# Patient Record
Sex: MALE | Race: WHITE | NOT HISPANIC OR LATINO | Employment: OTHER | URBAN - NONMETROPOLITAN AREA
[De-identification: names, ages, dates, MRNs, and addresses within clinical notes are randomized per-mention and may not be internally consistent; named-entity substitution may affect disease eponyms.]

---

## 2023-04-04 ENCOUNTER — TELEPHONE (OUTPATIENT)
Dept: PRIMARY CARE | Facility: CLINIC | Age: 88
End: 2023-04-04
Payer: MEDICARE

## 2023-04-04 RX ORDER — ROSUVASTATIN CALCIUM 40 MG/1
40 TABLET, COATED ORAL DAILY
COMMUNITY
End: 2023-05-08 | Stop reason: SDUPTHER

## 2023-04-04 RX ORDER — PANTOPRAZOLE SODIUM 40 MG/1
TABLET, DELAYED RELEASE ORAL 2 TIMES DAILY
COMMUNITY
Start: 2022-02-21 | End: 2023-09-20 | Stop reason: ALTCHOICE

## 2023-04-04 RX ORDER — TORSEMIDE 20 MG/1
1 TABLET ORAL DAILY
COMMUNITY
Start: 2023-01-01 | End: 2023-05-08

## 2023-04-04 RX ORDER — APIXABAN 2.5 MG/1
2.5 TABLET, FILM COATED ORAL 2 TIMES DAILY
COMMUNITY
End: 2023-09-20 | Stop reason: SDUPTHER

## 2023-04-04 RX ORDER — TAMSULOSIN HYDROCHLORIDE 0.4 MG/1
CAPSULE ORAL
COMMUNITY
Start: 2023-01-01 | End: 2023-04-06 | Stop reason: ALTCHOICE

## 2023-04-04 RX ORDER — POLYETHYLENE GLYCOL 3350 17 G/17G
POWDER, FOR SOLUTION ORAL
COMMUNITY
Start: 2020-07-09 | End: 2023-09-20 | Stop reason: ALTCHOICE

## 2023-04-04 RX ORDER — FERROUS SULFATE 325(65) MG
1 TABLET ORAL
COMMUNITY
Start: 2022-02-21 | End: 2023-09-20 | Stop reason: ALTCHOICE

## 2023-04-04 RX ORDER — METOPROLOL SUCCINATE 25 MG/1
1 TABLET, EXTENDED RELEASE ORAL DAILY
COMMUNITY
End: 2023-05-08 | Stop reason: SDUPTHER

## 2023-04-04 RX ORDER — COLCHICINE 0.6 MG/1
CAPSULE ORAL
COMMUNITY
Start: 2023-02-08 | End: 2023-04-04

## 2023-04-04 RX ORDER — LISINOPRIL 10 MG/1
1 TABLET ORAL
COMMUNITY
End: 2023-05-08 | Stop reason: SDUPTHER

## 2023-04-04 RX ORDER — FUROSEMIDE 40 MG/1
1 TABLET ORAL DAILY
COMMUNITY
End: 2023-04-06 | Stop reason: ALTCHOICE

## 2023-04-04 NOTE — TELEPHONE ENCOUNTER
Transition of Care    Inpatient facility: [facility]  Discharge diagnosis: Syncope and Collapse  Discharged to: Home  Discharge date: 4/3/23  Initial Call date: 4/4/23  Spoke with patient/caregiver: Caregiver                                                                     Do you need assistance  visits prior to your PCP visit: No  Home health care ordered: No  Have you been contacted by home care and have a start of care date: No  Are you taking medications as prescribed at discharge: No    Referral to [facility] Pharmacist: No  Patient advised to bring all medications to PCP follow-up appointment.  Patient advised to follow discharge instructions until provider follow-up.  TCM visit date: 4/6/23  TCM provider visit with: Beronica Adames MD

## 2023-04-05 ENCOUNTER — DOCUMENTATION (OUTPATIENT)
Dept: CARE COORDINATION | Facility: CLINIC | Age: 88
End: 2023-04-05
Payer: MEDICARE

## 2023-04-06 ENCOUNTER — OFFICE VISIT (OUTPATIENT)
Dept: PRIMARY CARE | Facility: CLINIC | Age: 88
End: 2023-04-06
Payer: MEDICARE

## 2023-04-06 VITALS
HEART RATE: 52 BPM | WEIGHT: 173.2 LBS | SYSTOLIC BLOOD PRESSURE: 118 MMHG | TEMPERATURE: 97.3 F | DIASTOLIC BLOOD PRESSURE: 68 MMHG | OXYGEN SATURATION: 98 % | BODY MASS INDEX: 27.13 KG/M2

## 2023-04-06 DIAGNOSIS — I25.83 CORONARY ARTERY DISEASE DUE TO LIPID RICH PLAQUE: ICD-10-CM

## 2023-04-06 DIAGNOSIS — I50.9 OTHER CONGESTIVE HEART FAILURE (MULTI): ICD-10-CM

## 2023-04-06 DIAGNOSIS — Z78.9 ALCOHOL USE: ICD-10-CM

## 2023-04-06 DIAGNOSIS — R55 SYNCOPE, UNSPECIFIED SYNCOPE TYPE: Primary | ICD-10-CM

## 2023-04-06 DIAGNOSIS — I48.11 LONGSTANDING PERSISTENT ATRIAL FIBRILLATION (MULTI): ICD-10-CM

## 2023-04-06 DIAGNOSIS — D46.4 REFRACTORY ANEMIA, UNSPECIFIED (MULTI): ICD-10-CM

## 2023-04-06 DIAGNOSIS — N18.31 STAGE 3A CHRONIC KIDNEY DISEASE (MULTI): ICD-10-CM

## 2023-04-06 DIAGNOSIS — I25.10 CORONARY ARTERY DISEASE DUE TO LIPID RICH PLAQUE: ICD-10-CM

## 2023-04-06 PROBLEM — I48.91 ATRIAL FIBRILLATION (MULTI): Status: ACTIVE | Noted: 2023-04-06

## 2023-04-06 PROBLEM — N40.0 BPH (BENIGN PROSTATIC HYPERPLASIA): Status: ACTIVE | Noted: 2023-04-06

## 2023-04-06 PROBLEM — I10 HYPERTENSION: Status: ACTIVE | Noted: 2023-04-06

## 2023-04-06 PROBLEM — Z95.810 AICD (AUTOMATIC CARDIOVERTER/DEFIBRILLATOR) PRESENT: Status: ACTIVE | Noted: 2023-04-06

## 2023-04-06 PROBLEM — E87.6 HYPOKALEMIA: Status: ACTIVE | Noted: 2023-04-06

## 2023-04-06 PROBLEM — N18.30 CKD (CHRONIC KIDNEY DISEASE), STAGE III (MULTI): Status: ACTIVE | Noted: 2023-04-06

## 2023-04-06 PROBLEM — I47.20 VENTRICULAR TACHYCARDIA (MULTI): Status: ACTIVE | Noted: 2023-04-06

## 2023-04-06 PROBLEM — I63.511 ACUTE RIGHT MCA STROKE (MULTI): Status: ACTIVE | Noted: 2023-04-06

## 2023-04-06 PROCEDURE — 99496 TRANSJ CARE MGMT HIGH F2F 7D: CPT | Performed by: INTERNAL MEDICINE

## 2023-04-06 PROCEDURE — 1160F RVW MEDS BY RX/DR IN RCRD: CPT | Performed by: INTERNAL MEDICINE

## 2023-04-06 PROCEDURE — 1036F TOBACCO NON-USER: CPT | Performed by: INTERNAL MEDICINE

## 2023-04-06 PROCEDURE — 1159F MED LIST DOCD IN RCRD: CPT | Performed by: INTERNAL MEDICINE

## 2023-04-06 PROCEDURE — 3074F SYST BP LT 130 MM HG: CPT | Performed by: INTERNAL MEDICINE

## 2023-04-06 PROCEDURE — 3078F DIAST BP <80 MM HG: CPT | Performed by: INTERNAL MEDICINE

## 2023-04-06 ASSESSMENT — ENCOUNTER SYMPTOMS
BLOOD IN STOOL: 0
SORE THROAT: 0
DIZZINESS: 0
ARTHRALGIAS: 0
COUGH: 0
DIARRHEA: 0
DIFFICULTY URINATING: 0
PALPITATIONS: 0
BRUISES/BLEEDS EASILY: 0
FATIGUE: 0
ABDOMINAL PAIN: 0
WHEEZING: 0
FEVER: 0
HEADACHES: 0
UNEXPECTED WEIGHT CHANGE: 0
SINUS PAIN: 0
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
## 2023-04-17 ENCOUNTER — PATIENT OUTREACH (OUTPATIENT)
Dept: CARE COORDINATION | Facility: CLINIC | Age: 88
End: 2023-04-17
Payer: MEDICARE

## 2023-05-08 ENCOUNTER — LAB (OUTPATIENT)
Dept: LAB | Facility: LAB | Age: 88
End: 2023-05-08
Payer: MEDICARE

## 2023-05-08 ENCOUNTER — OFFICE VISIT (OUTPATIENT)
Dept: PRIMARY CARE | Facility: CLINIC | Age: 88
End: 2023-05-08
Payer: MEDICARE

## 2023-05-08 VITALS
TEMPERATURE: 97.4 F | HEART RATE: 74 BPM | WEIGHT: 169.6 LBS | DIASTOLIC BLOOD PRESSURE: 62 MMHG | BODY MASS INDEX: 26.56 KG/M2 | SYSTOLIC BLOOD PRESSURE: 110 MMHG | OXYGEN SATURATION: 95 %

## 2023-05-08 DIAGNOSIS — I10 HYPERTENSION, UNSPECIFIED TYPE: ICD-10-CM

## 2023-05-08 DIAGNOSIS — Z79.899 HIGH RISK MEDICATION USE: ICD-10-CM

## 2023-05-08 DIAGNOSIS — I50.9 OTHER CONGESTIVE HEART FAILURE (MULTI): ICD-10-CM

## 2023-05-08 DIAGNOSIS — Z95.810 AICD (AUTOMATIC CARDIOVERTER/DEFIBRILLATOR) PRESENT: ICD-10-CM

## 2023-05-08 DIAGNOSIS — I48.11 LONGSTANDING PERSISTENT ATRIAL FIBRILLATION (MULTI): ICD-10-CM

## 2023-05-08 DIAGNOSIS — E87.6 HYPOKALEMIA: ICD-10-CM

## 2023-05-08 DIAGNOSIS — R55 SYNCOPE, UNSPECIFIED SYNCOPE TYPE: ICD-10-CM

## 2023-05-08 DIAGNOSIS — I25.83 CORONARY ARTERY DISEASE DUE TO LIPID RICH PLAQUE: ICD-10-CM

## 2023-05-08 DIAGNOSIS — I10 HYPERTENSION, UNSPECIFIED TYPE: Primary | ICD-10-CM

## 2023-05-08 DIAGNOSIS — I25.10 CORONARY ARTERY DISEASE DUE TO LIPID RICH PLAQUE: ICD-10-CM

## 2023-05-08 LAB
ANION GAP IN SER/PLAS: 15 MMOL/L (ref 10–20)
BASOPHILS (10*3/UL) IN BLOOD BY AUTOMATED COUNT: 0.01 X10E9/L (ref 0–0.1)
BASOPHILS/100 LEUKOCYTES IN BLOOD BY AUTOMATED COUNT: 0.3 % (ref 0–2)
CALCIUM (MG/DL) IN SER/PLAS: 8.8 MG/DL (ref 8.6–10.3)
CARBON DIOXIDE, TOTAL (MMOL/L) IN SER/PLAS: 27 MMOL/L (ref 21–32)
CHLORIDE (MMOL/L) IN SER/PLAS: 102 MMOL/L (ref 98–107)
CREATININE (MG/DL) IN SER/PLAS: 1.26 MG/DL (ref 0.5–1.3)
EOSINOPHILS (10*3/UL) IN BLOOD BY AUTOMATED COUNT: 0.19 X10E9/L (ref 0–0.4)
EOSINOPHILS/100 LEUKOCYTES IN BLOOD BY AUTOMATED COUNT: 5.3 % (ref 0–6)
ERYTHROCYTE DISTRIBUTION WIDTH (RATIO) BY AUTOMATED COUNT: 17.6 % (ref 11.5–14.5)
ERYTHROCYTE MEAN CORPUSCULAR HEMOGLOBIN CONCENTRATION (G/DL) BY AUTOMATED: 30.1 G/DL (ref 32–36)
ERYTHROCYTE MEAN CORPUSCULAR VOLUME (FL) BY AUTOMATED COUNT: 86 FL (ref 80–100)
ERYTHROCYTES (10*6/UL) IN BLOOD BY AUTOMATED COUNT: 4.44 X10E12/L (ref 4.5–5.9)
GFR MALE: 56 ML/MIN/1.73M2
GLUCOSE (MG/DL) IN SER/PLAS: 88 MG/DL (ref 74–99)
HEMATOCRIT (%) IN BLOOD BY AUTOMATED COUNT: 38.2 % (ref 41–52)
HEMOGLOBIN (G/DL) IN BLOOD: 11.5 G/DL (ref 13.5–17.5)
IMMATURE GRANULOCYTES/100 LEUKOCYTES IN BLOOD BY AUTOMATED COUNT: 0.3 % (ref 0–0.9)
LEUKOCYTES (10*3/UL) IN BLOOD BY AUTOMATED COUNT: 3.6 X10E9/L (ref 4.4–11.3)
LYMPHOCYTES (10*3/UL) IN BLOOD BY AUTOMATED COUNT: 0.6 X10E9/L (ref 0.8–3)
LYMPHOCYTES/100 LEUKOCYTES IN BLOOD BY AUTOMATED COUNT: 16.7 % (ref 13–44)
MAGNESIUM (MG/DL) IN SER/PLAS: 1.99 MG/DL (ref 1.6–2.4)
MONOCYTES (10*3/UL) IN BLOOD BY AUTOMATED COUNT: 0.42 X10E9/L (ref 0.05–0.8)
MONOCYTES/100 LEUKOCYTES IN BLOOD BY AUTOMATED COUNT: 11.7 % (ref 2–10)
NEUTROPHILS (10*3/UL) IN BLOOD BY AUTOMATED COUNT: 2.37 X10E9/L (ref 1.6–5.5)
NEUTROPHILS/100 LEUKOCYTES IN BLOOD BY AUTOMATED COUNT: 65.7 % (ref 40–80)
PLATELETS (10*3/UL) IN BLOOD AUTOMATED COUNT: 132 X10E9/L (ref 150–450)
POTASSIUM (MMOL/L) IN SER/PLAS: 3.8 MMOL/L (ref 3.5–5.3)
SODIUM (MMOL/L) IN SER/PLAS: 140 MMOL/L (ref 136–145)
UREA NITROGEN (MG/DL) IN SER/PLAS: 14 MG/DL (ref 6–23)

## 2023-05-08 PROCEDURE — 1036F TOBACCO NON-USER: CPT | Performed by: INTERNAL MEDICINE

## 2023-05-08 PROCEDURE — 3074F SYST BP LT 130 MM HG: CPT | Performed by: INTERNAL MEDICINE

## 2023-05-08 PROCEDURE — 83735 ASSAY OF MAGNESIUM: CPT

## 2023-05-08 PROCEDURE — 85025 COMPLETE CBC W/AUTO DIFF WBC: CPT

## 2023-05-08 PROCEDURE — 36415 COLL VENOUS BLD VENIPUNCTURE: CPT

## 2023-05-08 PROCEDURE — 1160F RVW MEDS BY RX/DR IN RCRD: CPT | Performed by: INTERNAL MEDICINE

## 2023-05-08 PROCEDURE — 80048 BASIC METABOLIC PNL TOTAL CA: CPT

## 2023-05-08 PROCEDURE — 1159F MED LIST DOCD IN RCRD: CPT | Performed by: INTERNAL MEDICINE

## 2023-05-08 PROCEDURE — 99214 OFFICE O/P EST MOD 30 MIN: CPT | Performed by: INTERNAL MEDICINE

## 2023-05-08 PROCEDURE — 3078F DIAST BP <80 MM HG: CPT | Performed by: INTERNAL MEDICINE

## 2023-05-08 RX ORDER — TORSEMIDE 20 MG/1
TABLET ORAL
Qty: 90 TABLET | Refills: 0 | Status: SHIPPED | OUTPATIENT
Start: 2023-05-08 | End: 2023-09-20 | Stop reason: SDUPTHER

## 2023-05-08 RX ORDER — METOPROLOL SUCCINATE 25 MG/1
25 TABLET, EXTENDED RELEASE ORAL DAILY
Qty: 90 TABLET | Refills: 0 | Status: SHIPPED | OUTPATIENT
Start: 2023-05-08 | End: 2023-09-20 | Stop reason: SDUPTHER

## 2023-05-08 RX ORDER — LISINOPRIL 2.5 MG/1
TABLET ORAL
Qty: 19 TABLET | Refills: 0 | Status: SHIPPED | OUTPATIENT
Start: 2023-05-08 | End: 2023-09-20 | Stop reason: SDUPTHER

## 2023-05-08 RX ORDER — ROSUVASTATIN CALCIUM 40 MG/1
40 TABLET, COATED ORAL DAILY
Qty: 90 TABLET | Refills: 0 | Status: SHIPPED | OUTPATIENT
Start: 2023-05-08 | End: 2023-09-20 | Stop reason: SDUPTHER

## 2023-05-08 RX ORDER — LISINOPRIL 10 MG/1
10 TABLET ORAL DAILY
Qty: 90 TABLET | Refills: 0 | Status: SHIPPED | OUTPATIENT
Start: 2023-05-08 | End: 2023-09-20 | Stop reason: ALTCHOICE

## 2023-05-08 ASSESSMENT — ENCOUNTER SYMPTOMS
COUGH: 0
ARTHRALGIAS: 0
BRUISES/BLEEDS EASILY: 0
WHEEZING: 0
BLOOD IN STOOL: 0
DIZZINESS: 0
UNEXPECTED WEIGHT CHANGE: 0
HEADACHES: 0
FEVER: 0
DIFFICULTY URINATING: 0
DIARRHEA: 1
ABDOMINAL PAIN: 0
FATIGUE: 0
SYNCOPE: 1
SINUS PAIN: 0
PALPITATIONS: 0
SORE THROAT: 0

## 2023-05-08 NOTE — PROGRESS NOTES
Subjective   Patient ID: Gilmer Quiñonez is a 87 y.o. male who presents for Syncope and Diarrhea.    -Syncope  No recurrence continue with increased fluid intake  Follow-up CBC and BMP magnesium  -Defibrillator placement awaiting device check  -Patient counseled about alcohol abstinence possible passing out after drinking  - Coronary artery disease multiple stent placement dilated cardiomyopathy status post AICD compensated  - History of stroke no residual effect  -Benign prostatic hypertrophy symptoms are controlled  - Chronic kidney disease improved due to underlying diuretic use  -Chronic atrial fibrillation rate controlled continue with metoprolol and apixaban  -Hypercholesteremia continue with Crestor daily  Follow-up CBC BMP in 4 weeks reevaluate patient in 4 weeks      Syncope  Pertinent negatives include no abdominal pain, chest pain, dizziness, fever, headaches or palpitations.   Diarrhea   Pertinent negatives include no abdominal pain, arthralgias, coughing, fever or headaches.          Review of Systems   Constitutional:  Negative for fatigue, fever and unexpected weight change.   HENT:  Negative for congestion, ear discharge, ear pain, mouth sores, sinus pain and sore throat.    Eyes:  Negative for visual disturbance.   Respiratory:  Negative for cough and wheezing.    Cardiovascular:  Positive for syncope. Negative for chest pain, palpitations and leg swelling.   Gastrointestinal:  Positive for diarrhea. Negative for abdominal pain and blood in stool.   Genitourinary:  Negative for difficulty urinating.   Musculoskeletal:  Negative for arthralgias.   Skin:  Negative for rash.   Neurological:  Negative for dizziness and headaches.   Hematological:  Does not bruise/bleed easily.   Psychiatric/Behavioral:  Negative for behavioral problems.    All other systems reviewed and are negative.      Objective   Lab Results   Component Value Date    HGBA1C 5.8 10/31/2020      /62   Pulse 74   Temp 36.3 °C  (97.4 °F)   Wt 76.9 kg (169 lb 9.6 oz)   SpO2 95%   BMI 26.56 kg/m²     Lab Results   Component Value Date    WBC 3.6 (L) 05/08/2023    HGB 11.5 (L) 05/08/2023    HCT 38.2 (L) 05/08/2023     (L) 05/08/2023    CHOL 127 10/31/2020    TRIG 60 10/31/2020    HDL 42.1 10/31/2020    ALT 9 (L) 04/01/2023    AST 13 04/01/2023     05/08/2023    K 3.8 05/08/2023     05/08/2023    CREATININE 1.26 05/08/2023    BUN 14 05/08/2023    CO2 27 05/08/2023    TSH 1.09 02/10/2018    INR 1.2 (H) 02/28/2022    HGBA1C 5.8 10/31/2020     par   Physical Exam  Vitals and nursing note reviewed.   Constitutional:       Appearance: Normal appearance.   HENT:      Head: Normocephalic.      Nose: Nose normal.   Eyes:      Conjunctiva/sclera: Conjunctivae normal.      Pupils: Pupils are equal, round, and reactive to light.   Cardiovascular:      Rate and Rhythm: Regular rhythm.   Pulmonary:      Effort: Pulmonary effort is normal.      Breath sounds: Normal breath sounds.   Abdominal:      General: Abdomen is flat.      Palpations: Abdomen is soft.   Musculoskeletal:      Cervical back: Neck supple.   Skin:     General: Skin is warm.   Neurological:      General: No focal deficit present.      Mental Status: He is oriented to person, place, and time.   Psychiatric:         Mood and Affect: Mood normal.         Assessment/Plan   Gilmer was seen today for syncope and diarrhea.  Diagnoses and all orders for this visit:  Hypertension, unspecified type (Primary)  -     Magnesium; Future  Hypokalemia  -     Magnesium; Future  Syncope, unspecified syncope type  Longstanding persistent atrial fibrillation (CMS/HCC)  AICD (automatic cardioverter/defibrillator) present  -     Basic metabolic panel; Future  High risk medication use  -     Basic metabolic panel; Future  -     Magnesium; Future  -     CBC and Auto Differential; Future  Other orders  -     Follow Up In Primary Care  -     Follow Up In Primary Care; Future   -Syncope  No  recurrence continue with increased fluid intake  Follow-up CBC and BMP magnesium  -Defibrillator placement awaiting device check  -Patient counseled about alcohol abstinence possible passing out after drinking  - Coronary artery disease multiple stent placement dilated cardiomyopathy status post AICD compensated  - History of stroke no residual effect  -Benign prostatic hypertrophy symptoms are controlled  - Chronic kidney disease improved due to underlying diuretic use  -Chronic atrial fibrillation rate controlled continue with metoprolol and apixaban  -Hypercholesteremia continue with Crestor daily  Follow-up CBC BMP in 4 weeks reevaluate patient in 4 weeks

## 2023-05-31 ENCOUNTER — OFFICE VISIT (OUTPATIENT)
Dept: PRIMARY CARE | Facility: CLINIC | Age: 88
End: 2023-05-31
Payer: MEDICARE

## 2023-05-31 VITALS
OXYGEN SATURATION: 96 % | WEIGHT: 168.4 LBS | SYSTOLIC BLOOD PRESSURE: 138 MMHG | DIASTOLIC BLOOD PRESSURE: 68 MMHG | HEART RATE: 71 BPM | BODY MASS INDEX: 26.38 KG/M2 | TEMPERATURE: 97.7 F

## 2023-05-31 DIAGNOSIS — I10 PRIMARY HYPERTENSION: ICD-10-CM

## 2023-05-31 DIAGNOSIS — I48.11 LONGSTANDING PERSISTENT ATRIAL FIBRILLATION (MULTI): ICD-10-CM

## 2023-05-31 DIAGNOSIS — R04.0 EPISTAXIS: Primary | ICD-10-CM

## 2023-05-31 PROCEDURE — 1036F TOBACCO NON-USER: CPT | Performed by: NURSE PRACTITIONER

## 2023-05-31 PROCEDURE — 1160F RVW MEDS BY RX/DR IN RCRD: CPT | Performed by: NURSE PRACTITIONER

## 2023-05-31 PROCEDURE — 99213 OFFICE O/P EST LOW 20 MIN: CPT | Performed by: NURSE PRACTITIONER

## 2023-05-31 PROCEDURE — 3075F SYST BP GE 130 - 139MM HG: CPT | Performed by: NURSE PRACTITIONER

## 2023-05-31 PROCEDURE — 3078F DIAST BP <80 MM HG: CPT | Performed by: NURSE PRACTITIONER

## 2023-05-31 PROCEDURE — 1159F MED LIST DOCD IN RCRD: CPT | Performed by: NURSE PRACTITIONER

## 2023-06-04 ASSESSMENT — ENCOUNTER SYMPTOMS
SINUS PAIN: 0
FEVER: 0
DIFFICULTY URINATING: 0
COUGH: 0
SORE THROAT: 0
FATIGUE: 0
UNEXPECTED WEIGHT CHANGE: 0
BRUISES/BLEEDS EASILY: 0
ABDOMINAL PAIN: 0
WHEEZING: 0
BLOOD IN STOOL: 0
HEADACHES: 0
ARTHRALGIAS: 0
PALPITATIONS: 0
DIZZINESS: 0

## 2023-06-04 NOTE — PROGRESS NOTES
Subjective   Patient ID: Gilmer Quiñonez is a 87 y.o. male who presents for Epistaxis (Nose Bleed) and Coughing Up Blood (Has been taking eliquis every 3 days instead of every day).    History of atrial fib, prescribed Eliquis 2.5 mg twice a day. During the last month he has had a couple episodes of bloody nose after blowing his nose hard. States the bleeding stopped right away. He had a cough and had some blood streaks. Symptoms have now resolved. He stopped taking his Eliquis for a couple days, then resume taking every third day. Advised to resume Eliquis as prescribed twice a day every day. Advised to use saline nasal spray to keep nasal passages moisturized. Instructed to call the office if symptoms worsen or do not improve.         Review of Systems   Constitutional:  Negative for fatigue, fever and unexpected weight change.   HENT:  Positive for nosebleeds. Negative for congestion, ear discharge, ear pain, mouth sores, sinus pain and sore throat.    Eyes:  Negative for visual disturbance.   Respiratory:  Negative for cough and wheezing.    Cardiovascular:  Negative for chest pain, palpitations and leg swelling.   Gastrointestinal:  Negative for abdominal pain and blood in stool.   Genitourinary:  Negative for difficulty urinating.   Musculoskeletal:  Negative for arthralgias.   Skin:  Negative for rash.   Neurological:  Negative for dizziness and headaches.   Hematological:  Does not bruise/bleed easily.   Psychiatric/Behavioral:  Negative for behavioral problems.    All other systems reviewed and are negative.      Objective   /68   Pulse 71   Temp 36.5 °C (97.7 °F)   Wt 76.4 kg (168 lb 6.4 oz)   SpO2 96%   BMI 26.38 kg/m²     Physical Exam  Vitals and nursing note reviewed.   Constitutional:       Appearance: Normal appearance.   HENT:      Head: Normocephalic.      Nose: Nose normal.   Eyes:      Conjunctiva/sclera: Conjunctivae normal.      Pupils: Pupils are equal, round, and reactive to  light.   Cardiovascular:      Rate and Rhythm: Regular rhythm.   Pulmonary:      Effort: Pulmonary effort is normal.      Breath sounds: Normal breath sounds.   Abdominal:      General: Abdomen is flat.      Palpations: Abdomen is soft.   Musculoskeletal:      Cervical back: Neck supple.   Skin:     General: Skin is warm.   Neurological:      General: No focal deficit present.      Mental Status: He is oriented to person, place, and time.   Psychiatric:         Mood and Affect: Mood normal.         Assessment/Plan   Problem List Items Addressed This Visit          Circulatory    Atrial fibrillation (CMS/HCC)     Controlled. Continue current medications.  Resume Eliquis 2.5 mg twice a day every day         Hypertension     Controlled. Continue current medications.  -Low fat/cholesterol, low sodium, low carbohydrate diet  -Exercise as tolerated 150 minutes/week, increase activity slowly  -Maintain healthy weight          Other Visit Diagnoses       Epistaxis    -  Primary  Start saline nasal spray  -Call the office if symptoms worsen or do not improve

## 2023-06-08 ENCOUNTER — OFFICE VISIT (OUTPATIENT)
Dept: PRIMARY CARE | Facility: CLINIC | Age: 88
End: 2023-06-08
Payer: MEDICARE

## 2023-06-08 VITALS
WEIGHT: 168.4 LBS | OXYGEN SATURATION: 97 % | BODY MASS INDEX: 26.38 KG/M2 | DIASTOLIC BLOOD PRESSURE: 70 MMHG | HEART RATE: 45 BPM | SYSTOLIC BLOOD PRESSURE: 110 MMHG | TEMPERATURE: 97.1 F

## 2023-06-08 DIAGNOSIS — D46.4 REFRACTORY ANEMIA, UNSPECIFIED (MULTI): Primary | ICD-10-CM

## 2023-06-08 DIAGNOSIS — I48.11 LONGSTANDING PERSISTENT ATRIAL FIBRILLATION (MULTI): ICD-10-CM

## 2023-06-08 DIAGNOSIS — I10 HYPERTENSION, UNSPECIFIED TYPE: ICD-10-CM

## 2023-06-08 DIAGNOSIS — R04.0 RECURRENT EPISTAXIS: ICD-10-CM

## 2023-06-08 DIAGNOSIS — E87.6 HYPOKALEMIA: ICD-10-CM

## 2023-06-08 PROCEDURE — 99214 OFFICE O/P EST MOD 30 MIN: CPT | Performed by: INTERNAL MEDICINE

## 2023-06-08 PROCEDURE — 3078F DIAST BP <80 MM HG: CPT | Performed by: INTERNAL MEDICINE

## 2023-06-08 PROCEDURE — 1159F MED LIST DOCD IN RCRD: CPT | Performed by: INTERNAL MEDICINE

## 2023-06-08 PROCEDURE — 3074F SYST BP LT 130 MM HG: CPT | Performed by: INTERNAL MEDICINE

## 2023-06-08 PROCEDURE — 1036F TOBACCO NON-USER: CPT | Performed by: INTERNAL MEDICINE

## 2023-06-08 PROCEDURE — 1160F RVW MEDS BY RX/DR IN RCRD: CPT | Performed by: INTERNAL MEDICINE

## 2023-06-08 RX ORDER — BACITRACIN 500 [USP'U]/G
OINTMENT TOPICAL
COMMUNITY
Start: 2023-06-07 | End: 2023-09-20 | Stop reason: ALTCHOICE

## 2023-06-08 ASSESSMENT — ENCOUNTER SYMPTOMS
BRUISES/BLEEDS EASILY: 0
WHEEZING: 0
DIARRHEA: 0
COUGH: 0
BLOOD IN STOOL: 0
ABDOMINAL PAIN: 0
FATIGUE: 0
SORE THROAT: 0
DIFFICULTY URINATING: 0
ARTHRALGIAS: 0
HEADACHES: 0
UNEXPECTED WEIGHT CHANGE: 0
DIZZINESS: 0
SINUS PAIN: 0
FEVER: 0
PALPITATIONS: 0
HYPERTENSION: 1

## 2023-06-08 NOTE — PROGRESS NOTES
Subjective   Patient ID: Gilmer Quiñonez is a 87 y.o. male who presents for Hypertension and Epistaxis (Nose Bleed) (Continues, still only taking eliquis every 3 days because of the nosebleeds).    -Recurrent epistaxis patient using nasal spray normal saline using local ointment without improvement  Patient continue with conservative measures  Referred patient to otolaryngology  -Atrial fibrillation rate controlled continue with current medication  - Hypertension controlled continue with lisinopril 2.5 mg daily  -Hypercholesterolemia controlled continue with Crestor continue low-fat diet  - Patient counseled about alcohol abstinence  -History of defibrillator placement compensated  - History of stroke no recurrence continue with current medication  Reevaluate patient in 3 months    Hypertension  Pertinent negatives include no chest pain, headaches or palpitations.   Epistaxis (Nose Bleed)            Review of Systems   Constitutional:  Negative for fatigue, fever and unexpected weight change.   HENT:  Positive for nosebleeds. Negative for congestion, ear discharge, ear pain, mouth sores, sinus pain and sore throat.    Eyes:  Negative for visual disturbance.   Respiratory:  Negative for cough and wheezing.    Cardiovascular:  Negative for chest pain, palpitations and leg swelling.   Gastrointestinal:  Negative for abdominal pain, blood in stool and diarrhea.   Genitourinary:  Negative for difficulty urinating.   Musculoskeletal:  Negative for arthralgias.   Skin:  Negative for rash.   Neurological:  Negative for dizziness and headaches.   Hematological:  Does not bruise/bleed easily.   Psychiatric/Behavioral:  Negative for behavioral problems.    All other systems reviewed and are negative.      Objective   Lab Results   Component Value Date    HGBA1C 5.8 10/31/2020      /70   Pulse (!) 45   Temp 36.2 °C (97.1 °F)   Wt 76.4 kg (168 lb 6.4 oz)   SpO2 97%   BMI 26.38 kg/m²     Physical Exam  Vitals and  nursing note reviewed.   Constitutional:       Appearance: Normal appearance.   HENT:      Head: Normocephalic.      Nose: Nose normal.   Eyes:      Conjunctiva/sclera: Conjunctivae normal.      Pupils: Pupils are equal, round, and reactive to light.   Cardiovascular:      Rate and Rhythm: Regular rhythm.   Pulmonary:      Effort: Pulmonary effort is normal.      Breath sounds: Normal breath sounds.   Abdominal:      General: Abdomen is flat.      Palpations: Abdomen is soft.   Musculoskeletal:      Cervical back: Neck supple.   Skin:     General: Skin is warm.   Neurological:      General: No focal deficit present.      Mental Status: He is oriented to person, place, and time.   Psychiatric:         Mood and Affect: Mood normal.         Assessment/Plan   Gilmer was seen today for hypertension and epistaxis (nose bleed).  Diagnoses and all orders for this visit:  Refractory anemia, unspecified (CMS/HCC) (Primary)  Hypokalemia  Hypertension, unspecified type  Longstanding persistent atrial fibrillation (CMS/HCC)  Recurrent epistaxis  -     Referral to ENT; Future  Other orders  -     Follow Up In Primary Care   -Recurrent epistaxis patient using nasal spray normal saline using local ointment without improvement  Patient continue with conservative measures  Referred patient to otolaryngology  -Atrial fibrillation rate controlled continue with current medication  - Hypertension controlled continue with lisinopril 2.5 mg daily  -Hypercholesterolemia controlled continue with Crestor continue low-fat diet  - Patient counseled about alcohol abstinence  -History of defibrillator placement compensated  - History of stroke no recurrence continue with current medication  Reevaluate patient in 3 months

## 2023-09-11 ENCOUNTER — APPOINTMENT (OUTPATIENT)
Dept: PRIMARY CARE | Facility: CLINIC | Age: 88
End: 2023-09-11
Payer: MEDICARE

## 2023-09-13 ENCOUNTER — APPOINTMENT (OUTPATIENT)
Dept: PRIMARY CARE | Facility: CLINIC | Age: 88
End: 2023-09-13
Payer: MEDICARE

## 2023-09-20 ENCOUNTER — LAB (OUTPATIENT)
Dept: LAB | Facility: LAB | Age: 88
End: 2023-09-20
Payer: MEDICARE

## 2023-09-20 ENCOUNTER — OFFICE VISIT (OUTPATIENT)
Dept: PRIMARY CARE | Facility: CLINIC | Age: 88
End: 2023-09-20
Payer: MEDICARE

## 2023-09-20 VITALS
HEART RATE: 76 BPM | WEIGHT: 160.2 LBS | OXYGEN SATURATION: 98 % | SYSTOLIC BLOOD PRESSURE: 126 MMHG | BODY MASS INDEX: 25.9 KG/M2 | TEMPERATURE: 97.2 F | DIASTOLIC BLOOD PRESSURE: 66 MMHG

## 2023-09-20 DIAGNOSIS — Z79.899 HIGH RISK MEDICATION USE: ICD-10-CM

## 2023-09-20 DIAGNOSIS — I50.9 OTHER CONGESTIVE HEART FAILURE (MULTI): ICD-10-CM

## 2023-09-20 DIAGNOSIS — Z79.899 HIGH RISK MEDICATION USE: Primary | ICD-10-CM

## 2023-09-20 DIAGNOSIS — I10 HYPERTENSION, UNSPECIFIED TYPE: ICD-10-CM

## 2023-09-20 DIAGNOSIS — I25.10 CORONARY ARTERY DISEASE DUE TO LIPID RICH PLAQUE: ICD-10-CM

## 2023-09-20 DIAGNOSIS — I25.83 CORONARY ARTERY DISEASE DUE TO LIPID RICH PLAQUE: ICD-10-CM

## 2023-09-20 LAB
ANION GAP IN SER/PLAS: 13 MMOL/L (ref 10–20)
BASOPHILS (10*3/UL) IN BLOOD BY AUTOMATED COUNT: 0.02 X10E9/L (ref 0–0.1)
BASOPHILS/100 LEUKOCYTES IN BLOOD BY AUTOMATED COUNT: 0.4 % (ref 0–2)
CALCIUM (MG/DL) IN SER/PLAS: 8.9 MG/DL (ref 8.6–10.3)
CARBON DIOXIDE, TOTAL (MMOL/L) IN SER/PLAS: 26 MMOL/L (ref 21–32)
CHLORIDE (MMOL/L) IN SER/PLAS: 103 MMOL/L (ref 98–107)
CREATININE (MG/DL) IN SER/PLAS: 1.21 MG/DL (ref 0.5–1.3)
EOSINOPHILS (10*3/UL) IN BLOOD BY AUTOMATED COUNT: 0.16 X10E9/L (ref 0–0.4)
EOSINOPHILS/100 LEUKOCYTES IN BLOOD BY AUTOMATED COUNT: 3 % (ref 0–6)
ERYTHROCYTE DISTRIBUTION WIDTH (RATIO) BY AUTOMATED COUNT: 19.1 % (ref 11.5–14.5)
ERYTHROCYTE MEAN CORPUSCULAR HEMOGLOBIN CONCENTRATION (G/DL) BY AUTOMATED: 30 G/DL (ref 32–36)
ERYTHROCYTE MEAN CORPUSCULAR VOLUME (FL) BY AUTOMATED COUNT: 84 FL (ref 80–100)
ERYTHROCYTES (10*6/UL) IN BLOOD BY AUTOMATED COUNT: 4.7 X10E12/L (ref 4.5–5.9)
GFR MALE: 58 ML/MIN/1.73M2
GLUCOSE (MG/DL) IN SER/PLAS: 121 MG/DL (ref 74–99)
HEMATOCRIT (%) IN BLOOD BY AUTOMATED COUNT: 39.7 % (ref 41–52)
HEMOGLOBIN (G/DL) IN BLOOD: 11.9 G/DL (ref 13.5–17.5)
IMMATURE GRANULOCYTES/100 LEUKOCYTES IN BLOOD BY AUTOMATED COUNT: 0.2 % (ref 0–0.9)
LEUKOCYTES (10*3/UL) IN BLOOD BY AUTOMATED COUNT: 5.3 X10E9/L (ref 4.4–11.3)
LYMPHOCYTES (10*3/UL) IN BLOOD BY AUTOMATED COUNT: 0.68 X10E9/L (ref 0.8–3)
LYMPHOCYTES/100 LEUKOCYTES IN BLOOD BY AUTOMATED COUNT: 12.8 % (ref 13–44)
MONOCYTES (10*3/UL) IN BLOOD BY AUTOMATED COUNT: 0.31 X10E9/L (ref 0.05–0.8)
MONOCYTES/100 LEUKOCYTES IN BLOOD BY AUTOMATED COUNT: 5.8 % (ref 2–10)
NEUTROPHILS (10*3/UL) IN BLOOD BY AUTOMATED COUNT: 4.15 X10E9/L (ref 1.6–5.5)
NEUTROPHILS/100 LEUKOCYTES IN BLOOD BY AUTOMATED COUNT: 77.8 % (ref 40–80)
PLATELETS (10*3/UL) IN BLOOD AUTOMATED COUNT: 180 X10E9/L (ref 150–450)
POTASSIUM (MMOL/L) IN SER/PLAS: 4.2 MMOL/L (ref 3.5–5.3)
SODIUM (MMOL/L) IN SER/PLAS: 138 MMOL/L (ref 136–145)
UREA NITROGEN (MG/DL) IN SER/PLAS: 14 MG/DL (ref 6–23)

## 2023-09-20 PROCEDURE — 3078F DIAST BP <80 MM HG: CPT | Performed by: INTERNAL MEDICINE

## 2023-09-20 PROCEDURE — 1036F TOBACCO NON-USER: CPT | Performed by: INTERNAL MEDICINE

## 2023-09-20 PROCEDURE — 36415 COLL VENOUS BLD VENIPUNCTURE: CPT

## 2023-09-20 PROCEDURE — 1160F RVW MEDS BY RX/DR IN RCRD: CPT | Performed by: INTERNAL MEDICINE

## 2023-09-20 PROCEDURE — 99214 OFFICE O/P EST MOD 30 MIN: CPT | Performed by: INTERNAL MEDICINE

## 2023-09-20 PROCEDURE — 85025 COMPLETE CBC W/AUTO DIFF WBC: CPT

## 2023-09-20 PROCEDURE — 1159F MED LIST DOCD IN RCRD: CPT | Performed by: INTERNAL MEDICINE

## 2023-09-20 PROCEDURE — 80048 BASIC METABOLIC PNL TOTAL CA: CPT

## 2023-09-20 PROCEDURE — 3074F SYST BP LT 130 MM HG: CPT | Performed by: INTERNAL MEDICINE

## 2023-09-20 RX ORDER — TORSEMIDE 20 MG/1
20 TABLET ORAL DAILY
Qty: 90 TABLET | Refills: 1 | Status: SHIPPED | OUTPATIENT
Start: 2023-09-20 | End: 2024-04-02 | Stop reason: SDUPTHER

## 2023-09-20 RX ORDER — ROSUVASTATIN CALCIUM 40 MG/1
40 TABLET, COATED ORAL DAILY
Qty: 90 TABLET | Refills: 1 | Status: SHIPPED | OUTPATIENT
Start: 2023-09-20

## 2023-09-20 RX ORDER — LISINOPRIL 2.5 MG/1
TABLET ORAL
Qty: 90 TABLET | Refills: 1 | Status: SHIPPED | OUTPATIENT
Start: 2023-09-20 | End: 2023-11-07 | Stop reason: ALTCHOICE

## 2023-09-20 RX ORDER — METOPROLOL SUCCINATE 25 MG/1
25 TABLET, EXTENDED RELEASE ORAL DAILY
Qty: 90 TABLET | Refills: 1 | Status: SHIPPED | OUTPATIENT
Start: 2023-09-20

## 2023-09-20 ASSESSMENT — ENCOUNTER SYMPTOMS
DIFFICULTY URINATING: 0
BLOOD IN STOOL: 0
ABDOMINAL PAIN: 0
UNEXPECTED WEIGHT CHANGE: 0
DIZZINESS: 0
DIARRHEA: 0
PALPITATIONS: 0
BRUISES/BLEEDS EASILY: 0
WHEEZING: 0
SORE THROAT: 0
SINUS PAIN: 0
COUGH: 0
HEADACHES: 0
FATIGUE: 1
FEVER: 0
HYPERTENSION: 1
ARTHRALGIAS: 0

## 2023-09-20 NOTE — PROGRESS NOTES
Subjective   Patient ID: Gilmer Quiñonez is a 87 y.o. male who presents for Hypertension, Hyperlipidemia, Flu Vaccine (decline), and Fatigue (Would like BW done).    -Patient came in today with his daughter fatigue and tiredness not taking his iron supplement as recommended  Underlying history of anemia  Need to follow-up CBC recommended again to start on iron tablet daily patient agreed daughter also is going to bring him his medication  -Atrial fibrillation rate controlled continue with current medication  - Hypertension controlled continue with lisinopril 2.5 mg daily continue with current meds obtain BMP  -Hypercholesterolemia controlled continue with Crestor continue low-fat diet  - Patient counseled about alcohol abstinence  -History of defibrillator placement compensated.  - History of stroke no recurrence continue with current medication  Reevaluate patient in 3 months  Patient declined flu vaccine      Hypertension  Pertinent negatives include no chest pain, headaches or palpitations.   Hyperlipidemia  Pertinent negatives include no chest pain.   Fatigue  Associated symptoms include fatigue. Pertinent negatives include no abdominal pain, arthralgias, chest pain, congestion, coughing, fever, headaches, rash or sore throat.          Review of Systems   Constitutional:  Positive for fatigue. Negative for fever and unexpected weight change.   HENT:  Negative for congestion, ear discharge, ear pain, mouth sores, sinus pain and sore throat.    Eyes:  Negative for visual disturbance.   Respiratory:  Negative for cough and wheezing.    Cardiovascular:  Negative for chest pain, palpitations and leg swelling.   Gastrointestinal:  Negative for abdominal pain, blood in stool and diarrhea.   Genitourinary:  Negative for difficulty urinating.   Musculoskeletal:  Negative for arthralgias.   Skin:  Negative for rash.   Neurological:  Negative for dizziness and headaches.   Hematological:  Does not bruise/bleed easily.    Psychiatric/Behavioral:  Negative for behavioral problems.    All other systems reviewed and are negative.      Objective   Lab Results   Component Value Date    HGBA1C 6.3 (H) 06/11/2021      /66   Pulse 76   Temp 36.2 °C (97.2 °F)   Wt 72.7 kg (160 lb 3.2 oz)   SpO2 98%   BMI 25.90 kg/m²   Lab Results   Component Value Date    WBC 3.7 (L) 06/04/2023    HGB 11.6 (L) 06/04/2023    HCT 37.9 (L) 06/04/2023     (L) 06/04/2023    CHOL 127 10/31/2020    TRIG 60 10/31/2020    HDL 42.1 10/31/2020    ALT 9 (L) 04/01/2023    AST 13 04/01/2023     05/08/2023    K 3.8 05/08/2023     05/08/2023    CREATININE 1.26 05/08/2023    BUN 14 05/08/2023    CO2 27 05/08/2023    TSH 1.09 02/10/2018    INR 1.2 (H) 06/04/2023    HGBA1C 6.3 (H) 06/11/2021     par   Physical Exam  Vitals and nursing note reviewed.   Constitutional:       Appearance: Normal appearance.   HENT:      Head: Normocephalic.      Nose: Nose normal.   Eyes:      Conjunctiva/sclera: Conjunctivae normal.      Pupils: Pupils are equal, round, and reactive to light.   Cardiovascular:      Rate and Rhythm: Regular rhythm.   Pulmonary:      Effort: Pulmonary effort is normal.      Breath sounds: Normal breath sounds.   Abdominal:      General: Abdomen is flat.      Palpations: Abdomen is soft.   Musculoskeletal:      Cervical back: Neck supple.   Skin:     General: Skin is warm.   Neurological:      General: No focal deficit present.      Mental Status: He is oriented to person, place, and time.   Psychiatric:         Mood and Affect: Mood normal.         Assessment/Plan   Gilmer was seen today for hypertension, hyperlipidemia, flu vaccine and fatigue.  Diagnoses and all orders for this visit:  High risk medication use (Primary)  -     CBC and Auto Differential; Future  Hypertension, unspecified type  -     lisinopril 2.5 mg tablet; TAKE 1 TABLET BY MOUTH ONCE DAILY.  -     metoprolol succinate XL (Toprol-XL) 25 mg 24 hr tablet; Take 1  tablet (25 mg) by mouth once daily.  -     Basic metabolic panel; Future  Coronary artery disease due to lipid rich plaque  -     apixaban (Eliquis) 2.5 mg tablet; Take 1 tablet (2.5 mg) by mouth 2 times a day.  -     rosuvastatin (Crestor) 40 mg tablet; Take 1 tablet (40 mg) by mouth once daily.  Other congestive heart failure (CMS/HCC)  -     torsemide (Demadex) 20 mg tablet; Take 1 tablet (20 mg) by mouth once daily.  Other orders  -     Follow Up In Primary Care - Established; Future   -Patient came in today with his daughter fatigue and tiredness not taking his iron supplement as recommended  Underlying history of anemia  Need to follow-up CBC recommended again to start on iron tablet daily patient agreed daughter also is going to bring him his medication  -Atrial fibrillation rate controlled continue with current medication  - Hypertension controlled continue with lisinopril 2.5 mg daily continue with current meds obtain BMP  -Hypercholesterolemia controlled continue with Crestor continue low-fat diet  - Patient counseled about alcohol abstinence  -History of defibrillator placement compensated.  - History of stroke no recurrence continue with current medication  Reevaluate patient in 3 months  Patient declined flu vaccine

## 2023-10-30 ENCOUNTER — LAB (OUTPATIENT)
Dept: LAB | Facility: LAB | Age: 88
End: 2023-10-30
Payer: MEDICARE

## 2023-10-30 ENCOUNTER — OFFICE VISIT (OUTPATIENT)
Dept: PRIMARY CARE | Facility: CLINIC | Age: 88
End: 2023-10-30
Payer: MEDICARE

## 2023-10-30 VITALS
TEMPERATURE: 96.9 F | WEIGHT: 166 LBS | SYSTOLIC BLOOD PRESSURE: 146 MMHG | HEART RATE: 81 BPM | OXYGEN SATURATION: 100 % | BODY MASS INDEX: 26.84 KG/M2 | DIASTOLIC BLOOD PRESSURE: 72 MMHG

## 2023-10-30 DIAGNOSIS — R55 SYNCOPE, UNSPECIFIED SYNCOPE TYPE: ICD-10-CM

## 2023-10-30 DIAGNOSIS — I48.11 LONGSTANDING PERSISTENT ATRIAL FIBRILLATION (MULTI): ICD-10-CM

## 2023-10-30 DIAGNOSIS — Z95.810 AICD (AUTOMATIC CARDIOVERTER/DEFIBRILLATOR) PRESENT: ICD-10-CM

## 2023-10-30 DIAGNOSIS — Z79.899 HIGH RISK MEDICATION USE: ICD-10-CM

## 2023-10-30 DIAGNOSIS — I10 HYPERTENSION, UNSPECIFIED TYPE: ICD-10-CM

## 2023-10-30 DIAGNOSIS — R55 SYNCOPE, UNSPECIFIED SYNCOPE TYPE: Primary | ICD-10-CM

## 2023-10-30 DIAGNOSIS — N18.31 STAGE 3A CHRONIC KIDNEY DISEASE (MULTI): ICD-10-CM

## 2023-10-30 LAB
ANION GAP SERPL CALC-SCNC: 12 MMOL/L (ref 10–20)
BASOPHILS # BLD AUTO: 0.03 X10*3/UL (ref 0–0.1)
BASOPHILS NFR BLD AUTO: 0.6 %
BUN SERPL-MCNC: 10 MG/DL (ref 6–23)
CALCIUM SERPL-MCNC: 9.3 MG/DL (ref 8.6–10.3)
CHLORIDE SERPL-SCNC: 102 MMOL/L (ref 98–107)
CO2 SERPL-SCNC: 27 MMOL/L (ref 21–32)
CREAT SERPL-MCNC: 1.08 MG/DL (ref 0.5–1.3)
EOSINOPHIL # BLD AUTO: 0.25 X10*3/UL (ref 0–0.4)
EOSINOPHIL NFR BLD AUTO: 4.7 %
ERYTHROCYTE [DISTWIDTH] IN BLOOD BY AUTOMATED COUNT: 19.1 % (ref 11.5–14.5)
GFR SERPL CREATININE-BSD FRML MDRD: 67 ML/MIN/1.73M*2
GLUCOSE SERPL-MCNC: 78 MG/DL (ref 74–99)
HCT VFR BLD AUTO: 41.6 % (ref 41–52)
HGB BLD-MCNC: 12.4 G/DL (ref 13.5–17.5)
IMM GRANULOCYTES # BLD AUTO: 0.01 X10*3/UL (ref 0–0.5)
IMM GRANULOCYTES NFR BLD AUTO: 0.2 % (ref 0–0.9)
LYMPHOCYTES # BLD AUTO: 0.86 X10*3/UL (ref 0.8–3)
LYMPHOCYTES NFR BLD AUTO: 16 %
MCH RBC QN AUTO: 25.8 PG (ref 26–34)
MCHC RBC AUTO-ENTMCNC: 29.8 G/DL (ref 32–36)
MCV RBC AUTO: 87 FL (ref 80–100)
MONOCYTES # BLD AUTO: 0.63 X10*3/UL (ref 0.05–0.8)
MONOCYTES NFR BLD AUTO: 11.7 %
NEUTROPHILS # BLD AUTO: 3.59 X10*3/UL (ref 1.6–5.5)
NEUTROPHILS NFR BLD AUTO: 66.8 %
NRBC BLD-RTO: 0 /100 WBCS (ref 0–0)
PLATELET # BLD AUTO: 165 X10*3/UL (ref 150–450)
PMV BLD AUTO: 10.4 FL (ref 7.5–11.5)
POTASSIUM SERPL-SCNC: 4.6 MMOL/L (ref 3.5–5.3)
RBC # BLD AUTO: 4.81 X10*6/UL (ref 4.5–5.9)
SODIUM SERPL-SCNC: 136 MMOL/L (ref 136–145)
WBC # BLD AUTO: 5.4 X10*3/UL (ref 4.4–11.3)

## 2023-10-30 PROCEDURE — 99214 OFFICE O/P EST MOD 30 MIN: CPT | Performed by: INTERNAL MEDICINE

## 2023-10-30 PROCEDURE — 1036F TOBACCO NON-USER: CPT | Performed by: INTERNAL MEDICINE

## 2023-10-30 PROCEDURE — 3077F SYST BP >= 140 MM HG: CPT | Performed by: INTERNAL MEDICINE

## 2023-10-30 PROCEDURE — 3078F DIAST BP <80 MM HG: CPT | Performed by: INTERNAL MEDICINE

## 2023-10-30 PROCEDURE — 1159F MED LIST DOCD IN RCRD: CPT | Performed by: INTERNAL MEDICINE

## 2023-10-30 PROCEDURE — 1160F RVW MEDS BY RX/DR IN RCRD: CPT | Performed by: INTERNAL MEDICINE

## 2023-10-30 PROCEDURE — 36415 COLL VENOUS BLD VENIPUNCTURE: CPT

## 2023-10-30 ASSESSMENT — ENCOUNTER SYMPTOMS
FATIGUE: 0
UNEXPECTED WEIGHT CHANGE: 0
PALPITATIONS: 0
FEVER: 0
WHEEZING: 0
SORE THROAT: 0
ARTHRALGIAS: 0
DIARRHEA: 0
DIZZINESS: 1
HEADACHES: 0
BLOOD IN STOOL: 0
SINUS PAIN: 0
COUGH: 0
DIFFICULTY URINATING: 0
BRUISES/BLEEDS EASILY: 0
ABDOMINAL PAIN: 0

## 2023-10-30 NOTE — PROGRESS NOTES
Subjective   Patient ID: Gilmer Quiñonez is a 88 y.o. male who presents for Dizziness (Has been holding metoprolol for a couple of weeks on daughters orders), Hypotension, and Shoulder Pain (Right shoulder joint pain).    -Patient comes today for evaluation passed out in the parking lot for almost 10 minutes patient comes today for evaluation patient was directed by his daughter to stop the Toprol  Patient blood pressure controlled heart rate controlled need to follow-up CBC and BMP  Reasons to resume Toprol 25 mg daily  Referred patient to cardiology and [place] again for reevaluation and further recommendation, regarding recurrent falls  Patient is at increased fluid intake  -Atrial fibrillation rate controlled continue with current medication  - Hypertension controlled continue with lisinopril 2.5 mg daily continue with current meds obtain BMP  -Hypercholesterolemia controlled continue with Crestor continue low-fat diet  - Patient counseled about alcohol abstinence, as recommended  -History of defibrillator placement compensated.  - History of stroke no recurrence continue with current medication  Follow-up 4 weeks follow-up lab results closely    Dizziness  Pertinent negatives include no abdominal pain, arthralgias, chest pain, congestion, coughing, fatigue, fever, headaches, rash or sore throat.   Shoulder Pain   Pertinent negatives include no fever.          Review of Systems   Constitutional:  Negative for fatigue, fever and unexpected weight change.   HENT:  Negative for congestion, ear discharge, ear pain, mouth sores, sinus pain and sore throat.    Eyes:  Negative for visual disturbance.   Respiratory:  Negative for cough and wheezing.    Cardiovascular:  Negative for chest pain, palpitations and leg swelling.   Gastrointestinal:  Negative for abdominal pain, blood in stool and diarrhea.   Genitourinary:  Negative for difficulty urinating.   Musculoskeletal:  Negative for arthralgias.   Skin:  Negative  for rash.   Neurological:  Positive for dizziness. Negative for headaches.   Hematological:  Does not bruise/bleed easily.   Psychiatric/Behavioral:  Negative for behavioral problems.    All other systems reviewed and are negative.      Objective   Lab Results   Component Value Date    HGBA1C 6.3 (H) 06/11/2021      /72   Pulse 81   Temp 36.1 °C (96.9 °F)   Wt 75.3 kg (166 lb)   SpO2 100%   BMI 26.84 kg/m²   Lab Results   Component Value Date    WBC 5.3 09/20/2023    HGB 11.9 (L) 09/20/2023    HCT 39.7 (L) 09/20/2023     09/20/2023    CHOL 127 10/31/2020    TRIG 60 10/31/2020    HDL 42.1 10/31/2020    ALT 9 (L) 04/01/2023    AST 13 04/01/2023     09/20/2023    K 4.2 09/20/2023     09/20/2023    CREATININE 1.21 09/20/2023    BUN 14 09/20/2023    CO2 26 09/20/2023    TSH 1.09 02/10/2018    INR 1.2 (H) 06/04/2023    HGBA1C 6.3 (H) 06/11/2021     par   Physical Exam  Vitals and nursing note reviewed.   Constitutional:       Appearance: Normal appearance.   HENT:      Head: Normocephalic.      Nose: Nose normal.   Eyes:      Conjunctiva/sclera: Conjunctivae normal.      Pupils: Pupils are equal, round, and reactive to light.   Cardiovascular:      Rate and Rhythm: Regular rhythm.   Pulmonary:      Effort: Pulmonary effort is normal.      Breath sounds: Normal breath sounds.   Abdominal:      General: Abdomen is flat.      Palpations: Abdomen is soft.   Musculoskeletal:      Cervical back: Neck supple.   Skin:     General: Skin is warm.   Neurological:      General: No focal deficit present.      Mental Status: He is oriented to person, place, and time.   Psychiatric:         Mood and Affect: Mood normal.         Assessment/Plan   Gilmer was seen today for dizziness, hypotension and shoulder pain.  Diagnoses and all orders for this visit:  Syncope, unspecified syncope type (Primary)  -     Referral to Cardiology; Future  -     Basic metabolic panel; Future  High risk medication use  -     CBC  and Auto Differential; Future  Hypertension, unspecified type  AICD (automatic cardioverter/defibrillator) present  Longstanding persistent atrial fibrillation (CMS/HCC)  Stage 3a chronic kidney disease (CMS/HCC)  Other orders  -     Follow Up In Primary Care - Established; Future   -Patient comes today for evaluation passed out in the parking lot for almost 10 minutes patient comes today for evaluation patient was directed by his daughter to stop the Toprol  Patient blood pressure controlled heart rate controlled need to follow-up CBC and BMP  Reasons to resume Toprol 25 mg daily  Referred patient to cardiology and [place] again for reevaluation and further recommendation, regarding recurrent falls  Patient is at increased fluid intake  -Atrial fibrillation rate controlled continue with current medication  - Hypertension controlled continue with lisinopril 2.5 mg daily continue with current meds obtain BMP  -Hypercholesterolemia controlled continue with Crestor continue low-fat diet  - Patient counseled about alcohol abstinence, as recommended  -History of defibrillator placement compensated.  - History of stroke no recurrence continue with current medication  Follow-up 4 weeks follow-up lab results closely

## 2023-11-07 ENCOUNTER — OFFICE VISIT (OUTPATIENT)
Dept: CARDIOLOGY | Facility: CLINIC | Age: 88
End: 2023-11-07
Payer: MEDICARE

## 2023-11-07 VITALS
DIASTOLIC BLOOD PRESSURE: 68 MMHG | BODY MASS INDEX: 26.06 KG/M2 | WEIGHT: 161.2 LBS | HEART RATE: 63 BPM | SYSTOLIC BLOOD PRESSURE: 114 MMHG | OXYGEN SATURATION: 95 %

## 2023-11-07 DIAGNOSIS — I25.10 CORONARY ARTERY DISEASE INVOLVING NATIVE HEART WITHOUT ANGINA PECTORIS, UNSPECIFIED VESSEL OR LESION TYPE: ICD-10-CM

## 2023-11-07 DIAGNOSIS — E78.5 HYPERLIPIDEMIA, UNSPECIFIED HYPERLIPIDEMIA TYPE: ICD-10-CM

## 2023-11-07 DIAGNOSIS — I50.9 CONGESTIVE HEART FAILURE, UNSPECIFIED HF CHRONICITY, UNSPECIFIED HEART FAILURE TYPE (MULTI): Primary | ICD-10-CM

## 2023-11-07 PROCEDURE — 3074F SYST BP LT 130 MM HG: CPT | Performed by: NURSE PRACTITIONER

## 2023-11-07 PROCEDURE — 3078F DIAST BP <80 MM HG: CPT | Performed by: NURSE PRACTITIONER

## 2023-11-07 PROCEDURE — 1160F RVW MEDS BY RX/DR IN RCRD: CPT | Performed by: NURSE PRACTITIONER

## 2023-11-07 PROCEDURE — 99214 OFFICE O/P EST MOD 30 MIN: CPT | Performed by: NURSE PRACTITIONER

## 2023-11-07 PROCEDURE — 1159F MED LIST DOCD IN RCRD: CPT | Performed by: NURSE PRACTITIONER

## 2023-11-07 PROCEDURE — 1036F TOBACCO NON-USER: CPT | Performed by: NURSE PRACTITIONER

## 2023-11-07 ASSESSMENT — ENCOUNTER SYMPTOMS: DEPRESSION: 0

## 2023-11-07 NOTE — PROGRESS NOTES
Referred by Dr. Josefa finley. provider found for Dizziness (Patient has been holding metoprolol succ and has noticed improvement with sx.  Sx started two week ago and he did have an isolated episode of a fall d/t the dizziness.)     History Of Present Illness:    Gilmer Quiñonez is a very pleasant 88 year old gentleman with a history HTN, HLD, atrial fibrillation (Eliquis), ischemic cardiomyopathy LVEF 35-40% s/p ICD, and CAD, he is here for a follow up visit. The patient is seen in collaboration with Dr. Porter. Mr. Quiñonez has had several admission for GI bleed since I have seen him last. He has been losing weight. Complains of increased dizziness, his daughter states he has fallen. He stopped the Metoprolol and Lisinopril. States he has been feeling better. Heart rate has been low. Denies chest pain, has shortness of breath on exertion. He states he has to stop frequently with activity. He complains of general fatigue.     Review of Systems   Constitutional: Positive for malaise/fatigue.   HENT: Negative.     Eyes: Negative.    Cardiovascular:  Positive for dyspnea on exertion.   Respiratory:  Positive for shortness of breath.    Endocrine: Negative.    Hematologic/Lymphatic: Negative.    Skin: Negative.    Musculoskeletal:  Positive for muscle weakness and myalgias.   Gastrointestinal: Negative.    Neurological: Negative.    Psychiatric/Behavioral: Negative.          Past Medical History:  He has a past medical history of Personal history of pneumonia (recurrent) (07/15/2021).    Past Surgical History:  He has a past surgical history that includes Knee surgery (02/06/2015); Appendectomy (02/06/2015); Other surgical history (01/06/2020); Other surgical history (01/06/2020); Coronary artery bypass graft (07/17/2017); CT angio abdomen pelvis w and or wo IV IV contrast (2/28/2022); CT angio head w and wo IV contrast (4/1/2023); and CT angio neck w and wo IV contrast (4/1/2023).      Social History:  He reports that he  quit smoking about 25 years ago. His smoking use included cigarettes. He has a 49.00 pack-year smoking history. He has never used smokeless tobacco. He reports current alcohol use of about 1.0 standard drink of alcohol per week. He reports that he does not use drugs.    Family History:  No family history on file.     Allergies:  Penicillins, Rivaroxaban, and Morphine    Outpatient Medications:  Current Outpatient Medications   Medication Instructions    apixaban (ELIQUIS) 2.5 mg, oral, 2 times daily    lisinopril 2.5 mg tablet TAKE 1 TABLET BY MOUTH ONCE DAILY.    metoprolol succinate XL (TOPROL-XL) 25 mg, oral, Daily    rosuvastatin (CRESTOR) 40 mg, oral, Daily    torsemide (DEMADEX) 20 mg, oral, Daily        Last Recorded Vitals:  Vitals:    11/07/23 1412   BP: 114/68   Pulse: 63   SpO2: 95%   Weight: 73.1 kg (161 lb 3.2 oz)       Physical Exam:  Physical Exam  Vitals reviewed.   HENT:      Head: Normocephalic.      Nose: Nose normal.   Eyes:      Pupils: Pupils are equal, round, and reactive to light.   Cardiovascular:      Rate and Rhythm: Normal rate and regular rhythm.   Pulmonary:      Effort: Pulmonary effort is normal.      Breath sounds: Normal breath sounds.   Abdominal:      General: Abdomen is flat.      Palpations: Abdomen is soft.   Musculoskeletal:         General: Normal range of motion.      Cervical back: Normal range of motion.   Skin:     General: Skin is warm and dry.   Neurological:      General: No focal deficit present.      Mental Status: He is alert and oriented to person, place, and time.   Psychiatric:         Mood and Affect: Mood normal.            Last Labs:  CBC -  Lab Results   Component Value Date    WBC 5.4 10/30/2023    HGB 12.4 (L) 10/30/2023    HCT 41.6 10/30/2023    MCV 87 10/30/2023     10/30/2023       CMP -  Lab Results   Component Value Date    CALCIUM 9.3 10/30/2023    PHOS 2.4 (L) 07/07/2021    PROT 6.8 04/01/2023    ALBUMIN 4.1 04/01/2023    AST 13 04/01/2023     ALT 9 (L) 04/01/2023    ALKPHOS 63 04/01/2023    BILITOT 1.2 04/01/2023       LIPID PANEL -   Lab Results   Component Value Date    CHOL 127 10/31/2020    TRIG 60 10/31/2020    HDL 42.1 10/31/2020    CHHDL 3.0 10/31/2020    LDLF 73 10/31/2020    VLDL 12 10/31/2020       RENAL FUNCTION PANEL -   Lab Results   Component Value Date    GLUCOSE 78 10/30/2023     10/30/2023    K 4.6 10/30/2023     10/30/2023    CO2 27 10/30/2023    ANIONGAP 12 10/30/2023    BUN 10 10/30/2023    CREATININE 1.08 10/30/2023    GFRMALE 58 (A) 09/20/2023    CALCIUM 9.3 10/30/2023    PHOS 2.4 (L) 07/07/2021    ALBUMIN 4.1 04/01/2023        Lab Results   Component Value Date     (H) 04/02/2023    HGBA1C 6.3 (H) 06/11/2021       Last Cardiology Tests:  ECG:    Echo:  Echocardiogram 2/21/2022  1. The left ventricular systolic function is moderately decreased with a 35-40% estimated ejection fraction.  2. Spectral Doppler shows an impaired relaxation pattern of left ventricular diastolic filling.  3. The left atrium is severely dilated.  4. The right atrium is moderately dilated.  5. RVSP within normal limits.  6. Compared with study from 7/9/2021, no significant change.  7. There is global hypokinesis of the left ventricle with minor regional variations.    Echocardiogram 12/17/2019   1. The left ventricular systolic function is moderately decreased with a 35-40%  estimated ejection fraction.  2. Basal inferoseptal segment and basal inferior segment are abnormal.  3. Spectral Doppler shows an impaired relaxation pattern of left ventricular  diastolic filling.  4. The left atrium is severely dilated.  5. The right atrium is moderately dilated.  6. There is mild mitral and tricuspid regurgitation    Echo 2/10/18:   1. The left ventricular systolic function is moderately to severely decreased  with a 30-35% estimated ejection fraction.   2. Abnormal septal motion consistent with left bundle branch block.   3. Spectral Doppler shows  an abnormal pattern of left ventricular diastolic  filling.   4. The left atrium is severely dilated.   5. The left ventricular cavity size is moderately dilated.   6. The patient is in atrial fibrillation which may influence the estimate of  left ventricular function and transvalvular flows.        Ejection Fractions:  LVEF 35-40%    Cath:  Cath 2/12/18:   1. Triple vessel coronary artery disease with proximal left anterior descending  involvement.   2. Patent left internal mammary artery graft to the ramus intermedius coronary  artery.   3. Patent right internal mammary artery graft to the left anterior descending  coronary artery.   4. Severe in-stent restenosis in the saphenous vein graft to right posterior  descending coronary artery, at the distal anastomotic site, treated with balloon  angioplasty and stenting with a 2.5 mm x 12 mm zotarolimus-eluting stent,  postdilated to 2.75 mm at high atmospheric pressure.       Stress Test:    Cardiac Imaging:          Assessment/Plan   Mr. Quiñonez is a very pleasant 88 year old gentleman with HTN, CAD/CABG/ICM, a.fib/flutter s/p ablation in April 2015, s/p ICD, CVA (Eliquis 2020) and PCI to SVG-RCA in February 2018 in setting of VT, he was recently admitted with a syncopal event. He has had several admissions for a GI bleed we did discuss a Watchman device, the patient declined. He complains of dizziness he stopped his medications, blood pressure and heart rate are well controlled today. He has been losing weight. The Metoprolol will be restarted at 12.5 mg daily. The Torsemide will be decreased to a half a tablet daily. Recommended following up with pulmonary, patient declined.      Plan:  -call with any questions   -start Metoprolol ER 12.5 mg daily   -continue Crestor   -stop the Lisinopril   -decrease the Torsemide to a half a tablet daily   -follow up in one year         Sherine Marie, APRN-CNP

## 2023-11-07 NOTE — PATIENT INSTRUCTIONS
STOP THE LISINOPRIL   DECREASE THE METOPROLOL TO A HALF A TABLET A DAY   CALL WITH ANY QUESTIONS   DECREASE THE TORSEMIDE TO A HALF A TABLET A DAY

## 2023-11-08 ASSESSMENT — ENCOUNTER SYMPTOMS
SHORTNESS OF BREATH: 1
DYSPNEA ON EXERTION: 1
EYES NEGATIVE: 1
MYALGIAS: 1
ENDOCRINE NEGATIVE: 1
GASTROINTESTINAL NEGATIVE: 1
PSYCHIATRIC NEGATIVE: 1
NEUROLOGICAL NEGATIVE: 1
HEMATOLOGIC/LYMPHATIC NEGATIVE: 1

## 2023-11-30 DIAGNOSIS — I47.20 VENTRICULAR TACHYCARDIA (MULTI): Primary | ICD-10-CM

## 2023-12-05 ENCOUNTER — HOSPITAL ENCOUNTER (OUTPATIENT)
Dept: CARDIOLOGY | Facility: HOSPITAL | Age: 88
Discharge: HOME | End: 2023-12-05
Payer: MEDICARE

## 2023-12-05 DIAGNOSIS — I47.20 VENTRICULAR TACHYCARDIA (MULTI): ICD-10-CM

## 2023-12-05 PROCEDURE — 93290 INTERROG DEV EVAL ICPMS IP: CPT | Performed by: NURSE PRACTITIONER

## 2023-12-05 PROCEDURE — 93283 PRGRMG EVAL IMPLANTABLE DFB: CPT | Performed by: NURSE PRACTITIONER

## 2023-12-05 PROCEDURE — 93283 PRGRMG EVAL IMPLANTABLE DFB: CPT

## 2024-04-01 ENCOUNTER — LAB (OUTPATIENT)
Dept: LAB | Facility: LAB | Age: 89
End: 2024-04-01
Payer: MEDICARE

## 2024-04-01 ENCOUNTER — OFFICE VISIT (OUTPATIENT)
Dept: PRIMARY CARE | Facility: CLINIC | Age: 89
End: 2024-04-01
Payer: MEDICARE

## 2024-04-01 ENCOUNTER — HOSPITAL ENCOUNTER (OUTPATIENT)
Dept: RADIOLOGY | Facility: HOSPITAL | Age: 89
Discharge: HOME | End: 2024-04-01
Payer: MEDICARE

## 2024-04-01 VITALS
TEMPERATURE: 98.6 F | DIASTOLIC BLOOD PRESSURE: 74 MMHG | HEIGHT: 65 IN | SYSTOLIC BLOOD PRESSURE: 130 MMHG | OXYGEN SATURATION: 95 % | HEART RATE: 76 BPM | BODY MASS INDEX: 28.79 KG/M2 | WEIGHT: 172.8 LBS

## 2024-04-01 DIAGNOSIS — I48.11 LONGSTANDING PERSISTENT ATRIAL FIBRILLATION (MULTI): ICD-10-CM

## 2024-04-01 DIAGNOSIS — R53.83 OTHER FATIGUE: ICD-10-CM

## 2024-04-01 DIAGNOSIS — I50.9 CONGESTIVE HEART FAILURE, UNSPECIFIED HF CHRONICITY, UNSPECIFIED HEART FAILURE TYPE (MULTI): ICD-10-CM

## 2024-04-01 DIAGNOSIS — J61 PNEUMOCONIOSIS DUE TO ASBESTOS AND OTHER MINERAL FIBERS (MULTI): ICD-10-CM

## 2024-04-01 DIAGNOSIS — R53.83 OTHER FATIGUE: Primary | ICD-10-CM

## 2024-04-01 DIAGNOSIS — D46.4 REFRACTORY ANEMIA, UNSPECIFIED (MULTI): ICD-10-CM

## 2024-04-01 DIAGNOSIS — Z79.899 HIGH RISK MEDICATION USE: ICD-10-CM

## 2024-04-01 DIAGNOSIS — I50.23 ACUTE ON CHRONIC SYSTOLIC (CONGESTIVE) HEART FAILURE (MULTI): ICD-10-CM

## 2024-04-01 DIAGNOSIS — Z00.00 ROUTINE GENERAL MEDICAL EXAMINATION AT HEALTH CARE FACILITY: ICD-10-CM

## 2024-04-01 DIAGNOSIS — M19.90 ARTHRITIS: ICD-10-CM

## 2024-04-01 DIAGNOSIS — N18.31 STAGE 3A CHRONIC KIDNEY DISEASE (MULTI): ICD-10-CM

## 2024-04-01 DIAGNOSIS — Z13.89 SCREENING FOR MULTIPLE CONDITIONS: ICD-10-CM

## 2024-04-01 LAB
ANION GAP SERPL CALC-SCNC: 12 MMOL/L (ref 10–20)
BASOPHILS # BLD AUTO: 0.03 X10*3/UL (ref 0–0.1)
BASOPHILS NFR BLD AUTO: 0.6 %
BNP SERPL-MCNC: 670 PG/ML (ref 0–99)
BUN SERPL-MCNC: 10 MG/DL (ref 6–23)
CALCIUM SERPL-MCNC: 9.4 MG/DL (ref 8.6–10.3)
CHLORIDE SERPL-SCNC: 102 MMOL/L (ref 98–107)
CO2 SERPL-SCNC: 26 MMOL/L (ref 21–32)
CREAT SERPL-MCNC: 1.13 MG/DL (ref 0.5–1.3)
EGFRCR SERPLBLD CKD-EPI 2021: 63 ML/MIN/1.73M*2
EOSINOPHIL # BLD AUTO: 0.13 X10*3/UL (ref 0–0.4)
EOSINOPHIL NFR BLD AUTO: 2.8 %
ERYTHROCYTE [DISTWIDTH] IN BLOOD BY AUTOMATED COUNT: 15.9 % (ref 11.5–14.5)
GLUCOSE SERPL-MCNC: 83 MG/DL (ref 74–99)
HCT VFR BLD AUTO: 40.1 % (ref 41–52)
HGB BLD-MCNC: 12.3 G/DL (ref 13.5–17.5)
IMM GRANULOCYTES # BLD AUTO: 0.01 X10*3/UL (ref 0–0.5)
IMM GRANULOCYTES NFR BLD AUTO: 0.2 % (ref 0–0.9)
LYMPHOCYTES # BLD AUTO: 0.69 X10*3/UL (ref 0.8–3)
LYMPHOCYTES NFR BLD AUTO: 14.8 %
MAGNESIUM SERPL-MCNC: 2.18 MG/DL (ref 1.6–2.4)
MCH RBC QN AUTO: 26.1 PG (ref 26–34)
MCHC RBC AUTO-ENTMCNC: 30.7 G/DL (ref 32–36)
MCV RBC AUTO: 85 FL (ref 80–100)
MONOCYTES # BLD AUTO: 0.49 X10*3/UL (ref 0.05–0.8)
MONOCYTES NFR BLD AUTO: 10.5 %
NEUTROPHILS # BLD AUTO: 3.3 X10*3/UL (ref 1.6–5.5)
NEUTROPHILS NFR BLD AUTO: 71.1 %
NRBC BLD-RTO: 0 /100 WBCS (ref 0–0)
PLATELET # BLD AUTO: 153 X10*3/UL (ref 150–450)
POTASSIUM SERPL-SCNC: 4.3 MMOL/L (ref 3.5–5.3)
RBC # BLD AUTO: 4.71 X10*6/UL (ref 4.5–5.9)
SODIUM SERPL-SCNC: 136 MMOL/L (ref 136–145)
TSH SERPL-ACNC: 1.07 MIU/L (ref 0.44–3.98)
URATE SERPL-MCNC: 5.7 MG/DL (ref 4–7.5)
WBC # BLD AUTO: 4.7 X10*3/UL (ref 4.4–11.3)

## 2024-04-01 PROCEDURE — 36415 COLL VENOUS BLD VENIPUNCTURE: CPT

## 2024-04-01 PROCEDURE — 84443 ASSAY THYROID STIM HORMONE: CPT

## 2024-04-01 PROCEDURE — 71046 X-RAY EXAM CHEST 2 VIEWS: CPT | Performed by: RADIOLOGY

## 2024-04-01 PROCEDURE — G0439 PPPS, SUBSEQ VISIT: HCPCS | Performed by: INTERNAL MEDICINE

## 2024-04-01 PROCEDURE — 84550 ASSAY OF BLOOD/URIC ACID: CPT

## 2024-04-01 PROCEDURE — 83735 ASSAY OF MAGNESIUM: CPT

## 2024-04-01 PROCEDURE — 83880 ASSAY OF NATRIURETIC PEPTIDE: CPT

## 2024-04-01 PROCEDURE — 71046 X-RAY EXAM CHEST 2 VIEWS: CPT

## 2024-04-01 PROCEDURE — 1160F RVW MEDS BY RX/DR IN RCRD: CPT | Performed by: INTERNAL MEDICINE

## 2024-04-01 PROCEDURE — 99214 OFFICE O/P EST MOD 30 MIN: CPT | Performed by: INTERNAL MEDICINE

## 2024-04-01 PROCEDURE — 1159F MED LIST DOCD IN RCRD: CPT | Performed by: INTERNAL MEDICINE

## 2024-04-01 PROCEDURE — 99397 PER PM REEVAL EST PAT 65+ YR: CPT | Performed by: INTERNAL MEDICINE

## 2024-04-01 PROCEDURE — 3075F SYST BP GE 130 - 139MM HG: CPT | Performed by: INTERNAL MEDICINE

## 2024-04-01 PROCEDURE — 1036F TOBACCO NON-USER: CPT | Performed by: INTERNAL MEDICINE

## 2024-04-01 PROCEDURE — 85025 COMPLETE CBC W/AUTO DIFF WBC: CPT

## 2024-04-01 PROCEDURE — 1170F FXNL STATUS ASSESSED: CPT | Performed by: INTERNAL MEDICINE

## 2024-04-01 PROCEDURE — 3078F DIAST BP <80 MM HG: CPT | Performed by: INTERNAL MEDICINE

## 2024-04-01 PROCEDURE — 80048 BASIC METABOLIC PNL TOTAL CA: CPT

## 2024-04-01 ASSESSMENT — ENCOUNTER SYMPTOMS
ABDOMINAL PAIN: 0
ARTHRALGIAS: 0
SORE THROAT: 0
UNEXPECTED WEIGHT CHANGE: 0
BLOOD IN STOOL: 0
COUGH: 0
DIARRHEA: 0
FATIGUE: 1
BRUISES/BLEEDS EASILY: 0
DIFFICULTY URINATING: 0
WHEEZING: 0
HEADACHES: 0
OCCASIONAL FEELINGS OF UNSTEADINESS: 1
DEPRESSION: 0
DIZZINESS: 0
SINUS PAIN: 0
PALPITATIONS: 0
FEVER: 0
LOSS OF SENSATION IN FEET: 1

## 2024-04-01 ASSESSMENT — ACTIVITIES OF DAILY LIVING (ADL)
TAKING_MEDICATION: INDEPENDENT
MANAGING_FINANCES: INDEPENDENT
GROCERY_SHOPPING: INDEPENDENT
BATHING: INDEPENDENT
DRESSING: INDEPENDENT
DOING_HOUSEWORK: INDEPENDENT

## 2024-04-01 ASSESSMENT — PATIENT HEALTH QUESTIONNAIRE - PHQ9
2. FEELING DOWN, DEPRESSED OR HOPELESS: NOT AT ALL
SUM OF ALL RESPONSES TO PHQ9 QUESTIONS 1 AND 2: 0
1. LITTLE INTEREST OR PLEASURE IN DOING THINGS: NOT AT ALL

## 2024-04-01 NOTE — PATIENT INSTRUCTIONS

## 2024-04-01 NOTE — PROGRESS NOTES
"Subjective   Patient ID: Gilmer Quiñonez is a 88 y.o. male who presents for Medicare Annual Wellness Visit Subsequent (Would like bw. ), Generalized Body Aches (Fatigued. Knee pain.), Gout (Would like uric acid level checked.), and Fall (Knees are locking up. Daughter thinks its time to see ortho again. Did not do his exeresis like he was supposed to when he has his knee replacement. ).    HPI       Review of Systems    Objective   Lab Results   Component Value Date    HGBA1C 6.3 (H) 06/11/2021      /74   Pulse 76   Temp 37 °C (98.6 °F)   Ht 1.651 m (5' 5\")   Wt 78.4 kg (172 lb 12.8 oz)   SpO2 95%   BMI 28.76 kg/m²   Lab Results   Component Value Date    WBC 5.4 10/30/2023    HGB 12.4 (L) 10/30/2023    HCT 41.6 10/30/2023     10/30/2023    CHOL 127 10/31/2020    TRIG 60 10/31/2020    HDL 42.1 10/31/2020    ALT 9 (L) 04/01/2023    AST 13 04/01/2023     10/30/2023    K 4.6 10/30/2023     10/30/2023    CREATININE 1.08 10/30/2023    BUN 10 10/30/2023    CO2 27 10/30/2023    TSH 1.09 02/10/2018    INR 1.2 (H) 06/04/2023    HGBA1C 6.3 (H) 06/11/2021     par   Physical Exam    Assessment/Plan   There are no diagnoses linked to this encounter.   "

## 2024-04-01 NOTE — PROGRESS NOTES
Subjective   Reason for Visit: Gilmer Quiñonez is an 88 y.o. male here for a Medicare Wellness visit.     Past Medical, Surgical, and Family History reviewed and updated in chart.    Reviewed all medications by prescribing practitioner or clinical pharmacist (such as prescriptions, OTCs, herbal therapies and supplements) and documented in the medical record.    Annual preventive visit and Medicare screening  Depression screening reviewed negative  - Vaccines reviewed and up-to-date  - Screening for colon cancer obtained and no need to repeat because of patient age  -Previous blood work reviewed and up-to-date  Advanced directives reviewed and up-to-date    Follow-up  - Fatigue and tiredness need to proceed with thyroid function test and CBC BMP  - Recurrent joint pains in knees and hips also bilateral feet history of gout need to follow-up with uric acid today contact patient with results  -Paroxysmal atrial fibrillation continue Toprol-XL 25 mg continue with Eliquis and lisinopril  Follow-up renal function today  - Hypertension controlled continue with lisinopril 2.5 mg daily continue with current meds obtain BMP  -Hypercholesterolemia controlled continue with Crestor continue low-fat diet  - Patient counseled about alcohol abstinence, as recommended  -History of defibrillator placement compensated.  - History of stroke no recurrence continue with current medication  Follow-up 4 weeks follow-up lab results closely              Fall  Pertinent negatives include no abdominal pain, fever or headaches.       Patient Care Team:  Beronica Adames MD as PCP - General  Beronica Adames MD as PCP - [employer] PCP     Review of Systems   Constitutional:  Positive for fatigue. Negative for fever and unexpected weight change.   HENT:  Negative for congestion, ear discharge, ear pain, mouth sores, sinus pain and sore throat.    Eyes:  Negative for visual disturbance.   Respiratory:  Negative for cough and  "wheezing.    Cardiovascular:  Negative for chest pain, palpitations and leg swelling.   Gastrointestinal:  Negative for abdominal pain, blood in stool and diarrhea.   Genitourinary:  Negative for difficulty urinating.   Musculoskeletal:  Negative for arthralgias.   Skin:  Negative for rash.   Neurological:  Negative for dizziness and headaches.   Hematological:  Does not bruise/bleed easily.   Psychiatric/Behavioral:  Negative for behavioral problems.    All other systems reviewed and are negative.      Objective   Vitals:  /74   Pulse 76   Temp 37 °C (98.6 °F)   Ht 1.651 m (5' 5\")   Wt 78.4 kg (172 lb 12.8 oz)   SpO2 95%   BMI 28.76 kg/m²       Physical Exam  Vitals and nursing note reviewed.   Constitutional:       Appearance: Normal appearance. He is diaphoretic. He is not ill-appearing or toxic-appearing.   HENT:      Head: Normocephalic.      Nose: Nose normal.   Eyes:      Conjunctiva/sclera: Conjunctivae normal.      Pupils: Pupils are equal, round, and reactive to light.   Cardiovascular:      Rate and Rhythm: Regular rhythm.   Pulmonary:      Effort: Pulmonary effort is normal.      Breath sounds: Normal breath sounds.   Abdominal:      General: Abdomen is flat.      Palpations: Abdomen is soft.   Musculoskeletal:      Cervical back: Neck supple.   Skin:     General: Skin is warm.   Neurological:      General: No focal deficit present.      Mental Status: He is oriented to person, place, and time.   Psychiatric:         Mood and Affect: Mood normal.         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)     Other Visit Diagnoses       Routine general medical examination at health care facility    -  Primary    Screening for multiple conditions        High risk medication use        Relevant Orders    CBC and Auto Differential (Completed)    Other fatigue        Relevant Orders    Basic metabolic panel    TSH with reflex to Free T4 if abnormal    XR chest 2 views    B-type natriuretic peptide (Completed)    Arthritis        Relevant Orders    Uric Acid        Annual preventive visit and Medicare screening  Depression screening reviewed negative  - Vaccines reviewed and up-to-date  - Screening for colon cancer obtained and no need to repeat because of patient age  -Previous blood work reviewed and up-to-date  Advanced directives reviewed and up-to-date    Follow-up  - Fatigue and tiredness need to proceed with thyroid function test and CBC BMP  - Recurrent joint pains in knees and hips also bilateral feet history of gout need to follow-up with uric acid today contact patient with results  -Paroxysmal atrial fibrillation continue Toprol-XL 25 mg continue with Eliquis and lisinopril  Follow-up renal function today  - Hypertension controlled continue with lisinopril 2.5 mg daily continue with current meds obtain BMP  -Hypercholesterolemia controlled continue with Crestor continue low-fat diet  - Patient counseled about alcohol abstinence, as recommended  -History of defibrillator placement compensated.  - History of stroke no recurrence continue with current medication  Follow-up 4 weeks follow-up lab results closely               Patient was identified as a fall risk. Risk prevention instructions provided.

## 2024-04-02 DIAGNOSIS — I50.9 OTHER CONGESTIVE HEART FAILURE (MULTI): ICD-10-CM

## 2024-04-02 DIAGNOSIS — J18.9 PNEUMONIA OF BOTH LUNGS DUE TO INFECTIOUS ORGANISM, UNSPECIFIED PART OF LUNG: Primary | ICD-10-CM

## 2024-04-02 RX ORDER — TORSEMIDE 20 MG/1
40 TABLET ORAL DAILY
Qty: 90 TABLET | Refills: 1 | Status: SHIPPED | OUTPATIENT
Start: 2024-04-02

## 2024-04-02 RX ORDER — DOXYCYCLINE 100 MG/1
100 CAPSULE ORAL 2 TIMES DAILY
Qty: 20 CAPSULE | Refills: 0 | Status: SHIPPED | OUTPATIENT
Start: 2024-04-02 | End: 2024-04-12

## 2024-06-11 ENCOUNTER — HOSPITAL ENCOUNTER (OUTPATIENT)
Dept: CARDIOLOGY | Facility: CLINIC | Age: 89
Discharge: HOME | End: 2024-06-11
Payer: MEDICARE

## 2024-06-11 DIAGNOSIS — I47.29 OTHER VENTRICULAR TACHYCARDIA (MULTI): ICD-10-CM

## 2024-06-11 DIAGNOSIS — Z95.810 PRESENCE OF AUTOMATIC (IMPLANTABLE) CARDIAC DEFIBRILLATOR: ICD-10-CM

## 2024-06-11 PROCEDURE — 93296 REM INTERROG EVL PM/IDS: CPT

## 2024-06-11 PROCEDURE — 93295 DEV INTERROG REMOTE 1/2/MLT: CPT | Performed by: INTERNAL MEDICINE

## 2024-08-26 ENCOUNTER — APPOINTMENT (OUTPATIENT)
Dept: CARDIOLOGY | Facility: HOSPITAL | Age: 89
DRG: 291 | End: 2024-08-26
Payer: MEDICARE

## 2024-08-26 ENCOUNTER — APPOINTMENT (OUTPATIENT)
Dept: RADIOLOGY | Facility: HOSPITAL | Age: 89
DRG: 291 | End: 2024-08-26
Payer: MEDICARE

## 2024-08-26 ENCOUNTER — HOSPITAL ENCOUNTER (OUTPATIENT)
Facility: HOSPITAL | Age: 89
Setting detail: OBSERVATION
Discharge: LEFT BEFORE DISCHARGE | DRG: 291 | End: 2024-08-27
Attending: STUDENT IN AN ORGANIZED HEALTH CARE EDUCATION/TRAINING PROGRAM | Admitting: INTERNAL MEDICINE
Payer: MEDICARE

## 2024-08-26 DIAGNOSIS — I50.9 ACUTE ON CHRONIC CONGESTIVE HEART FAILURE, UNSPECIFIED HEART FAILURE TYPE (MULTI): Primary | ICD-10-CM

## 2024-08-26 DIAGNOSIS — I48.0 PAROXYSMAL ATRIAL FIBRILLATION (MULTI): ICD-10-CM

## 2024-08-26 PROBLEM — E78.5 HLD (HYPERLIPIDEMIA): Status: ACTIVE | Noted: 2024-08-26

## 2024-08-26 PROBLEM — I50.43 ACUTE ON CHRONIC COMBINED SYSTOLIC (CONGESTIVE) AND DIASTOLIC (CONGESTIVE) HEART FAILURE (MULTI): Status: ACTIVE | Noted: 2024-08-26

## 2024-08-26 PROBLEM — D50.9 IRON DEFICIENCY ANEMIA: Status: ACTIVE | Noted: 2024-08-26

## 2024-08-26 LAB
ALBUMIN SERPL BCP-MCNC: 4.4 G/DL (ref 3.4–5)
ALP SERPL-CCNC: 70 U/L (ref 33–136)
ALT SERPL W P-5'-P-CCNC: 7 U/L (ref 10–52)
ANION GAP SERPL CALC-SCNC: 12 MMOL/L (ref 10–20)
AORTIC VALVE PEAK VELOCITY: 1.32 M/S
AST SERPL W P-5'-P-CCNC: 21 U/L (ref 9–39)
AV PEAK GRADIENT: 7 MMHG
AVA (PEAK VEL): 2.38 CM2
BASOPHILS # BLD AUTO: 0.01 X10*3/UL (ref 0–0.1)
BASOPHILS NFR BLD AUTO: 0.2 %
BILIRUB SERPL-MCNC: 0.9 MG/DL (ref 0–1.2)
BNP SERPL-MCNC: 375 PG/ML (ref 0–99)
BUN SERPL-MCNC: 23 MG/DL (ref 6–23)
CALCIUM SERPL-MCNC: 9.4 MG/DL (ref 8.6–10.3)
CARDIAC TROPONIN I PNL SERPL HS: 17 NG/L (ref 0–20)
CARDIAC TROPONIN I PNL SERPL HS: 19 NG/L (ref 0–20)
CHLORIDE SERPL-SCNC: 98 MMOL/L (ref 98–107)
CO2 SERPL-SCNC: 29 MMOL/L (ref 21–32)
CREAT SERPL-MCNC: 1.44 MG/DL (ref 0.5–1.3)
EGFRCR SERPLBLD CKD-EPI 2021: 47 ML/MIN/1.73M*2
EJECTION FRACTION APICAL 4 CHAMBER: 23.4
EJECTION FRACTION: 28 %
EOSINOPHIL # BLD AUTO: 0.18 X10*3/UL (ref 0–0.4)
EOSINOPHIL NFR BLD AUTO: 3.7 %
ERYTHROCYTE [DISTWIDTH] IN BLOOD BY AUTOMATED COUNT: 16.3 % (ref 11.5–14.5)
FLUAV RNA RESP QL NAA+PROBE: NOT DETECTED
FLUBV RNA RESP QL NAA+PROBE: NOT DETECTED
GLUCOSE SERPL-MCNC: 105 MG/DL (ref 74–99)
HCT VFR BLD AUTO: 44.3 % (ref 41–52)
HGB BLD-MCNC: 13.5 G/DL (ref 13.5–17.5)
IMM GRANULOCYTES # BLD AUTO: 0.01 X10*3/UL (ref 0–0.5)
IMM GRANULOCYTES NFR BLD AUTO: 0.2 % (ref 0–0.9)
LEFT ATRIUM VOLUME AREA LENGTH INDEX BSA: 55.6 ML/M2
LEFT VENTRICLE INTERNAL DIMENSION DIASTOLE: 5.4 CM (ref 3.5–6)
LEFT VENTRICULAR OUTFLOW TRACT DIAMETER: 1.9 CM
LIPASE SERPL-CCNC: 14 U/L (ref 9–82)
LYMPHOCYTES # BLD AUTO: 0.84 X10*3/UL (ref 0.8–3)
LYMPHOCYTES NFR BLD AUTO: 17.1 %
MAGNESIUM SERPL-MCNC: 2.25 MG/DL (ref 1.6–2.4)
MCH RBC QN AUTO: 25.2 PG (ref 26–34)
MCHC RBC AUTO-ENTMCNC: 30.5 G/DL (ref 32–36)
MCV RBC AUTO: 83 FL (ref 80–100)
MONOCYTES # BLD AUTO: 0.44 X10*3/UL (ref 0.05–0.8)
MONOCYTES NFR BLD AUTO: 9 %
NEUTROPHILS # BLD AUTO: 3.43 X10*3/UL (ref 1.6–5.5)
NEUTROPHILS NFR BLD AUTO: 69.8 %
NRBC BLD-RTO: 0 /100 WBCS (ref 0–0)
PLATELET # BLD AUTO: 174 X10*3/UL (ref 150–450)
POTASSIUM SERPL-SCNC: 4.4 MMOL/L (ref 3.5–5.3)
PROT SERPL-MCNC: 7.7 G/DL (ref 6.4–8.2)
RBC # BLD AUTO: 5.35 X10*6/UL (ref 4.5–5.9)
RIGHT VENTRICLE FREE WALL PEAK S': 7.13 CM/S
SARS-COV-2 RNA RESP QL NAA+PROBE: NOT DETECTED
SODIUM SERPL-SCNC: 135 MMOL/L (ref 136–145)
TRICUSPID ANNULAR PLANE SYSTOLIC EXCURSION: 0.8 CM
WBC # BLD AUTO: 4.9 X10*3/UL (ref 4.4–11.3)

## 2024-08-26 PROCEDURE — 93005 ELECTROCARDIOGRAM TRACING: CPT

## 2024-08-26 PROCEDURE — 93306 TTE W/DOPPLER COMPLETE: CPT

## 2024-08-26 PROCEDURE — 83690 ASSAY OF LIPASE: CPT | Performed by: STUDENT IN AN ORGANIZED HEALTH CARE EDUCATION/TRAINING PROGRAM

## 2024-08-26 PROCEDURE — 83735 ASSAY OF MAGNESIUM: CPT | Performed by: STUDENT IN AN ORGANIZED HEALTH CARE EDUCATION/TRAINING PROGRAM

## 2024-08-26 PROCEDURE — 71250 CT THORAX DX C-: CPT | Performed by: RADIOLOGY

## 2024-08-26 PROCEDURE — 94760 N-INVAS EAR/PLS OXIMETRY 1: CPT

## 2024-08-26 PROCEDURE — 99285 EMERGENCY DEPT VISIT HI MDM: CPT | Mod: 25

## 2024-08-26 PROCEDURE — 96376 TX/PRO/DX INJ SAME DRUG ADON: CPT

## 2024-08-26 PROCEDURE — 71250 CT THORAX DX C-: CPT

## 2024-08-26 PROCEDURE — 71045 X-RAY EXAM CHEST 1 VIEW: CPT | Performed by: RADIOLOGY

## 2024-08-26 PROCEDURE — 84484 ASSAY OF TROPONIN QUANT: CPT | Performed by: STUDENT IN AN ORGANIZED HEALTH CARE EDUCATION/TRAINING PROGRAM

## 2024-08-26 PROCEDURE — 2500000004 HC RX 250 GENERAL PHARMACY W/ HCPCS (ALT 636 FOR OP/ED)

## 2024-08-26 PROCEDURE — 71045 X-RAY EXAM CHEST 1 VIEW: CPT

## 2024-08-26 PROCEDURE — 99223 1ST HOSP IP/OBS HIGH 75: CPT

## 2024-08-26 PROCEDURE — 85025 COMPLETE CBC W/AUTO DIFF WBC: CPT | Performed by: STUDENT IN AN ORGANIZED HEALTH CARE EDUCATION/TRAINING PROGRAM

## 2024-08-26 PROCEDURE — 99222 1ST HOSP IP/OBS MODERATE 55: CPT | Performed by: NURSE PRACTITIONER

## 2024-08-26 PROCEDURE — 2500000002 HC RX 250 W HCPCS SELF ADMINISTERED DRUGS (ALT 637 FOR MEDICARE OP, ALT 636 FOR OP/ED)

## 2024-08-26 PROCEDURE — 93306 TTE W/DOPPLER COMPLETE: CPT | Performed by: INTERNAL MEDICINE

## 2024-08-26 PROCEDURE — G0378 HOSPITAL OBSERVATION PER HR: HCPCS

## 2024-08-26 PROCEDURE — 87636 SARSCOV2 & INF A&B AMP PRB: CPT | Performed by: STUDENT IN AN ORGANIZED HEALTH CARE EDUCATION/TRAINING PROGRAM

## 2024-08-26 PROCEDURE — 2500000004 HC RX 250 GENERAL PHARMACY W/ HCPCS (ALT 636 FOR OP/ED): Performed by: STUDENT IN AN ORGANIZED HEALTH CARE EDUCATION/TRAINING PROGRAM

## 2024-08-26 PROCEDURE — 96374 THER/PROPH/DIAG INJ IV PUSH: CPT

## 2024-08-26 PROCEDURE — 2500000001 HC RX 250 WO HCPCS SELF ADMINISTERED DRUGS (ALT 637 FOR MEDICARE OP)

## 2024-08-26 PROCEDURE — 80053 COMPREHEN METABOLIC PANEL: CPT | Performed by: STUDENT IN AN ORGANIZED HEALTH CARE EDUCATION/TRAINING PROGRAM

## 2024-08-26 PROCEDURE — 36415 COLL VENOUS BLD VENIPUNCTURE: CPT | Performed by: STUDENT IN AN ORGANIZED HEALTH CARE EDUCATION/TRAINING PROGRAM

## 2024-08-26 PROCEDURE — 83880 ASSAY OF NATRIURETIC PEPTIDE: CPT | Performed by: STUDENT IN AN ORGANIZED HEALTH CARE EDUCATION/TRAINING PROGRAM

## 2024-08-26 RX ORDER — ROSUVASTATIN CALCIUM 10 MG/1
40 TABLET, COATED ORAL DAILY
Status: DISCONTINUED | OUTPATIENT
Start: 2024-08-26 | End: 2024-08-28 | Stop reason: HOSPADM

## 2024-08-26 RX ORDER — PANTOPRAZOLE SODIUM 40 MG/10ML
40 INJECTION, POWDER, LYOPHILIZED, FOR SOLUTION INTRAVENOUS
Status: DISCONTINUED | OUTPATIENT
Start: 2024-08-27 | End: 2024-08-28 | Stop reason: HOSPADM

## 2024-08-26 RX ORDER — FUROSEMIDE 10 MG/ML
20 INJECTION INTRAMUSCULAR; INTRAVENOUS 2 TIMES DAILY
Status: DISCONTINUED | OUTPATIENT
Start: 2024-08-26 | End: 2024-08-28 | Stop reason: HOSPADM

## 2024-08-26 RX ORDER — ONDANSETRON 4 MG/1
4 TABLET, FILM COATED ORAL EVERY 8 HOURS PRN
Status: DISCONTINUED | OUTPATIENT
Start: 2024-08-26 | End: 2024-08-28 | Stop reason: HOSPADM

## 2024-08-26 RX ORDER — TORSEMIDE 20 MG/1
40 TABLET ORAL DAILY
Status: DISCONTINUED | OUTPATIENT
Start: 2024-08-26 | End: 2024-08-28 | Stop reason: HOSPADM

## 2024-08-26 RX ORDER — POLYETHYLENE GLYCOL 3350 17 G/17G
17 POWDER, FOR SOLUTION ORAL DAILY PRN
Status: DISCONTINUED | OUTPATIENT
Start: 2024-08-26 | End: 2024-08-28 | Stop reason: HOSPADM

## 2024-08-26 RX ORDER — ACETAMINOPHEN 325 MG/1
650 TABLET ORAL EVERY 4 HOURS PRN
Status: DISCONTINUED | OUTPATIENT
Start: 2024-08-26 | End: 2024-08-28 | Stop reason: HOSPADM

## 2024-08-26 RX ORDER — TALC
9 POWDER (GRAM) TOPICAL NIGHTLY PRN
Status: DISCONTINUED | OUTPATIENT
Start: 2024-08-26 | End: 2024-08-28 | Stop reason: HOSPADM

## 2024-08-26 RX ORDER — ONDANSETRON HYDROCHLORIDE 2 MG/ML
4 INJECTION, SOLUTION INTRAVENOUS EVERY 8 HOURS PRN
Status: DISCONTINUED | OUTPATIENT
Start: 2024-08-26 | End: 2024-08-28 | Stop reason: HOSPADM

## 2024-08-26 RX ORDER — PANTOPRAZOLE SODIUM 40 MG/1
40 TABLET, DELAYED RELEASE ORAL
Status: DISCONTINUED | OUTPATIENT
Start: 2024-08-27 | End: 2024-08-28 | Stop reason: HOSPADM

## 2024-08-26 RX ORDER — METOPROLOL SUCCINATE 25 MG/1
25 TABLET, EXTENDED RELEASE ORAL DAILY
Status: DISCONTINUED | OUTPATIENT
Start: 2024-08-26 | End: 2024-08-28 | Stop reason: HOSPADM

## 2024-08-26 RX ORDER — GUAIFENESIN/DEXTROMETHORPHAN 100-10MG/5
5 SYRUP ORAL EVERY 4 HOURS PRN
Status: DISCONTINUED | OUTPATIENT
Start: 2024-08-26 | End: 2024-08-28 | Stop reason: HOSPADM

## 2024-08-26 RX ORDER — GUAIFENESIN 600 MG/1
600 TABLET, EXTENDED RELEASE ORAL EVERY 12 HOURS PRN
Status: DISCONTINUED | OUTPATIENT
Start: 2024-08-26 | End: 2024-08-28 | Stop reason: HOSPADM

## 2024-08-26 RX ORDER — FUROSEMIDE 10 MG/ML
40 INJECTION INTRAMUSCULAR; INTRAVENOUS ONCE
Status: COMPLETED | OUTPATIENT
Start: 2024-08-26 | End: 2024-08-26

## 2024-08-26 SDOH — SOCIAL STABILITY: SOCIAL INSECURITY: ARE THERE ANY APPARENT SIGNS OF INJURIES/BEHAVIORS THAT COULD BE RELATED TO ABUSE/NEGLECT?: NO

## 2024-08-26 SDOH — SOCIAL STABILITY: SOCIAL INSECURITY: HAS ANYONE EVER THREATENED TO HURT YOUR FAMILY OR YOUR PETS?: NO

## 2024-08-26 SDOH — ECONOMIC STABILITY: INCOME INSECURITY: IN THE LAST 12 MONTHS, WAS THERE A TIME WHEN YOU WERE NOT ABLE TO PAY THE MORTGAGE OR RENT ON TIME?: NO

## 2024-08-26 SDOH — SOCIAL STABILITY: SOCIAL INSECURITY: DOES ANYONE TRY TO KEEP YOU FROM HAVING/CONTACTING OTHER FRIENDS OR DOING THINGS OUTSIDE YOUR HOME?: NO

## 2024-08-26 SDOH — ECONOMIC STABILITY: HOUSING INSECURITY: AT ANY TIME IN THE PAST 12 MONTHS, WERE YOU HOMELESS OR LIVING IN A SHELTER (INCLUDING NOW)?: NO

## 2024-08-26 SDOH — SOCIAL STABILITY: SOCIAL INSECURITY: DO YOU FEEL ANYONE HAS EXPLOITED OR TAKEN ADVANTAGE OF YOU FINANCIALLY OR OF YOUR PERSONAL PROPERTY?: NO

## 2024-08-26 SDOH — ECONOMIC STABILITY: HOUSING INSECURITY: IN THE PAST 12 MONTHS, HOW MANY TIMES HAVE YOU MOVED WHERE YOU WERE LIVING?: 0

## 2024-08-26 SDOH — ECONOMIC STABILITY: INCOME INSECURITY: HOW HARD IS IT FOR YOU TO PAY FOR THE VERY BASICS LIKE FOOD, HOUSING, MEDICAL CARE, AND HEATING?: NOT HARD AT ALL

## 2024-08-26 SDOH — SOCIAL STABILITY: SOCIAL INSECURITY: HAVE YOU HAD THOUGHTS OF HARMING ANYONE ELSE?: NO

## 2024-08-26 SDOH — SOCIAL STABILITY: SOCIAL INSECURITY: HAVE YOU HAD ANY THOUGHTS OF HARMING ANYONE ELSE?: NO

## 2024-08-26 SDOH — SOCIAL STABILITY: SOCIAL INSECURITY: WERE YOU ABLE TO COMPLETE ALL THE BEHAVIORAL HEALTH SCREENINGS?: YES

## 2024-08-26 SDOH — SOCIAL STABILITY: SOCIAL INSECURITY: ARE YOU OR HAVE YOU BEEN THREATENED OR ABUSED PHYSICALLY, EMOTIONALLY, OR SEXUALLY BY ANYONE?: NO

## 2024-08-26 SDOH — ECONOMIC STABILITY: TRANSPORTATION INSECURITY
IN THE PAST 12 MONTHS, HAS THE LACK OF TRANSPORTATION KEPT YOU FROM MEDICAL APPOINTMENTS OR FROM GETTING MEDICATIONS?: NO

## 2024-08-26 SDOH — ECONOMIC STABILITY: TRANSPORTATION INSECURITY
IN THE PAST 12 MONTHS, HAS LACK OF TRANSPORTATION KEPT YOU FROM MEETINGS, WORK, OR FROM GETTING THINGS NEEDED FOR DAILY LIVING?: NO

## 2024-08-26 SDOH — SOCIAL STABILITY: SOCIAL INSECURITY: ABUSE: ADULT

## 2024-08-26 SDOH — SOCIAL STABILITY: SOCIAL INSECURITY: DO YOU FEEL UNSAFE GOING BACK TO THE PLACE WHERE YOU ARE LIVING?: NO

## 2024-08-26 ASSESSMENT — ACTIVITIES OF DAILY LIVING (ADL)
PATIENT'S MEMORY ADEQUATE TO SAFELY COMPLETE DAILY ACTIVITIES?: YES
TOILETING: INDEPENDENT
HEARING - RIGHT EAR: HEARING AID
FEEDING YOURSELF: INDEPENDENT
JUDGMENT_ADEQUATE_SAFELY_COMPLETE_DAILY_ACTIVITIES: YES
HEARING - LEFT EAR: HEARING AID
WALKS IN HOME: INDEPENDENT
ADEQUATE_TO_COMPLETE_ADL: YES
GROOMING: INDEPENDENT
BATHING: INDEPENDENT
DRESSING YOURSELF: INDEPENDENT

## 2024-08-26 ASSESSMENT — PAIN - FUNCTIONAL ASSESSMENT: PAIN_FUNCTIONAL_ASSESSMENT: 0-10

## 2024-08-26 ASSESSMENT — LIFESTYLE VARIABLES
AUDIT-C TOTAL SCORE: 4
HOW OFTEN DO YOU HAVE A DRINK CONTAINING ALCOHOL: 4 OR MORE TIMES A WEEK
SKIP TO QUESTIONS 9-10: 1
HOW MANY STANDARD DRINKS CONTAINING ALCOHOL DO YOU HAVE ON A TYPICAL DAY: 1 OR 2
HOW OFTEN DO YOU HAVE 6 OR MORE DRINKS ON ONE OCCASION: NEVER
AUDIT-C TOTAL SCORE: 4

## 2024-08-26 ASSESSMENT — ENCOUNTER SYMPTOMS
CARDIOVASCULAR NEGATIVE: 1
COUGH: 1
HEMATOLOGIC/LYMPHATIC NEGATIVE: 1
NEUROLOGICAL NEGATIVE: 1
MUSCULOSKELETAL NEGATIVE: 1
CONSTITUTIONAL NEGATIVE: 1
GASTROINTESTINAL NEGATIVE: 1
EYES NEGATIVE: 1
ALLERGIC/IMMUNOLOGIC NEGATIVE: 1
SHORTNESS OF BREATH: 1
PSYCHIATRIC NEGATIVE: 1
ENDOCRINE NEGATIVE: 1

## 2024-08-26 ASSESSMENT — COGNITIVE AND FUNCTIONAL STATUS - GENERAL
DAILY ACTIVITIY SCORE: 24
CLIMB 3 TO 5 STEPS WITH RAILING: A LITTLE
PATIENT BASELINE BEDBOUND: NO
MOBILITY SCORE: 23

## 2024-08-26 ASSESSMENT — PAIN SCALES - GENERAL
PAINLEVEL_OUTOF10: 0 - NO PAIN
PAINLEVEL_OUTOF10: 0 - NO PAIN

## 2024-08-26 ASSESSMENT — PATIENT HEALTH QUESTIONNAIRE - PHQ9
1. LITTLE INTEREST OR PLEASURE IN DOING THINGS: NOT AT ALL
2. FEELING DOWN, DEPRESSED OR HOPELESS: NOT AT ALL
SUM OF ALL RESPONSES TO PHQ9 QUESTIONS 1 & 2: 0

## 2024-08-26 NOTE — CONSULTS
Cardiology Consult Note  Patient: Gilmer Quiñonez  Unit/Bed: 216/216-A  YOB: 1935    Admitting Diagnosis: Paroxysmal atrial fibrillation (Multi) [I48.0]  Acute on chronic congestive heart failure, unspecified heart failure type (Multi) [I50.9]  Acute on chronic combined systolic (congestive) and diastolic (congestive) heart failure (Multi) [I50.43]  Date:  8/26/2024  Hospital Day: 0  Attending: Cardiology consulting at the request of  Mirta Plaza MD  for Heart failure      Chief Complaint   Patient presents with    Shortness of Breath    Dizziness            History of Present Illness:   Gilmer Quiñonez is a 88 y.o. Male who presented via [facility] with c/o progressive shortness of breath over the past week   He is in atrial flutter with intermittent V pacing,  which is not unusual for him   BNP is 375  Crt is 1.44 baseline 0.93         Past medical history significant for  HTN, HLD, atrial fibrillation /flutter,  hx ablation in 2015 with recurrence, on long term OAC with Eliquis,  ischemic cardiomyopathy LVEF 35-40% s/p ICD,  hx VT,  hx GI bleed declining watchman,  Hx CVA,  numerus PCI and hx CABG         Primary Cardiology outpatient: Dr. Porter / Sherine Acosta          SUBJECTIVE:   Awake in bed,  feeling somewhat better after IV Lasix x40 mg.     He does not take his Rx regularly but starts it again when he becomes symptomatic                Allergies:  Allergies   Allergen Reactions    Penicillins Swelling    Rivaroxaban GI bleeding     required 9 blood transfsions while on it, unable to find source of bleeding    Morphine Rash      Home Medication:  Medications Prior to Admission   Medication Sig Dispense Refill Last Dose    apixaban (Eliquis) 2.5 mg tablet Take 1 tablet (2.5 mg) by mouth 2 times a day. 180 tablet 1 Unknown    metoprolol succinate XL (Toprol-XL) 25 mg 24 hr tablet Take 1 tablet (25 mg) by mouth once daily. 90 tablet 1 Unknown    rosuvastatin (Crestor) 40 mg  tablet Take 1 tablet (40 mg) by mouth once daily. 90 tablet 1 Unknown    torsemide (Demadex) 20 mg tablet Take 2 tablets (40 mg) by mouth once daily. 90 tablet 1 Unknown      PMHx:  Past Medical History:   Diagnosis Date    Personal history of pneumonia (recurrent) 07/15/2021    History of pneumonia       PSHx:  Past Surgical History:   Procedure Laterality Date    APPENDECTOMY  2015    Appendectomy    CORONARY ARTERY BYPASS GRAFT  2017    CABG    CT ABDOMEN PELVIS ANGIOGRAM W AND/OR WO IV CONTRAST  2022    CT ABDOMEN PELVIS ANGIOGRAM W AND/OR WO IV CONTRAST [facility]    CT ANGIO NECK  2023    CT NECK ANGIO W AND WO IV CONTRAST GEN CT    CT HEAD ANGIO W AND WO IV CONTRAST  2023    CT HEAD ANGIO W AND WO IV CONTRAST GEN CT    KNEE SURGERY  2015    Knee Surgery    OTHER SURGICAL HISTORY  2020    Cardioverter defibrillator insertion    OTHER SURGICAL HISTORY  2020    Coronary artery stent placement       Social Hx:  Social History     Socioeconomic History    Marital status:    Tobacco Use    Smoking status: Former     Current packs/day: 0.00     Average packs/day: 1 pack/day for 49.0 years (49.0 ttl pk-yrs)     Types: Cigarettes     Start date:      Quit date:      Years since quittin.6    Smokeless tobacco: Never   Vaping Use    Vaping status: Never Used   Substance and Sexual Activity    Alcohol use: Yes     Alcohol/week: 1.0 standard drink of alcohol     Types: 1 Cans of beer per week    Drug use: Never     Social Determinants of Health     Financial Resource Strain: Low Risk  (2024)    Overall Financial Resource Strain (CARDIA)     Difficulty of Paying Living Expenses: Not hard at all   Transportation Needs: No Transportation Needs (2024)    PRAPARE - Transportation     Lack of Transportation (Medical): No     Lack of Transportation (Non-Medical): No   Housing Stability: Low Risk  (2024)    Housing Stability Vital  Sign     Unable to Pay for Housing in the Last Year: No     Number of Times Moved in the Last Year: 0     Homeless in the Last Year: No       Family Hx:  No family history on file.    Review of Systems:   12 system review of symptoms is negative except as noted above          OBJECTIVE  Vitals:   Visit Vitals  /67 (BP Location: Right arm, Patient Position: Sitting)   Pulse 51   Temp 36.1 °C (97 °F) (Temporal)   Resp 17        Wt Readings from Last 5 Encounters:   08/26/24 71.8 kg (158 lb 4.6 oz)   04/01/24 78.4 kg (172 lb 12.8 oz)   11/07/23 73.1 kg (161 lb 3.2 oz)   10/30/23 75.3 kg (166 lb)   09/20/23 72.7 kg (160 lb 3.2 oz)       Intake / Output:   No intake/output data recorded.     Tele monitoring: Atrial flutter + V paced    Physical Examination:    Constitutional:       Appearance: Healthy appearance. Not in distress.   Eyes:      Conjunctiva/sclera: Conjunctivae normal.   Neck:      Vascular: JVD normal.   Pulmonary:      Effort: Pulmonary effort is normal.      Breath sounds: Normal breath sounds.   Cardiovascular:      PMI at left midclavicular line. Normal rate. Regularly irregular rhythm. Normal S1. Normal S2.       Murmurs: There is no murmur.      No rub.      Comments: ICD left chest, healed   Pulses:     Intact distal pulses.   Edema:     Peripheral edema absent.   Abdominal:      General: Bowel sounds are normal.   Musculoskeletal:      Cervical back: Neck supple. Skin:     General: Skin is warm and dry.   Neurological:      Mental Status: Alert and oriented to person, place and time.            LABS:  CMP:   Recent Labs     08/26/24  1026 04/01/24  1210 10/30/23  1305 09/20/23  1324 05/08/23  1519 04/03/23  0443 04/02/23  0510 04/01/23  0852 03/31/23  2010 10/05/22  1410 12/24/21  2015 12/01/21  0612 11/29/21  1015 11/28/21  1600 11/16/21  1610 11/10/21  0535   * 136 136 138 140 142 141 139 137 138   < > 133* 130* 132*   < > 138   K 4.4 4.3 4.6 4.2 3.8 3.7 3.7 4.1 3.9 4.1   < > 3.9 3.4*  3.9   < > 4.0   CL 98 102 102 103 102 107 105 107 104 104   < > 97* 96* 96*   < > 100   CO2 29 26 27 26 27 27 23 24 22 27   < > 30 26 30   < > 29   ANIONGAP 12 12 12 13 15 12 17 12 15 11   < > 10 11 10   < > 13   BUN 23 10 10 14 14 19 18 14 14 14   < > 37* 22 21   < > 24*   CREATININE 1.44* 1.13 1.08 1.21 1.26 1.35* 1.42* 1.10 1.08 1.10   < > 1.84* 1.38* 1.28   < > 1.88*   EGFR 47* 63 67  --   --   --   --   --   --   --   --   --   --   --   --   --    MG 2.25 2.18  --   --  1.99 1.99 1.92  --  2.04  --   --  2.41* 2.22 2.17  --  2.11    < > = values in this interval not displayed.     LIVER ENZYMES:   Recent Labs     08/26/24  1026 04/01/23  0852 03/31/23 2010 02/28/22  1847 02/19/22  0642 02/18/22  2028 02/18/22  1454 02/17/22  1945 12/16/19  1533 12/06/19  1142 11/28/19  1641   ALBUMIN 4.4 4.1 4.5 3.8 3.3* 3.6 3.7 3.9   < > 3.9 4.3   ALT 7* 9* 11 11 10 12 13 15   < > 20 22   AST 21 13 16 17 16 15 16 21   < > 36 27   BILITOT 0.9 1.2 1.2 1.0 1.2 1.1 1.3* 0.9   < > 0.6 0.6   LIPASE 14  --  14 14  --   --   --   --   --  40 60    < > = values in this interval not displayed.     CBC:  Recent Labs     08/26/24  1026 04/01/24  1210 10/30/23  1305 09/20/23  1324 06/04/23  1006 05/08/23  1519 04/03/23  0443 04/02/23  0510   WBC 4.9 4.7 5.4 5.3 3.7* 3.6* 4.9 5.3   HGB 13.5 12.3* 12.4* 11.9* 11.6* 11.5* 10.9* 11.2*   HCT 44.3 40.1* 41.6 39.7* 37.9* 38.2* 35.7* 36.7*    153 165 180 146* 132* 151 161   MCV 83 85 87 84 84 86 86 86     COAG:   Recent Labs     06/04/23  1006 02/28/22  1847 02/18/22  1454 02/17/22  1945 11/28/21  1600 11/25/21  1520 11/16/21  1725 11/07/21  1440   INR 1.2* 1.2* 1.4* 1.5* 1.4* 1.3* 1.4* 1.6*     ABO:   Recent Labs     02/28/22  1847   ABO O     HEME/ENDO:  Recent Labs     04/01/24  1210 04/02/23  0510 04/18/22  1040 11/17/21  1430 06/11/21  0910 10/31/20  2353 11/29/19  0808 02/10/18  0250   FERRITIN  --  35 70   < >  --   --    < > 17*   IRONSAT  --  83* 12*   < >  --   --    < > NOT  "CALC.   TSH 1.07  --   --   --   --   --   --  1.09   HGBA1C  --   --   --   --  6.3* 5.8  --   --     < > = values in this interval not displayed.      CARDIAC:   Recent Labs     08/26/24  1152 08/26/24  1026 04/01/24  1210 04/02/23  0510 04/01/23  0852 03/31/23  2158 03/31/23 2010 02/28/22  2219 02/17/22  1945 01/10/22  1435 11/29/21  1015 10/31/20  2353 07/30/20  1127   LDH  --   --   --   --   --   --   --   --   --   --   --   --  165   TROPHS 19 17  --   --  21* 27* 23*  CANCELED  --   --   --   --   --   --    BNP  --  375* 670* 708*  --   --  642* 619* 366* 548* 388*   < >  --     < > = values in this interval not displayed.       Lipid Panel:  No results found for: \"HDL\", \"CHHDL\", \"VLDL\", \"TRIG\", \"NHDL\"       Current Medications:   MEDICATIONS  Infusions:     Scheduled:  apixaban, 2.5 mg, BID  furosemide, 20 mg, BID  metoprolol succinate XL, 25 mg, Daily  [START ON 8/27/2024] pantoprazole, 40 mg, Daily before breakfast   Or  [START ON 8/27/2024] pantoprazole, 40 mg, Daily before breakfast  perflutren lipid microspheres, 0.5-10 mL of dilution, Once in imaging  perflutren protein A microsphere, 0.5 mL, Once in imaging  rosuvastatin, 40 mg, Daily  sulfur hexafluoride microsphr, 2 mL, Once in imaging  [Held by provider] torsemide, 40 mg, Daily      PRN:  acetaminophen, 650 mg, q4h PRN  acetaminophen, 650 mg, q4h PRN  benzocaine-menthol, 1 lozenge, q2h PRN  dextromethorphan-guaifenesin, 5 mL, q4h PRN  guaiFENesin, 600 mg, q12h PRN  melatonin, 9 mg, Nightly PRN  ondansetron, 4 mg, q8h PRN   Or  ondansetron, 4 mg, q8h PRN  polyethylene glycol, 17 g, Daily PRN          LAST IMAGING RESULTS   XR chest 1 view  Narrative: Interpreted By:  William Unger,   STUDY:  XR CHEST 1 VIEW; 8/26/2024 10:39 am      INDICATION:  CLINICAL INFORMATION: Signs/Symptoms:SOB.      COMPARISON:  04/01/2024      ACCESSION NUMBER(S):  [id number]      ORDERING CLINICIAN:  KIRK MARTINEZ      TECHNIQUE:  Portable chest one view.    "   FINDINGS:  The cardiac silhouette is quite prominent suggesting cardiomegaly. A  bipolar cardiac pacemaker is present. Sternal fixations devices and  mediastinal clips are present. Calcific pleural plaque is present  bilaterally. The overlying plaque limits assessment for pulmonary  infiltrates. Hiatal hernia suspected within the lower middle  mediastinum. The lungs are markedly hyperinflated consistent with  COPD.      Impression: 1. 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  2. 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  3. Limited sensitivity for the detection of pulmonary infiltrates due  to the extensive calcific pleural plaque.  4. Probable hiatal hernia.  5. Postoperative findings as above.      MACRO:  none      Signed by: William Unger 8/26/2024 10:56 AM  Dictation workstation:   [id number]           Cardiovascular studies:        EKG dated 8/26/2024 independently reviewed    Atrial flutter and V Paced         Echocardiogram 2/21/2022  CONCLUSIONS:   1. The left ventricular systolic function is moderately decreased with a 35-40% estimated ejection fraction.   2. Spectral Doppler shows an impaired relaxation pattern of left ventricular diastolic filling.   3. The left atrium is severely dilated.   4. The right atrium is moderately dilated.   5. RVSP within normal limits.   6. Compared with study from 7/9/2021, no significant change.   7. There is global hypokinesis of the left ventricle with minor regional variations.      Echocardiogram 11/02/2020  CONCLUSIONS:   1. The left ventricular systolic function is mildly to moderately decreased with a 40-45% estimated ejection fraction.   2. Poorly visualized anatomical structures due to suboptimal image quality.   3. Abnormal septal motion consistent with post-operative status.   4. Thee is inferior akinesia and inferolateral and distal septal-apical hypokinesia.   5. There is severe eccentric left ventricular hypertrophy.   6. The left atrium is  severely dilated.   7. The right atrium is severely dilated.   8. The left ventricular cavity size is moderate to severely dilated.   9. There is no evidence of a patent foramen ovale.  10. The patient is in atrial fibrillation which may influence the estimate of left ventricular function and transvalvular flows.    Cardiac catheterization 10/31/2016  CONCLUSIONS:   1. Left main: patent stent into left Cx with mild ISR.   2. LAD: 100% ostial occlusion.   3. LCx: patient LM-proximal LCx stent with mild ISR, mild diffuse disease in vessel proper.   4. RCA: 95% ostial disease, 100% prox-mid  with right to right collaterals.   5. Grafts: (1) WENDY to LAD (and jump to diagonal) patent (2) LIMA to ramus patent (3) SVG to RPDA with 99% ISR at distal anastomosis.   6. LVEDP 6mmHg, no significant aortic stenosis on LV-AO gradient.      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  Chronic atrial fibrillation /flutter, rate controlled   -- hx ablation in 2015 with recurrence, on long term OAC with Eliquis, declines watchman LAAO despite hx of GI bleed  HTN, controlled  HLD,    hx VT   Hx CVA  JONAS - baseline crt 0.93      Plan:  Continue diuresis   Resume home antihypertensive regimen,  optimizing GDMT for HFrEF after clinically euvolemic   Will review repeat echo   Further recommendations to follow                 Thank you  for the consult   Will follow   Please call or message with questions, concerns or changes in clinical condition   The case was discussed with SOFIE Hobson           Electronically signed by SOFIE Chaudhry on 8/26/2024 at 3:05 PM  I spent 60 minutes in the professional and overall care of this patient.

## 2024-08-26 NOTE — H&P
History Of Present Illness  Gilmer Quiñonez is a 88 y.o. male presenting with shortness of breath. Pt has a history of heart failure and atrial fibrillation. Pt is supposed to be taking apixaban and torsemide but does not take them regularly. Pt reports having some short of breath for the past few days. He has also had a cough that is productive of clear sputum. The patients daughter states that she went to get his torsemide from the cabinet and realized that it has been out for about a month. Pt has seen Dr. Porter in the past for cardiology but his PCP normally handles his prescriptions. Pt denies any chest pain or palpitations. Sp02 stable on RA.    ED VS: T36.2, HR 74, RR 18, /86, Sp02 95%RA    Imaging: CXR- 1. 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  2. 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  3. Limited sensitivity for the detection of pulmonary infiltrates due  to the extensive calcific pleural plaque.  4. Probable hiatal hernia.  5. Postoperative findings as above.    Labs: Glu 105, Na 135, K 4.4, Bun/creat 23/1.44, , Trop 17, WBC 4.9, H/H 13.5/44.3, Plt 174     Past Medical History  Past Medical History:   Diagnosis Date    Personal history of pneumonia (recurrent) 07/15/2021    History of pneumonia       Surgical History  Past Surgical History:   Procedure Laterality Date    APPENDECTOMY  02/06/2015    Appendectomy    CORONARY ARTERY BYPASS GRAFT  07/17/2017    CABG    CT ABDOMEN PELVIS ANGIOGRAM W AND/OR WO IV CONTRAST  2/28/2022    CT ABDOMEN PELVIS ANGIOGRAM W AND/OR WO IV CONTRAST [facility]    CT ANGIO NECK  4/1/2023    CT NECK ANGIO W AND WO IV CONTRAST GEN CT    CT HEAD ANGIO W AND WO IV CONTRAST  4/1/2023    CT HEAD ANGIO W AND WO IV CONTRAST GEN CT    KNEE SURGERY  02/06/2015    Knee Surgery    OTHER SURGICAL HISTORY  01/06/2020    Cardioverter defibrillator insertion    OTHER SURGICAL HISTORY  01/06/2020    Coronary artery stent placement         Social History  He reports that he quit smoking about 26 years ago. His smoking use included cigarettes. He started smoking about 75 years ago. He has a 49 pack-year smoking history. He has never used smokeless tobacco. He reports current alcohol use of about 1.0 standard drink of alcohol per week. He reports that he does not use drugs.    Family History  No family history on file.     Allergies  Penicillins, Rivaroxaban, and Morphine    Review of Systems   Constitutional: Negative.    HENT: Negative.     Eyes: Negative.    Respiratory:  Positive for cough and shortness of breath.    Cardiovascular: Negative.    Gastrointestinal: Negative.    Endocrine: Negative.    Genitourinary: Negative.    Musculoskeletal: Negative.    Skin: Negative.    Allergic/Immunologic: Negative.    Neurological: Negative.    Hematological: Negative.    Psychiatric/Behavioral: Negative.          Physical Exam  Vitals reviewed.   HENT:      Head: Normocephalic and atraumatic.      Right Ear: External ear normal.      Left Ear: External ear normal.      Nose: Nose normal.      Mouth/Throat:      Pharynx: Oropharynx is clear.   Eyes:      Conjunctiva/sclera: Conjunctivae normal.   Cardiovascular:      Rate and Rhythm: Normal rate and regular rhythm.      Pulses: Normal pulses.      Heart sounds: Normal heart sounds.   Pulmonary:      Effort: Pulmonary effort is normal.      Breath sounds: Normal breath sounds.   Abdominal:      General: Bowel sounds are normal.      Palpations: Abdomen is soft.   Musculoskeletal:         General: Normal range of motion.      Cervical back: Normal range of motion and neck supple.   Skin:     General: Skin is dry.   Neurological:      General: No focal deficit present.      Mental Status: He is alert and oriented to person, place, and time.   Psychiatric:         Mood and Affect: Mood normal.         Behavior: Behavior normal.          Last Recorded Vitals  Blood pressure 155/68, pulse 64, temperature 36.2 °C  "(97.2 °F), temperature source Temporal, resp. rate 18, height 1.676 m (5' 6\"), weight 73.6 kg (162 lb 4.1 oz), SpO2 95%.    Relevant Results  Scheduled medications  furosemide, 20 mg, intravenous, BID  [START ON 8/27/2024] pantoprazole, 40 mg, oral, Daily before breakfast   Or  [START ON 8/27/2024] pantoprazole, 40 mg, intravenous, Daily before breakfast      Continuous medications     PRN medications  PRN medications: acetaminophen, acetaminophen, benzocaine-menthol, dextromethorphan-guaifenesin, guaiFENesin, melatonin, ondansetron **OR** ondansetron, polyethylene glycol    Results for orders placed or performed during the hospital encounter of 08/26/24 (from the past 24 hour(s))   CBC and Auto Differential   Result Value Ref Range    WBC 4.9 4.4 - 11.3 x10*3/uL    nRBC 0.0 0.0 - 0.0 /100 WBCs    RBC 5.35 4.50 - 5.90 x10*6/uL    Hemoglobin 13.5 13.5 - 17.5 g/dL    Hematocrit 44.3 41.0 - 52.0 %    MCV 83 80 - 100 fL    MCH 25.2 (L) 26.0 - 34.0 pg    MCHC 30.5 (L) 32.0 - 36.0 g/dL    RDW 16.3 (H) 11.5 - 14.5 %    Platelets 174 150 - 450 x10*3/uL    Neutrophils % 69.8 40.0 - 80.0 %    Immature Granulocytes %, Automated 0.2 0.0 - 0.9 %    Lymphocytes % 17.1 13.0 - 44.0 %    Monocytes % 9.0 2.0 - 10.0 %    Eosinophils % 3.7 0.0 - 6.0 %    Basophils % 0.2 0.0 - 2.0 %    Neutrophils Absolute 3.43 1.60 - 5.50 x10*3/uL    Immature Granulocytes Absolute, Automated 0.01 0.00 - 0.50 x10*3/uL    Lymphocytes Absolute 0.84 0.80 - 3.00 x10*3/uL    Monocytes Absolute 0.44 0.05 - 0.80 x10*3/uL    Eosinophils Absolute 0.18 0.00 - 0.40 x10*3/uL    Basophils Absolute 0.01 0.00 - 0.10 x10*3/uL   Magnesium   Result Value Ref Range    Magnesium 2.25 1.60 - 2.40 mg/dL   Comprehensive metabolic panel   Result Value Ref Range    Glucose 105 (H) 74 - 99 mg/dL    Sodium 135 (L) 136 - 145 mmol/L    Potassium 4.4 3.5 - 5.3 mmol/L    Chloride 98 98 - 107 mmol/L    Bicarbonate 29 21 - 32 mmol/L    Anion Gap 12 10 - 20 mmol/L    Urea Nitrogen 23 6 " - 23 mg/dL    Creatinine 1.44 (H) 0.50 - 1.30 mg/dL    eGFR 47 (L) >60 mL/min/1.73m*2    Calcium 9.4 8.6 - 10.3 mg/dL    Albumin 4.4 3.4 - 5.0 g/dL    Alkaline Phosphatase 70 33 - 136 U/L    Total Protein 7.7 6.4 - 8.2 g/dL    AST 21 9 - 39 U/L    Bilirubin, Total 0.9 0.0 - 1.2 mg/dL    ALT 7 (L) 10 - 52 U/L   Lipase   Result Value Ref Range    Lipase 14 9 - 82 U/L   B-Type Natriuretic Peptide   Result Value Ref Range     (H) 0 - 99 pg/mL   Sars-CoV-2 PCR   Result Value Ref Range    Coronavirus 2019, PCR Not Detected Not Detected   Influenza A, and B PCR   Result Value Ref Range    Flu A Result Not Detected Not Detected    Flu B Result Not Detected Not Detected   Troponin I, High Sensitivity, Initial   Result Value Ref Range    Troponin I, High Sensitivity 17 0 - 20 ng/L   Troponin, High Sensitivity, 1 Hour   Result Value Ref Range    Troponin I, High Sensitivity 19 0 - 20 ng/L        Assessment/Plan   Assessment & Plan  Acute on chronic combined systolic (congestive) and diastolic (congestive) heart failure (Multi)    Atrial fibrillation (Multi)    Hypertension    HLD (hyperlipidemia)    Iron deficiency anemia      #Acute on chronic combined systolic and diastolic heart failure   #Paroxysmal atrial fibrillation  #Essential HTN  #HLD  -Presented with SOB, out of lasix for a few weeks   -CXR: 1. 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  2. 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  3. Limited sensitivity for the detection of pulmonary infiltrates due  to the extensive calcific pleural plaque.  4. Probable hiatal hernia.  5. Postoperative findings as above.  -CT chest pending   -Echo (2/22): 1. The left ventricular systolic function is moderately decreased with a 35-40% estimated ejection fraction.   2. Spectral Doppler shows an impaired relaxation pattern of left ventricular diastolic filling.   3. The left atrium is severely dilated.   4. The right atrium is moderately dilated.   5. RVSP within  normal limits.   6. Compared with study from 7/9/2021, no significant change.   7. There is global hypokinesis of the left ventricle with minor regional variations.  -Repeat echo pending  -  -Trop 17 > 19  -Cardiac monitoring  -Cardiology consult, appreciate recs  -Cardiac diet with 1500ml FR  -IVP furosemide 20mg BID  -Strict IO  -Daily wt   -Continue apixaban, metoprolol, rosuvastatin    #Iron deficiency anemia   -Stable, H/H 13.5/44.3  -Daily CBC     DVT ppx  -apixaban    PUD ppx  -pantoprazole    F: PRN  E: Replete per protocol  N: Cardiac, 1500ml FR  A: PIV    Disposition: Pt requires more than 2 inpatient days at this time   Code Status: Full Code         Laura Nix, APRN-CNP

## 2024-08-26 NOTE — CARE PLAN
The patient's goals for the shift include      The clinical goals for the shift include SpO2 to remain > 90%    Orientated to all surroundings. Treatment plan initiated. Vital signs remain stable. SpO2 remains > 92% room air. Tolerated Lasix IV.

## 2024-08-26 NOTE — CARE PLAN
Problem: Heart Failure  Goal: Reduction in peripheral edema within 24 hours  8/26/2024 1734 by Gina Shume, RN  Outcome: Progressing  8/26/2024 1730 by Gina Shume, RN  Outcome: Progressing     Problem: Heart Failure  Goal: Report improvement of dyspnea/breathlessness this shift  8/26/2024 1734 by Gina Shume, RN  Outcome: Progressing  8/26/2024 1730 by Gina Shume, RN  Outcome: Progressing     Signed      The patient's goals for the shift include       The clinical goals for the shift include SpO2 to remain > 90%     Orientated to all surroundings. Treatment plan initiated. Vital signs remain stable. SpO2 remains > 92% room air. Tolerated Lasix IV

## 2024-08-26 NOTE — ED TRIAGE NOTES
Pt from home to the ED with daughter with the c/o SOB and dizziness. Pt states it hurts to take a deep breath in. Daughter states pt ran out of lasix a few weeks ago and does have a hx of CHF. Pt states increased SOB started a few days ago.

## 2024-08-27 ENCOUNTER — APPOINTMENT (OUTPATIENT)
Dept: RADIOLOGY | Facility: HOSPITAL | Age: 89
DRG: 291 | End: 2024-08-27
Payer: MEDICARE

## 2024-08-27 VITALS
SYSTOLIC BLOOD PRESSURE: 162 MMHG | OXYGEN SATURATION: 95 % | TEMPERATURE: 97 F | DIASTOLIC BLOOD PRESSURE: 90 MMHG | WEIGHT: 158.29 LBS | RESPIRATION RATE: 19 BRPM | BODY MASS INDEX: 25.44 KG/M2 | HEIGHT: 66 IN | HEART RATE: 73 BPM

## 2024-08-27 PROBLEM — I50.9 ACUTE ON CHRONIC CONGESTIVE HEART FAILURE, UNSPECIFIED HEART FAILURE TYPE (MULTI): Status: ACTIVE | Noted: 2024-08-27

## 2024-08-27 PROBLEM — J40 BRONCHITIS: Status: ACTIVE | Noted: 2024-08-27

## 2024-08-27 LAB
ANION GAP SERPL CALC-SCNC: 18 MMOL/L (ref 10–20)
APPEARANCE UR: CLEAR
BILIRUB UR STRIP.AUTO-MCNC: NEGATIVE MG/DL
BUN SERPL-MCNC: 27 MG/DL (ref 6–23)
CALCIUM SERPL-MCNC: 9.6 MG/DL (ref 8.6–10.3)
CHLORIDE SERPL-SCNC: 98 MMOL/L (ref 98–107)
CO2 SERPL-SCNC: 26 MMOL/L (ref 21–32)
COLOR UR: ABNORMAL
CREAT SERPL-MCNC: 1.38 MG/DL (ref 0.5–1.3)
D DIMER PPP FEU-MCNC: 1108 NG/ML FEU
EGFRCR SERPLBLD CKD-EPI 2021: 50 ML/MIN/1.73M*2
ERYTHROCYTE [DISTWIDTH] IN BLOOD BY AUTOMATED COUNT: 16.3 % (ref 11.5–14.5)
GLUCOSE SERPL-MCNC: 94 MG/DL (ref 74–99)
GLUCOSE UR STRIP.AUTO-MCNC: NORMAL MG/DL
HCT VFR BLD AUTO: 44.8 % (ref 41–52)
HGB BLD-MCNC: 13.9 G/DL (ref 13.5–17.5)
HOLD SPECIMEN: NORMAL
KETONES UR STRIP.AUTO-MCNC: NEGATIVE MG/DL
LEUKOCYTE ESTERASE UR QL STRIP.AUTO: NEGATIVE
MCH RBC QN AUTO: 25.2 PG (ref 26–34)
MCHC RBC AUTO-ENTMCNC: 31 G/DL (ref 32–36)
MCV RBC AUTO: 81 FL (ref 80–100)
NITRITE UR QL STRIP.AUTO: NEGATIVE
NRBC BLD-RTO: 0 /100 WBCS (ref 0–0)
PH UR STRIP.AUTO: 5.5 [PH]
PLATELET # BLD AUTO: 178 X10*3/UL (ref 150–450)
POTASSIUM SERPL-SCNC: 3.7 MMOL/L (ref 3.5–5.3)
PROT UR STRIP.AUTO-MCNC: ABNORMAL MG/DL
RBC # BLD AUTO: 5.52 X10*6/UL (ref 4.5–5.9)
RBC # UR STRIP.AUTO: ABNORMAL /UL
RBC #/AREA URNS AUTO: NORMAL /HPF
SODIUM SERPL-SCNC: 138 MMOL/L (ref 136–145)
SP GR UR STRIP.AUTO: 1.04
UROBILINOGEN UR STRIP.AUTO-MCNC: NORMAL MG/DL
WBC # BLD AUTO: 5.5 X10*3/UL (ref 4.4–11.3)
WBC #/AREA URNS AUTO: NORMAL /HPF

## 2024-08-27 PROCEDURE — 99232 SBSQ HOSP IP/OBS MODERATE 35: CPT

## 2024-08-27 PROCEDURE — 94760 N-INVAS EAR/PLS OXIMETRY 1: CPT | Mod: IPSPLIT

## 2024-08-27 PROCEDURE — 70450 CT HEAD/BRAIN W/O DYE: CPT | Mod: IPSPLIT

## 2024-08-27 PROCEDURE — 80048 BASIC METABOLIC PNL TOTAL CA: CPT

## 2024-08-27 PROCEDURE — 2500000002 HC RX 250 W HCPCS SELF ADMINISTERED DRUGS (ALT 637 FOR MEDICARE OP, ALT 636 FOR OP/ED): Mod: IPSPLIT | Performed by: NURSE PRACTITIONER

## 2024-08-27 PROCEDURE — 2500000001 HC RX 250 WO HCPCS SELF ADMINISTERED DRUGS (ALT 637 FOR MEDICARE OP): Mod: IPSPLIT

## 2024-08-27 PROCEDURE — 99232 SBSQ HOSP IP/OBS MODERATE 35: CPT | Performed by: NURSE PRACTITIONER

## 2024-08-27 PROCEDURE — 2500000002 HC RX 250 W HCPCS SELF ADMINISTERED DRUGS (ALT 637 FOR MEDICARE OP, ALT 636 FOR OP/ED)

## 2024-08-27 PROCEDURE — 71275 CT ANGIOGRAPHY CHEST: CPT | Mod: IPSPLIT

## 2024-08-27 PROCEDURE — 81001 URINALYSIS AUTO W/SCOPE: CPT | Mod: IPSPLIT | Performed by: NURSE PRACTITIONER

## 2024-08-27 PROCEDURE — 71275 CT ANGIOGRAPHY CHEST: CPT | Performed by: RADIOLOGY

## 2024-08-27 PROCEDURE — 36415 COLL VENOUS BLD VENIPUNCTURE: CPT

## 2024-08-27 PROCEDURE — 2500000004 HC RX 250 GENERAL PHARMACY W/ HCPCS (ALT 636 FOR OP/ED): Mod: IPSPLIT

## 2024-08-27 PROCEDURE — 96376 TX/PRO/DX INJ SAME DRUG ADON: CPT

## 2024-08-27 PROCEDURE — 70450 CT HEAD/BRAIN W/O DYE: CPT | Performed by: STUDENT IN AN ORGANIZED HEALTH CARE EDUCATION/TRAINING PROGRAM

## 2024-08-27 PROCEDURE — 2500000001 HC RX 250 WO HCPCS SELF ADMINISTERED DRUGS (ALT 637 FOR MEDICARE OP): Mod: IPSPLIT | Performed by: NURSE PRACTITIONER

## 2024-08-27 PROCEDURE — 2550000001 HC RX 255 CONTRASTS: Mod: IPSPLIT

## 2024-08-27 PROCEDURE — 1200000002 HC GENERAL ROOM WITH TELEMETRY DAILY: Mod: IPSPLIT

## 2024-08-27 PROCEDURE — G0378 HOSPITAL OBSERVATION PER HR: HCPCS

## 2024-08-27 PROCEDURE — 85027 COMPLETE CBC AUTOMATED: CPT

## 2024-08-27 PROCEDURE — 85379 FIBRIN DEGRADATION QUANT: CPT

## 2024-08-27 RX ORDER — QUETIAPINE FUMARATE 25 MG/1
25 TABLET, FILM COATED ORAL NIGHTLY
Status: DISCONTINUED | OUTPATIENT
Start: 2024-08-27 | End: 2024-08-28 | Stop reason: HOSPADM

## 2024-08-27 RX ORDER — BISMUTH SUBSALICYLATE 262 MG
1 TABLET,CHEWABLE ORAL DAILY
Status: DISCONTINUED | OUTPATIENT
Start: 2024-08-27 | End: 2024-08-28 | Stop reason: HOSPADM

## 2024-08-27 RX ORDER — LANOLIN ALCOHOL/MO/W.PET/CERES
100 CREAM (GRAM) TOPICAL DAILY
Status: DISCONTINUED | OUTPATIENT
Start: 2024-08-27 | End: 2024-08-28 | Stop reason: HOSPADM

## 2024-08-27 RX ORDER — LORAZEPAM 0.5 MG/1
2 TABLET ORAL EVERY 2 HOUR PRN
Status: DISCONTINUED | OUTPATIENT
Start: 2024-08-27 | End: 2024-08-28 | Stop reason: HOSPADM

## 2024-08-27 RX ORDER — LORAZEPAM 0.5 MG/1
1 TABLET ORAL EVERY 2 HOUR PRN
Status: DISCONTINUED | OUTPATIENT
Start: 2024-08-27 | End: 2024-08-28 | Stop reason: HOSPADM

## 2024-08-27 RX ORDER — FOLIC ACID 1 MG/1
1 TABLET ORAL DAILY
Status: DISCONTINUED | OUTPATIENT
Start: 2024-08-27 | End: 2024-08-28 | Stop reason: HOSPADM

## 2024-08-27 RX ORDER — SPIRONOLACTONE 25 MG/1
25 TABLET ORAL DAILY
Status: DISCONTINUED | OUTPATIENT
Start: 2024-08-27 | End: 2024-08-28 | Stop reason: HOSPADM

## 2024-08-27 RX ORDER — AZITHROMYCIN 250 MG/1
500 TABLET, FILM COATED ORAL
Status: DISCONTINUED | OUTPATIENT
Start: 2024-08-27 | End: 2024-08-28 | Stop reason: HOSPADM

## 2024-08-27 RX ORDER — LORAZEPAM 0.5 MG/1
0.5 TABLET ORAL EVERY 2 HOUR PRN
Status: DISCONTINUED | OUTPATIENT
Start: 2024-08-27 | End: 2024-08-28 | Stop reason: HOSPADM

## 2024-08-27 RX ORDER — SODIUM CHLORIDE 9 MG/ML
10 INJECTION, SOLUTION INTRAVENOUS CONTINUOUS PRN
Status: DISCONTINUED | OUTPATIENT
Start: 2024-08-27 | End: 2024-08-28 | Stop reason: HOSPADM

## 2024-08-27 ASSESSMENT — LIFESTYLE VARIABLES
AUDITORY DISTURBANCES: NOT PRESENT
HEADACHE, FULLNESS IN HEAD: NOT PRESENT
ANXIETY: MILDLY ANXIOUS
AUDITORY DISTURBANCES: NOT PRESENT
ORIENTATION AND CLOUDING OF SENSORIUM: DISORIENTED FOR DATE BY MORE THAN 2 CALENDAR DAYS
TOTAL SCORE: 9
ANXIETY: MILDLY ANXIOUS
AGITATION: NORMAL ACTIVITY
TREMOR: NO TREMOR
PULSE: 72
PAROXYSMAL SWEATS: NO SWEAT VISIBLE
HEADACHE, FULLNESS IN HEAD: NOT PRESENT
AGITATION: SOMEWHAT MORE THAN NORMAL ACTIVITY
NAUSEA AND VOMITING: MILD NAUSEA WITH NO VOMITING
VISUAL DISTURBANCES: VERY MILD SENSITIVITY
NAUSEA AND VOMITING: MILD NAUSEA WITH NO VOMITING
ORIENTATION AND CLOUDING OF SENSORIUM: ORIENTED AND CAN DO SERIAL ADDITIONS
PAROXYSMAL SWEATS: NO SWEAT VISIBLE
TREMOR: NO TREMOR
VISUAL DISTURBANCES: MODERATELY SEVERE HALLUCINATIONS
TOTAL SCORE: 4

## 2024-08-27 ASSESSMENT — ACTIVITIES OF DAILY LIVING (ADL): LACK_OF_TRANSPORTATION: NO

## 2024-08-27 ASSESSMENT — PAIN SCALES - GENERAL: PAINLEVEL_OUTOF10: 0 - NO PAIN

## 2024-08-27 NOTE — PROGRESS NOTES
08/27/24 1251   Discharge Planning   Living Arrangements Children   Support Systems Children   Assistance Needed Independent with ADLs/IADLs and ambulation; Patient says he drives.  No DME at home.   Type of Residence Private residence  (1 story house)   Do you have animals or pets at home? No   Home or Post Acute Services None   Expected Discharge Disposition Home   Does the patient need discharge transport arranged? No   Financial Resource Strain   How hard is it for you to pay for the very basics like food, housing, medical care, and heating? Not hard   Housing Stability   In the last 12 months, was there a time when you were not able to pay the mortgage or rent on time? N   In the past 12 months, how many times have you moved where you were living? 0   At any time in the past 12 months, were you homeless or living in a shelter (including now)? N   Transportation Needs   In the past 12 months, has lack of transportation kept you from medical appointments or from getting medications? no   In the past 12 months, has lack of transportation kept you from meetings, work, or from getting things needed for daily living? No     OBSERVATION:   TCC spoke with patient regarding discharge planning and home going needs. Patient says his daughter lives with him.  Patient says he is independent with ADLs/ambulation/driving.  Confirmed with patient PCP is  Dr. Beronica Adames and pharmacy is [facility] in [place].  Discharge Plan is for patient to return home and his daughter should be able to pick him up.  TCC will continue to follow for changes in discharge planning needs.

## 2024-08-27 NOTE — DISCHARGE INSTR - OTHER ORDERS
Thank you for choosing [facility] for your Health Care needs.  Also, thank you for allowing us to take you and your families preferences into account when determining your discharge plan.  Stay well!    Your Care Transitions Team Member:Elise Larson Robin and Karin [phone]    For questions about your medications listed on your discharge instructions, please call the Nurses Station at [phone].      [facility] is a free community service that provides information about social health and government resources 24 hours a day.  It provides Human services agencies, food and shelter providers,  resources, special services for senior and volunteer opportunities.  [email or web address]

## 2024-08-27 NOTE — PROGRESS NOTES
"     Cardiology Progress Note  Patient: Gilmer Quiñonez  Unit/Bed: 216/216-A  YOB: 1935    Admitting Diagnosis: Paroxysmal atrial fibrillation (Multi) [I48.0]  Acute on chronic congestive heart failure, unspecified heart failure type (Multi) [I50.9]  Acute on chronic combined systolic (congestive) and diastolic (congestive) heart failure (Multi) [I50.43]  Date:  8/26/2024  Attending: Mirta Plaza MD      Hospital Day: 0    SUBJECTIVE:   Feeling better,  shortness of breath has resolved   Cites Rx as \"a hassle\" but otherwise agreeable to take as prescribed         OBJECTIVE  Vitals:   Visit Vitals  /70 (BP Location: Right arm, Patient Position: Lying)   Pulse 61   Temp 36 °C (96.8 °F) (Temporal)   Resp 16        Wt Readings from Last 5 Encounters:   08/27/24 71.8 kg (158 lb 4.6 oz)   04/01/24 78.4 kg (172 lb 12.8 oz)   11/07/23 73.1 kg (161 lb 3.2 oz)   10/30/23 75.3 kg (166 lb)   09/20/23 72.7 kg (160 lb 3.2 oz)       Intake / Output:   I/O last 3 completed shifts:  In: 360 (5 mL/kg) [P.O.:360]  Out: 200 (2.8 mL/kg) [Urine:200 (0.1 mL/kg/hr)]  Weight: 71.8 kg      Tele monitoring: AF / Vpaced    Physical Examination:    Constitutional:       Appearance: Healthy appearance. Not in distress.   Eyes:      Conjunctiva/sclera: Conjunctivae normal.   Neck:      Vascular: JVD normal.   Pulmonary:      Effort: Pulmonary effort is normal.      Breath sounds: Normal breath sounds.   Cardiovascular:      PMI at left midclavicular line. Normal rate. Regularly irregular rhythm. Normal S1. Normal S2.       Murmurs: There is no murmur.      No rub.      Comments: ICD site healed   Pulses:     Intact distal pulses.   Edema:     Peripheral edema absent.   Abdominal:      General: Bowel sounds are normal.   Musculoskeletal:      Cervical back: Neck supple. Skin:     General: Skin is warm and dry.   Neurological:      Mental Status: Alert and oriented to person, place and time.            LABS:  CMP: "   Recent Labs     08/27/24  0733 08/26/24  1026 04/01/24  1210 10/30/23  1305 09/20/23  1324 05/08/23  1519 04/03/23  0443 04/02/23  0510 04/01/23  0852 03/31/23 2010 12/24/21 2015 12/01/21  0612 11/29/21  1015 11/28/21  1600 11/16/21  1610 11/10/21  0535    135* 136 136 138 140 142 141 139 137   < > 133* 130* 132*   < > 138   K 3.7 4.4 4.3 4.6 4.2 3.8 3.7 3.7 4.1 3.9   < > 3.9 3.4* 3.9   < > 4.0   CL 98 98 102 102 103 102 107 105 107 104   < > 97* 96* 96*   < > 100   CO2 26 29 26 27 26 27 27 23 24 22   < > 30 26 30   < > 29   ANIONGAP 18 12 12 12 13 15 12 17 12 15   < > 10 11 10   < > 13   BUN 27* 23 10 10 14 14 19 18 14 14   < > 37* 22 21   < > 24*   CREATININE 1.38* 1.44* 1.13 1.08 1.21 1.26 1.35* 1.42* 1.10 1.08   < > 1.84* 1.38* 1.28   < > 1.88*   EGFR 50* 47* 63 67  --   --   --   --   --   --   --   --   --   --   --   --    MG  --  2.25 2.18  --   --  1.99 1.99 1.92  --  2.04  --  2.41* 2.22 2.17  --  2.11    < > = values in this interval not displayed.     LIVER ENZYMES:   Recent Labs     08/26/24  1026 04/01/23  0852 03/31/23 2010 02/28/22  1847 02/19/22  0642 02/18/22  2028 02/18/22  1454 02/17/22  1945 12/16/19  1533 12/06/19  1142 11/28/19  1641   ALBUMIN 4.4 4.1 4.5 3.8 3.3* 3.6 3.7 3.9   < > 3.9 4.3   ALT 7* 9* 11 11 10 12 13 15   < > 20 22   AST 21 13 16 17 16 15 16 21   < > 36 27   BILITOT 0.9 1.2 1.2 1.0 1.2 1.1 1.3* 0.9   < > 0.6 0.6   LIPASE 14  --  14 14  --   --   --   --   --  40 60    < > = values in this interval not displayed.     CBC:  Recent Labs     08/27/24  0733 08/26/24  1026 04/01/24  1210 10/30/23  1305 09/20/23  1324 06/04/23  1006 05/08/23  1519 04/03/23  0443   WBC 5.5 4.9 4.7 5.4 5.3 3.7* 3.6* 4.9   HGB 13.9 13.5 12.3* 12.4* 11.9* 11.6* 11.5* 10.9*   HCT 44.8 44.3 40.1* 41.6 39.7* 37.9* 38.2* 35.7*    174 153 165 180 146* 132* 151   MCV 81 83 85 87 84 84 86 86     COAG:   Recent Labs     06/04/23  1006 02/28/22  1847 02/18/22  1454 02/17/22  1945 11/28/21  1600  "11/25/21  1520 11/16/21  1725 11/07/21  1440   INR 1.2* 1.2* 1.4* 1.5* 1.4* 1.3* 1.4* 1.6*     ABO:   Recent Labs     02/28/22  1847   ABO O     HEME/ENDO:  Recent Labs     04/01/24  1210 04/02/23  0510 04/18/22  1040 11/17/21  1430 06/11/21  0910 10/31/20  2353 11/29/19  0808 02/10/18  0250   FERRITIN  --  35 70   < >  --   --    < > 17*   IRONSAT  --  83* 12*   < >  --   --    < > NOT CALC.   TSH 1.07  --   --   --   --   --   --  1.09   HGBA1C  --   --   --   --  6.3* 5.8  --   --     < > = values in this interval not displayed.      CARDIAC:   Recent Labs     08/26/24  1152 08/26/24  1026 04/01/24  1210 04/02/23  0510 04/01/23  0852 03/31/23  2158 03/31/23 2010 02/28/22  2219 02/17/22  1945 01/10/22  1435 11/29/21  1015 10/31/20  2353 07/30/20  1127   LDH  --   --   --   --   --   --   --   --   --   --   --   --  165   TROPHS 19 17  --   --  21* 27* 23*  CANCELED  --   --   --   --   --   --    BNP  --  375* 670* 708*  --   --  642* 619* 366* 548* 388*   < >  --     < > = values in this interval not displayed.       Lipid Panel:  No results found for: \"HDL\", \"CHHDL\", \"VLDL\", \"TRIG\", \"NHDL\"       Current Medications:   MEDICATIONS  Infusions:  sodium chloride 0.9%      Scheduled:  apixaban, 2.5 mg, BID  azithromycin, 500 mg, q24h MELISSA  empagliflozin, 10 mg, Daily  furosemide, 20 mg, BID  metoprolol succinate XL, 25 mg, Daily  pantoprazole, 40 mg, Daily before breakfast   Or  pantoprazole, 40 mg, Daily before breakfast  perflutren protein A microsphere, 0.5 mL, Once in imaging  rosuvastatin, 40 mg, Daily  sacubitriL-valsartan, 0.5 tablet, BID  spironolactone, 25 mg, Daily  sulfur hexafluoride microsphr, 2 mL, Once in imaging  [Held by provider] torsemide, 40 mg, Daily      PRN:  acetaminophen, 650 mg, q4h PRN  acetaminophen, 650 mg, q4h PRN  benzocaine-menthol, 1 lozenge, q2h PRN  dextromethorphan-guaifenesin, 5 mL, q4h PRN  guaiFENesin, 600 mg, q12h PRN  melatonin, 9 mg, Nightly PRN  ondansetron, 4 mg, q8h " PRN   Or  ondansetron, 4 mg, q8h PRN  polyethylene glycol, 17 g, Daily PRN  sodium chloride 0.9%, 10 mL/hr, Continuous PRN              Imaging:   CT chest without IV contrast -- pending     CXR 8/26/2024  IMPRESSION:  1. 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  2. 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  3. Limited sensitivity for the detection of pulmonary infiltrates due  to the extensive calcific pleural plaque.  4. Probable hiatal hernia.  5. Postoperative findings as above    Cardiovascular studies:       Echocardiogram 8/26/2024  CONCLUSIONS:   1. Left ventricular ejection fraction is severely decreased, by visual estimate at 25-30%.   2. There is global hypokinesis of the left ventricle with minor regional variations.   3. Spectral Doppler shows an abnormal pattern of left ventricular diastolic filling.   4. Left ventricular cavity size is mildly dilated.   5. There is moderate concentric left ventricular hypertrophy.   6. There is mildly reduced right ventricular systolic function.   7. Mildly enlarged right ventricle.   8. The left atrium is severely dilated.   9. The right atrium is moderately dilated.  10. The patient is in atrial fibrillation which may influence the estimate of left ventricular function and transvalvular flows.        EKG dated 8/26/2024 independently reviewed    Atrial flutter and V Paced            Echocardiogram 2/21/2022  CONCLUSIONS:   1. The left ventricular systolic function is moderately decreased with a 35-40% estimated ejection fraction.   2. Spectral Doppler shows an impaired relaxation pattern of left ventricular diastolic filling.   3. The left atrium is severely dilated.   4. The right atrium is moderately dilated.   5. RVSP within normal limits.   6. Compared with study from 7/9/2021, no significant change.   7. There is global hypokinesis of the left ventricle with minor regional variations.        Echocardiogram 11/02/2020  CONCLUSIONS:   1. The left  ventricular systolic function is mildly to moderately decreased with a 40-45% estimated ejection fraction.   2. Poorly visualized anatomical structures due to suboptimal image quality.   3. Abnormal septal motion consistent with post-operative status.   4. Thee is inferior akinesia and inferolateral and distal septal-apical hypokinesia.   5. There is severe eccentric left ventricular hypertrophy.   6. The left atrium is severely dilated.   7. The right atrium is severely dilated.   8. The left ventricular cavity size is moderate to severely dilated.   9. There is no evidence of a patent foramen ovale.  10. The patient is in atrial fibrillation which may influence the estimate of left ventricular function and transvalvular flows.     Cardiac catheterization 10/31/2016  CONCLUSIONS:   1. Left main: patent stent into left Cx with mild ISR.   2. LAD: 100% ostial occlusion.   3. LCx: patient LM-proximal LCx stent with mild ISR, mild diffuse disease in vessel proper.   4. RCA: 95% ostial disease, 100% prox-mid  with right to right collaterals.   5. Grafts: (1) WENDY to LAD (and jump to diagonal) patent (2) LIMA to ramus patent (3) SVG to RPDA with 99% ISR at distal anastomosis.   6. LVEDP 6mmHg, no significant aortic stenosis on LV-AO gradient.        Assessment:   88 YOM with acute on chronic HFrEF with Rx non-compliance.  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  Chronic atrial fibrillation /flutter, rate controlled   -- hx ablation in 2015 with recurrence, on long term OAC with Eliquis, declines watchman LAAO despite hx of GI bleed  HTN, controlled  HLD,    hx VT   Hx CVA  JONAS - baseline crt 0.93       PLAN:     GDMT for HFrEF:   Continue IV lasix for now,  closely monitoring renal function and electrolyte balance   Transition to PO torsemide tomorrow   Add Jardiance 10 mg daily    Spironolactone 25 mg daily  Continue Toprol XL 25 mg daily   Entresto 24/26 mg, starting at 0.5 tab BID  Atrial flutter is rate controlled   Continue long term OAC with Eliquis   ASCVD is chest pain free   On OAC alone with hx GI bleeding   Consider workup for LAAO as an outpatient -- which he has declined in the past       Thank you  for the consult   Will follow   Please call or message with questions, concerns or changes in clinical condition   The case was discussed with SOFIE Hobson       Electronically signed by SOFIE Chaudhry on 8/27/2024 at 10:10 AM

## 2024-08-27 NOTE — ED PROVIDER NOTES
Chief Complaint: Shortness of breath  HPI: This is an 88-year-old male, presenting to the emergency department for shortness of breath which began a few days ago, and increased in severity today.  Patient denies any chest pain.  He does complain of some bilateral lower extremity swelling.  Further history was obtained by the patient's daughter who states that the patient has not been taking his Lasix as prescribed.  He denies any cough, congestion, fevers, chills.    Past Medical History:   Diagnosis Date    Personal history of pneumonia (recurrent) 07/15/2021    History of pneumonia      Past Surgical History:   Procedure Laterality Date    APPENDECTOMY  02/06/2015    Appendectomy    CORONARY ARTERY BYPASS GRAFT  07/17/2017    CABG    CT ABDOMEN PELVIS ANGIOGRAM W AND/OR WO IV CONTRAST  2/28/2022    CT ABDOMEN PELVIS ANGIOGRAM W AND/OR WO IV CONTRAST 2/28/2022 [facility]    CT ANGIO NECK  4/1/2023    CT NECK ANGIO W AND WO IV CONTRAST GEN CT    CT HEAD ANGIO W AND WO IV CONTRAST  4/1/2023    CT HEAD ANGIO W AND WO IV CONTRAST GEN CT    KNEE SURGERY  02/06/2015    Knee Surgery    OTHER SURGICAL HISTORY  01/06/2020    Cardioverter defibrillator insertion    OTHER SURGICAL HISTORY  01/06/2020    Coronary artery stent placement       Physical Exam  Constitutional:       Appearance: Normal appearance.   HENT:      Head: Normocephalic and atraumatic.      Mouth/Throat:      Mouth: Mucous membranes are moist.   Eyes:      Extraocular Movements: Extraocular movements intact.   Cardiovascular:      Rate and Rhythm: Normal rate and regular rhythm.   Pulmonary:      Effort: Pulmonary effort is normal.      Breath sounds: Rales present. No decreased breath sounds.   Chest:      Chest wall: No tenderness.   Abdominal:      General: Abdomen is flat.      Palpations: Abdomen is soft.   Musculoskeletal:      Right lower leg: Edema present.      Left lower leg: Edema present.   Skin:     General: Skin is warm.    Neurological:      General: No focal deficit present.      Mental Status: He is alert.   Psychiatric:         Mood and Affect: Mood normal.            ED Course/MDM  Diagnoses as of 08/26/24 2130   Acute on chronic congestive heart failure, unspecified heart failure type (Multi)     EKG interpreted by myself (ED attending physician): Ventricular paced rhythm, rate of 66 with PVCs, left axis deviation, wide QRS consistent with pacing, paced rhythm makes further interpretation difficult, however no apparent ischemic changes    This is a 88 y.o. male presenting to the ED for evaluation of shortness of breath and bilateral lower extremity swelling for the last few days, worse today. Further history was obtained from the patient's daughter who states the patient has not been taking his Lasix as prescribed.  On physical exam, the patient is resting comfortably in the bed, no acute distress.  He does have Rales bilaterally, worse at the bases as well as some mild pitting edema to his ankles bilaterally.  EKG is a paced rhythm that is nonischemic.  Lab work was obtained, and is concerning for an elevation of the BNP, which is likely the cause of the patient's shortness of breath.  He was given Lasix in the emergency department with good urine output.  He did not require oxygen during his stay.  I did discuss the case with the hospitalist team who ultimately accepted the patient in admission for further evaluation.    Final Impression  1.  CHF exacerbation   Disposition/Plan: admit to medicine  Condition at disposition: Stable.     Muriel Oscar DO  Emergency Medicine Physician     Muriel Oscar,   08/26/24 2133

## 2024-08-27 NOTE — PROGRESS NOTES
Marie Canseco is a 88 y.o. male on day 0 of admission presenting with Acute on chronic combined systolic (congestive) and diastolic (congestive) heart failure (Multi).      Subjective   Pt assessed at bedside. Feeling a little better, anxious to go home. Noted to have a moist cough.        Objective     Last Recorded Vitals  /70 (BP Location: Right arm, Patient Position: Lying)   Pulse 61   Temp 36 °C (96.8 °F) (Temporal)   Resp 16   Wt 71.8 kg (158 lb 4.6 oz)   SpO2 94%   Intake/Output last 3 Shifts:    Intake/Output Summary (Last 24 hours) at 8/27/2024 0908  Last data filed at 8/26/2024 2049  Gross per 24 hour   Intake 360 ml   Output 200 ml   Net 160 ml       Admission Weight  Weight: 73.6 kg (162 lb 4.1 oz) (08/26/24 1000)    Daily Weight  08/27/24 : 71.8 kg (158 lb 4.6 oz)    Image Results  Transthoracic Echo (TTE) Complete     [facility], [address] [place] [address]               Tel [phone] and Fax [phone]    TRANSTHORACIC ECHOCARDIOGRAM REPORT       Patient Name:      MARIE CANSECO     Reading Physician:    [id number] Helena Phillips MD  Study Date:        8/26/2024            Ordering Provider:    [id number] GAUTAM  MRN/PID:           [id number]             Fellow:  Accession#:        [id number]         Nurse:                Bernadette Cox RN  Date of Birth/Age: 1935 / 88      Sonographer:          Jenn paul                                      JASKARAN  Gender:            M                    Additional Staff:  Height:            167.64 cm            Admit Date:  Weight:            71.67 kg             Admission Status:     Inpatient -                                                                Routine  BSA / BMI:         1.81 m2 / 25.50      Encounter#:           [id number]                      kg/m2  Blood Pressure:    139/67 mmHg          Department Location:  [facility]    Study Type:    TRANSTHORACIC ECHO (TTE) COMPLETE  Diagnosis/ICD: Heart failure, unspecified-I50.9  Indication:    Congestive Heart Failure  CPT Code:      Echo Complete w Full Doppler-[id number]    Patient History:  Pertinent         A-Fib, CAD, CHF, HTN, Hyperlipidemia, CVA and Syncope.  History:          pacemaker.    Study Detail: The following Echo studies were performed: 2D, M-Mode and Doppler.                Technically challenging study due to body habitus. Definity used                as a contrast agent for endocardial border definition. Total                contrast used for this procedure was 2 mL via IV push. The patient                was awake.       PHYSICIAN INTERPRETATION:  Left Ventricle: Left ventricular ejection fraction is severely decreased, by visual estimate at 25-30%. The patient is in atrial fibrillation which may influence the estimate of left ventricular function and transvalvular flows. There is global hypokinesis of the left ventricle with minor regional variations. The left ventricular cavity size is mildly dilated. There is moderate concentric left ventricular hypertrophy. Spectral Doppler shows an abnormal pattern of left ventricular diastolic filling.  Left Atrium: The left atrium is severely dilated.  Right Ventricle: The right ventricle is mildly enlarged. There is mildly reduced right ventricular systolic function. A device is visualized in the right ventricle.  Right Atrium: The right atrium is moderately dilated.  Aortic Valve: The aortic valve is trileaflet. There is minimal aortic valve cusp calcification. There is no evidence of aortic valve regurgitation. The peak instantaneous gradient of the aortic valve is 7.0 mmHg.  Mitral Valve: The mitral valve is normal in structure. There is mild mitral valve regurgitation.  Tricuspid  Valve: The tricuspid valve is structurally normal. There is mild tricuspid regurgitation.  Pulmonic Valve: The pulmonic valve is not well visualized. There is physiologic pulmonic valve regurgitation.  Pericardium: There is no pericardial effusion noted.  Aorta: The aortic root was not well visualized.       CONCLUSIONS:   1. Left ventricular ejection fraction is severely decreased, by visual estimate at 25-30%.   2. There is global hypokinesis of the left ventricle with minor regional variations.   3. Spectral Doppler shows an abnormal pattern of left ventricular diastolic filling.   4. Left ventricular cavity size is mildly dilated.   5. There is moderate concentric left ventricular hypertrophy.   6. There is mildly reduced right ventricular systolic function.   7. Mildly enlarged right ventricle.   8. The left atrium is severely dilated.   9. The right atrium is moderately dilated.  10. The patient is in atrial fibrillation which may influence the estimate of left ventricular function and transvalvular flows.    QUANTITATIVE DATA SUMMARY:  2D MEASUREMENTS:                          Normal Ranges:  Ao Root d:     3.50 cm  (2.0-3.7cm)  LAs:           4.00 cm  (2.7-4.0cm)  IVSd:          0.97 cm  (0.6-1.1cm)  LVPWd:         1.28 cm  (0.6-1.1cm)  LVIDd:         5.40 cm  (3.9-5.9cm)  LVIDs:         4.25 cm  LV Mass Index: 134 g/m2  LVEDV Index:   78 ml/m2  LV % FS        21.3 %    LA VOLUME:                                Normal Ranges:  LA Vol A4C:        82.6 ml    (22+/-6mL/m2)  LA Vol A2C:        113.3 ml  LA Vol BP:         100.6 ml  LA Vol Index A4C:  45.6ml/m2  LA Vol Index A2C:  62.6 ml/m2  LA Vol Index BP:   55.6 ml/m2  LA Area A4C:       27.7 cm2  LA Area A2C:       31.2 cm2  LA Major Axis A4C: 7.9 cm  LA Major Axis A2C: 7.3 cm  LA Volume Index:   53.0 ml/m2    RA VOLUME BY A/L METHOD:                                Normal Ranges:  RA Vol A4C:        89.4 ml    (8.3-19.5ml)  RA Vol Index A4C:  49.4 ml/m2  RA  Area A4C:       27.9 cm2  RA Major Axis A4C: 7.4 cm    M-MODE MEASUREMENTS:                   Normal Ranges:  Ao Root: 3.00 cm (2.0-3.7cm)  LAs:     5.30 cm (2.7-4.0cm)    AORTA MEASUREMENTS:                     Normal Ranges:  Asc Ao, d: 3.30 cm (2.1-3.4cm)    LV SYSTOLIC FUNCTION BY 2D PLANIMETRY (MOD):                       Normal Ranges:  EF-A4C View:    23 % (>=55%)  EF-A2C View:    29 %  EF-Biplane:     26 %  EF-Visual:      28 %  LV EF Reported: 28 %    LV DIASTOLIC FUNCTION:                             Normal Ranges:  MV Peak E:      0.99 m/s   (0.7-1.2 m/s)  MV e'           0.054 m/s  (>8.0)  MV lateral e'   0.06 m/s  MV medial e'    0.05 m/s  E/e' Ratio:     18.29      (<8.0)  PulmV Sys Eduard:  24.60 cm/s  PulmV Villa Eduard: 46.80 cm/s  PulmV S/D Eduard:  0.50    MITRAL VALVE:                  Normal Ranges:  MV DT: 203 msec (150-240msec)    AORTIC VALVE:                          Normal Ranges:  AoV Vmax:      1.32 m/s (<=1.7m/s)  AoV Peak P.0 mmHg (<20mmHg)  LVOT Max Eduard:  1.11 m/s (<=1.1m/s)  LVOT VTI:      18.50 cm  LVOT Diameter: 1.90 cm  (1.8-2.4cm)  AoV Area,Vmax: 2.38 cm2 (2.5-4.5cm2)       RIGHT VENTRICLE:  RV Basal 3.70 cm  RV Mid   2.80 cm  RV Major 8.1 cm  TAPSE:   7.9 mm  RV s'    0.07 m/s    TRICUSPID VALVE/RVSP:                    Normal Ranges:  IVC Diam: 1.60 cm    PULMONIC VALVE:                       Normal Ranges:  PV Max Eduard: 1.1 m/s  (0.6-0.9m/s)  PV Max P.0 mmHg    Pulmonary Veins:  PulmV Villa Eduard: 46.80 cm/s  PulmV S/D Eduard:  0.50  PulmV Sys Eduard:  24.60 cm/s       [id number] Helena Phillips MD  Electronically signed on 2024 at 8:40:29 PM       ** Final **  XR chest 1 view  Narrative: Interpreted By:  William Unger,   STUDY:  XR CHEST 1 VIEW; 2024 10:39 am      INDICATION:  CLINICAL INFORMATION: Signs/Symptoms:SOB.      COMPARISON:  2024      ACCESSION NUMBER(S):  [id number]      ORDERING CLINICIAN:  KIRK MARTINEZ      TECHNIQUE:  Portable chest one view.       FINDINGS:  The cardiac silhouette is quite prominent suggesting cardiomegaly. A  bipolar cardiac pacemaker is present. Sternal fixations devices and  mediastinal clips are present. Calcific pleural plaque is present  bilaterally. The overlying plaque limits assessment for pulmonary  infiltrates. Hiatal hernia suspected within the lower middle  mediastinum. The lungs are markedly hyperinflated consistent with  COPD.      Impression: 1. 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  2. 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  3. Limited sensitivity for the detection of pulmonary infiltrates due  to the extensive calcific pleural plaque.  4. Probable hiatal hernia.  5. Postoperative findings as above.      MACRO:  none      Signed by: William Unger 8/26/2024 10:56 AM  Dictation workstation:   [id number]      Physical Exam  Vitals reviewed.   HENT:      Head: Normocephalic and atraumatic.      Right Ear: External ear normal.      Left Ear: External ear normal.      Nose: Nose normal.      Mouth/Throat:      Pharynx: Oropharynx is clear.   Eyes:      Conjunctiva/sclera: Conjunctivae normal.   Cardiovascular:      Rate and Rhythm: Normal rate and regular rhythm.      Pulses: Normal pulses.      Heart sounds: Normal heart sounds.   Pulmonary:      Effort: Pulmonary effort is normal.      Breath sounds: Normal breath sounds.   Abdominal:      General: Bowel sounds are normal.      Palpations: Abdomen is soft.   Musculoskeletal:         General: Normal range of motion.      Cervical back: Normal range of motion and neck supple.   Skin:     General: Skin is dry.   Neurological:      General: No focal deficit present.      Mental Status: He is alert and oriented to person, place, and time.   Psychiatric:         Mood and Affect: Mood normal.         Behavior: Behavior normal.         Relevant Results  Scheduled medications  apixaban, 2.5 mg, oral, BID  azithromycin, 500 mg, oral, q24h MELISSA  furosemide, 20 mg,  intravenous, BID  metoprolol succinate XL, 25 mg, oral, Daily  pantoprazole, 40 mg, oral, Daily before breakfast   Or  pantoprazole, 40 mg, intravenous, Daily before breakfast  perflutren protein A microsphere, 0.5 mL, intravenous, Once in imaging  rosuvastatin, 40 mg, oral, Daily  sulfur hexafluoride microsphr, 2 mL, intravenous, Once in imaging  [Held by provider] torsemide, 40 mg, oral, Daily      Continuous medications  sodium chloride 0.9%, 10 mL/hr      PRN medications  PRN medications: acetaminophen, acetaminophen, benzocaine-menthol, dextromethorphan-guaifenesin, guaiFENesin, melatonin, ondansetron **OR** ondansetron, polyethylene glycol, sodium chloride 0.9%    Results for orders placed or performed during the hospital encounter of 08/26/24 (from the past 24 hour(s))   CBC and Auto Differential   Result Value Ref Range    WBC 4.9 4.4 - 11.3 x10*3/uL    nRBC 0.0 0.0 - 0.0 /100 WBCs    RBC 5.35 4.50 - 5.90 x10*6/uL    Hemoglobin 13.5 13.5 - 17.5 g/dL    Hematocrit 44.3 41.0 - 52.0 %    MCV 83 80 - 100 fL    MCH 25.2 (L) 26.0 - 34.0 pg    MCHC 30.5 (L) 32.0 - 36.0 g/dL    RDW 16.3 (H) 11.5 - 14.5 %    Platelets 174 150 - 450 x10*3/uL    Neutrophils % 69.8 40.0 - 80.0 %    Immature Granulocytes %, Automated 0.2 0.0 - 0.9 %    Lymphocytes % 17.1 13.0 - 44.0 %    Monocytes % 9.0 2.0 - 10.0 %    Eosinophils % 3.7 0.0 - 6.0 %    Basophils % 0.2 0.0 - 2.0 %    Neutrophils Absolute 3.43 1.60 - 5.50 x10*3/uL    Immature Granulocytes Absolute, Automated 0.01 0.00 - 0.50 x10*3/uL    Lymphocytes Absolute 0.84 0.80 - 3.00 x10*3/uL    Monocytes Absolute 0.44 0.05 - 0.80 x10*3/uL    Eosinophils Absolute 0.18 0.00 - 0.40 x10*3/uL    Basophils Absolute 0.01 0.00 - 0.10 x10*3/uL   Magnesium   Result Value Ref Range    Magnesium 2.25 1.60 - 2.40 mg/dL   Comprehensive metabolic panel   Result Value Ref Range    Glucose 105 (H) 74 - 99 mg/dL    Sodium 135 (L) 136 - 145 mmol/L    Potassium 4.4 3.5 - 5.3 mmol/L    Chloride 98 98 -  107 mmol/L    Bicarbonate 29 21 - 32 mmol/L    Anion Gap 12 10 - 20 mmol/L    Urea Nitrogen 23 6 - 23 mg/dL    Creatinine 1.44 (H) 0.50 - 1.30 mg/dL    eGFR 47 (L) >60 mL/min/1.73m*2    Calcium 9.4 8.6 - 10.3 mg/dL    Albumin 4.4 3.4 - 5.0 g/dL    Alkaline Phosphatase 70 33 - 136 U/L    Total Protein 7.7 6.4 - 8.2 g/dL    AST 21 9 - 39 U/L    Bilirubin, Total 0.9 0.0 - 1.2 mg/dL    ALT 7 (L) 10 - 52 U/L   Lipase   Result Value Ref Range    Lipase 14 9 - 82 U/L   B-Type Natriuretic Peptide   Result Value Ref Range     (H) 0 - 99 pg/mL   Sars-CoV-2 PCR   Result Value Ref Range    Coronavirus 2019, PCR Not Detected Not Detected   Influenza A, and B PCR   Result Value Ref Range    Flu A Result Not Detected Not Detected    Flu B Result Not Detected Not Detected   Troponin I, High Sensitivity, Initial   Result Value Ref Range    Troponin I, High Sensitivity 17 0 - 20 ng/L   Troponin, High Sensitivity, 1 Hour   Result Value Ref Range    Troponin I, High Sensitivity 19 0 - 20 ng/L   Transthoracic Echo (TTE) Complete   Result Value Ref Range    AV pk vazquez 1.32 m/s    LVOT diam 1.90 cm    LA vol index A/L 55.6 ml/m2    Tricuspid annular plane systolic excursion 0.8 cm    LV EF 28 %    RV free wall pk S' 7.13 cm/s    LVIDd 5.40 cm    Aortic Valve Area by Continuity of Peak Velocity 2.38 cm2    AV pk grad 7.0 mmHg    LV A4C EF 23.4    CBC   Result Value Ref Range    WBC 5.5 4.4 - 11.3 x10*3/uL    nRBC 0.0 0.0 - 0.0 /100 WBCs    RBC 5.52 4.50 - 5.90 x10*6/uL    Hemoglobin 13.9 13.5 - 17.5 g/dL    Hematocrit 44.8 41.0 - 52.0 %    MCV 81 80 - 100 fL    MCH 25.2 (L) 26.0 - 34.0 pg    MCHC 31.0 (L) 32.0 - 36.0 g/dL    RDW 16.3 (H) 11.5 - 14.5 %    Platelets 178 150 - 450 x10*3/uL   D-dimer, VTE Exclusion   Result Value Ref Range    D-Dimer, Quantitative VTE Exclusion 1,108 (H) <=500 ng/mL FEU       Assessment/Plan      Assessment & Plan  Acute on chronic combined systolic (congestive) and diastolic (congestive) heart failure  (Multi)    Atrial fibrillation (Multi)    Hypertension    HLD (hyperlipidemia)    Iron deficiency anemia    Acute on chronic congestive heart failure, unspecified heart failure type (Multi)    Bronchitis    #Acute on chronic combined systolic and diastolic heart failure   #Paroxysmal atrial fibrillation  #Essential HTN  #HLD  -Presented with SOB, out of lasix for a few weeks   -CXR: 1. 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  2. 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  3. Limited sensitivity for the detection of pulmonary infiltrates due  to the extensive calcific pleural plaque.  4. Probable hiatal hernia.  5. Postoperative findings as above.  -CT chest pending   -Echo (2/22): 1. The left ventricular systolic function is moderately decreased with a 35-40% estimated ejection fraction.   2. Spectral Doppler shows an impaired relaxation pattern of left ventricular diastolic filling.   3. The left atrium is severely dilated.   4. The right atrium is moderately dilated.   5. RVSP within normal limits.   6. Compared with study from 7/9/2021, no significant change.   7. There is global hypokinesis of the left ventricle with minor regional variations.  -Repeat echo:  1. Left ventricular ejection fraction is severely decreased, by visual estimate at 25-30%.   2. There is global hypokinesis of the left ventricle with minor regional variations.   3. Spectral Doppler shows an abnormal pattern of left ventricular diastolic filling.   4. Left ventricular cavity size is mildly dilated.   5. There is moderate concentric left ventricular hypertrophy.   6. There is mildly reduced right ventricular systolic function.   7. Mildly enlarged right ventricle.   8. The left atrium is severely dilated.   9. The right atrium is moderately dilated.  10. The patient is in atrial fibrillation which may influence the estimate of left ventricular function and transvalvular flows.  -  -Trop 17 > 19  -Cardiac  monitoring  -Cardiology consult, appreciate recs  -Cardiac diet with 1500ml FR  -IVP furosemide 20mg BID  -Strict IO  -LOS + 160  -Daily wt   -Continue apixaban, metoprolol, rosuvastatin    #Bronchitis  -CT chest pending  -PO azithro Day 1/3  -Stable on RA     #Iron deficiency anemia   -Stable, H/H 13.5/44.3  -Daily CBC      DVT ppx  -apixaban     PUD ppx  -pantoprazole     F: PRN  E: Replete per protocol  N: Cardiac, 1500ml FR  A: PIV     Disposition: Pt requires more than 2 inpatient days at this time   Code Status: Full Code        Laura Nix, APRN-CNP

## 2024-08-27 NOTE — CARE PLAN
The patient's goals for the shift include      The clinical goals for the shift include pt will maintain sufficient oxygenation of 92% or above on room air, pt will have no incidents of shortness of breath      Tolerating IV Lasix. Patient woke up confused, paranoid and hallucinating. Cooperative with care. Re-orientated to surroundings. Daughter became upset stating that patient has never been confused in his life. CIWA protocol added and will monitor for mentation changes. Alarms enabled to ensure safety.

## 2024-08-27 NOTE — CARE PLAN
The patient's goals for the shift include      The clinical goals for the shift include pt will maintain sufficient oxygenation of 92% or above on room air, pt will have no incidents of shortness of breath    Pt rested in bed through the night. Was cooperative with all medications, requests, and was able to maintain a oxygen level at or above 92% on room air. Pt denies any pain or shortness of breath.

## 2024-08-28 LAB
ATRIAL RATE: 65 BPM
HOLD SPECIMEN: NORMAL
Q ONSET: 189 MS
QRS COUNT: 11 BEATS
QRS DURATION: 200 MS
QT INTERVAL: 484 MS
QTC CALCULATION(BAZETT): 507 MS
QTC FREDERICIA: 500 MS
R AXIS: -84 DEGREES
T AXIS: 63 DEGREES
T OFFSET: 431 MS
VENTRICULAR RATE: 66 BPM

## 2024-08-28 NOTE — NURSING NOTE
"2045- Daughter at bedside. CIWA score 9. 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 Patient presents with clear speech, motor skills and sensation intact. Pupils equal and reactive. Disoriented to time and situation. Baseline A/OX3, confusion onset during day. Supervisor called to bedside in attempt to deescalate and find solutions with patient and daughter. Daughter reports patient fell 2 weeks ago and has increased forgetfulness since and was not seen after that fall. Guadalupe Ansari CNP notified daughter is insisting on head CT. This RN also requested UA order as one was not complete on this admission. New orders placed by provider.     2300- Patient presents with increase in agitation. This RN and STNA at bedside attempting to reorient patient and help patient find his cell phone as that is where his concern is. Patient denies pain. Stating \"Im calling my daughter I'm going home with her, she would not have left me here.\" This RN sat at eye level with patient in calm, quiet environment and explained where patient was, reason for being here and treatment that is necessary for his stay. Patient presented with more agitation despite efforts to calm. Patient called daughter with hospital phone then his cell phone. This RN attempted to call daughter to ask her wishes on ways to deescalate (sitter vs. Medication)- no answer.     2330- Daughter arrived requesting to take patient AMA. RN on floor called supervisor to come up, supervisor failed to come up. Assigned RN to this patient resumed procedure for AMA paperwork and equipment removal from there.     0035- Guadalupe Ansari CNP notified of patient leaving AMA via secure chat as this provider is off site.   "

## 2024-08-28 NOTE — NURSING NOTE
Pt increased confusion agitated. Wants to leave this place. Called daughter to come get him. Daughter here and convinced dad to complete testing that has been ordered. New orders for urinalysis, ct scan of the head. Diagnostics completed. Pt remains confused, angry, agitated. daughter in and reviewed ct scan and urine test results with daughter. Daughter agreed with her father to take him home even if it is against medical advice. Discharge AMA form reviewed with daughter and her signature was obtained on the form. Pt did ambulate down [address], and out emergency room waiting room door and through parking lot and did get into vehicle without difficulty.

## 2024-08-29 ENCOUNTER — APPOINTMENT (OUTPATIENT)
Dept: RADIOLOGY | Facility: HOSPITAL | Age: 89
End: 2024-08-29
Payer: MEDICARE

## 2024-08-29 ENCOUNTER — HOSPITAL ENCOUNTER (INPATIENT)
Facility: HOSPITAL | Age: 89
End: 2024-08-29
Attending: EMERGENCY MEDICINE | Admitting: INTERNAL MEDICINE
Payer: MEDICARE

## 2024-08-29 ENCOUNTER — APPOINTMENT (OUTPATIENT)
Dept: CARDIOLOGY | Facility: HOSPITAL | Age: 89
End: 2024-08-29
Payer: MEDICARE

## 2024-08-29 DIAGNOSIS — I47.29 OTHER VENTRICULAR TACHYCARDIA (MULTI): ICD-10-CM

## 2024-08-29 DIAGNOSIS — F10.10 ETOH ABUSE: ICD-10-CM

## 2024-08-29 DIAGNOSIS — R79.89 ELEVATED TROPONIN: ICD-10-CM

## 2024-08-29 DIAGNOSIS — R29.898 WEAKNESS OF BOTH LEGS: ICD-10-CM

## 2024-08-29 DIAGNOSIS — N28.9 RENAL INSUFFICIENCY: ICD-10-CM

## 2024-08-29 DIAGNOSIS — Z95.810 PRESENCE OF AUTOMATIC (IMPLANTABLE) CARDIAC DEFIBRILLATOR: ICD-10-CM

## 2024-08-29 DIAGNOSIS — N17.9 AKI (ACUTE KIDNEY INJURY) (CMS-HCC): ICD-10-CM

## 2024-08-29 DIAGNOSIS — I50.43 CHF (CONGESTIVE HEART FAILURE), NYHA CLASS II, ACUTE ON CHRONIC, COMBINED (MULTI): ICD-10-CM

## 2024-08-29 DIAGNOSIS — I50.9 CONGESTIVE HEART FAILURE, UNSPECIFIED HF CHRONICITY, UNSPECIFIED HEART FAILURE TYPE (MULTI): ICD-10-CM

## 2024-08-29 DIAGNOSIS — R42 DIZZINESS: Primary | ICD-10-CM

## 2024-08-29 LAB
ALBUMIN SERPL BCP-MCNC: 4.3 G/DL (ref 3.4–5)
ALP SERPL-CCNC: 78 U/L (ref 33–136)
ALT SERPL W P-5'-P-CCNC: 6 U/L (ref 10–52)
ANION GAP SERPL CALC-SCNC: 14 MMOL/L (ref 10–20)
AST SERPL W P-5'-P-CCNC: 13 U/L (ref 9–39)
BASOPHILS # BLD AUTO: 0.03 X10*3/UL (ref 0–0.1)
BASOPHILS NFR BLD AUTO: 0.5 %
BILIRUB SERPL-MCNC: 0.7 MG/DL (ref 0–1.2)
BNP SERPL-MCNC: 264 PG/ML (ref 0–99)
BUN SERPL-MCNC: 48 MG/DL (ref 6–23)
CALCIUM SERPL-MCNC: 9.5 MG/DL (ref 8.6–10.3)
CARDIAC TROPONIN I PNL SERPL HS: 24 NG/L (ref 0–20)
CARDIAC TROPONIN I PNL SERPL HS: 25 NG/L (ref 0–20)
CHLORIDE SERPL-SCNC: 95 MMOL/L (ref 98–107)
CO2 SERPL-SCNC: 31 MMOL/L (ref 21–32)
CREAT SERPL-MCNC: 2.88 MG/DL (ref 0.5–1.3)
EGFRCR SERPLBLD CKD-EPI 2021: 21 ML/MIN/1.73M*2
EOSINOPHIL # BLD AUTO: 0.18 X10*3/UL (ref 0–0.4)
EOSINOPHIL NFR BLD AUTO: 2.8 %
ERYTHROCYTE [DISTWIDTH] IN BLOOD BY AUTOMATED COUNT: 17 % (ref 11.5–14.5)
GLUCOSE BLD MANUAL STRIP-MCNC: 95 MG/DL (ref 74–99)
GLUCOSE SERPL-MCNC: 87 MG/DL (ref 74–99)
HCT VFR BLD AUTO: 45.1 % (ref 41–52)
HGB BLD-MCNC: 14.2 G/DL (ref 13.5–17.5)
IMM GRANULOCYTES # BLD AUTO: 0.01 X10*3/UL (ref 0–0.5)
IMM GRANULOCYTES NFR BLD AUTO: 0.2 % (ref 0–0.9)
INR PPP: 1.3 (ref 0.9–1.1)
LYMPHOCYTES # BLD AUTO: 1.01 X10*3/UL (ref 0.8–3)
LYMPHOCYTES NFR BLD AUTO: 15.5 %
MCH RBC QN AUTO: 25 PG (ref 26–34)
MCHC RBC AUTO-ENTMCNC: 31.5 G/DL (ref 32–36)
MCV RBC AUTO: 79 FL (ref 80–100)
MONOCYTES # BLD AUTO: 0.65 X10*3/UL (ref 0.05–0.8)
MONOCYTES NFR BLD AUTO: 10 %
NEUTROPHILS # BLD AUTO: 4.63 X10*3/UL (ref 1.6–5.5)
NEUTROPHILS NFR BLD AUTO: 71 %
NRBC BLD-RTO: 0 /100 WBCS (ref 0–0)
PLATELET # BLD AUTO: 205 X10*3/UL (ref 150–450)
POTASSIUM SERPL-SCNC: 3.5 MMOL/L (ref 3.5–5.3)
PROT SERPL-MCNC: 7.8 G/DL (ref 6.4–8.2)
PROTHROMBIN TIME: 14.8 SECONDS (ref 9.8–12.8)
RBC # BLD AUTO: 5.68 X10*6/UL (ref 4.5–5.9)
SODIUM SERPL-SCNC: 136 MMOL/L (ref 136–145)
WBC # BLD AUTO: 6.5 X10*3/UL (ref 4.4–11.3)

## 2024-08-29 PROCEDURE — G0378 HOSPITAL OBSERVATION PER HR: HCPCS

## 2024-08-29 PROCEDURE — 85610 PROTHROMBIN TIME: CPT | Performed by: EMERGENCY MEDICINE

## 2024-08-29 PROCEDURE — 82947 ASSAY GLUCOSE BLOOD QUANT: CPT

## 2024-08-29 PROCEDURE — 2500000001 HC RX 250 WO HCPCS SELF ADMINISTERED DRUGS (ALT 637 FOR MEDICARE OP)

## 2024-08-29 PROCEDURE — 83880 ASSAY OF NATRIURETIC PEPTIDE: CPT

## 2024-08-29 PROCEDURE — 2500000004 HC RX 250 GENERAL PHARMACY W/ HCPCS (ALT 636 FOR OP/ED): Performed by: EMERGENCY MEDICINE

## 2024-08-29 PROCEDURE — 96361 HYDRATE IV INFUSION ADD-ON: CPT

## 2024-08-29 PROCEDURE — 84484 ASSAY OF TROPONIN QUANT: CPT | Performed by: EMERGENCY MEDICINE

## 2024-08-29 PROCEDURE — 70450 CT HEAD/BRAIN W/O DYE: CPT | Performed by: RADIOLOGY

## 2024-08-29 PROCEDURE — 99285 EMERGENCY DEPT VISIT HI MDM: CPT | Mod: 59

## 2024-08-29 PROCEDURE — 85025 COMPLETE CBC W/AUTO DIFF WBC: CPT | Performed by: EMERGENCY MEDICINE

## 2024-08-29 PROCEDURE — 36415 COLL VENOUS BLD VENIPUNCTURE: CPT | Performed by: EMERGENCY MEDICINE

## 2024-08-29 PROCEDURE — 80053 COMPREHEN METABOLIC PANEL: CPT | Performed by: EMERGENCY MEDICINE

## 2024-08-29 PROCEDURE — 93005 ELECTROCARDIOGRAM TRACING: CPT

## 2024-08-29 PROCEDURE — 2500000004 HC RX 250 GENERAL PHARMACY W/ HCPCS (ALT 636 FOR OP/ED)

## 2024-08-29 PROCEDURE — 71045 X-RAY EXAM CHEST 1 VIEW: CPT

## 2024-08-29 PROCEDURE — 70450 CT HEAD/BRAIN W/O DYE: CPT

## 2024-08-29 PROCEDURE — 71045 X-RAY EXAM CHEST 1 VIEW: CPT | Mod: FOREIGN READ | Performed by: RADIOLOGY

## 2024-08-29 PROCEDURE — 2500000002 HC RX 250 W HCPCS SELF ADMINISTERED DRUGS (ALT 637 FOR MEDICARE OP, ALT 636 FOR OP/ED)

## 2024-08-29 PROCEDURE — 94760 N-INVAS EAR/PLS OXIMETRY 1: CPT

## 2024-08-29 PROCEDURE — 99223 1ST HOSP IP/OBS HIGH 75: CPT

## 2024-08-29 RX ORDER — ROSUVASTATIN CALCIUM 10 MG/1
40 TABLET, COATED ORAL NIGHTLY
Status: DISCONTINUED | OUTPATIENT
Start: 2024-08-29 | End: 2024-09-02 | Stop reason: HOSPADM

## 2024-08-29 RX ORDER — ACETAMINOPHEN 325 MG/1
650 TABLET ORAL EVERY 4 HOURS PRN
Status: DISCONTINUED | OUTPATIENT
Start: 2024-08-29 | End: 2024-09-02 | Stop reason: HOSPADM

## 2024-08-29 RX ORDER — TALC
3 POWDER (GRAM) TOPICAL NIGHTLY PRN
Status: DISCONTINUED | OUTPATIENT
Start: 2024-08-29 | End: 2024-09-02 | Stop reason: HOSPADM

## 2024-08-29 RX ORDER — ONDANSETRON HYDROCHLORIDE 2 MG/ML
4 INJECTION, SOLUTION INTRAVENOUS EVERY 8 HOURS PRN
Status: DISCONTINUED | OUTPATIENT
Start: 2024-08-29 | End: 2024-09-02 | Stop reason: HOSPADM

## 2024-08-29 RX ORDER — METOPROLOL SUCCINATE 25 MG/1
25 TABLET, EXTENDED RELEASE ORAL DAILY
Status: DISCONTINUED | OUTPATIENT
Start: 2024-08-30 | End: 2024-09-02 | Stop reason: HOSPADM

## 2024-08-29 RX ORDER — POLYETHYLENE GLYCOL 3350 17 G/17G
17 POWDER, FOR SOLUTION ORAL DAILY PRN
Status: DISCONTINUED | OUTPATIENT
Start: 2024-08-29 | End: 2024-09-02 | Stop reason: HOSPADM

## 2024-08-29 RX ORDER — DEXTROSE MONOHYDRATE AND SODIUM CHLORIDE 5; .45 G/100ML; G/100ML
125 INJECTION, SOLUTION INTRAVENOUS CONTINUOUS
Status: DISCONTINUED | OUTPATIENT
Start: 2024-08-29 | End: 2024-08-30

## 2024-08-29 SDOH — SOCIAL STABILITY: SOCIAL INSECURITY: HAVE YOU HAD THOUGHTS OF HARMING ANYONE ELSE?: NO

## 2024-08-29 SDOH — SOCIAL STABILITY: SOCIAL INSECURITY: ARE YOU OR HAVE YOU BEEN THREATENED OR ABUSED PHYSICALLY, EMOTIONALLY, OR SEXUALLY BY ANYONE?: NO

## 2024-08-29 SDOH — ECONOMIC STABILITY: HOUSING INSECURITY: AT ANY TIME IN THE PAST 12 MONTHS, WERE YOU HOMELESS OR LIVING IN A SHELTER (INCLUDING NOW)?: NO

## 2024-08-29 SDOH — ECONOMIC STABILITY: FOOD INSECURITY: WITHIN THE PAST 12 MONTHS, YOU WORRIED THAT YOUR FOOD WOULD RUN OUT BEFORE YOU GOT MONEY TO BUY MORE.: NEVER TRUE

## 2024-08-29 SDOH — HEALTH STABILITY: PHYSICAL HEALTH: ON AVERAGE, HOW MANY MINUTES DO YOU ENGAGE IN EXERCISE AT THIS LEVEL?: 30 MIN

## 2024-08-29 SDOH — ECONOMIC STABILITY: FOOD INSECURITY: WITHIN THE PAST 12 MONTHS, THE FOOD YOU BOUGHT JUST DIDN'T LAST AND YOU DIDN'T HAVE MONEY TO GET MORE.: NEVER TRUE

## 2024-08-29 SDOH — SOCIAL STABILITY: SOCIAL INSECURITY: HAS ANYONE EVER THREATENED TO HURT YOUR FAMILY OR YOUR PETS?: NO

## 2024-08-29 SDOH — ECONOMIC STABILITY: HOUSING INSECURITY: IN THE PAST 12 MONTHS, HOW MANY TIMES HAVE YOU MOVED WHERE YOU WERE LIVING?: 0

## 2024-08-29 SDOH — HEALTH STABILITY: MENTAL HEALTH
STRESS IS WHEN SOMEONE FEELS TENSE, NERVOUS, ANXIOUS, OR CAN'T SLEEP AT NIGHT BECAUSE THEIR MIND IS TROUBLED. HOW STRESSED ARE YOU?: NOT AT ALL

## 2024-08-29 SDOH — ECONOMIC STABILITY: INCOME INSECURITY: IN THE LAST 12 MONTHS, WAS THERE A TIME WHEN YOU WERE NOT ABLE TO PAY THE MORTGAGE OR RENT ON TIME?: NO

## 2024-08-29 SDOH — HEALTH STABILITY: PHYSICAL HEALTH: ON AVERAGE, HOW MANY MINUTES DO YOU ENGAGE IN EXERCISE AT THIS LEVEL?: 60 MIN

## 2024-08-29 SDOH — ECONOMIC STABILITY: INCOME INSECURITY: HOW HARD IS IT FOR YOU TO PAY FOR THE VERY BASICS LIKE FOOD, HOUSING, MEDICAL CARE, AND HEATING?: NOT VERY HARD

## 2024-08-29 SDOH — SOCIAL STABILITY: SOCIAL INSECURITY: ARE THERE ANY APPARENT SIGNS OF INJURIES/BEHAVIORS THAT COULD BE RELATED TO ABUSE/NEGLECT?: NO

## 2024-08-29 SDOH — SOCIAL STABILITY: SOCIAL INSECURITY: DOES ANYONE TRY TO KEEP YOU FROM HAVING/CONTACTING OTHER FRIENDS OR DOING THINGS OUTSIDE YOUR HOME?: NO

## 2024-08-29 SDOH — SOCIAL STABILITY: SOCIAL INSECURITY: HAVE YOU HAD ANY THOUGHTS OF HARMING ANYONE ELSE?: NO

## 2024-08-29 SDOH — ECONOMIC STABILITY: INCOME INSECURITY: HOW HARD IS IT FOR YOU TO PAY FOR THE VERY BASICS LIKE FOOD, HOUSING, MEDICAL CARE, AND HEATING?: NOT HARD AT ALL

## 2024-08-29 SDOH — HEALTH STABILITY: PHYSICAL HEALTH: ON AVERAGE, HOW MANY DAYS PER WEEK DO YOU ENGAGE IN MODERATE TO STRENUOUS EXERCISE (LIKE A BRISK WALK)?: 3 DAYS

## 2024-08-29 SDOH — SOCIAL STABILITY: SOCIAL INSECURITY: DO YOU FEEL UNSAFE GOING BACK TO THE PLACE WHERE YOU ARE LIVING?: NO

## 2024-08-29 SDOH — SOCIAL STABILITY: SOCIAL INSECURITY: DO YOU FEEL ANYONE HAS EXPLOITED OR TAKEN ADVANTAGE OF YOU FINANCIALLY OR OF YOUR PERSONAL PROPERTY?: NO

## 2024-08-29 SDOH — SOCIAL STABILITY: SOCIAL INSECURITY: WERE YOU ABLE TO COMPLETE ALL THE BEHAVIORAL HEALTH SCREENINGS?: YES

## 2024-08-29 ASSESSMENT — ACTIVITIES OF DAILY LIVING (ADL)
GROOMING: INDEPENDENT
ADEQUATE_TO_COMPLETE_ADL: YES
JUDGMENT_ADEQUATE_SAFELY_COMPLETE_DAILY_ACTIVITIES: YES
FEEDING YOURSELF: INDEPENDENT
HEARING - RIGHT EAR: HEARING AID
BATHING: INDEPENDENT
ASSISTIVE_DEVICE: DENTURES UPPER;DENTURES LOWER
WALKS IN HOME: INDEPENDENT
HEARING - LEFT EAR: HEARING AID
DRESSING YOURSELF: INDEPENDENT
TOILETING: INDEPENDENT
PATIENT'S MEMORY ADEQUATE TO SAFELY COMPLETE DAILY ACTIVITIES?: NO

## 2024-08-29 ASSESSMENT — PATIENT HEALTH QUESTIONNAIRE - PHQ9
1. LITTLE INTEREST OR PLEASURE IN DOING THINGS: NOT AT ALL
SUM OF ALL RESPONSES TO PHQ9 QUESTIONS 1 & 2: 0
2. FEELING DOWN, DEPRESSED OR HOPELESS: NOT AT ALL

## 2024-08-29 ASSESSMENT — LIFESTYLE VARIABLES
HOW OFTEN DO YOU HAVE 6 OR MORE DRINKS ON ONE OCCASION: NEVER
SUBSTANCE_ABUSE_PAST_12_MONTHS: NO
PRESCIPTION_ABUSE_PAST_12_MONTHS: NO
SKIP TO QUESTIONS 9-10: 1
HOW MANY STANDARD DRINKS CONTAINING ALCOHOL DO YOU HAVE ON A TYPICAL DAY: 1 OR 2
AUDIT-C TOTAL SCORE: 1
AUDIT-C TOTAL SCORE: 1
HOW OFTEN DO YOU HAVE A DRINK CONTAINING ALCOHOL: MONTHLY OR LESS

## 2024-08-29 ASSESSMENT — COGNITIVE AND FUNCTIONAL STATUS - GENERAL
CLIMB 3 TO 5 STEPS WITH RAILING: A LITTLE
PERSONAL GROOMING: A LITTLE
WALKING IN HOSPITAL ROOM: A LITTLE
HELP NEEDED FOR BATHING: A LITTLE
PATIENT BASELINE BEDBOUND: NO
MOBILITY SCORE: 20
DRESSING REGULAR LOWER BODY CLOTHING: A LITTLE
DAILY ACTIVITIY SCORE: 19
TOILETING: A LITTLE
STANDING UP FROM CHAIR USING ARMS: A LITTLE
MOVING TO AND FROM BED TO CHAIR: A LITTLE
DRESSING REGULAR UPPER BODY CLOTHING: A LITTLE

## 2024-08-29 ASSESSMENT — PAIN - FUNCTIONAL ASSESSMENT
PAIN_FUNCTIONAL_ASSESSMENT: 0-10
PAIN_FUNCTIONAL_ASSESSMENT: 0-10

## 2024-08-29 ASSESSMENT — COLUMBIA-SUICIDE SEVERITY RATING SCALE - C-SSRS
6. HAVE YOU EVER DONE ANYTHING, STARTED TO DO ANYTHING, OR PREPARED TO DO ANYTHING TO END YOUR LIFE?: NO
1. IN THE PAST MONTH, HAVE YOU WISHED YOU WERE DEAD OR WISHED YOU COULD GO TO SLEEP AND NOT WAKE UP?: NO
2. HAVE YOU ACTUALLY HAD ANY THOUGHTS OF KILLING YOURSELF?: NO

## 2024-08-29 ASSESSMENT — PAIN SCALES - GENERAL
PAINLEVEL_OUTOF10: 0 - NO PAIN
PAINLEVEL_OUTOF10: 0 - NO PAIN
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
## 2024-08-30 ENCOUNTER — APPOINTMENT (OUTPATIENT)
Dept: CARDIOLOGY | Facility: CLINIC | Age: 89
End: 2024-08-30
Payer: MEDICARE

## 2024-08-30 ENCOUNTER — PATIENT OUTREACH (OUTPATIENT)
Dept: CARE COORDINATION | Facility: CLINIC | Age: 89
End: 2024-08-30
Payer: MEDICARE

## 2024-08-30 PROBLEM — R79.89 ELEVATED TROPONIN: Status: ACTIVE | Noted: 2024-08-30

## 2024-08-30 PROBLEM — N18.30 STAGE 3 CHRONIC KIDNEY DISEASE (MULTI): Status: ACTIVE | Noted: 2024-08-30

## 2024-08-30 PROBLEM — R29.898 WEAKNESS OF BOTH LEGS: Status: ACTIVE | Noted: 2024-08-30

## 2024-08-30 PROBLEM — J15.9 COMMUNITY ACQUIRED BACTERIAL PNEUMONIA: Status: ACTIVE | Noted: 2024-08-30

## 2024-08-30 LAB
ALBUMIN SERPL BCP-MCNC: 4 G/DL (ref 3.4–5)
ALP SERPL-CCNC: 65 U/L (ref 33–136)
ALT SERPL W P-5'-P-CCNC: 5 U/L (ref 10–52)
ANION GAP SERPL CALC-SCNC: 15 MMOL/L (ref 10–20)
AST SERPL W P-5'-P-CCNC: 11 U/L (ref 9–39)
ATRIAL RATE: 277 BPM
BILIRUB DIRECT SERPL-MCNC: 0.2 MG/DL (ref 0–0.3)
BILIRUB SERPL-MCNC: 0.8 MG/DL (ref 0–1.2)
BNP SERPL-MCNC: 314 PG/ML (ref 0–99)
BODY SURFACE AREA: 1.85 M2
BUN SERPL-MCNC: 39 MG/DL (ref 6–23)
CALCIUM SERPL-MCNC: 9.6 MG/DL (ref 8.6–10.3)
CHLORIDE SERPL-SCNC: 98 MMOL/L (ref 98–107)
CHOLEST SERPL-MCNC: 127 MG/DL (ref 0–199)
CHOLESTEROL/HDL RATIO: 3.3
CO2 SERPL-SCNC: 27 MMOL/L (ref 21–32)
CREAT SERPL-MCNC: 2.05 MG/DL (ref 0.5–1.3)
EGFRCR SERPLBLD CKD-EPI 2021: 31 ML/MIN/1.73M*2
ERYTHROCYTE [DISTWIDTH] IN BLOOD BY AUTOMATED COUNT: 16.8 % (ref 11.5–14.5)
EST. AVERAGE GLUCOSE BLD GHB EST-MCNC: 131 MG/DL
GLUCOSE SERPL-MCNC: 91 MG/DL (ref 74–99)
HBA1C MFR BLD: 6.2 %
HCT VFR BLD AUTO: 45.3 % (ref 41–52)
HDLC SERPL-MCNC: 38.4 MG/DL
HGB BLD-MCNC: 14.2 G/DL (ref 13.5–17.5)
LDLC SERPL CALC-MCNC: 71 MG/DL
MCH RBC QN AUTO: 25 PG (ref 26–34)
MCHC RBC AUTO-ENTMCNC: 31.3 G/DL (ref 32–36)
MCV RBC AUTO: 80 FL (ref 80–100)
NON HDL CHOLESTEROL: 89 MG/DL (ref 0–149)
NRBC BLD-RTO: 0 /100 WBCS (ref 0–0)
PLATELET # BLD AUTO: 177 X10*3/UL (ref 150–450)
POTASSIUM SERPL-SCNC: 3.6 MMOL/L (ref 3.5–5.3)
PROCALCITONIN SERPL-MCNC: 0.05 NG/ML
PROT SERPL-MCNC: 7.3 G/DL (ref 6.4–8.2)
Q ONSET: 197 MS
QRS COUNT: 10 BEATS
QRS DURATION: 206 MS
QT INTERVAL: 488 MS
QTC CALCULATION(BAZETT): 515 MS
QTC FREDERICIA: 506 MS
R AXIS: -87 DEGREES
RBC # BLD AUTO: 5.67 X10*6/UL (ref 4.5–5.9)
SODIUM SERPL-SCNC: 136 MMOL/L (ref 136–145)
T AXIS: 65 DEGREES
T OFFSET: 441 MS
TRIGL SERPL-MCNC: 88 MG/DL (ref 0–149)
VENTRICULAR RATE: 67 BPM
VLDL: 18 MG/DL (ref 0–40)
WBC # BLD AUTO: 5.6 X10*3/UL (ref 4.4–11.3)

## 2024-08-30 PROCEDURE — 83880 ASSAY OF NATRIURETIC PEPTIDE: CPT

## 2024-08-30 PROCEDURE — 36415 COLL VENOUS BLD VENIPUNCTURE: CPT | Performed by: NURSE PRACTITIONER

## 2024-08-30 PROCEDURE — 36415 COLL VENOUS BLD VENIPUNCTURE: CPT

## 2024-08-30 PROCEDURE — 97116 GAIT TRAINING THERAPY: CPT | Mod: GP | Performed by: PHYSICAL THERAPIST

## 2024-08-30 PROCEDURE — 2500000002 HC RX 250 W HCPCS SELF ADMINISTERED DRUGS (ALT 637 FOR MEDICARE OP, ALT 636 FOR OP/ED): Performed by: NURSE PRACTITIONER

## 2024-08-30 PROCEDURE — 96365 THER/PROPH/DIAG IV INF INIT: CPT

## 2024-08-30 PROCEDURE — 2500000004 HC RX 250 GENERAL PHARMACY W/ HCPCS (ALT 636 FOR OP/ED)

## 2024-08-30 PROCEDURE — 80053 COMPREHEN METABOLIC PANEL: CPT

## 2024-08-30 PROCEDURE — 84145 PROCALCITONIN (PCT): CPT | Mod: GENLAB | Performed by: NURSE PRACTITIONER

## 2024-08-30 PROCEDURE — G0378 HOSPITAL OBSERVATION PER HR: HCPCS

## 2024-08-30 PROCEDURE — 2500000004 HC RX 250 GENERAL PHARMACY W/ HCPCS (ALT 636 FOR OP/ED): Performed by: INTERNAL MEDICINE

## 2024-08-30 PROCEDURE — 83036 HEMOGLOBIN GLYCOSYLATED A1C: CPT | Mod: GENLAB

## 2024-08-30 PROCEDURE — 4B02XTZ MEASUREMENT OF CARDIAC DEFIBRILLATOR, EXTERNAL APPROACH: ICD-10-PCS | Performed by: INTERNAL MEDICINE

## 2024-08-30 PROCEDURE — 96375 TX/PRO/DX INJ NEW DRUG ADDON: CPT

## 2024-08-30 PROCEDURE — 99233 SBSQ HOSP IP/OBS HIGH 50: CPT | Performed by: NURSE PRACTITIONER

## 2024-08-30 PROCEDURE — 99232 SBSQ HOSP IP/OBS MODERATE 35: CPT | Performed by: NURSE PRACTITIONER

## 2024-08-30 PROCEDURE — 9420000001 HC RT PATIENT EDUCATION 5 MIN

## 2024-08-30 PROCEDURE — 97161 PT EVAL LOW COMPLEX 20 MIN: CPT | Mod: GP | Performed by: PHYSICAL THERAPIST

## 2024-08-30 PROCEDURE — 94667 MNPJ CHEST WALL 1ST: CPT

## 2024-08-30 PROCEDURE — 94640 AIRWAY INHALATION TREATMENT: CPT

## 2024-08-30 PROCEDURE — 94664 DEMO&/EVAL PT USE INHALER: CPT

## 2024-08-30 PROCEDURE — 94760 N-INVAS EAR/PLS OXIMETRY 1: CPT

## 2024-08-30 PROCEDURE — 80061 LIPID PANEL: CPT

## 2024-08-30 PROCEDURE — 2500000002 HC RX 250 W HCPCS SELF ADMINISTERED DRUGS (ALT 637 FOR MEDICARE OP, ALT 636 FOR OP/ED)

## 2024-08-30 PROCEDURE — 2500000001 HC RX 250 WO HCPCS SELF ADMINISTERED DRUGS (ALT 637 FOR MEDICARE OP)

## 2024-08-30 PROCEDURE — 2500000004 HC RX 250 GENERAL PHARMACY W/ HCPCS (ALT 636 FOR OP/ED): Performed by: NURSE PRACTITIONER

## 2024-08-30 PROCEDURE — 85027 COMPLETE CBC AUTOMATED: CPT

## 2024-08-30 PROCEDURE — 96366 THER/PROPH/DIAG IV INF ADDON: CPT

## 2024-08-30 RX ORDER — LANOLIN ALCOHOL/MO/W.PET/CERES
100 CREAM (GRAM) TOPICAL DAILY
Status: DISCONTINUED | OUTPATIENT
Start: 2024-09-02 | End: 2024-09-02 | Stop reason: HOSPADM

## 2024-08-30 RX ORDER — IPRATROPIUM BROMIDE AND ALBUTEROL SULFATE 2.5; .5 MG/3ML; MG/3ML
3 SOLUTION RESPIRATORY (INHALATION)
Status: DISCONTINUED | OUTPATIENT
Start: 2024-08-30 | End: 2024-08-30

## 2024-08-30 RX ORDER — IPRATROPIUM BROMIDE AND ALBUTEROL SULFATE 2.5; .5 MG/3ML; MG/3ML
3 SOLUTION RESPIRATORY (INHALATION) 3 TIMES DAILY
Status: DISCONTINUED | OUTPATIENT
Start: 2024-08-30 | End: 2024-09-02 | Stop reason: HOSPADM

## 2024-08-30 RX ORDER — LORAZEPAM 0.5 MG/1
0.5 TABLET ORAL EVERY 2 HOUR PRN
Status: DISCONTINUED | OUTPATIENT
Start: 2024-08-30 | End: 2024-08-30

## 2024-08-30 RX ORDER — LORAZEPAM 2 MG/ML
1 INJECTION INTRAMUSCULAR EVERY 2 HOUR PRN
Status: DISCONTINUED | OUTPATIENT
Start: 2024-08-30 | End: 2024-08-31

## 2024-08-30 RX ORDER — LEVOFLOXACIN 5 MG/ML
750 INJECTION, SOLUTION INTRAVENOUS
Status: DISCONTINUED | OUTPATIENT
Start: 2024-08-30 | End: 2024-08-31

## 2024-08-30 RX ORDER — LORAZEPAM 0.5 MG/1
1 TABLET ORAL EVERY 2 HOUR PRN
Status: DISCONTINUED | OUTPATIENT
Start: 2024-08-30 | End: 2024-08-30

## 2024-08-30 RX ORDER — SODIUM CHLORIDE 9 MG/ML
10 INJECTION, SOLUTION INTRAVENOUS CONTINUOUS PRN
Status: DISCONTINUED | OUTPATIENT
Start: 2024-08-30 | End: 2024-09-02 | Stop reason: HOSPADM

## 2024-08-30 RX ORDER — ALBUTEROL SULFATE 0.83 MG/ML
2.5 SOLUTION RESPIRATORY (INHALATION) EVERY 2 HOUR PRN
Status: DISCONTINUED | OUTPATIENT
Start: 2024-08-30 | End: 2024-09-02 | Stop reason: HOSPADM

## 2024-08-30 RX ORDER — THIAMINE HYDROCHLORIDE 100 MG/ML
100 INJECTION, SOLUTION INTRAMUSCULAR; INTRAVENOUS DAILY
Status: DISPENSED | OUTPATIENT
Start: 2024-08-30 | End: 2024-09-02

## 2024-08-30 RX ORDER — LORAZEPAM 2 MG/ML
0.5 INJECTION INTRAMUSCULAR EVERY 2 HOUR PRN
Status: DISCONTINUED | OUTPATIENT
Start: 2024-08-30 | End: 2024-08-31

## 2024-08-30 RX ORDER — BISMUTH SUBSALICYLATE 262 MG
1 TABLET,CHEWABLE ORAL DAILY
Status: DISCONTINUED | OUTPATIENT
Start: 2024-08-30 | End: 2024-09-02 | Stop reason: HOSPADM

## 2024-08-30 RX ORDER — FOLIC ACID 1 MG/1
1 TABLET ORAL DAILY
Status: DISCONTINUED | OUTPATIENT
Start: 2024-08-30 | End: 2024-09-02 | Stop reason: HOSPADM

## 2024-08-30 SDOH — ECONOMIC STABILITY: GENERAL: WOULD YOU LIKE HELP WITH ANY OF THE FOLLOWING NEEDS?: I DONT NEED HELP WITH ANY OF THESE

## 2024-08-30 SDOH — ECONOMIC STABILITY: FOOD INSECURITY
ARE ANY OF YOUR NEEDS URGENT? FOR EXAMPLE, UNCERTAINTY OF WHERE YOU WILL GET YOUR NEXT MEAL OR NOT HAVING THE MEDICATIONS YOU NEED TO TAKE TOMORROW.: NO

## 2024-08-30 ASSESSMENT — LIFESTYLE VARIABLES
ANXIETY: MODERATELY ANXIOUS, OR GUARDED, SO ANXIETY IS INFERRED
AGITATION: SOMEWHAT MORE THAN NORMAL ACTIVITY
ORIENTATION AND CLOUDING OF SENSORIUM: DISORIENTED FOR DATE BY MORE THAN 2 CALENDAR DAYS
HEADACHE, FULLNESS IN HEAD: NOT PRESENT
TOTAL SCORE: 7
PAROXYSMAL SWEATS: BARELY PERCEPTIBLE SWEATING, PALMS MOIST
PAROXYSMAL SWEATS: BARELY PERCEPTIBLE SWEATING, PALMS MOIST
NAUSEA AND VOMITING: NO NAUSEA AND NO VOMITING
AUDITORY DISTURBANCES: NOT PRESENT
TOTAL SCORE: 12
TREMOR: NO TREMOR
ANXIETY: 2
NAUSEA AND VOMITING: NO NAUSEA AND NO VOMITING
VISUAL DISTURBANCES: NOT PRESENT
VISUAL DISTURBANCES: NOT PRESENT
TREMOR: NO TREMOR
HEADACHE, FULLNESS IN HEAD: NOT PRESENT
AGITATION: MODERATELY FIDGETY AND RESTLESS
AUDITORY DISTURBANCES: NOT PRESENT
ORIENTATION AND CLOUDING OF SENSORIUM: DISORIENTED FOR DATE BY MORE THAN 2 CALENDAR DAYS

## 2024-08-30 ASSESSMENT — ENCOUNTER SYMPTOMS
MUSCULOSKELETAL NEGATIVE: 1
GASTROINTESTINAL NEGATIVE: 1
ENDOCRINE NEGATIVE: 1
WEAKNESS: 1
RESPIRATORY NEGATIVE: 1
EYES NEGATIVE: 1
HEMATOLOGIC/LYMPHATIC NEGATIVE: 1
CONSTITUTIONAL NEGATIVE: 1
ALLERGIC/IMMUNOLOGIC NEGATIVE: 1
CARDIOVASCULAR NEGATIVE: 1
DIZZINESS: 1
PSYCHIATRIC NEGATIVE: 1

## 2024-08-30 ASSESSMENT — COGNITIVE AND FUNCTIONAL STATUS - GENERAL
MOVING TO AND FROM BED TO CHAIR: A LITTLE
MOVING TO AND FROM BED TO CHAIR: A LITTLE
WALKING IN HOSPITAL ROOM: A LITTLE
STANDING UP FROM CHAIR USING ARMS: A LITTLE
CLIMB 3 TO 5 STEPS WITH RAILING: A LITTLE
MOBILITY SCORE: 20
STANDING UP FROM CHAIR USING ARMS: A LITTLE
DRESSING REGULAR UPPER BODY CLOTHING: A LITTLE
WALKING IN HOSPITAL ROOM: A LITTLE
DRESSING REGULAR UPPER BODY CLOTHING: A LITTLE
MOBILITY SCORE: 20
CLIMB 3 TO 5 STEPS WITH RAILING: A LITTLE
DAILY ACTIVITIY SCORE: 21
STANDING UP FROM CHAIR USING ARMS: A LITTLE
PERSONAL GROOMING: A LITTLE
DRESSING REGULAR LOWER BODY CLOTHING: A LITTLE
WALKING IN HOSPITAL ROOM: A LITTLE
DRESSING REGULAR LOWER BODY CLOTHING: A LITTLE
DRESSING REGULAR UPPER BODY CLOTHING: A LITTLE
TOILETING: A LITTLE
STANDING UP FROM CHAIR USING ARMS: A LITTLE
CLIMB 3 TO 5 STEPS WITH RAILING: A LITTLE
MOVING TO AND FROM BED TO CHAIR: A LITTLE
HELP NEEDED FOR BATHING: A LITTLE
TOILETING: A LITTLE
TURNING FROM BACK TO SIDE WHILE IN FLAT BAD: A LITTLE
DAILY ACTIVITIY SCORE: 19
HELP NEEDED FOR BATHING: A LITTLE
DRESSING REGULAR LOWER BODY CLOTHING: A LITTLE
MOBILITY SCORE: 20
DAILY ACTIVITIY SCORE: 19
CLIMB 3 TO 5 STEPS WITH RAILING: A LITTLE
MOBILITY SCORE: 20
MOVING TO AND FROM BED TO CHAIR: A LITTLE
HELP NEEDED FOR BATHING: A LITTLE
PERSONAL GROOMING: A LITTLE

## 2024-08-30 ASSESSMENT — PAIN - FUNCTIONAL ASSESSMENT
PAIN_FUNCTIONAL_ASSESSMENT: 0-10

## 2024-08-30 ASSESSMENT — ACTIVITIES OF DAILY LIVING (ADL): LACK_OF_TRANSPORTATION: NO

## 2024-08-30 ASSESSMENT — PAIN SCALES - GENERAL
PAINLEVEL_OUTOF10: 0 - NO PAIN

## 2024-08-30 NOTE — NURSING NOTE
1611: Patient more confused at this time, attempting to ambulate without assistance and is refusing to wear masimo and telemetry. 1:1 provided with little effect noted.     1823: The patient's goals for the shift include  to go home     The clinical goals for the shift include patient will tolerate care this shift    Over the shift, the patient did make progress toward the following goals.

## 2024-08-30 NOTE — PROGRESS NOTES
"   08/30/24 1004   Discharge Planning   Living Arrangements Alone  (daughter lives in next door motel unit)   Support Systems Family members   Assistance Needed Independent in ADL's and iADL's. TCC made call to inocente Catherine, to confirm patient living situation. They own a motel. He lives in the office unit and she lives in the 2nd unit next door. They also own a home in [place]. He is very active and independent. PT recommended LOW level outpatient therapy. Per patients daughter, he is non-compliant and will not follow up. TCC made aware that we will provide script on discharge for him to make that decision for follow up therapy. DME: new cane distributed by therapy. Patient does not use any EMD at baseline. Beronica Adames is PCP last seen 4/1/24. [facility] in [place] is preferred pharmacy. Patients manages his own home medications utilizing [facility]. Patients daughter states, \"that's why he is in there, he stopped taking his lasix for a month.\" TCC will make team aware. Patient drives independently. Plan: home with outpatient therapy script. DC Secure   Type of Residence Private residence  (self owned motel)   Number of Stairs to Enter Residence 0   Number of Stairs Within Residence 0   Do you have animals or pets at home? No   Who is requesting discharge planning? Provider   Home or Post Acute Services None   Expected Discharge Disposition Home   Does the patient need discharge transport arranged? No   Financial Resource Strain   How hard is it for you to pay for the very basics like food, housing, medical care, and heating? Not hard   Housing Stability   In the last 12 months, was there a time when you were not able to pay the mortgage or rent on time? N   In the past 12 months, how many times have you moved where you were living? 0   At any time in the past 12 months, were you homeless or living in a shelter (including now)? N   Transportation Needs   In the past 12 months, has lack of transportation kept you " from medical appointments or from getting medications? no   In the past 12 months, has lack of transportation kept you from meetings, work, or from getting things needed for daily living? No

## 2024-08-30 NOTE — PROGRESS NOTES
Physical Therapy    Physical Therapy Evaluation & Treatment    Patient Name: Gilmer Quiñonez  MRN: [id number]  Today's Date: 8/30/2024   Time Calculation  Start Time: 0920  Stop Time: 0950  Time Calculation (min): 30 min    Assessment/Plan   PT Assessment  PT Assessment Results: Impaired balance, Decreased mobility, Decreased safety awareness  Rehab Prognosis: Good  Barriers to Discharge: n/a  Evaluation/Treatment Tolerance: Patient tolerated treatment well  Medical Staff Made Aware: Yes  Strengths: Premorbid level of function  Barriers to Participation:  (n/a)  End of Session Communication: Bedside nurse, Care Coordinator  Assessment Comment: Pleasant 88 y.o presents with impaired balance. Pt. lives next to daughter and is normally very IND. Pt. still works. Pt. currently requires close supervision when amb. with SC and would benefit from addtional PT to address above noted limitations and prevent further decline.  End of Session Patient Position: Up in chair, Alarm on   IP OR SWING BED PT PLAN  Inpatient or Swing Bed: Inpatient  PT Plan  Treatment/Interventions: Transfer training, Gait training, Balance training, Therapeutic exercise, Therapeutic activity, Home exercise program  PT Plan: Ongoing PT  PT Frequency: 3 times per week  PT Discharge Recommendations: Low intensity level of continued care  Equipment Recommended upon Discharge:  (issued cane)  PT Recommended Transfer Status: Stand by assist  PT - OK to Discharge: Yes Based on completed evaluation and care plan recommendations, no barriers to discharge to next site of care        Subjective     General Visit Information:  General  Reason for Referral: impaired mobility, dizziness  Referred By: SOFIE Phelan  Past Medical History Relevant to Rehab: Per chart: reports he has not been compliant with his medication, hasn't been taking anything for a few weeks and just restarted it a couple of days ago  Patient Position Received: Bed, 2 rail up, Alarm  on  General Comment: rachael, c/o mild dizziness upon sitting and amb.  Home Living:  Home Living  Type of Home:  (motel)  Lives With:  (daughter lives next door)  Home Adaptive Equipment:  (states he may have a cane at home)  Home Layout: One level  Home Access: Level entry  Bathroom Shower/Tub: Tub/shower unit  Bathroom Equipment: Grab bars in shower (shower chair)  Prior Level of Function:  Prior Function Per Pt/Caregiver Report  Level of [place]: Independent with ADLs and functional transfers, Independent with homemaking with ambulation  Ambulatory Assistance: Independent  Vocational:  (still works at a bar)  Prior Function Comments: + drives  Precautions:  Precautions  Medical Precautions: Fall precautions    Vital Signs (Past 2hrs)        Date/Time Vitals Session Patient Position Pulse Resp SpO2 BP MAP (mmHg)    08/30/24 0920 --  --  68  --  93 %  --  --     08/30/24 1016 --  --  59  18  94 %  125/60  --                        Objective   Pain:  Pain Assessment  Pain Assessment: 0-10  0-10 (Numeric) Pain Score: 0 - No pain  Cognition:  Cognition  Overall Cognitive Status: Within Functional Limits  Orientation Level:  (questionable historian at times)    General Assessments:     Activity Tolerance  Endurance: Endurance does not limit participation in activity    Sensation  Sensation Comment: denies sensation deficits    Strength  Strength Comments: BLE: WFL  Coordination  Movements are Fluid and Coordinated: Yes  Coordination Comment: (-) pronator drift test    Dynamic Standing Balance  Dynamic Standing-Comments: good-; increased lateral sway when looking to the R or with turns; Recommend SC at this time  Functional Assessments:  Bed Mobility  Bed Mobility: Yes  Bed Mobility 1  Bed Mobility 1: Supine to sitting  Level of Assistance 1: Independent    Transfers  Transfer: Yes  Transfer 1  Technique 1: Sit to stand, Stand to sit  Transfer Device 1:  (once with HHA; second attempt with SC at  SBA)    Ambulation/Gait Training  Ambulation/Gait Training Performed: Yes  Ambulation/Gait Training 1  Assistance 1: Hand held assistance  Quality of Gait 1:  (increased lateral sway with looking L/R and with turns. Recommend AD)  Comments/Distance (ft) 1: 150  Ambulation/Gait Training 2  Assistance 2: Close supervision  Quality of Gait 2:  (improved gait with SC. VC's for sequencing)  Comments/Distance (ft) 2: 300  Extremity/Trunk Assessments:  RLE   RLE : Within Functional Limits  LLE   LLE : Within Functional Limits  Treatments:  Ambulation/Gait Training  Ambulation/Gait Training Performed: Yes  Ambulation/Gait Training 1  Assistance 1: Hand held assistance  Quality of Gait 1:  (increased lateral sway with looking L/R and with turns. Recommend AD)  Comments/Distance (ft) 1: 150  Ambulation/Gait Training 2  Assistance 2: Close supervision  Quality of Gait 2:  (improved gait with SC. VC's for sequencing)  Comments/Distance (ft) 2: 300  Outcome Measures:  [facility] Basic Mobility  Turning from your back to your side while in a flat bed without using bedrails: None  Moving from lying on your back to sitting on the side of a flat bed without using bedrails: None  Moving to and from bed to chair (including a wheelchair): A little  Standing up from a chair using your arms (e.g. wheelchair or bedside chair): A little  To walk in hospital room: A little  Climbing 3-5 steps with railing: A little  Basic Mobility - Total Score: 20    Encounter Problems       Encounter Problems (Active)       Balance       STG - Maintains dynamic standing balance without upper extremity support with good balance        Start:  08/30/24    Expected End:  09/13/24               Mobility       LTG - Patient will ambulate community distance FADY with cane        Start:  08/30/24    Expected End:  09/13/24               Mobility       LTG - Patient will ambulate household distance IND with no device and no LOB        Start:  08/30/24    Expected End:   09/13/24               PT Transfers       STG - Patient will transfer sit to and from stand IND       Start:  08/30/24    Expected End:  09/13/24               Pain - Adult              Education Documentation  Mobility Training, taught by Indira Andres, PT at 8/30/2024 11:17 AM.  Learner: Patient  Readiness: Acceptance  Method: Explanation  Response: Verbalizes Understanding  Comment: Educated pt. on PT POC    Education Comments  No comments found.

## 2024-08-30 NOTE — H&P
History Of Present Illness  Gilmer Quiñonez is a 88 y.o. male presenting with intermittent dizziness. He just says feels like his legs are weak. No loss of vision nor speech, no weakness or numbness on one side his legs just kind of feel wobbly. He said he was outside working when this initially occurred he does not have a history of stroke does have a pacer and other heart problems. Said currently feels fine but if he stands he does get dizzy. Patient sitting at bedside eating, no complaints at this time.  Patient reports he has been having this intermittent dizziness for about a month.  Reports he has not been compliant with his medication, hasn't been taking anything for a few weeks and just restarted it a couple of days ago.  Discussed treatment plan, patient states understanding, and agrees.       Past Medical History  Past Medical History:   Diagnosis Date    Personal history of pneumonia (recurrent) 07/15/2021    History of pneumonia       Surgical History  Past Surgical History:   Procedure Laterality Date    APPENDECTOMY  02/06/2015    Appendectomy    CORONARY ARTERY BYPASS GRAFT  07/17/2017    CABG    CT ABDOMEN PELVIS ANGIOGRAM W AND/OR WO IV CONTRAST  2/28/2022    CT ABDOMEN PELVIS ANGIOGRAM W AND/OR WO IV CONTRAST 2/28/2022 [facility] EMERGENCY LEGAC[facility]    CT ANGIO NECK  4/1/2023    CT NECK ANGIO W AND WO IV CONTRAST GEN CT    CT HEAD ANGIO W AND WO IV CONTRAST  4/1/2023    CT HEAD ANGIO W AND WO IV CONTRAST GEN CT    KNEE SURGERY  02/06/2015    Knee Surgery    OTHER SURGICAL HISTORY  01/06/2020    Cardioverter defibrillator insertion    OTHER SURGICAL HISTORY  01/06/2020    Coronary artery stent placement        Social History  He reports that he quit smoking about 26 years ago. His smoking use included cigarettes. He started smoking about 75 years ago. He has a 49 pack-year smoking history. He has never used smokeless tobacco. He reports current alcohol use of about 1.0 standard drink of alcohol per week. He  "reports that he does not use drugs.    Family History  Family History   Problem Relation Name Age of Onset    Coronary artery disease Father          Allergies  Penicillins, Rivaroxaban, and Morphine    Review of Systems   Constitutional: Negative.    HENT: Negative.     Eyes: Negative.    Respiratory: Negative.     Cardiovascular: Negative.    Gastrointestinal: Negative.    Endocrine: Negative.    Genitourinary: Negative.    Musculoskeletal: Negative.    Skin: Negative.    Allergic/Immunologic: Negative.    Neurological:  Positive for dizziness and weakness.   Hematological: Negative.    Psychiatric/Behavioral: Negative.          Physical Exam  Constitutional:       Appearance: Normal appearance.   HENT:      Head: Normocephalic and atraumatic.      Nose: Nose normal.      Mouth/Throat:      Mouth: Mucous membranes are moist.   Eyes:      Extraocular Movements: Extraocular movements intact.      Pupils: Pupils are equal, round, and reactive to light.   Cardiovascular:      Rate and Rhythm: Normal rate and regular rhythm.      Pulses: Normal pulses.      Heart sounds: Normal heart sounds.   Pulmonary:      Effort: Pulmonary effort is normal.      Breath sounds: Normal breath sounds.   Abdominal:      General: Bowel sounds are normal.      Palpations: Abdomen is soft.   Musculoskeletal:         General: Normal range of motion.      Cervical back: Normal range of motion.   Skin:     General: Skin is warm and dry.      Capillary Refill: Capillary refill takes less than 2 seconds.   Neurological:      Mental Status: He is alert. Mental status is at baseline.      Motor: Weakness present.   Psychiatric:         Mood and Affect: Mood normal.         Behavior: Behavior normal.          Last Recorded Vitals  Blood pressure 172/80, pulse 65, temperature 35.9 °C (96.6 °F), temperature source Temporal, resp. rate 16, height 1.676 m (5' 6\"), weight 73.8 kg (162 lb 11.2 oz), SpO2 96%.    Relevant Results  Scheduled " medications  apixaban, 2.5 mg, oral, BID  metoprolol succinate XL, 25 mg, oral, Daily  perflutren lipid microspheres, 0.5-10 mL of dilution, intravenous, Once in imaging  perflutren protein A microsphere, 0.5 mL, intravenous, Once in imaging  rosuvastatin, 40 mg, oral, Nightly  sulfur hexafluoride microsphr, 2 mL, intravenous, Once in imaging      Continuous medications  dextrose 5%-0.45 % sodium chloride, 125 mL/hr, Last Rate: 125 mL/hr (08/29/24 2205)      PRN medications  PRN medications: acetaminophen, melatonin, ondansetron, polyethylene glycol    CT head wo IV contrast    Result Date: 8/29/2024  Interpreted By:  Kaitlin Gillette, STUDY: CT HEAD WO IV CONTRAST;  8/29/2024 5:15 pm   INDICATION: Signs/Symptoms:dizziness.   COMPARISON: CT head 08/27/2024, 03/31/2023   ACCESSION NUMBER(S): [id number]   ORDERING CLINICIAN: KENNEDY BARBOSA   TECHNIQUE: Noncontrast axial CT scan of head was performed. Multiplanar reconstructions   FINDINGS: No evidence of a large territorial infarct. Gray-white matter interfaces are preserved. No acute intracranial hemorrhage, mass effect, midline shift, or herniation. No evidence of hydrocephalus.   The ventricles and sulci are prominent for age, consistent with cerebral volume loss that is unchanged compared with the prior exam. Mild periventricular white matter hypodensities again seen, consistent with chronic microangiopathic changes. Small areas of gliosis in the right insula and superior temporal gyrus are again seen.   A heterogeneous right maxillary sinus mucous retention cysts versus polypoid mass is again seen measuring up to at least 2.5 cm. Opacification of the right sphenoid sinus is again seen. Remaining paranasal sinuses and mastoid air cells are clear.   No acute osseous abnormality of the calvarium. No destructive bone lesion.       1. No evidence of acute intracranial abnormality or interval change. 2. Stable senescent changes of cerebral volume loss and chronic  microangiopathic changes. 3. Right maxillary sinus heterogeneous polypoid mass versus mucous retention cyst is again seen. Correlation with nonemergent ENT evaluation is recommended, if not previously performed elsewhere.     Signed by: Kaitlin Gillette 8/29/2024 5:43 PM Dictation workstation:   [id number]    XR chest 1 view    Result Date: 8/29/2024  STUDY: Chest Radiograph;  8/29/2024 4:21PM INDICATION: Dizziness. COMPARISON: 8/26/2024 XR Chest. ACCESSION NUMBER(S): [id number] ORDERING CLINICIAN: KENNEDY BARBOSA TECHNIQUE:  Frontal chest was obtained at 17:20 hours. FINDINGS: There are median sternotomy wires from thoracic surgery.  There is a bipolar left-sided pacemaker with leads in right atrium and right ventricle apex. CARDIOMEDIASTINAL SILHOUETTE: Cardiomediastinal silhouette is top normal in size and configuration.  LUNGS: There are scattered ill-defined bilateral calcified pleural plaques. Linear stranding within the right lower lobe suggestive of atelectasis.  Trace right pleural effusion.  No consolidative opacities.  ABDOMEN: No remarkable upper abdominal findings.  BONES: No acute osseous changes.    1. Linear stranding within the right lower lobe suggestive of atelectasis. Trace right pleural effusion. 2. Scattered ill-defined bilateral calcified pleural plaques. Signed by Román Hill MD    ECG 12 lead    Result Date: 8/28/2024  Ventricular-paced rhythm with premature ventricular or aberrantly conducted complexes Abnormal ECG When compared with ECG of 31-MAR-2023 21:53, Previous ECG has undetermined rhythm, needs review See ED provider note for full interpretation and clinical correlation Confirmed by Lita Miranda ([id number]) on 8/28/2024 3:16:13 PM    CT head wo IV contrast    Result Date: 8/27/2024  Interpreted By:  Bob Moody, STUDY: CT HEAD WO IV CONTRAST;  8/27/2024 9:42 pm   INDICATION: Signs/Symptoms:new onset confusion.   COMPARISON: None.   ACCESSION NUMBER(S): [id number]   ORDERING  CLINICIAN: GRACE RUBIN   TECHNIQUE: Noncontrast axial CT images of head were obtained with coronal and sagittal reconstructed images.   FINDINGS: BRAIN PARENCHYMA: There is diffuse cerebral and cerebellar volume loss. Mild periventricular and subcortical hemispheric white matter hypodensities are most compatible with chronic small vessel ischemic disease.Redemonstration of a small area of gliosis in the posterior right insula and right superior temporal gyrus. No acute intraparenchymal hemorrhage or parenchymal evidence of acute large territory ischemic infarct. No mass-effect. Gray-white matter distinction is preserved.   VENTRICLES and EXTRA-AXIAL SPACES:  No acute extra-axial or intraventricular hemorrhage. No effacement of cerebral sulci. The ventricles and sulci are diffusely prominent likely owing to volume loss, slightly progressed from prior study.   PARANASAL SINUSES/MASTOIDS: Mucous retention cyst versus polypoid mass (favored) in right maxillary sinus, increased in size from prior study. Mucous retention cyst versus polyp in the right maxillary the mastoids are well aerated.   CALVARIUM/ORBITS:  No skull fracture.  The orbits and globes are intact to the extent visualized.   EXTRACRANIAL SOFT TISSUES: No discernible abnormality.       No acute intracranial abnormality.   Progression of volume loss overall stable chronic ischemic changes as described above.   Slight enlargement of a polypoid mass in the right maxillary sinus. Recommend ENT follow-up.   MACRO: None.   Signed by: Bob Moody 8/27/2024 10:40 PM Dictation workstation:   [id number]    CT chest wo IV contrast    Result Date: 8/27/2024  Interpreted By:  Patt Cespedes, STUDY: CT CHEST WO IV CONTRAST;  8/26/2024 2:25 pm   INDICATION: Signs/Symptoms:sob.   COMPARISON: 11/28/2021   ACCESSION NUMBER(S): [id number]   ORDERING CLINICIAN: BEREKET GAUTAM   TECHNIQUE: Serial axial CT images of the chest obtained without intravenous contrast.  Sagittal and coronal reconstructions were generated.   FINDINGS:   VESSELS: There are atherosclerotic changes of the aorta. The cava and main pulmonary artery are unremarkable.   HEART: The heart appears somewhat enlarged. There are coronary artery calcifications. There is a transvenous pacemaker/AICD device.   MEDIASTINUM AND GEO: There is no significant mediastinal or hilar lymphadenopathy.   LUNG, PLEURA, AND LARGE AIRWAYS: There are densely calcified pleural plaques.   There is basilar atelectasis or scarring.   CHEST WALL AND LOWER NECK: Thyroid is not obvious. There is artifact from the pacemaker.   BONES: There are degenerative changes of the spine.   Sternal wires are present.   UPPER ABDOMEN: There is a hiatal hernia.   COMPARISON TO THE PRIOR EXAM: Patchiness at the lung bases has improved since the prior exam.       Cardiomegaly. Hiatal hernia. Extensive pleural plaques.   Signed by: Patt Cespedes 8/27/2024 12:46 PM Dictation workstation:   [id number]    CT angio chest for pulmonary embolism    Result Date: 8/27/2024  Interpreted By:  Patt Cespedes, STUDY: CT ANGIO CHEST FOR PULMONARY EMBOLISM;  8/27/2024 11:34 am   INDICATION: Signs/Symptoms:elevated d-dimer; non-compliant with eliquis.   COMPARISON: 08/26/2024   ACCESSION NUMBER(S): [id number]   ORDERING CLINICIAN: BEREKET GAUTAM   TECHNIQUE: Serial axial CT images of the chest obtained 75 mL of Omnipaque 350. Sagittal, coronal and MIP reconstructions were generated.   FINDINGS: VESSELS: There is optimal opacification of the pulmonary arteries. There are no obvious pulmonary artery emboli.   There are atherosclerotic changes of the aorta. The cava is unremarkable.   HEART: The heart appears somewhat enlarged. There are coronary artery calcifications. There is a transvenous pacemaker/AICD device.   MEDIASTINUM AND GEO: There is no significant mediastinal or hilar lymphadenopathy.   LUNG, PLEURA, AND LARGE AIRWAYS: There are densely calcified pleural  plaques.   There is basilar atelectasis or scarring.   CHEST WALL AND LOWER NECK: Thyroid is not obvious. There is artifact from the pacemaker.   BONES: There are degenerative changes of the spine.   Sternal wires are present.   UPPER ABDOMEN: There is a hiatal hernia.   COMPARISON TO THE PRIOR EXAM: The chest is similar.       No evidence of PE.   Extensive pleural plaques within without calcifications.   Hiatal hernia.   Signed by: Patt Cespedes 8/27/2024 12:12 PM Dictation workstation:   [id number]    Transthoracic Echo (TTE) Complete    Result Date: 8/26/2024   [facility], [address] [place] [address]              Tel [phone] and Fax [phone] TRANSTHORACIC ECHOCARDIOGRAM REPORT  Patient Name:      MARIE Kellogg Physician:    [id number] Helena Phillips MD Study Date:        8/26/2024            Ordering Provider:    [id number] BEREKET GAUTAM MRN/PID:           [id number]             Fellow: Accession#:        [id number]         Nurse:                Bernadette Cox RN Date of Birth/Age: 1935 / 88      Sonographer:          Jenn paul                                      RDJASKRAAN Gender:            M                    Additional Staff: Height:            167.64 cm            Admit Date: Weight:            71.67 kg             Admission Status:     Inpatient -                                                               Routine BSA / BMI:         1.81 m2 / 25.50      Encounter#:           [id number]                    kg/m2 Blood Pressure:    139/67 mmHg          Department Location:  [facility] Study Type:    TRANSTHORACIC ECHO (TTE) COMPLETE Diagnosis/ICD: Heart failure, unspecified-I50.9 Indication:    Congestive Heart Failure CPT Code:      Echo  Complete w Full Doppler-[id number] Patient History: Pertinent         A-Fib, CAD, CHF, HTN, Hyperlipidemia, CVA and Syncope. History:          pacemaker. Study Detail: The following Echo studies were performed: 2D, M-Mode and Doppler.               Technically challenging study due to body habitus. Definity used               as a contrast agent for endocardial border definition. Total               contrast used for this procedure was 2 mL via IV push. The patient               was awake.  PHYSICIAN INTERPRETATION: Left Ventricle: Left ventricular ejection fraction is severely decreased, by visual estimate at 25-30%. The patient is in atrial fibrillation which may influence the estimate of left ventricular function and transvalvular flows. There is global hypokinesis of the left ventricle with minor regional variations. The left ventricular cavity size is mildly dilated. There is moderate concentric left ventricular hypertrophy. Spectral Doppler shows an abnormal pattern of left ventricular diastolic filling. Left Atrium: The left atrium is severely dilated. Right Ventricle: The right ventricle is mildly enlarged. There is mildly reduced right ventricular systolic function. A device is visualized in the right ventricle. Right Atrium: The right atrium is moderately dilated. Aortic Valve: The aortic valve is trileaflet. There is minimal aortic valve cusp calcification. There is no evidence of aortic valve regurgitation. The peak instantaneous gradient of the aortic valve is 7.0 mmHg. Mitral Valve: The mitral valve is normal in structure. There is mild mitral valve regurgitation. Tricuspid Valve: The tricuspid valve is structurally normal. There is mild tricuspid regurgitation. Pulmonic Valve: The pulmonic valve is not well visualized. There is physiologic pulmonic valve regurgitation. Pericardium: There is no pericardial effusion noted. Aorta: The aortic root was not well visualized.  CONCLUSIONS:  1. Left ventricular  ejection fraction is severely decreased, by visual estimate at 25-30%.  2. There is global hypokinesis of the left ventricle with minor regional variations.  3. Spectral Doppler shows an abnormal pattern of left ventricular diastolic filling.  4. Left ventricular cavity size is mildly dilated.  5. There is moderate concentric left ventricular hypertrophy.  6. There is mildly reduced right ventricular systolic function.  7. Mildly enlarged right ventricle.  8. The left atrium is severely dilated.  9. The right atrium is moderately dilated. 10. The patient is in atrial fibrillation which may influence the estimate of left ventricular function and transvalvular flows. QUANTITATIVE DATA SUMMARY: 2D MEASUREMENTS:                         Normal Ranges: Ao Root d:     3.50 cm  (2.0-3.7cm) LAs:           4.00 cm  (2.7-4.0cm) IVSd:          0.97 cm  (0.6-1.1cm) LVPWd:         1.28 cm  (0.6-1.1cm) LVIDd:         5.40 cm  (3.9-5.9cm) LVIDs:         4.25 cm LV Mass Index: 134 g/m2 LVEDV Index:   78 ml/m2 LV % FS        21.3 % LA VOLUME:                               Normal Ranges: LA Vol A4C:        82.6 ml    (22+/-6mL/m2) LA Vol A2C:        113.3 ml LA Vol BP:         100.6 ml LA Vol Index A4C:  45.6ml/m2 LA Vol Index A2C:  62.6 ml/m2 LA Vol Index BP:   55.6 ml/m2 LA Area A4C:       27.7 cm2 LA Area A2C:       31.2 cm2 LA Major Axis A4C: 7.9 cm LA Major Axis A2C: 7.3 cm LA Volume Index:   53.0 ml/m2 RA VOLUME BY A/L METHOD:                               Normal Ranges: RA Vol A4C:        89.4 ml    (8.3-19.5ml) RA Vol Index A4C:  49.4 ml/m2 RA Area A4C:       27.9 cm2 RA Major Axis A4C: 7.4 cm M-MODE MEASUREMENTS:                  Normal Ranges: Ao Root: 3.00 cm (2.0-3.7cm) LAs:     5.30 cm (2.7-4.0cm) AORTA MEASUREMENTS:                    Normal Ranges: Asc Ao, d: 3.30 cm (2.1-3.4cm) LV SYSTOLIC FUNCTION BY 2D PLANIMETRY (MOD):                      Normal Ranges: EF-A4C View:    23 % (>=55%) EF-A2C View:    29 % EF-Biplane:      26 % EF-Visual:      28 % LV EF Reported: 28 % LV DIASTOLIC FUNCTION:                            Normal Ranges: MV Peak E:      0.99 m/s   (0.7-1.2 m/s) MV e'           0.054 m/s  (>8.0) MV lateral e'   0.06 m/s MV medial e'    0.05 m/s E/e' Ratio:     18.29      (<8.0) PulmV Sys Eduard:  24.60 cm/s PulmV [place] Eduard: 46.80 cm/s PulmV S/D Eduard:  0.50 MITRAL VALVE:                 Normal Ranges: MV DT: 203 msec (150-240msec) AORTIC VALVE:                         Normal Ranges: AoV Vmax:      1.32 m/s (<=1.7m/s) AoV Peak P.0 mmHg (<20mmHg) LVOT Max Eduard:  1.11 m/s (<=1.1m/s) LVOT VTI:      18.50 cm LVOT Diameter: 1.90 cm  (1.8-2.4cm) AoV Area,Vmax: 2.38 cm2 (2.5-4.5cm2)  RIGHT VENTRICLE: RV Basal 3.70 cm RV Mid   2.80 cm RV Major 8.1 cm TAPSE:   7.9 mm RV s'    0.07 m/s TRICUSPID VALVE/RVSP:                   Normal Ranges: IVC Diam: 1.60 cm PULMONIC VALVE:                      Normal Ranges: PV Max Eduard: 1.1 m/s  (0.6-0.9m/s) PV Max P.0 mmHg Pulmonary Veins: PulmV [place] Eduard: 46.80 cm/s PulmV S/D Eduard:  0.50 PulmV Sys Eduard:  24.60 cm/s  [id number] Helena Phillips MD Electronically signed on 2024 at 8:40:29 PM  ** Final **     XR chest 1 view    Result Date: 2024  Interpreted By:  William Unger, STUDY: XR CHEST 1 VIEW; 2024 10:39 am   INDICATION: CLINICAL INFORMATION: Signs/Symptoms:SOB.   COMPARISON: 2024   ACCESSION NUMBER(S): [id number]   ORDERING CLINICIAN: KIRK MARTINEZ   TECHNIQUE: Portable chest one view.   FINDINGS: The cardiac silhouette is quite prominent suggesting cardiomegaly. A bipolar cardiac pacemaker is present. Sternal fixations devices and mediastinal clips are present. Calcific pleural plaque is present bilaterally. The overlying plaque limits assessment for pulmonary infiltrates. Hiatal hernia suspected within the lower middle mediastinum. The lungs are markedly hyperinflated consistent with COPD.       1. 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 2. 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 3. Limited sensitivity for the detection of pulmonary infiltrates due to the extensive calcific pleural plaque. 4. Probable hiatal hernia. 5. Postoperative findings as above.   MACRO: none   Signed by: William Unger 8/26/2024 10:56 AM Dictation workstation:   [id number]     Results for orders placed or performed during the hospital encounter of 08/29/24 (from the past 24 hour(s))   POCT GLUCOSE   Result Value Ref Range    POCT Glucose 95 74 - 99 mg/dL   CBC and Auto Differential   Result Value Ref Range    WBC 6.5 4.4 - 11.3 x10*3/uL    nRBC 0.0 0.0 - 0.0 /100 WBCs    RBC 5.68 4.50 - 5.90 x10*6/uL    Hemoglobin 14.2 13.5 - 17.5 g/dL    Hematocrit 45.1 41.0 - 52.0 %    MCV 79 (L) 80 - 100 fL    MCH 25.0 (L) 26.0 - 34.0 pg    MCHC 31.5 (L) 32.0 - 36.0 g/dL    RDW 17.0 (H) 11.5 - 14.5 %    Platelets 205 150 - 450 x10*3/uL    Neutrophils % 71.0 40.0 - 80.0 %    Immature Granulocytes %, Automated 0.2 0.0 - 0.9 %    Lymphocytes % 15.5 13.0 - 44.0 %    Monocytes % 10.0 2.0 - 10.0 %    Eosinophils % 2.8 0.0 - 6.0 %    Basophils % 0.5 0.0 - 2.0 %    Neutrophils Absolute 4.63 1.60 - 5.50 x10*3/uL    Immature Granulocytes Absolute, Automated 0.01 0.00 - 0.50 x10*3/uL    Lymphocytes Absolute 1.01 0.80 - 3.00 x10*3/uL    Monocytes Absolute 0.65 0.05 - 0.80 x10*3/uL    Eosinophils Absolute 0.18 0.00 - 0.40 x10*3/uL    Basophils Absolute 0.03 0.00 - 0.10 x10*3/uL   Comprehensive metabolic panel   Result Value Ref Range    Glucose 87 74 - 99 mg/dL    Sodium 136 136 - 145 mmol/L    Potassium 3.5 3.5 - 5.3 mmol/L    Chloride 95 (L) 98 - 107 mmol/L    Bicarbonate 31 21 - 32 mmol/L    Anion Gap 14 10 - 20 mmol/L    Urea Nitrogen 48 (H) 6 - 23 mg/dL    Creatinine 2.88 (H) 0.50 - 1.30 mg/dL    eGFR 21 (L) >60 mL/min/1.73m*2    Calcium 9.5 8.6 - 10.3 mg/dL    Albumin 4.3 3.4 - 5.0 g/dL    Alkaline Phosphatase 78 33 - 136 U/L    Total Protein 7.8 6.4 - 8.2 g/dL    AST 13 9 - 39 U/L     Bilirubin, Total 0.7 0.0 - 1.2 mg/dL    ALT 6 (L) 10 - 52 U/L   Protime-INR   Result Value Ref Range    Protime 14.8 (H) 9.8 - 12.8 seconds    INR 1.3 (H) 0.9 - 1.1   Troponin I, High Sensitivity, Initial   Result Value Ref Range    Troponin I, High Sensitivity 25 (H) 0 - 20 ng/L   B-type natriuretic peptide   Result Value Ref Range     (H) 0 - 99 pg/mL   Troponin, High Sensitivity, 1 Hour   Result Value Ref Range    Troponin I, High Sensitivity 24 (H) 0 - 20 ng/L        Assessment/Plan   Assessment & Plan  Dizziness    JONAS (acute kidney injury) (CMS-HCC)    Atrial fibrillation (Multi)    Acute on chronic systolic (congestive) heart failure (Multi)    HLD (hyperlipidemia)    Elevated troponin    CKD (chronic kidney disease), stage III (Multi)    Weakness of both legs    Principal Problem  #Dizziness  #Atrial fibrillation (Multi)  #Elevated Troponin  #Acute on chronic systolic (congestive) heart failure (Multi)  -WBC 6.5  -Troponin 25 > 24  -  -ECHO 8/26/24  CONCLUSIONS:   1. Left ventricular ejection fraction is severely decreased, by visual estimate at 25-30%.   2. There is global hypokinesis of the left ventricle with minor regional variations.   3. Spectral Doppler shows an abnormal pattern of left ventricular diastolic filling.   4. Left ventricular cavity size is mildly dilated.   5. There is moderate concentric left ventricular hypertrophy.   6. There is mildly reduced right ventricular systolic function.   7. Mildly enlarged right ventricle.   8. The left atrium is severely dilated.   9. The right atrium is moderately dilated.  10. The patient is in atrial fibrillation which may influence the estimate of left ventricular function and transvalvular flows.  -continue Eliquis 2.5 mg PO BID  -continue Metoprolol 25 mg PO Daily   -Cardiology consult, appreciate recs    Active Problems  #Weakness of both legs  -CT Head  IMPRESSION:  1. No evidence of acute intracranial abnormality or interval  change.  2. Stable senescent changes of cerebral volume loss and chronic  microangiopathic changes.  3. Right maxillary sinus heterogeneous polypoid mass versus mucous  retention cyst is again seen. Correlation with nonemergent ENT  evaluation is recommended, if not previously performed elsewhere.  -MRI/MRA Ordered  -Neuro Checks  -PT Eval and Treat    #JONAS (acute kidney injury) (CMS-[facility])  #CKD Stage 3  -BUN/Creat/GFR  48/2.88/21  - ml IV given in ED  -D5-1/2- ml given in ED    #HLD (hyperlipidemia)  -continue Crestor 40 mg PO HS    DVT Prophylaxis: Eliquis  Fluids: N/A   Nutrition: Cardiac    Code Status: Full Code    Pt requires inpatient stay at this time.  Total accumulated time spent face to face and not face to face preparing to see the patient, obtaining and reviewing separately obtained history; performing a medically appropriate examination and/or evaluation; counseling and educating the patient, family; ordering medications, tests, or procedures; referring and communicating with other health care professionals; documenting clinical information in the patient's medical record; independently interpreting results and communicating the results to the patient, family; and care coordination was 45 minutes.      HARSHIL Phelan-CNP  Attending Attestation:    I was present with the APRDANIELA-CNP who participated in the documentation of this note. I have personally seen and re-examined the patient and performed the medical decision-making components (assessment and plan of care). I have reviewed the documentation and verified the findings in the note as written with additions or exceptions as stated in the body of this note.     Dr. Mirta Plaza MD  Internal Medicine

## 2024-08-30 NOTE — PROGRESS NOTES
Cardiology Progress Note  Patient: Gilmer Quiñonez  Unit/Bed: 227/227-A  YOB: 1935    Admitting Diagnosis: Dizziness [R42]  Renal insufficiency [N28.9]  Elevated troponin [R79.89]  CHF (congestive heart failure), NYHA class II, acute on chronic, combined (Multi) [I50.43]  Congestive heart failure, unspecified HF chronicity, unspecified heart failure type (Multi) [I50.9]  JONAS (acute kidney injury) (CMS-HCC) [N17.9]  Date:  8/29/2024  Attending: Mirta Plaza MD      Hospital Day: 0    SUBJECTIVE:   Returned via ER after leaving AMA on 8/28/2024  Up in the chair;  he is confused as to why he is back in the hospital ;  he says he feels fine    Device interrogation 8/30/2024 reviewed without any Atrial or Ventricular arrhythmia       OBJECTIVE  Vitals:   Visit Vitals  /60 (BP Location: Left arm, Patient Position: Lying)   Pulse 59   Temp 36.2 °C (97.2 °F) (Temporal)   Resp 18        Wt Readings from Last 5 Encounters:   08/29/24 73.8 kg (162 lb 11.2 oz)   08/27/24 71.8 kg (158 lb 4.6 oz)   04/01/24 78.4 kg (172 lb 12.8 oz)   11/07/23 73.1 kg (161 lb 3.2 oz)   10/30/23 75.3 kg (166 lb)       Intake / Output:   I/O last 3 completed shifts:  In: 1037.5 (14.1 mL/kg) [I.V.:537.5 (7.3 mL/kg); IV Piggyback:500]  Out: - (0 mL/kg)   Weight: 73.8 kg      Tele monitoring: AF / Vpaced    Physical Examination:    Constitutional:       Appearance: Not in distress. Chronically ill-appearing.   Eyes:      Conjunctiva/sclera: Conjunctivae normal.   Neck:      Vascular: JVD normal.   Pulmonary:      Effort: Pulmonary effort is normal.      Breath sounds: Decreased breath sounds present. Wheezing present.   Cardiovascular:      PMI at left midclavicular line. Normal rate. Irregularly irregular rhythm. Normal S1. Normal S2.       Murmurs: There is no murmur.      No rub.      Comments: ICD site healed   Pulses:     Intact distal pulses.   Edema:     Peripheral edema absent.   Abdominal:      General: Bowel  sounds are normal.   Musculoskeletal:      Cervical back: Neck supple. Skin:     General: Skin is warm and dry.   Neurological:      Mental Status: Alert. Confused.            LABS:  CMP:   Recent Labs     08/30/24  0655 08/29/24  1702 08/27/24  0733 08/26/24  1026 04/01/24  1210 10/30/23  1305 09/20/23  1324 05/08/23  1519 04/03/23  0443 04/02/23  0510 04/01/23  0852 03/31/23 2010 12/24/21 2015 12/01/21  0612 11/29/21  1015 11/28/21  1600 11/16/21  1610 11/10/21  0535    136 138 135* 136 136 138 140 142 141   < > 137   < > 133* 130* 132*   < > 138   K 3.6 3.5 3.7 4.4 4.3 4.6 4.2 3.8 3.7 3.7   < > 3.9   < > 3.9 3.4* 3.9   < > 4.0   CL 98 95* 98 98 102 102 103 102 107 105   < > 104   < > 97* 96* 96*   < > 100   CO2 27 31 26 29 26 27 26 27 27 23   < > 22   < > 30 26 30   < > 29   ANIONGAP 15 14 18 12 12 12 13 15 12 17   < > 15   < > 10 11 10   < > 13   BUN 39* 48* 27* 23 10 10 14 14 19 18   < > 14   < > 37* 22 21   < > 24*   CREATININE 2.05* 2.88* 1.38* 1.44* 1.13 1.08 1.21 1.26 1.35* 1.42*   < > 1.08   < > 1.84* 1.38* 1.28   < > 1.88*   EGFR 31* 21* 50* 47* 63 67  --   --   --   --   --   --   --   --   --   --   --   --    MG  --   --   --  2.25 2.18  --   --  1.99 1.99 1.92  --  2.04  --  2.41* 2.22 2.17  --  2.11    < > = values in this interval not displayed.     LIVER ENZYMES:   Recent Labs     08/30/24  0655 08/29/24  1702 08/26/24  1026 04/01/23  0852 03/31/23 2010 02/28/22  1847 02/19/22  0642 02/18/22  2028 12/16/19  1533 12/06/19  1142 11/28/19  1641   ALBUMIN 4.0 4.3 4.4 4.1 4.5 3.8 3.3* 3.6   < > 3.9 4.3   ALT 5* 6* 7* 9* 11 11 10 12   < > 20 22   AST 11 13 21 13 16 17 16 15   < > 36 27   BILITOT 0.8 0.7 0.9 1.2 1.2 1.0 1.2 1.1   < > 0.6 0.6   LIPASE  --   --  14  --  14 14  --   --   --  40 60    < > = values in this interval not displayed.     CBC:  Recent Labs     08/30/24  0655 08/29/24  1702 08/27/24  0733 08/26/24  1026 04/01/24  1210 10/30/23  1305 09/20/23  1324 06/04/23  1006   WBC 5.6  6.5 5.5 4.9 4.7 5.4 5.3 3.7*   HGB 14.2 14.2 13.9 13.5 12.3* 12.4* 11.9* 11.6*   HCT 45.3 45.1 44.8 44.3 40.1* 41.6 39.7* 37.9*    205 178 174 153 165 180 146*   MCV 80 79* 81 83 85 87 84 84     COAG:   Recent Labs     08/29/24  1702 06/04/23  1006 02/28/22  1847 02/18/22  1454 02/17/22  1945 11/28/21  1600 11/25/21  1520 11/16/21  1725   INR 1.3* 1.2* 1.2* 1.4* 1.5* 1.4* 1.3* 1.4*     ABO:   Recent Labs     02/28/22 1847   ABO O     HEME/ENDO:  Recent Labs     04/01/24  1210 04/02/23  0510 04/18/22  1040 11/17/21  1430 06/11/21  0910 10/31/20  2353 11/29/19  0808 02/10/18  0250   FERRITIN  --  35 70   < >  --   --    < > 17*   IRONSAT  --  83* 12*   < >  --   --    < > NOT CALC.   TSH 1.07  --   --   --   --   --   --  1.09   HGBA1C  --   --   --   --  6.3* 5.8  --   --     < > = values in this interval not displayed.      CARDIAC:   Recent Labs     08/30/24  0655 08/29/24  1803 08/29/24  1702 08/26/24  1152 08/26/24  1026 04/01/24  1210 04/02/23  0510 04/01/23  0852 03/31/23  2158 03/31/23 2010 02/28/22  2219 02/17/22  1945 10/31/20  2353 07/30/20  1127   LDH  --   --   --   --   --   --   --   --   --   --   --   --   --  165   TROPHS  --  24* 25* 19 17  --   --  21* 27* 23*  CANCELED  --   --   --   --    *  --  264*  --  375* 670* 708*  --   --  642* 619* 366*   < >  --     < > = values in this interval not displayed.       Lipid Panel:  Lab Results   Component Value Date    HDL 38.4 08/30/2024    CHHDL 3.3 08/30/2024    VLDL 18 08/30/2024    TRIG 88 08/30/2024    NHDL 89 08/30/2024          Current Medications:   MEDICATIONS  Infusions:  sodium chloride 0.9%, Last Rate: 10 mL/hr (08/30/24 1042)      Scheduled:  apixaban, 2.5 mg, BID  ipratropium-albuteroL, 3 mL, q6h  levoFLOXacin, 750 mg, q48h  metoprolol succinate XL, 25 mg, Daily  perflutren lipid microspheres, 0.5-10 mL of dilution, Once in imaging  rosuvastatin, 40 mg, Nightly      PRN:  acetaminophen, 650 mg, q4h PRN  melatonin, 3 mg,  Nightly PRN  ondansetron, 4 mg, q8h PRN  polyethylene glycol, 17 g, Daily PRN  sodium chloride 0.9%, 10 mL/hr, Continuous PRN              Imaging:     CT head without IV contrast 8/29/2024  IMPRESSION:  1. No evidence of acute intracranial abnormality or interval change.  2. Stable senescent changes of cerebral volume loss and chronic  microangiopathic changes.  3. Right maxillary sinus heterogeneous polypoid mass versus mucous  retention cyst is again seen. Correlation with nonemergent ENT  evaluation is recommended, if not previously performed elsewhere.      CT angio chest for PE 8/27/2024  IMPRESSION:  No evidence of PE.  Extensive pleural plaques within without calcifications.  Hiatal hernia.      CXR 8/26/2024  IMPRESSION:  1. 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  2. 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  3. Limited sensitivity for the detection of pulmonary infiltrates due  to the extensive calcific pleural plaque.  4. Probable hiatal hernia.  5. Postoperative findings as above    Cardiovascular studies:       EKG dated 8/29/2024 independently reviewed   Atrial flutter;  V-paced        Echocardiogram 8/26/2024  CONCLUSIONS:   1. Left ventricular ejection fraction is severely decreased, by visual estimate at 25-30%.   2. There is global hypokinesis of the left ventricle with minor regional variations.   3. Spectral Doppler shows an abnormal pattern of left ventricular diastolic filling.   4. Left ventricular cavity size is mildly dilated.   5. There is moderate concentric left ventricular hypertrophy.   6. There is mildly reduced right ventricular systolic function.   7. Mildly enlarged right ventricle.   8. The left atrium is severely dilated.   9. The right atrium is moderately dilated.  10. The patient is in atrial fibrillation which may influence the estimate of left ventricular function and transvalvular flows.        EKG dated 8/26/2024 independently reviewed    Atrial flutter and V  Paced            Echocardiogram 2/21/2022  CONCLUSIONS:   1. The left ventricular systolic function is moderately decreased with a 35-40% estimated ejection fraction.   2. Spectral Doppler shows an impaired relaxation pattern of left ventricular diastolic filling.   3. The left atrium is severely dilated.   4. The right atrium is moderately dilated.   5. RVSP within normal limits.   6. Compared with study from 7/9/2021, no significant change.   7. There is global hypokinesis of the left ventricle with minor regional variations.        Echocardiogram 11/02/2020  CONCLUSIONS:   1. The left ventricular systolic function is mildly to moderately decreased with a 40-45% estimated ejection fraction.   2. Poorly visualized anatomical structures due to suboptimal image quality.   3. Abnormal septal motion consistent with post-operative status.   4. Thee is inferior akinesia and inferolateral and distal septal-apical hypokinesia.   5. There is severe eccentric left ventricular hypertrophy.   6. The left atrium is severely dilated.   7. The right atrium is severely dilated.   8. The left ventricular cavity size is moderate to severely dilated.   9. There is no evidence of a patent foramen ovale.  10. The patient is in atrial fibrillation which may influence the estimate of left ventricular function and transvalvular flows.     Cardiac catheterization 10/31/2016  CONCLUSIONS:   1. Left main: patent stent into left Cx with mild ISR.   2. LAD: 100% ostial occlusion.   3. LCx: patient LM-proximal LCx stent with mild ISR, mild diffuse disease in vessel proper.   4. RCA: 95% ostial disease, 100% prox-mid  with right to right collaterals.   5. Grafts: (1) WENDY to LAD (and jump to diagonal) patent (2) LIMA to ramus patent (3) SVG to RPDA with 99% ISR at distal anastomosis.   6. LVEDP 6mmHg, no significant aortic stenosis on LV-AO gradient.        Assessment:   88 YOM with acute on chronic HFrEF with Rx non-compliance.  Worsening LV  systolic function 25-30% now also with reduced RV systolic function.  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  Chronic atrial fibrillation /flutter, rate controlled   -- hx ablation in 2015 with recurrence, on long term OAC with Eliquis, declines watchman LAAO despite hx of GI bleed  HTN, controlled  HLD,    hx VT   Hx CVA  JONAS - baseline crt 0.93       PLAN:   Rx regimen is unclear after her left AMA   Will re-initiate GDMT for HFrEF:   Diuretic Holiday with JONAS   Add Jardiance 10 mg daily   Avoid MRA with labile renal function   Continue Toprol XL 25 mg daily   Consider addition of ARNI vs Hydral + Isordil as BP / renal function allows   Atrial flutter is rate controlled without ventricular arrhyhtmia on device interrogation   Continue long term OAC with Eliquis   ASCVD is chest pain free   On OAC alone with hx GI bleeding   Consider workup for LAAO as an outpatient -- which he has declined in the past         I spoke with his daughter Catherine, on the phone  who agrees that her father is currently confused.  He initially presented with a wet cough,  hospitalized with ADHF   He called his daughter from the hospital 8/27/2024 very confused and agitated, and she picked him up AMA and  took his home   None of the Rx changes were initiated     After 2 days at home he had difficulty walking,  weakness, dizziness and not making sense of his surroundings.    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   He does not take his Rx as prescribed.  Increased urinary frequency is bothersome particularly in the summer.    He takes Eliquis once daily due to excess nuisance  bleeding    She understands he may not be able to make medical decisions right now with altered mental status and will consider evaluation for watchman LAAO,  particularly if there are acute changes on MRI          Thank you  for the consult   Will follow   Please call or message with questions, concerns or changes in clinical condition   The case was discussed with SOFIE Barba          Electronically signed by SOFIE Chaudhry on 8/30/2024 at 12:15 PM  40 min

## 2024-08-30 NOTE — CARE PLAN
The patient's goals for the shift include      The clinical goals for the shift include        Problem: Fall/Injury  Goal: Not fall by end of shift  Outcome: Progressing  Goal: Be free from injury by end of the shift  Outcome: Progressing  Goal: Verbalize understanding of personal risk factors for fall in the hospital  Outcome: Progressing  Goal: Verbalize understanding of risk factor reduction measures to prevent injury from fall in the home  Outcome: Progressing  Goal: Use assistive devices by end of the shift  Outcome: Progressing  Goal: Pace activities to prevent fatigue by end of the shift  Outcome: Progressing

## 2024-08-30 NOTE — DISCHARGE INSTR - OTHER ORDERS
Thank you for choosing [facility] for your Health Care needs. As you transition from the hospital back to home, we hope we took your preferences into account on how you manage your health needs so you can manage your health at home.     You may receive a survey in the mail within the next couple weeks. Please take the time to complete it and return it. Your input is ALWAYS important to us. Thank you!  Your Care Transition Team - [facility] [employer] - [phone]    For questions about your medications listed on your discharge instructions, please call the Nurses Station at [phone].

## 2024-08-30 NOTE — PROGRESS NOTES
Gilmer Quiñonez is a 88 y.o. male on day 0 of admission presenting with Dizziness.      Subjective   Patient assessed at bedside; lying in bed. He is very anxious; does not want to stay. He reports he stopped his medication because he did not want to take them anymore. He did realized how terrible he would feel not taking them. He denies fever, chills, N/V/D/C.       Objective     Last Recorded Vitals  /60 (BP Location: Left arm, Patient Position: Lying)   Pulse 59   Temp 36.2 °C (97.2 °F) (Temporal)   Resp 18   Wt 73.8 kg (162 lb 11.2 oz)   SpO2 94%   Intake/Output last 3 Shifts:    Intake/Output Summary (Last 24 hours) at 8/30/2024 1053  Last data filed at 8/30/2024 0900  Gross per 24 hour   Intake 1237.5 ml   Output --   Net 1237.5 ml       Admission Weight  Weight: 71.7 kg (158 lb) (08/29/24 1600)    Daily Weight  08/29/24 : 73.8 kg (162 lb 11.2 oz)    Image Results  ECG 12 lead  Ventricular-paced rhythm with frequent Premature ventricular complexes and Fusion complexes  Abnormal ECG  When compared with ECG of 26-AUG-2024 10:00,  Fusion complexes are now Present  Premature ventricular complexes are now Present      Physical Exam  Vitals reviewed.   Constitutional:       Appearance: Normal appearance. He is normal weight.   HENT:      Head: Normocephalic and atraumatic.      Right Ear: External ear normal.      Left Ear: External ear normal.      Nose: Nose normal.      Mouth/Throat:      Mouth: Mucous membranes are moist.      Pharynx: Oropharynx is clear.   Eyes:      Conjunctiva/sclera: Conjunctivae normal.      Pupils: Pupils are equal, round, and reactive to light.   Cardiovascular:      Rate and Rhythm: Normal rate. Rhythm irregular.      Pulses: Normal pulses.      Heart sounds: Normal heart sounds.   Pulmonary:      Effort: Pulmonary effort is normal.      Breath sounds: Rales present.   Abdominal:      General: Bowel sounds are normal.      Palpations: Abdomen is soft.   Musculoskeletal:          General: Normal range of motion.      Cervical back: Normal range of motion and neck supple.   Skin:     General: Skin is warm and dry.   Neurological:      General: No focal deficit present.      Mental Status: He is alert and oriented to person, place, and time.   Psychiatric:         Mood and Affect: Mood normal.         Behavior: Behavior normal.         Relevant Results    Scheduled medications  apixaban, 2.5 mg, oral, BID  ipratropium-albuteroL, 3 mL, nebulization, q6h  levoFLOXacin, 750 mg, intravenous, q48h  metoprolol succinate XL, 25 mg, oral, Daily  perflutren lipid microspheres, 0.5-10 mL of dilution, intravenous, Once in imaging  perflutren protein A microsphere, 0.5 mL, intravenous, Once in imaging  rosuvastatin, 40 mg, oral, Nightly  sulfur hexafluoride microsphr, 2 mL, intravenous, Once in imaging      Continuous medications  dextrose 5%-0.45 % sodium chloride, 125 mL/hr, Last Rate: 125 mL/hr (08/29/24 2205)  sodium chloride 0.9%, 10 mL/hr, Last Rate: 10 mL/hr (08/30/24 1042)      PRN medications  PRN medications: acetaminophen, melatonin, ondansetron, polyethylene glycol, sodium chloride 0.9%      Results for orders placed or performed during the hospital encounter of 08/29/24 (from the past 24 hour(s))   POCT GLUCOSE   Result Value Ref Range    POCT Glucose 95 74 - 99 mg/dL   CBC and Auto Differential   Result Value Ref Range    WBC 6.5 4.4 - 11.3 x10*3/uL    nRBC 0.0 0.0 - 0.0 /100 WBCs    RBC 5.68 4.50 - 5.90 x10*6/uL    Hemoglobin 14.2 13.5 - 17.5 g/dL    Hematocrit 45.1 41.0 - 52.0 %    MCV 79 (L) 80 - 100 fL    MCH 25.0 (L) 26.0 - 34.0 pg    MCHC 31.5 (L) 32.0 - 36.0 g/dL    RDW 17.0 (H) 11.5 - 14.5 %    Platelets 205 150 - 450 x10*3/uL    Neutrophils % 71.0 40.0 - 80.0 %    Immature Granulocytes %, Automated 0.2 0.0 - 0.9 %    Lymphocytes % 15.5 13.0 - 44.0 %    Monocytes % 10.0 2.0 - 10.0 %    Eosinophils % 2.8 0.0 - 6.0 %    Basophils % 0.5 0.0 - 2.0 %    Neutrophils Absolute 4.63 1.60  - 5.50 x10*3/uL    Immature Granulocytes Absolute, Automated 0.01 0.00 - 0.50 x10*3/uL    Lymphocytes Absolute 1.01 0.80 - 3.00 x10*3/uL    Monocytes Absolute 0.65 0.05 - 0.80 x10*3/uL    Eosinophils Absolute 0.18 0.00 - 0.40 x10*3/uL    Basophils Absolute 0.03 0.00 - 0.10 x10*3/uL   Comprehensive metabolic panel   Result Value Ref Range    Glucose 87 74 - 99 mg/dL    Sodium 136 136 - 145 mmol/L    Potassium 3.5 3.5 - 5.3 mmol/L    Chloride 95 (L) 98 - 107 mmol/L    Bicarbonate 31 21 - 32 mmol/L    Anion Gap 14 10 - 20 mmol/L    Urea Nitrogen 48 (H) 6 - 23 mg/dL    Creatinine 2.88 (H) 0.50 - 1.30 mg/dL    eGFR 21 (L) >60 mL/min/1.73m*2    Calcium 9.5 8.6 - 10.3 mg/dL    Albumin 4.3 3.4 - 5.0 g/dL    Alkaline Phosphatase 78 33 - 136 U/L    Total Protein 7.8 6.4 - 8.2 g/dL    AST 13 9 - 39 U/L    Bilirubin, Total 0.7 0.0 - 1.2 mg/dL    ALT 6 (L) 10 - 52 U/L   Protime-INR   Result Value Ref Range    Protime 14.8 (H) 9.8 - 12.8 seconds    INR 1.3 (H) 0.9 - 1.1   Troponin I, High Sensitivity, Initial   Result Value Ref Range    Troponin I, High Sensitivity 25 (H) 0 - 20 ng/L   B-type natriuretic peptide   Result Value Ref Range     (H) 0 - 99 pg/mL   Troponin, High Sensitivity, 1 Hour   Result Value Ref Range    Troponin I, High Sensitivity 24 (H) 0 - 20 ng/L   ECG 12 lead   Result Value Ref Range    Ventricular Rate 67 BPM    Atrial Rate 277 BPM    QRS Duration 206 ms    QT Interval 488 ms    QTC Calculation(Bazett) 515 ms    R Axis -87 degrees    T Axis 65 degrees    QRS Count 10 beats    Q Onset 197 ms    T Offset 441 ms    QTC Fredericia 506 ms   Hepatic Function Panel   Result Value Ref Range    Albumin 4.0 3.4 - 5.0 g/dL    Bilirubin, Total 0.8 0.0 - 1.2 mg/dL    Bilirubin, Direct 0.2 0.0 - 0.3 mg/dL    Alkaline Phosphatase 65 33 - 136 U/L    ALT 5 (L) 10 - 52 U/L    AST 11 9 - 39 U/L    Total Protein 7.3 6.4 - 8.2 g/dL   B-Type Natriuretic Peptide   Result Value Ref Range     (H) 0 - 99 pg/mL   Lipid  Panel   Result Value Ref Range    Cholesterol 127 0 - 199 mg/dL    HDL-Cholesterol 38.4 mg/dL    Cholesterol/HDL Ratio 3.3     LDL Calculated 71 <=99 mg/dL    VLDL 18 0 - 40 mg/dL    Triglycerides 88 0 - 149 mg/dL    Non HDL Cholesterol 89 0 - 149 mg/dL   CBC   Result Value Ref Range    WBC 5.6 4.4 - 11.3 x10*3/uL    nRBC 0.0 0.0 - 0.0 /100 WBCs    RBC 5.67 4.50 - 5.90 x10*6/uL    Hemoglobin 14.2 13.5 - 17.5 g/dL    Hematocrit 45.3 41.0 - 52.0 %    MCV 80 80 - 100 fL    MCH 25.0 (L) 26.0 - 34.0 pg    MCHC 31.3 (L) 32.0 - 36.0 g/dL    RDW 16.8 (H) 11.5 - 14.5 %    Platelets 177 150 - 450 x10*3/uL   Basic metabolic panel   Result Value Ref Range    Glucose 91 74 - 99 mg/dL    Sodium 136 136 - 145 mmol/L    Potassium 3.6 3.5 - 5.3 mmol/L    Chloride 98 98 - 107 mmol/L    Bicarbonate 27 21 - 32 mmol/L    Anion Gap 15 10 - 20 mmol/L    Urea Nitrogen 39 (H) 6 - 23 mg/dL    Creatinine 2.05 (H) 0.50 - 1.30 mg/dL    eGFR 31 (L) >60 mL/min/1.73m*2    Calcium 9.6 8.6 - 10.3 mg/dL                   Assessment/Plan      Assessment & Plan  Dizziness    JONAS (acute kidney injury) (CMS-HCC)    Atrial fibrillation (Multi)    Acute on chronic systolic (congestive) heart failure (Multi)    HLD (hyperlipidemia)    Elevated troponin    CKD (chronic kidney disease), stage III (Multi)    Weakness of both legs    Community acquired bacterial pneumonia    Dizziness  Chronic Atrial fibrillation (Multi)  Non MI Elevated Troponin  Chronic systolic (congestive) heart failure (Multi)  -WBC 6.5  -Troponin 25 > 24  -  -ECHO 8/26/24: severely decreased LVSF with an EF of 25-30% with abnormal pattern of LVDF  -continue apixaban 2.5 mg PO BID  -continue Metoprolol succinate 25 mg PO Daily   -Hold torsemide d/t renal function  -Cardiology consult, appreciate recs    Community Acquired bacterial pneumonia  - supplemental oxygen to keep SpO2 > 90%  - currently on RA  - No oxygen at home  - started Levofloxacin 750 mg daily; day 1  - started duoneb  q6h  - RT for bronchial hygiene  - procalcitonin pending  - blood cultures pending     Weakness of both legs  -CT Head: No evidence of acute intracranial abnormality or interval change. Stable senescent changes of cerebral volume loss and chronic  microangiopathic changes. Right maxillary sinus heterogeneous polypoid mass versus mucous retention cyst is again seen. Correlation with nonemergent ENT evaluation is recommended, if not previously performed elsewhere.  -MRI/MRA Ordered  -Neuro Checks  -PT Eval and Treat; recommending low level therapy     Acute Renal Failure, stage 3a  -baseline creatine 1.13  -creatine on admission 2.88; today creatine 2.05  - ml IV given in ED  -D5-1/2- ml given in ED     HLD (hyperlipidemia)  -continue rosuvastatin 40 mg PO HS     DVT ppx  - continue apixaban 2.5 mg BID        Code Status: Full Code    Disposition: Patient requires more than 2 inpatient days.              Bailey Martini, APRN-CNP

## 2024-08-31 LAB
ANION GAP SERPL CALC-SCNC: 15 MMOL/L (ref 10–20)
APPEARANCE UR: CLEAR
BILIRUB UR STRIP.AUTO-MCNC: NEGATIVE MG/DL
BUN SERPL-MCNC: 37 MG/DL (ref 6–23)
CALCIUM SERPL-MCNC: 9.3 MG/DL (ref 8.6–10.3)
CHLORIDE SERPL-SCNC: 99 MMOL/L (ref 98–107)
CO2 SERPL-SCNC: 25 MMOL/L (ref 21–32)
COLOR UR: NORMAL
CREAT SERPL-MCNC: 1.9 MG/DL (ref 0.5–1.3)
EGFRCR SERPLBLD CKD-EPI 2021: 34 ML/MIN/1.73M*2
ERYTHROCYTE [DISTWIDTH] IN BLOOD BY AUTOMATED COUNT: 16.4 % (ref 11.5–14.5)
GLUCOSE SERPL-MCNC: 84 MG/DL (ref 74–99)
GLUCOSE UR STRIP.AUTO-MCNC: NORMAL MG/DL
HCT VFR BLD AUTO: 40.7 % (ref 41–52)
HGB BLD-MCNC: 12.6 G/DL (ref 13.5–17.5)
HOLD SPECIMEN: NORMAL
KETONES UR STRIP.AUTO-MCNC: NEGATIVE MG/DL
LEUKOCYTE ESTERASE UR QL STRIP.AUTO: NEGATIVE
MCH RBC QN AUTO: 25 PG (ref 26–34)
MCHC RBC AUTO-ENTMCNC: 31 G/DL (ref 32–36)
MCV RBC AUTO: 81 FL (ref 80–100)
MUCOUS THREADS #/AREA URNS AUTO: NORMAL /LPF
NITRITE UR QL STRIP.AUTO: NEGATIVE
NRBC BLD-RTO: 0 /100 WBCS (ref 0–0)
PH UR STRIP.AUTO: 6.5 [PH]
PLATELET # BLD AUTO: 167 X10*3/UL (ref 150–450)
POTASSIUM SERPL-SCNC: 3.8 MMOL/L (ref 3.5–5.3)
PROT UR STRIP.AUTO-MCNC: NORMAL MG/DL
RBC # BLD AUTO: 5.05 X10*6/UL (ref 4.5–5.9)
RBC # UR STRIP.AUTO: NEGATIVE /UL
RBC #/AREA URNS AUTO: NORMAL /HPF
SODIUM SERPL-SCNC: 135 MMOL/L (ref 136–145)
SP GR UR STRIP.AUTO: 1.02
UROBILINOGEN UR STRIP.AUTO-MCNC: NORMAL MG/DL
WBC # BLD AUTO: 6.4 X10*3/UL (ref 4.4–11.3)
WBC #/AREA URNS AUTO: NORMAL /HPF

## 2024-08-31 PROCEDURE — 81001 URINALYSIS AUTO W/SCOPE: CPT | Mod: IPSPLIT

## 2024-08-31 PROCEDURE — 1200000002 HC GENERAL ROOM WITH TELEMETRY DAILY: Mod: IPSPLIT

## 2024-08-31 PROCEDURE — 2500000001 HC RX 250 WO HCPCS SELF ADMINISTERED DRUGS (ALT 637 FOR MEDICARE OP): Mod: IPSPLIT

## 2024-08-31 PROCEDURE — 2500000002 HC RX 250 W HCPCS SELF ADMINISTERED DRUGS (ALT 637 FOR MEDICARE OP, ALT 636 FOR OP/ED): Mod: IPSPLIT | Performed by: NURSE PRACTITIONER

## 2024-08-31 PROCEDURE — 2500000002 HC RX 250 W HCPCS SELF ADMINISTERED DRUGS (ALT 637 FOR MEDICARE OP, ALT 636 FOR OP/ED): Mod: IPSPLIT

## 2024-08-31 PROCEDURE — 2500000001 HC RX 250 WO HCPCS SELF ADMINISTERED DRUGS (ALT 637 FOR MEDICARE OP): Mod: IPSPLIT | Performed by: NURSE PRACTITIONER

## 2024-08-31 PROCEDURE — 80048 BASIC METABOLIC PNL TOTAL CA: CPT

## 2024-08-31 PROCEDURE — 85027 COMPLETE CBC AUTOMATED: CPT

## 2024-08-31 PROCEDURE — 36415 COLL VENOUS BLD VENIPUNCTURE: CPT

## 2024-08-31 PROCEDURE — 99232 SBSQ HOSP IP/OBS MODERATE 35: CPT

## 2024-08-31 PROCEDURE — 2500000004 HC RX 250 GENERAL PHARMACY W/ HCPCS (ALT 636 FOR OP/ED): Mod: IPSPLIT

## 2024-08-31 PROCEDURE — 94668 MNPJ CHEST WALL SBSQ: CPT | Mod: IPSPLIT

## 2024-08-31 PROCEDURE — 94760 N-INVAS EAR/PLS OXIMETRY 1: CPT | Mod: IPSPLIT

## 2024-08-31 PROCEDURE — 94640 AIRWAY INHALATION TREATMENT: CPT | Mod: IPSPLIT

## 2024-08-31 PROCEDURE — 96376 TX/PRO/DX INJ SAME DRUG ADON: CPT

## 2024-08-31 RX ORDER — LORAZEPAM 2 MG/ML
1 INJECTION INTRAMUSCULAR EVERY 2 HOUR PRN
Status: DISCONTINUED | OUTPATIENT
Start: 2024-08-31 | End: 2024-09-02 | Stop reason: HOSPADM

## 2024-08-31 RX ORDER — LORAZEPAM 2 MG/ML
0.5 INJECTION INTRAMUSCULAR EVERY 2 HOUR PRN
Status: DISCONTINUED | OUTPATIENT
Start: 2024-08-31 | End: 2024-09-02 | Stop reason: HOSPADM

## 2024-08-31 ASSESSMENT — PAIN SCALES - GENERAL
PAINLEVEL_OUTOF10: 0 - NO PAIN

## 2024-08-31 ASSESSMENT — PAIN - FUNCTIONAL ASSESSMENT
PAIN_FUNCTIONAL_ASSESSMENT: 0-10
PAIN_FUNCTIONAL_ASSESSMENT: 0-10
PAIN_FUNCTIONAL_ASSESSMENT: PAINAD (PAIN ASSESSMENT IN ADVANCED DEMENTIA SCALE)
PAIN_FUNCTIONAL_ASSESSMENT: 0-10
PAIN_FUNCTIONAL_ASSESSMENT: PAINAD (PAIN ASSESSMENT IN ADVANCED DEMENTIA SCALE)

## 2024-08-31 ASSESSMENT — LIFESTYLE VARIABLES
VISUAL DISTURBANCES: NOT PRESENT
AUDITORY DISTURBANCES: NOT PRESENT
AUDITORY DISTURBANCES: NOT PRESENT
ANXIETY: NO ANXIETY, AT EASE
ORIENTATION AND CLOUDING OF SENSORIUM: DISORIENTED FOR PLACE OR PERSON
NAUSEA AND VOMITING: NO NAUSEA AND NO VOMITING
ANXIETY: 3
AGITATION: NORMAL ACTIVITY
AGITATION: NORMAL ACTIVITY
PAROXYSMAL SWEATS: NO SWEAT VISIBLE
NAUSEA AND VOMITING: NO NAUSEA AND NO VOMITING
ANXIETY: NO ANXIETY, AT EASE
PAROXYSMAL SWEATS: NO SWEAT VISIBLE
PAROXYSMAL SWEATS: NO SWEAT VISIBLE
AGITATION: NORMAL ACTIVITY
ORIENTATION AND CLOUDING OF SENSORIUM: DISORIENTED FOR PLACE OR PERSON
ANXIETY: NO ANXIETY, AT EASE
HEADACHE, FULLNESS IN HEAD: NOT PRESENT
HEADACHE, FULLNESS IN HEAD: NOT PRESENT
AGITATION: NORMAL ACTIVITY
AGITATION: NORMAL ACTIVITY
TREMOR: NO TREMOR
NAUSEA AND VOMITING: NO NAUSEA AND NO VOMITING
VISUAL DISTURBANCES: NOT PRESENT
VISUAL DISTURBANCES: NOT PRESENT
NAUSEA AND VOMITING: NO NAUSEA AND NO VOMITING
ORIENTATION AND CLOUDING OF SENSORIUM: CANNOT DO SERIAL ADDITIONS OR IS UNCERTAIN ABOUT DATE
AGITATION: NORMAL ACTIVITY
AGITATION: NORMAL ACTIVITY
AUDITORY DISTURBANCES: NOT PRESENT
NAUSEA AND VOMITING: NO NAUSEA AND NO VOMITING
AUDITORY DISTURBANCES: NOT PRESENT
AGITATION: NORMAL ACTIVITY
ANXIETY: NO ANXIETY, AT EASE
AUDITORY DISTURBANCES: NOT PRESENT
NAUSEA AND VOMITING: NO NAUSEA AND NO VOMITING
TOTAL SCORE: 4
TOTAL SCORE: 0
VISUAL DISTURBANCES: NOT PRESENT
TREMOR: NO TREMOR
PAROXYSMAL SWEATS: NO SWEAT VISIBLE
NAUSEA AND VOMITING: NO NAUSEA AND NO VOMITING
PAROXYSMAL SWEATS: NO SWEAT VISIBLE
HEADACHE, FULLNESS IN HEAD: NOT PRESENT
VISUAL DISTURBANCES: NOT PRESENT
HEADACHE, FULLNESS IN HEAD: NOT PRESENT
AUDITORY DISTURBANCES: NOT PRESENT
ORIENTATION AND CLOUDING OF SENSORIUM: DISORIENTED FOR PLACE OR PERSON
ANXIETY: NO ANXIETY, AT EASE
ORIENTATION AND CLOUDING OF SENSORIUM: ORIENTED AND CAN DO SERIAL ADDITIONS
PAROXYSMAL SWEATS: NO SWEAT VISIBLE
NAUSEA AND VOMITING: NO NAUSEA AND NO VOMITING
PAROXYSMAL SWEATS: NO SWEAT VISIBLE
TREMOR: NO TREMOR
HEADACHE, FULLNESS IN HEAD: NOT PRESENT
AGITATION: NORMAL ACTIVITY
VISUAL DISTURBANCES: NOT PRESENT
AUDITORY DISTURBANCES: NOT PRESENT
TREMOR: NO TREMOR
TREMOR: NO TREMOR
AUDITORY DISTURBANCES: NOT PRESENT
ORIENTATION AND CLOUDING OF SENSORIUM: DISORIENTED FOR PLACE OR PERSON
AGITATION: NORMAL ACTIVITY
TOTAL SCORE: 0
HEADACHE, FULLNESS IN HEAD: NOT PRESENT
TOTAL SCORE: 4
VISUAL DISTURBANCES: NOT PRESENT
TREMOR: NO TREMOR
AUDITORY DISTURBANCES: NOT PRESENT
ANXIETY: NO ANXIETY, AT EASE
NAUSEA AND VOMITING: NO NAUSEA AND NO VOMITING
ANXIETY: NO ANXIETY, AT EASE
ORIENTATION AND CLOUDING OF SENSORIUM: ORIENTED AND CAN DO SERIAL ADDITIONS
TOTAL SCORE: 4
ORIENTATION AND CLOUDING OF SENSORIUM: CANNOT DO SERIAL ADDITIONS OR IS UNCERTAIN ABOUT DATE
VISUAL DISTURBANCES: NOT PRESENT
TREMOR: NO TREMOR
PAROXYSMAL SWEATS: BARELY PERCEPTIBLE SWEATING, PALMS MOIST
VISUAL DISTURBANCES: NOT PRESENT
ORIENTATION AND CLOUDING OF SENSORIUM: ORIENTED AND CAN DO SERIAL ADDITIONS
NAUSEA AND VOMITING: NO NAUSEA AND NO VOMITING
TREMOR: NO TREMOR
AUDITORY DISTURBANCES: NOT PRESENT
TREMOR: NO TREMOR
PAROXYSMAL SWEATS: NO SWEAT VISIBLE
HEADACHE, FULLNESS IN HEAD: NOT PRESENT
TREMOR: NO TREMOR
PAROXYSMAL SWEATS: NO SWEAT VISIBLE
HEADACHE, FULLNESS IN HEAD: NOT PRESENT
AGITATION: 6
AUDITORY DISTURBANCES: NOT PRESENT
ANXIETY: NO ANXIETY, AT EASE
HEADACHE, FULLNESS IN HEAD: NOT PRESENT
TOTAL SCORE: 1
VISUAL DISTURBANCES: NOT PRESENT
ORIENTATION AND CLOUDING OF SENSORIUM: DISORIENTED FOR PLACE OR PERSON
TOTAL SCORE: 0
PULSE: 57
TOTAL SCORE: 3
ANXIETY: NO ANXIETY, AT EASE
ORIENTATION AND CLOUDING OF SENSORIUM: DISORIENTED FOR DATE BY MORE THAN 2 CALENDAR DAYS
ANXIETY: NO ANXIETY, AT EASE
HEADACHE, FULLNESS IN HEAD: NOT PRESENT
TOTAL SCORE: 1
TOTAL SCORE: 4
PAROXYSMAL SWEATS: NO SWEAT VISIBLE
TREMOR: NO TREMOR
HEADACHE, FULLNESS IN HEAD: NOT PRESENT
NAUSEA AND VOMITING: NO NAUSEA AND NO VOMITING
TOTAL SCORE: 14
VISUAL DISTURBANCES: NOT PRESENT

## 2024-08-31 ASSESSMENT — PAIN SCALES - PAIN ASSESSMENT IN ADVANCED DEMENTIA (PAINAD)
CONSOLABILITY: NO NEED TO CONSOLE
TOTALSCORE: 0
TOTALSCORE: 0
BODYLANGUAGE: RELAXED
BREATHING: NORMAL
BREATHING: NORMAL
FACIALEXPRESSION: SMILING OR INEXPRESSIVE
FACIALEXPRESSION: SMILING OR INEXPRESSIVE
BODYLANGUAGE: RELAXED
CONSOLABILITY: NO NEED TO CONSOLE

## 2024-08-31 ASSESSMENT — COGNITIVE AND FUNCTIONAL STATUS - GENERAL
STANDING UP FROM CHAIR USING ARMS: A LITTLE
MOVING TO AND FROM BED TO CHAIR: A LITTLE
HELP NEEDED FOR BATHING: A LITTLE
DAILY ACTIVITIY SCORE: 19
TURNING FROM BACK TO SIDE WHILE IN FLAT BAD: A LITTLE
WALKING IN HOSPITAL ROOM: A LITTLE
CLIMB 3 TO 5 STEPS WITH RAILING: A LITTLE
MOBILITY SCORE: 19
DRESSING REGULAR LOWER BODY CLOTHING: A LITTLE
TOILETING: A LITTLE
DRESSING REGULAR UPPER BODY CLOTHING: A LITTLE
PERSONAL GROOMING: A LITTLE

## 2024-08-31 NOTE — PROGRESS NOTES
Gilmer Quiñonez is a 88 y.o. male on day 0 of admission presenting with Dizziness.      Subjective   Pt assessed at bedside, drowsy from receiving ativan overnight per [facility] protocol.        Objective     Last Recorded Vitals  /60 (BP Location: Left arm, Patient Position: Lying)   Pulse 64   Temp 35.8 °C (96.4 °F) (Temporal)   Resp 17   Wt 73.8 kg (162 lb 11.2 oz)   SpO2 95%   Intake/Output last 3 Shifts:    Intake/Output Summary (Last 24 hours) at 8/31/2024 0926  Last data filed at 8/30/2024 2255  Gross per 24 hour   Intake 512.17 ml   Output --   Net 512.17 ml       Admission Weight  Weight: 71.7 kg (158 lb) (08/29/24 1600)    Daily Weight  08/29/24 : 73.8 kg (162 lb 11.2 oz)    Image Results  ECG 12 lead  Ventricular-paced rhythm with frequent Premature ventricular complexes and Fusion complexes  Abnormal ECG  When compared with ECG of 26-AUG-2024 10:00,  Fusion complexes are now Present  Premature ventricular complexes are now Present  See ED provider note for full interpretation and clinical correlation  Confirmed by Danette Khan ([id number]) on 8/30/2024 10:59:29 AM      Physical Exam  Vitals reviewed.   HENT:      Head: Normocephalic and atraumatic.      Right Ear: External ear normal.      Left Ear: External ear normal.      Nose: Nose normal.      Mouth/Throat:      Pharynx: Oropharynx is clear.   Eyes:      Conjunctiva/sclera: Conjunctivae normal.   Cardiovascular:      Rate and Rhythm: Normal rate. Rhythm irregular.   Pulmonary:      Breath sounds: Rales present.   Abdominal:      General: Bowel sounds are normal.      Palpations: Abdomen is soft.   Musculoskeletal:         General: Normal range of motion.      Cervical back: Normal range of motion and neck supple.   Skin:     General: Skin is dry.   Neurological:      General: No focal deficit present.      Mental Status: He is alert. He is disoriented.   Psychiatric:         Behavior: Behavior is combative.         Judgment: Judgment is  impulsive.         Relevant Results  Scheduled medications  apixaban, 2.5 mg, oral, BID  empagliflozin, 10 mg, oral, Daily  folic acid, 1 mg, oral, Daily  ipratropium-albuteroL, 3 mL, nebulization, TID  metoprolol succinate XL, 25 mg, oral, Daily  multivitamin with minerals, 1 tablet, oral, Daily  perflutren lipid microspheres, 0.5-10 mL of dilution, intravenous, Once in imaging  rosuvastatin, 40 mg, oral, Nightly  [START ON 9/2/2024] thiamine, 100 mg, oral, Daily  thiamine, 100 mg, intravenous, Daily      Continuous medications  sodium chloride 0.9%, 10 mL/hr, Last Rate: 10 mL/hr (08/30/24 3775)      PRN medications  PRN medications: acetaminophen, albuterol, LORazepam **OR** LORazepam **OR** LORazepam, melatonin, ondansetron, polyethylene glycol, sodium chloride 0.9%    Results for orders placed or performed during the hospital encounter of 08/29/24 (from the past 24 hour(s))   Procalcitonin   Result Value Ref Range    Procalcitonin 0.05 <=0.07 ng/mL   Cardiac Device Check - Remote   Result Value Ref Range    BSA 1.85 m2   CBC   Result Value Ref Range    WBC 6.4 4.4 - 11.3 x10*3/uL    nRBC 0.0 0.0 - 0.0 /100 WBCs    RBC 5.05 4.50 - 5.90 x10*6/uL    Hemoglobin 12.6 (L) 13.5 - 17.5 g/dL    Hematocrit 40.7 (L) 41.0 - 52.0 %    MCV 81 80 - 100 fL    MCH 25.0 (L) 26.0 - 34.0 pg    MCHC 31.0 (L) 32.0 - 36.0 g/dL    RDW 16.4 (H) 11.5 - 14.5 %    Platelets 167 150 - 450 x10*3/uL   Basic metabolic panel   Result Value Ref Range    Glucose 84 74 - 99 mg/dL    Sodium 135 (L) 136 - 145 mmol/L    Potassium 3.8 3.5 - 5.3 mmol/L    Chloride 99 98 - 107 mmol/L    Bicarbonate 25 21 - 32 mmol/L    Anion Gap 15 10 - 20 mmol/L    Urea Nitrogen 37 (H) 6 - 23 mg/dL    Creatinine 1.90 (H) 0.50 - 1.30 mg/dL    eGFR 34 (L) >60 mL/min/1.73m*2    Calcium 9.3 8.6 - 10.3 mg/dL          Assessment/Plan        Assessment & Plan  Dizziness    JONAS (acute kidney injury) (CMS-HCC)    Atrial fibrillation (Multi)    Acute on chronic systolic  (congestive) heart failure (Multi)    HLD (hyperlipidemia)    Elevated troponin    CKD (chronic kidney disease), stage III (Multi)    Weakness of both legs    Community acquired bacterial pneumonia    #Dizziness  #Chronic Atrial fibrillation (Multi)  #Non MI Elevated Troponin  #Chronic systolic (congestive) heart failure (Multi)  -WBC 6.5  -Troponin 25 > 24  -  -ECHO 8/26/24: severely decreased LVSF with an EF of 25-30% with abnormal pattern of LVDF  -continue apixaban 2.5 mg PO BID  -continue Metoprolol succinate 25 mg PO Daily   -Add jardiance per cards   -Hold torsemide d/t renal function  -Cardiology consult, appreciate recs    #Metabolic encephalopathy 2/2 delirium vs dementia vs etoh abuse disorder   -Dtr reports increased confusion over the last week   -Pt reports drinking beer daily; unsure of last drink  -Confused and combative overnight; unable to be re-directed  -Sitter at bedside for safety  -[facility] protocol   -Thiamine, folic acid, MV  -Will obtain repeat UA to r/o infectious cause     #Community Acquired bacterial pneumonia, resolved   - supplemental oxygen to keep SpO2 > 90%  - currently on RA  - No oxygen at home  - started Levofloxacin 750 mg daily; day 1; discontinue  - Procal 0.05  - started duoneb q6h  - RT for bronchial hygiene  - blood cultures pending     #Weakness of both legs  -CT Head: No evidence of acute intracranial abnormality or interval change. Stable senescent changes of cerebral volume loss and chronic  microangiopathic changes. Right maxillary sinus heterogeneous polypoid mass versus mucous retention cyst is again seen. Correlation with nonemergent ENT evaluation is recommended, if not previously performed elsewhere.  -MRI/MRA Ordered; unable to complete due to pacemaker   -Neuro Checks  -PT Eval and Treat; recommending low level therapy     #Acute Renal Failure, stage 3a  -baseline creatine 1.13  -creatine on admission 2.88; today creatine 1.90  - ml IV given in  ED  -D5-1/2- ml given in ED     #HLD (hyperlipidemia)  -continue rosuvastatin 40 mg PO HS    DVT ppx  -apixaban    F: PRN  E: Replete per protocol  N: Cardiac  A: PIV    Disposition: Pt requires less than 2 inpatient days at this time   Code Status: Full Code          Laura Nix, APRN-CNP

## 2024-08-31 NOTE — CARE PLAN
The patient's goals for the shift include      The clinical goals for the shift include Patients urine to remain clear, afebrile and vitals WNL    Patient at approx midnight became combative and slightly aggressive was confused to place and time ciwa score was 14

## 2024-08-31 NOTE — NURSING NOTE
Contacted provider regarding pt's low BP 90/47 and c/o dizziness.  Provider came to assess patient at bedside, Manual BP taken, NP will review chart and order sitter

## 2024-08-31 NOTE — CARE PLAN
The patient's goals for the shift include      The clinical goals for the shift include Patients urine to remain clear, afebrile and vitals WNL    Over the shift, the patient did not make progress toward the following goals. Barriers to progression include ***. Recommendations to address these barriers include ***.

## 2024-08-31 NOTE — SIGNIFICANT EVENT
Nursing reported patient dizzy in chair, no activity.  Assessed patient, who did confirm dizziness but up ambulating without difficulty.  Patient appears to be sundowning, hard to re-direct, and continues to ambulate with out assistance and leave room despite complaints of dizziness.  BP re-checked manually 122/60 sitting,  124/64 standing, no change in patient complaints from sitting to standing.  Patient QTC noted to be prolonged limiting interventions for sundowning behaviors.  Patient does report that he drinks beer daily, states sometimes he takes a break and has none, can not confirm when his last drink was.  Will initiate CIWA protocol and order sitter for patient safety.

## 2024-08-31 NOTE — NURSING NOTE
Assumed patient care. No immediate needs at this time. Patient sitting in chair. Call light within reach. Family in room.

## 2024-08-31 NOTE — CARE PLAN
"    The clinical goals for the shift include Pt will be free of injury today    Pt slept most of the day. He was arousable and could say his name and birthday but then just fell asleep again. Meds were crushed and he took with gingerale. Pt was assisted to chair for dinner with walker and gait belt and was much more alert and oriented. No c/o pain. States he remembers the  being in his room during the night and that he was having \"crazy\" dreams. Lungs clear. Did not try to get OOB. IV was restarted. Did not eat any breakfast or dinner due to being lethargic.  "

## 2024-09-01 LAB
ANION GAP SERPL CALC-SCNC: 14 MMOL/L (ref 10–20)
BUN SERPL-MCNC: 31 MG/DL (ref 6–23)
CALCIUM SERPL-MCNC: 9.4 MG/DL (ref 8.6–10.3)
CHLORIDE SERPL-SCNC: 103 MMOL/L (ref 98–107)
CO2 SERPL-SCNC: 24 MMOL/L (ref 21–32)
CREAT SERPL-MCNC: 1.53 MG/DL (ref 0.5–1.3)
EGFRCR SERPLBLD CKD-EPI 2021: 44 ML/MIN/1.73M*2
ERYTHROCYTE [DISTWIDTH] IN BLOOD BY AUTOMATED COUNT: 16.8 % (ref 11.5–14.5)
GLUCOSE SERPL-MCNC: 92 MG/DL (ref 74–99)
HCT VFR BLD AUTO: 44 % (ref 41–52)
HGB BLD-MCNC: 13.1 G/DL (ref 13.5–17.5)
MCH RBC QN AUTO: 25.2 PG (ref 26–34)
MCHC RBC AUTO-ENTMCNC: 29.8 G/DL (ref 32–36)
MCV RBC AUTO: 85 FL (ref 80–100)
NRBC BLD-RTO: ABNORMAL /100{WBCS}
PLATELET # BLD AUTO: 142 X10*3/UL (ref 150–450)
POTASSIUM SERPL-SCNC: 3.8 MMOL/L (ref 3.5–5.3)
RBC # BLD AUTO: 5.2 X10*6/UL (ref 4.5–5.9)
SODIUM SERPL-SCNC: 137 MMOL/L (ref 136–145)
WBC # BLD AUTO: 5.6 X10*3/UL (ref 4.4–11.3)

## 2024-09-01 PROCEDURE — 2500000001 HC RX 250 WO HCPCS SELF ADMINISTERED DRUGS (ALT 637 FOR MEDICARE OP): Mod: IPSPLIT

## 2024-09-01 PROCEDURE — 2500000001 HC RX 250 WO HCPCS SELF ADMINISTERED DRUGS (ALT 637 FOR MEDICARE OP): Mod: IPSPLIT | Performed by: NURSE PRACTITIONER

## 2024-09-01 PROCEDURE — 80048 BASIC METABOLIC PNL TOTAL CA: CPT | Mod: IPSPLIT

## 2024-09-01 PROCEDURE — 99232 SBSQ HOSP IP/OBS MODERATE 35: CPT

## 2024-09-01 PROCEDURE — 94668 MNPJ CHEST WALL SBSQ: CPT | Mod: IPSPLIT

## 2024-09-01 PROCEDURE — 85027 COMPLETE CBC AUTOMATED: CPT | Mod: IPSPLIT

## 2024-09-01 PROCEDURE — 94640 AIRWAY INHALATION TREATMENT: CPT | Mod: IPSPLIT

## 2024-09-01 PROCEDURE — 94760 N-INVAS EAR/PLS OXIMETRY 1: CPT | Mod: IPSPLIT

## 2024-09-01 PROCEDURE — 36415 COLL VENOUS BLD VENIPUNCTURE: CPT | Mod: IPSPLIT

## 2024-09-01 PROCEDURE — 2500000002 HC RX 250 W HCPCS SELF ADMINISTERED DRUGS (ALT 637 FOR MEDICARE OP, ALT 636 FOR OP/ED): Mod: IPSPLIT | Performed by: NURSE PRACTITIONER

## 2024-09-01 PROCEDURE — 2500000002 HC RX 250 W HCPCS SELF ADMINISTERED DRUGS (ALT 637 FOR MEDICARE OP, ALT 636 FOR OP/ED): Mod: IPSPLIT

## 2024-09-01 PROCEDURE — 1100000001 HC PRIVATE ROOM DAILY: Mod: IPSPLIT

## 2024-09-01 PROCEDURE — 99232 SBSQ HOSP IP/OBS MODERATE 35: CPT | Performed by: NURSE PRACTITIONER

## 2024-09-01 PROCEDURE — 2500000004 HC RX 250 GENERAL PHARMACY W/ HCPCS (ALT 636 FOR OP/ED): Mod: IPSPLIT

## 2024-09-01 ASSESSMENT — LIFESTYLE VARIABLES
AUDITORY DISTURBANCES: NOT PRESENT
VISUAL DISTURBANCES: NOT PRESENT
PAROXYSMAL SWEATS: NO SWEAT VISIBLE
AUDITORY DISTURBANCES: NOT PRESENT
ORIENTATION AND CLOUDING OF SENSORIUM: CANNOT DO SERIAL ADDITIONS OR IS UNCERTAIN ABOUT DATE
AGITATION: NORMAL ACTIVITY
AGITATION: NORMAL ACTIVITY
AUDITORY DISTURBANCES: NOT PRESENT
PAROXYSMAL SWEATS: NO SWEAT VISIBLE
ORIENTATION AND CLOUDING OF SENSORIUM: CANNOT DO SERIAL ADDITIONS OR IS UNCERTAIN ABOUT DATE
PAROXYSMAL SWEATS: NO SWEAT VISIBLE
NAUSEA AND VOMITING: NO NAUSEA AND NO VOMITING
VISUAL DISTURBANCES: NOT PRESENT
HEADACHE, FULLNESS IN HEAD: NOT PRESENT
TREMOR: NO TREMOR
HEADACHE, FULLNESS IN HEAD: NOT PRESENT
NAUSEA AND VOMITING: NO NAUSEA AND NO VOMITING
ORIENTATION AND CLOUDING OF SENSORIUM: CANNOT DO SERIAL ADDITIONS OR IS UNCERTAIN ABOUT DATE
AGITATION: NORMAL ACTIVITY
NAUSEA AND VOMITING: NO NAUSEA AND NO VOMITING
ANXIETY: NO ANXIETY, AT EASE
AUDITORY DISTURBANCES: NOT PRESENT
TREMOR: NO TREMOR
AUDITORY DISTURBANCES: NOT PRESENT
HEADACHE, FULLNESS IN HEAD: NOT PRESENT
ORIENTATION AND CLOUDING OF SENSORIUM: CANNOT DO SERIAL ADDITIONS OR IS UNCERTAIN ABOUT DATE
ORIENTATION AND CLOUDING OF SENSORIUM: CANNOT DO SERIAL ADDITIONS OR IS UNCERTAIN ABOUT DATE
ANXIETY: NO ANXIETY, AT EASE
BLOOD PRESSURE: 130/66
ANXIETY: NO ANXIETY, AT EASE
VISUAL DISTURBANCES: NOT PRESENT
TOTAL SCORE: 1
TOTAL SCORE: 1
ANXIETY: NO ANXIETY, AT EASE
TREMOR: NO TREMOR
VISUAL DISTURBANCES: NOT PRESENT
AGITATION: NORMAL ACTIVITY
HEADACHE, FULLNESS IN HEAD: NOT PRESENT
TOTAL SCORE: 1
PULSE: 55
ORIENTATION AND CLOUDING OF SENSORIUM: DISORIENTED FOR DATE BY NO MORE THAN 2 CALENDAR DAYS
AGITATION: NORMAL ACTIVITY
PAROXYSMAL SWEATS: NO SWEAT VISIBLE
TREMOR: NO TREMOR
HEADACHE, FULLNESS IN HEAD: NOT PRESENT
VISUAL DISTURBANCES: NOT PRESENT
AUDITORY DISTURBANCES: NOT PRESENT
ANXIETY: NO ANXIETY, AT EASE
TREMOR: NO TREMOR
TOTAL SCORE: 1
NAUSEA AND VOMITING: NO NAUSEA AND NO VOMITING
ANXIETY: NO ANXIETY, AT EASE
TOTAL SCORE: 2
TOTAL SCORE: 1
VISUAL DISTURBANCES: NOT PRESENT
PULSE: 62
PAROXYSMAL SWEATS: NO SWEAT VISIBLE
PULSE: 55
AGITATION: NORMAL ACTIVITY
TREMOR: NO TREMOR
HEADACHE, FULLNESS IN HEAD: NOT PRESENT
PAROXYSMAL SWEATS: NO SWEAT VISIBLE
NAUSEA AND VOMITING: NO NAUSEA AND NO VOMITING
NAUSEA AND VOMITING: NO NAUSEA AND NO VOMITING

## 2024-09-01 ASSESSMENT — COGNITIVE AND FUNCTIONAL STATUS - GENERAL
PERSONAL GROOMING: A LITTLE
WALKING IN HOSPITAL ROOM: A LITTLE
DRESSING REGULAR UPPER BODY CLOTHING: A LITTLE
STANDING UP FROM CHAIR USING ARMS: A LITTLE
TURNING FROM BACK TO SIDE WHILE IN FLAT BAD: A LITTLE
DRESSING REGULAR LOWER BODY CLOTHING: A LITTLE
MOBILITY SCORE: 19
DAILY ACTIVITIY SCORE: 19
HELP NEEDED FOR BATHING: A LITTLE
TOILETING: A LITTLE
CLIMB 3 TO 5 STEPS WITH RAILING: A LITTLE
MOVING TO AND FROM BED TO CHAIR: A LITTLE

## 2024-09-01 ASSESSMENT — PAIN SCALES - GENERAL
PAINLEVEL_OUTOF10: 0 - NO PAIN

## 2024-09-01 ASSESSMENT — PAIN - FUNCTIONAL ASSESSMENT
PAIN_FUNCTIONAL_ASSESSMENT: 0-10

## 2024-09-01 NOTE — CARE PLAN
The clinical goals for the shift include Pt will be alert and oriented today    Pt was alert and oriented x2--was not sure of the month and why he was here. Slightly forgetful. Up to chair with 1 assist. He has not been incont. Lungs clear. CIWA rates only at 1-2. Pleasnat and cooperative, Ate well today. Neuro checks stable and Dc'd today. No c/o pain.

## 2024-09-01 NOTE — PROGRESS NOTES
Cardiology Progress Note  Patient: Gilmer Quiñonez  Unit/Bed: 227/227-A  YOB: 1935    Admitting Diagnosis: Dizziness [R42]  Renal insufficiency [N28.9]  Elevated troponin [R79.89]  CHF (congestive heart failure), NYHA class II, acute on chronic, combined (Multi) [I50.43]  Congestive heart failure, unspecified HF chronicity, unspecified heart failure type (Multi) [I50.9]  JONAS (acute kidney injury) (CMS-HCC) [N17.9]  Date:  8/29/2024  Attending: Mirta Plaza MD      Hospital Day: 1    SUBJECTIVE:   Resting quietly,  no complaints         OBJECTIVE  Vitals:   Visit Vitals  /62 (BP Location: Right arm, Patient Position: Lying)   Pulse 52   Temp 36.2 °C (97.2 °F) (Temporal)   Resp 17        Wt Readings from Last 5 Encounters:   08/29/24 73.8 kg (162 lb 11.2 oz)   08/27/24 71.8 kg (158 lb 4.6 oz)   04/01/24 78.4 kg (172 lb 12.8 oz)   11/07/23 73.1 kg (161 lb 3.2 oz)   10/30/23 75.3 kg (166 lb)       Intake / Output:   I/O last 3 completed shifts:  In: 465 (6.3 mL/kg) [P.O.:420; I.V.:45 (0.6 mL/kg)]  Out: 400 (5.4 mL/kg) [Urine:400 (0.2 mL/kg/hr)]  Weight: 73.8 kg      Tele monitoring: AF / Vpaced    Physical Examination:    Constitutional:       Appearance: Not in distress. Chronically ill-appearing.   Eyes:      Conjunctiva/sclera: Conjunctivae normal.   Neck:      Vascular: JVD normal.   Pulmonary:      Effort: Pulmonary effort is normal.      Breath sounds: Decreased breath sounds present. Wheezing present.   Cardiovascular:      PMI at left midclavicular line. Normal rate. Irregularly irregular rhythm. Normal S1. Normal S2.       Murmurs: There is no murmur.      No rub.      Comments: ICD site healed   Pulses:     Intact distal pulses.   Edema:     Peripheral edema absent.   Abdominal:      General: Bowel sounds are normal.   Musculoskeletal:      Cervical back: Neck supple. Skin:     General: Skin is warm and dry.   Neurological:      Mental Status: Alert. Confused.             LABS:  CMP:   Recent Labs     09/01/24  0729 08/31/24  0703 08/30/24  0655 08/29/24  1702 08/27/24  0733 08/26/24  1026 04/01/24  1210 10/30/23  1305 09/20/23  1324 05/08/23  1519 04/03/23  0443 04/02/23  0510 04/01/23  0852 03/31/23 2010 12/24/21 2015 12/01/21  0612 11/29/21  1015 11/28/21  1600 11/16/21  1610 11/10/21  0535    135* 136 136 138 135* 136 136 138 140 142 141   < > 137   < > 133* 130* 132*   < > 138   K 3.8 3.8 3.6 3.5 3.7 4.4 4.3 4.6 4.2 3.8 3.7 3.7   < > 3.9   < > 3.9 3.4* 3.9   < > 4.0    99 98 95* 98 98 102 102 103 102 107 105   < > 104   < > 97* 96* 96*   < > 100   CO2 24 25 27 31 26 29 26 27 26 27 27 23   < > 22   < > 30 26 30   < > 29   ANIONGAP 14 15 15 14 18 12 12 12 13 15 12 17   < > 15   < > 10 11 10   < > 13   BUN 31* 37* 39* 48* 27* 23 10 10 14 14 19 18   < > 14   < > 37* 22 21   < > 24*   CREATININE 1.53* 1.90* 2.05* 2.88* 1.38* 1.44* 1.13 1.08 1.21 1.26 1.35* 1.42*   < > 1.08   < > 1.84* 1.38* 1.28   < > 1.88*   EGFR 44* 34* 31* 21* 50* 47* 63 67  --   --   --   --   --   --   --   --   --   --   --   --    MG  --   --   --   --   --  2.25 2.18  --   --  1.99 1.99 1.92  --  2.04  --  2.41* 2.22 2.17  --  2.11    < > = values in this interval not displayed.     LIVER ENZYMES:   Recent Labs     08/30/24  0655 08/29/24  1702 08/26/24  1026 04/01/23  0852 03/31/23 2010 02/28/22  1847 02/19/22  0642 02/18/22  2028 12/16/19  1533 12/06/19  1142 11/28/19  1641   ALBUMIN 4.0 4.3 4.4 4.1 4.5 3.8 3.3* 3.6   < > 3.9 4.3   ALT 5* 6* 7* 9* 11 11 10 12   < > 20 22   AST 11 13 21 13 16 17 16 15   < > 36 27   BILITOT 0.8 0.7 0.9 1.2 1.2 1.0 1.2 1.1   < > 0.6 0.6   LIPASE  --   --  14  --  14 14  --   --   --  40 60    < > = values in this interval not displayed.     CBC:  Recent Labs     09/01/24  0729 08/31/24  0703 08/30/24  0655 08/29/24  1702 08/27/24  0733 08/26/24  1026 04/01/24  1210 10/30/23  1305   WBC 5.6 6.4 5.6 6.5 5.5 4.9 4.7 5.4   HGB 13.1* 12.6* 14.2 14.2 13.9 13.5  12.3* 12.4*   HCT 44.0 40.7* 45.3 45.1 44.8 44.3 40.1* 41.6   * 167 177 205 178 174 153 165   MCV 85 81 80 79* 81 83 85 87     COAG:   Recent Labs     08/29/24  1702 06/04/23  1006 02/28/22  1847 02/18/22  1454 02/17/22  1945 11/28/21  1600 11/25/21  1520 11/16/21  1725   INR 1.3* 1.2* 1.2* 1.4* 1.5* 1.4* 1.3* 1.4*     ABO:   Recent Labs     02/28/22  1847   ABO O     HEME/ENDO:  Recent Labs     08/30/24  0656 04/01/24  1210 04/02/23  0510 04/18/22  1040 11/17/21  1430 06/11/21  0910 11/29/19  0808 02/10/18  0250   FERRITIN  --   --  35 70   < >  --    < > 17*   IRONSAT  --   --  83* 12*   < >  --    < > NOT CALC.   TSH  --  1.07  --   --   --   --   --  1.09   HGBA1C 6.2*  --   --   --   --  6.3*   < >  --     < > = values in this interval not displayed.      CARDIAC:   Recent Labs     08/30/24  0655 08/29/24  1803 08/29/24  1702 08/26/24  1152 08/26/24  1026 04/01/24  1210 04/02/23  0510 04/01/23  0852 03/31/23  2158 03/31/23 2010 02/28/22  2219 02/17/22  1945 10/31/20  2353 07/30/20  1127   LDH  --   --   --   --   --   --   --   --   --   --   --   --   --  165   TROPHS  --  24* 25* 19 17  --   --  21* 27* 23*  CANCELED  --   --   --   --    *  --  264*  --  375* 670* 708*  --   --  642* 619* 366*   < >  --     < > = values in this interval not displayed.       Lipid Panel:  Lab Results   Component Value Date    HDL 38.4 08/30/2024    CHHDL 3.3 08/30/2024    VLDL 18 08/30/2024    TRIG 88 08/30/2024    NHDL 89 08/30/2024          Current Medications:   MEDICATIONS  Infusions:  sodium chloride 0.9%, Last Rate: Stopped (08/31/24 1556)      Scheduled:  apixaban, 2.5 mg, BID  empagliflozin, 10 mg, Daily  folic acid, 1 mg, Daily  ipratropium-albuteroL, 3 mL, TID  metoprolol succinate XL, 25 mg, Daily  multivitamin, 1 tablet, Daily  perflutren lipid microspheres, 0.5-10 mL of dilution, Once in imaging  rosuvastatin, 40 mg, Nightly  [START ON 9/2/2024] thiamine, 100 mg, Daily  thiamine, 100 mg,  Daily      PRN:  acetaminophen, 650 mg, q4h PRN  albuterol, 2.5 mg, q2h PRN  LORazepam, 0.5 mg, q2h PRN   Or  LORazepam, 0.5 mg, q2h PRN   Or  LORazepam, 1 mg, q2h PRN  melatonin, 3 mg, Nightly PRN  ondansetron, 4 mg, q8h PRN  polyethylene glycol, 17 g, Daily PRN  sodium chloride 0.9%, 10 mL/hr, Continuous PRN              Imaging:     CT head without IV contrast 8/29/2024  IMPRESSION:  1. No evidence of acute intracranial abnormality or interval change.  2. Stable senescent changes of cerebral volume loss and chronic  microangiopathic changes.  3. Right maxillary sinus heterogeneous polypoid mass versus mucous  retention cyst is again seen. Correlation with nonemergent ENT  evaluation is recommended, if not previously performed elsewhere.      CT angio chest for PE 8/27/2024  IMPRESSION:  No evidence of PE.  Extensive pleural plaques within without calcifications.  Hiatal hernia.      CXR 8/26/2024  IMPRESSION:  1. 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  2. 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  3. Limited sensitivity for the detection of pulmonary infiltrates due  to the extensive calcific pleural plaque.  4. Probable hiatal hernia.  5. Postoperative findings as above    Cardiovascular studies:       EKG dated 8/29/2024 independently reviewed   Atrial flutter;  V-paced        Echocardiogram 8/26/2024  CONCLUSIONS:   1. Left ventricular ejection fraction is severely decreased, by visual estimate at 25-30%.   2. There is global hypokinesis of the left ventricle with minor regional variations.   3. Spectral Doppler shows an abnormal pattern of left ventricular diastolic filling.   4. Left ventricular cavity size is mildly dilated.   5. There is moderate concentric left ventricular hypertrophy.   6. There is mildly reduced right ventricular systolic function.   7. Mildly enlarged right ventricle.   8. The left atrium is severely dilated.   9. The right atrium is moderately dilated.  10. The patient  is in atrial fibrillation which may influence the estimate of left ventricular function and transvalvular flows.        EKG dated 8/26/2024 independently reviewed    Atrial flutter and V Paced            Echocardiogram 2/21/2022  CONCLUSIONS:   1. The left ventricular systolic function is moderately decreased with a 35-40% estimated ejection fraction.   2. Spectral Doppler shows an impaired relaxation pattern of left ventricular diastolic filling.   3. The left atrium is severely dilated.   4. The right atrium is moderately dilated.   5. RVSP within normal limits.   6. Compared with study from 7/9/2021, no significant change.   7. There is global hypokinesis of the left ventricle with minor regional variations.        Echocardiogram 11/02/2020  CONCLUSIONS:   1. The left ventricular systolic function is mildly to moderately decreased with a 40-45% estimated ejection fraction.   2. Poorly visualized anatomical structures due to suboptimal image quality.   3. Abnormal septal motion consistent with post-operative status.   4. Thee is inferior akinesia and inferolateral and distal septal-apical hypokinesia.   5. There is severe eccentric left ventricular hypertrophy.   6. The left atrium is severely dilated.   7. The right atrium is severely dilated.   8. The left ventricular cavity size is moderate to severely dilated.   9. There is no evidence of a patent foramen ovale.  10. The patient is in atrial fibrillation which may influence the estimate of left ventricular function and transvalvular flows.     Cardiac catheterization 10/31/2016  CONCLUSIONS:   1. Left main: patent stent into left Cx with mild ISR.   2. LAD: 100% ostial occlusion.   3. LCx: patient LM-proximal LCx stent with mild ISR, mild diffuse disease in vessel proper.   4. RCA: 95% ostial disease, 100% prox-mid  with right to right collaterals.   5. Grafts: (1) WENDY to LAD (and jump to diagonal) patent (2) LIMA to ramus patent (3) SVG to RPDA with 99%  ISR at distal anastomosis.   6. LVEDP 6mmHg, no significant aortic stenosis on LV-AO gradient.        Assessment:   88 YOM with acute on chronic HFrEF with Rx non-compliance.  Worsening LV systolic function 25-30% now also with reduced RV systolic function.  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  Chronic atrial fibrillation /flutter, rate controlled   -- hx ablation in 2015 with recurrence, on long term OAC with Eliquis, declines watchman LAAO despite hx of GI bleed  HTN, controlled  HLD,    hx VT   Hx CVA  JONAS - baseline crt 0.93       PLAN:   Rx regimen is unclear after her left AMA   Will re-initiate GDMT for HFrEF:   Diuretic Holiday with JONAS,  planning to resume torsemide at 40 mg daily pending follow up   Add Jardiance 10 mg daily   Avoid MRA with labile renal function   Continue Toprol XL 25 mg daily   Consider addition of ARNI vs Hydral + Isordil as BP / renal function allows   Atrial flutter is rate controlled without ventricular arrhyhtmia   Continue long term OAC with Eliquis   ASCVD is chest pain free   On OAC alone with hx GI bleeding   Consider workup for LAAO as an outpatient -- he has declined in the past   Close follow up with Sherine HERNANDEZ after discharge           Thank you  for the consult   Will follow   Please call or message with questions, concerns or changes in clinical condition   The case was discussed with SOFIE Hobson          Electronically signed by SOFIE Chaudhry on 9/1/2024 at 9:54 AM  40 min

## 2024-09-01 NOTE — CARE PLAN
The patient's goals for the shift include      The clinical goals for the shift include pt will be free from injury    Over the shift, the patient did make progress toward the following goals. Patient remained free from injury. Assisted to the bathroom via one assist. Neuro checks and CIWA continued.   Problem: Fall/Injury  Goal: Not fall by end of shift  Outcome: Progressing     Problem: Fall/Injury  Goal: Be free from injury by end of the shift  Outcome: Progressing     Problem: Fall/Injury  Goal: Use assistive devices by end of the shift  Outcome: Progressing     Problem: Pain - Adult  Goal: Verbalizes/displays adequate comfort level or baseline comfort level  Outcome: Progressing

## 2024-09-01 NOTE — PROGRESS NOTES
Gilmer Quiñonez is a 88 y.o. male on day 1 of admission presenting with Dizziness.      Subjective   Pt assessed at bedside. Pt easily arousable this morning. Alert and oriented x3, just reports feeling tired. Pt up to the restroom multiple times overnight, no confusion noted.     Update provided to daughter at bedside, agreeable to plan of care.        Objective     Last Recorded Vitals  /62 (BP Location: Right arm, Patient Position: Lying)   Pulse 52   Temp 36.2 °C (97.2 °F) (Temporal)   Resp 17   Wt 73.8 kg (162 lb 11.2 oz)   SpO2 96%   Intake/Output last 3 Shifts:    Intake/Output Summary (Last 24 hours) at 9/1/2024 0950  Last data filed at 8/31/2024 2200  Gross per 24 hour   Intake 420 ml   Output 400 ml   Net 20 ml       Admission Weight  Weight: 71.7 kg (158 lb) (08/29/24 1600)    Daily Weight  08/29/24 : 73.8 kg (162 lb 11.2 oz)    Image Results  ECG 12 lead  Ventricular-paced rhythm with frequent Premature ventricular complexes and Fusion complexes  Abnormal ECG  When compared with ECG of 26-AUG-2024 10:00,  Fusion complexes are now Present  Premature ventricular complexes are now Present  See ED provider note for full interpretation and clinical correlation  Confirmed by Danette Khan ([id number]) on 8/30/2024 10:59:29 AM      Physical Exam  Vitals reviewed.   HENT:      Head: Normocephalic and atraumatic.      Right Ear: External ear normal.      Left Ear: External ear normal.      Nose: Nose normal.      Mouth/Throat:      Pharynx: Oropharynx is clear.   Eyes:      Conjunctiva/sclera: Conjunctivae normal.   Cardiovascular:      Rate and Rhythm: Normal rate. Rhythm irregular.      Pulses: Normal pulses.      Heart sounds: Normal heart sounds.   Pulmonary:      Effort: Pulmonary effort is normal.      Breath sounds: Decreased breath sounds present.   Abdominal:      General: Bowel sounds are normal.      Palpations: Abdomen is soft.   Musculoskeletal:         General: Normal range of motion.       Cervical back: Normal range of motion and neck supple.   Skin:     General: Skin is dry.   Neurological:      General: No focal deficit present.      Mental Status: He is alert and oriented to person, place, and time.   Psychiatric:         Mood and Affect: Mood normal.         Behavior: Behavior normal.         Relevant Results  Scheduled medications  apixaban, 2.5 mg, oral, BID  empagliflozin, 10 mg, oral, Daily  folic acid, 1 mg, oral, Daily  ipratropium-albuteroL, 3 mL, nebulization, TID  metoprolol succinate XL, 25 mg, oral, Daily  multivitamin, 1 tablet, oral, Daily  perflutren lipid microspheres, 0.5-10 mL of dilution, intravenous, Once in imaging  rosuvastatin, 40 mg, oral, Nightly  [START ON 9/2/2024] thiamine, 100 mg, oral, Daily  thiamine, 100 mg, intravenous, Daily      Continuous medications  sodium chloride 0.9%, 10 mL/hr, Last Rate: Stopped (08/31/24 1556)      PRN medications  PRN medications: acetaminophen, albuterol, LORazepam **OR** LORazepam **OR** LORazepam, melatonin, ondansetron, polyethylene glycol, sodium chloride 0.9%    Results for orders placed or performed during the hospital encounter of 08/29/24 (from the past 24 hour(s))   Urinalysis with Reflex Culture and Microscopic   Result Value Ref Range    Color, Urine Light-Yellow Light-Yellow, Yellow, Dark-Yellow    Appearance, Urine Clear Clear    Specific Gravity, Urine 1.016 1.005 - 1.035    pH, Urine 6.5 5.0, 5.5, 6.0, 6.5, 7.0, 7.5, 8.0    Protein, Urine 20 (TRACE) NEGATIVE, 10 (TRACE), 20 (TRACE) mg/dL    Glucose, Urine Normal Normal mg/dL    Blood, Urine NEGATIVE NEGATIVE    Ketones, Urine NEGATIVE NEGATIVE mg/dL    Bilirubin, Urine NEGATIVE NEGATIVE    Urobilinogen, Urine Normal Normal mg/dL    Nitrite, Urine NEGATIVE NEGATIVE    Leukocyte Esterase, Urine NEGATIVE NEGATIVE   Urinalysis Microscopic   Result Value Ref Range    WBC, Urine NONE 1-5, NONE /HPF    RBC, Urine 1-2 NONE, 1-2, 3-5 /HPF    Mucus, Urine FEW Reference range  not established. /LPF   CBC   Result Value Ref Range    WBC 5.6 4.4 - 11.3 x10*3/uL    nRBC      RBC 5.20 4.50 - 5.90 x10*6/uL    Hemoglobin 13.1 (L) 13.5 - 17.5 g/dL    Hematocrit 44.0 41.0 - 52.0 %    MCV 85 80 - 100 fL    MCH 25.2 (L) 26.0 - 34.0 pg    MCHC 29.8 (L) 32.0 - 36.0 g/dL    RDW 16.8 (H) 11.5 - 14.5 %    Platelets 142 (L) 150 - 450 x10*3/uL   Basic metabolic panel   Result Value Ref Range    Glucose 92 74 - 99 mg/dL    Sodium 137 136 - 145 mmol/L    Potassium 3.8 3.5 - 5.3 mmol/L    Chloride 103 98 - 107 mmol/L    Bicarbonate 24 21 - 32 mmol/L    Anion Gap 14 10 - 20 mmol/L    Urea Nitrogen 31 (H) 6 - 23 mg/dL    Creatinine 1.53 (H) 0.50 - 1.30 mg/dL    eGFR 44 (L) >60 mL/min/1.73m*2    Calcium 9.4 8.6 - 10.3 mg/dL          Assessment/Plan        Assessment & Plan  Dizziness    JONAS (acute kidney injury) (CMS-HCC)    Atrial fibrillation (Multi)    Acute on chronic systolic (congestive) heart failure (Multi)    HLD (hyperlipidemia)    Elevated troponin    CKD (chronic kidney disease), stage III (Multi)    Weakness of both legs    Community acquired bacterial pneumonia    #Dizziness, improved   #Chronic Atrial fibrillation (Multi)  #Non MI Elevated Troponin  #Chronic systolic (congestive) heart failure (Multi)  -WBC 6.5  -Troponin 25 > 24  -  -ECHO 8/26/24: severely decreased LVSF with an EF of 25-30% with abnormal pattern of LVDF  -continue apixaban 2.5 mg PO BID  -continue Metoprolol succinate 25 mg PO Daily   -Add jardiance per cards   -Hold torsemide d/t renal function  -Cardiology consult, appreciate recs     #Metabolic encephalopathy 2/2 delirium vs dementia vs etoh abuse disorder, resolving    -Dtr reports increased confusion over the last week   -Pt reports drinking beer daily; unsure of last drink  -Confused and combative overnight; unable to be re-directed  -Sitter at bedside for safety; now discontinued   -[facility] protocol   -Thiamine, folic acid, MV  -Will obtain repeat UA to r/o  infectious cause   -UA neg   -Pt alert and oriented today     #Community Acquired bacterial pneumonia, resolved   - supplemental oxygen to keep SpO2 > 90%  - currently on RA  - No oxygen at home  - started Levofloxacin 750 mg daily; day 1; discontinue  - Procal 0.05  - started duoneb q6h  - RT for bronchial hygiene     #Weakness of both legs  -CT Head: No evidence of acute intracranial abnormality or interval change. Stable senescent changes of cerebral volume loss and chronic  microangiopathic changes. Right maxillary sinus heterogeneous polypoid mass versus mucous retention cyst is again seen. Correlation with nonemergent ENT evaluation is recommended, if not previously performed elsewhere.  -MRI/MRA Ordered; unable to complete due to pacemaker   -Neuro Checks discontinued   -PT Eval and Treat; recommending low level therapy     #Acute Renal Failure, stage 3a  -baseline creatine 1.13  -creatine on admission 2.88; today creatine 1.53  - ml IV given in ED  -D5-1/2- ml given in ED     #HLD (hyperlipidemia)  -continue rosuvastatin 40 mg PO HS     DVT ppx  -apixaban     F: PRN  E: Replete per protocol  N: Cardiac  A: PIV     Disposition: Pt requires less than 2 inpatient days at this time   Code Status: Full Code        Laura Nix, APRN-CNP

## 2024-09-02 VITALS
RESPIRATION RATE: 17 BRPM | HEIGHT: 66 IN | BODY MASS INDEX: 26.15 KG/M2 | HEART RATE: 56 BPM | WEIGHT: 162.7 LBS | TEMPERATURE: 98 F | SYSTOLIC BLOOD PRESSURE: 158 MMHG | OXYGEN SATURATION: 96 % | DIASTOLIC BLOOD PRESSURE: 70 MMHG

## 2024-09-02 VITALS
SYSTOLIC BLOOD PRESSURE: 125 MMHG | OXYGEN SATURATION: 96 % | RESPIRATION RATE: 17 BRPM | WEIGHT: 162.7 LBS | TEMPERATURE: 97.5 F | DIASTOLIC BLOOD PRESSURE: 65 MMHG | BODY MASS INDEX: 26.15 KG/M2 | HEART RATE: 61 BPM | HEIGHT: 66 IN

## 2024-09-02 LAB
ANION GAP SERPL CALC-SCNC: 13 MMOL/L (ref 10–20)
BUN SERPL-MCNC: 33 MG/DL (ref 6–23)
CALCIUM SERPL-MCNC: 9.5 MG/DL (ref 8.6–10.3)
CHLORIDE SERPL-SCNC: 103 MMOL/L (ref 98–107)
CO2 SERPL-SCNC: 27 MMOL/L (ref 21–32)
CREAT SERPL-MCNC: 1.61 MG/DL (ref 0.5–1.3)
EGFRCR SERPLBLD CKD-EPI 2021: 41 ML/MIN/1.73M*2
ERYTHROCYTE [DISTWIDTH] IN BLOOD BY AUTOMATED COUNT: 16.6 % (ref 11.5–14.5)
GLUCOSE SERPL-MCNC: 95 MG/DL (ref 74–99)
HCT VFR BLD AUTO: 42.2 % (ref 41–52)
HGB BLD-MCNC: 13.1 G/DL (ref 13.5–17.5)
MCH RBC QN AUTO: 25 PG (ref 26–34)
MCHC RBC AUTO-ENTMCNC: 31 G/DL (ref 32–36)
MCV RBC AUTO: 81 FL (ref 80–100)
NRBC BLD-RTO: 0 /100 WBCS (ref 0–0)
PLATELET # BLD AUTO: 157 X10*3/UL (ref 150–450)
POTASSIUM SERPL-SCNC: 3.8 MMOL/L (ref 3.5–5.3)
RBC # BLD AUTO: 5.23 X10*6/UL (ref 4.5–5.9)
SODIUM SERPL-SCNC: 139 MMOL/L (ref 136–145)
WBC # BLD AUTO: 7 X10*3/UL (ref 4.4–11.3)

## 2024-09-02 PROCEDURE — 99239 HOSP IP/OBS DSCHRG MGMT >30: CPT

## 2024-09-02 PROCEDURE — 97116 GAIT TRAINING THERAPY: CPT | Mod: GP,CQ,IPSPLIT

## 2024-09-02 PROCEDURE — 94760 N-INVAS EAR/PLS OXIMETRY 1: CPT | Mod: IPSPLIT

## 2024-09-02 PROCEDURE — 2500000001 HC RX 250 WO HCPCS SELF ADMINISTERED DRUGS (ALT 637 FOR MEDICARE OP): Mod: IPSPLIT

## 2024-09-02 PROCEDURE — 9420000001 HC RT PATIENT EDUCATION 5 MIN: Mod: IPSPLIT

## 2024-09-02 PROCEDURE — 94762 N-INVAS EAR/PLS OXIMTRY CONT: CPT | Mod: IPSPLIT

## 2024-09-02 PROCEDURE — 85027 COMPLETE CBC AUTOMATED: CPT | Mod: IPSPLIT

## 2024-09-02 PROCEDURE — 2500000002 HC RX 250 W HCPCS SELF ADMINISTERED DRUGS (ALT 637 FOR MEDICARE OP, ALT 636 FOR OP/ED): Mod: IPSPLIT | Performed by: NURSE PRACTITIONER

## 2024-09-02 PROCEDURE — 82374 ASSAY BLOOD CARBON DIOXIDE: CPT | Mod: IPSPLIT

## 2024-09-02 PROCEDURE — 2500000001 HC RX 250 WO HCPCS SELF ADMINISTERED DRUGS (ALT 637 FOR MEDICARE OP): Mod: IPSPLIT | Performed by: NURSE PRACTITIONER

## 2024-09-02 PROCEDURE — 36415 COLL VENOUS BLD VENIPUNCTURE: CPT | Mod: IPSPLIT

## 2024-09-02 PROCEDURE — 94640 AIRWAY INHALATION TREATMENT: CPT | Mod: IPSPLIT

## 2024-09-02 RX ORDER — BISMUTH SUBSALICYLATE 262 MG
1 TABLET,CHEWABLE ORAL DAILY
Start: 2024-09-02 | End: 2024-11-01

## 2024-09-02 RX ORDER — FOLIC ACID 1 MG/1
1 TABLET ORAL DAILY
Qty: 30 TABLET | Refills: 1 | Status: SHIPPED | OUTPATIENT
Start: 2024-09-02 | End: 2024-11-01

## 2024-09-02 ASSESSMENT — LIFESTYLE VARIABLES
ANXIETY: MILDLY ANXIOUS
NAUSEA AND VOMITING: NO NAUSEA AND NO VOMITING
TOTAL SCORE: 5
ANXIETY: NO ANXIETY, AT EASE
VISUAL DISTURBANCES: NOT PRESENT
NAUSEA AND VOMITING: NO NAUSEA AND NO VOMITING
PAROXYSMAL SWEATS: NO SWEAT VISIBLE
VISUAL DISTURBANCES: NOT PRESENT
AUDITORY DISTURBANCES: VERY MILD HARSHNESS OR ABILITY TO FRIGHTEN
HEADACHE, FULLNESS IN HEAD: NOT PRESENT
TOTAL SCORE: 1
AGITATION: SOMEWHAT MORE THAN NORMAL ACTIVITY
ORIENTATION AND CLOUDING OF SENSORIUM: CANNOT DO SERIAL ADDITIONS OR IS UNCERTAIN ABOUT DATE
HEADACHE, FULLNESS IN HEAD: NOT PRESENT
AUDITORY DISTURBANCES: NOT PRESENT
ORIENTATION AND CLOUDING OF SENSORIUM: CANNOT DO SERIAL ADDITIONS OR IS UNCERTAIN ABOUT DATE
TREMOR: NO TREMOR
AGITATION: NORMAL ACTIVITY
TREMOR: NOT VISIBLE, BUT CAN BE FELT FINGERTIP TO FINGERTIP
PAROXYSMAL SWEATS: NO SWEAT VISIBLE

## 2024-09-02 ASSESSMENT — COGNITIVE AND FUNCTIONAL STATUS - GENERAL
TOILETING: A LITTLE
DAILY ACTIVITIY SCORE: 18
DRESSING REGULAR UPPER BODY CLOTHING: A LITTLE
HELP NEEDED FOR BATHING: A LITTLE
MOBILITY SCORE: 21
MOVING TO AND FROM BED TO CHAIR: A LITTLE
CLIMB 3 TO 5 STEPS WITH RAILING: A LITTLE
DRESSING REGULAR LOWER BODY CLOTHING: A LITTLE
EATING MEALS: A LITTLE
MOBILITY SCORE: 20
PERSONAL GROOMING: A LITTLE
MOVING TO AND FROM BED TO CHAIR: A LITTLE
STANDING UP FROM CHAIR USING ARMS: A LITTLE
WALKING IN HOSPITAL ROOM: A LITTLE
TURNING FROM BACK TO SIDE WHILE IN FLAT BAD: A LITTLE
WALKING IN HOSPITAL ROOM: A LITTLE

## 2024-09-02 ASSESSMENT — PAIN SCALES - GENERAL
PAINLEVEL_OUTOF10: 0 - NO PAIN
PAINLEVEL_OUTOF10: 0 - NO PAIN

## 2024-09-02 ASSESSMENT — PAIN - FUNCTIONAL ASSESSMENT: PAIN_FUNCTIONAL_ASSESSMENT: 0-10

## 2024-09-02 NOTE — PROGRESS NOTES
Physical Therapy    Physical Therapy Treatment    Patient Name: Gilmer Quiñonez  MRN: [id number]  Today's Date: 9/2/2024  Time Calculation  Start Time: 0957  Stop Time: 1014  Time Calculation (min): 17 min         Assessment/Plan  Pt to be discharged home today  PT Assessment  PT Assessment Results: Decreased strength, Decreased endurance, Decreased mobility, Decreased safety awareness  PT Plan  Inpatient/Swing Bed or Outpatient: Inpatient  PT Plan  Treatment/Interventions: Transfer training, Gait training, Balance training, Therapeutic exercise, Therapeutic activity, Home exercise program  PT Plan: Ongoing PT  PT Frequency: 3 times per week  PT Discharge Recommendations: Low intensity level of continued care  Equipment Recommended upon Discharge:  (issued cane)  PT Recommended Transfer Status: Stand by assist  PT - OK to Discharge: Yes    General Visit Information:   PT  Visit  PT Received On: 09/02/24  Response to Previous Treatment: Patient reporting fatigue but able to participate.  General  Reason for Referral: decreased strength, impaired balance, decreased mobility,  safety  awareness.  Prior to Session Communication: Bedside nurse    Subjective   Precautions:   Fall risk    Vital Signs (Past 2hrs)        Date/Time Vitals Session Patient Position Pulse Resp SpO2 BP MAP (mmHg)    09/02/24 0923 --  --  --  --  97 %  --  --     09/02/24 1010 --  --  56  17  96 %  158/70  --                      Objective   Pain:  Pain Assessment  Pain Assessment: 0-10  0-10 (Numeric) Pain Score: 0 - No pain  Cognition:  Cognition  Overall Cognitive Status: Within Functional Limits  Safety/Judgement: Exceptions to WFL  Impulsive: Moderately    Activity Tolerance:  Activity Tolerance  Endurance: Tolerates 30 min exercise with multiple rests, Tolerates 10 - 20 min exercise with multiple rests  Treatments:  Ambulation/Gait Training  Ambulation/Gait Training Performed: Yes  Ambulation/Gait Training 1  Surface 1: Level tile  Device 1:  Single point cane  Gait Support Devices: Gait belt  Assistance 1: Close supervision  Quality of Gait 1: Inconsistent stride length, Narrow base of support  Comments/Distance (ft) 1: 150 ft. x 2  Transfers  Transfer: Yes  Transfer 1  Transfer From 1: Sit to  Transfer to 1: Stand  Technique 1: Sit to stand, Stand to sit  Transfer Device 1: Cane  Transfer Level of Assistance 1: Close supervision    Stairs  Stairs: Yes  Stairs  Rails 1: Left  Device 1: Single point cane  Support Devices 1: Gait belt  Assistance 1: Close supervision  Comment/Number of Steps 1: 5 steps x 2    Outcome Measures:  [facility] Basic Mobility  Turning from your back to your side while in a flat bed without using bedrails: None  Moving from lying on your back to sitting on the side of a flat bed without using bedrails: A little  Moving to and from bed to chair (including a wheelchair): A little  Standing up from a chair using your arms (e.g. wheelchair or bedside chair): None  To walk in hospital room: A little  Climbing 3-5 steps with railing: None  Basic Mobility - Total Score: 21    Education Documentation  No documentation found.  Education Comments  No comments found.            Encounter Problems       Encounter Problems (Resolved)       Balance       STG - Maintains dynamic standing balance without upper extremity support with good balance  (Adequate for Discharge)       Start:  08/30/24    Expected End:  09/13/24    Resolved:  09/02/24            Mobility       LTG - Patient will ambulate community distance FADY with cane  (Adequate for Discharge)       Start:  08/30/24    Expected End:  09/13/24    Resolved:  09/02/24            Mobility       LTG - Patient will ambulate household distance IND with no device and no LOB  (Adequate for Discharge)       Start:  08/30/24    Expected End:  09/13/24    Resolved:  09/02/24            PT Transfers       STG - Patient will transfer sit to and from stand IND (Adequate for Discharge)       Start:   08/30/24    Expected End:  09/13/24    Resolved:  09/02/24            Pain - Adult

## 2024-09-02 NOTE — DISCHARGE SUMMARY
Discharge Diagnosis  Dizziness    Issues Requiring Follow-Up  Follow up with PCP, Cards    Discharge Meds     Your medication list        START taking these medications        Instructions Last Dose Given Next Dose Due   empagliflozin 10 mg  Commonly known as: Jardiance      Take 1 tablet (10 mg) by mouth once daily.       folic acid 1 mg tablet  Commonly known as: Folvite      Take 1 tablet (1 mg) by mouth once daily.       multivitamin tablet      Take 1 tablet by mouth once daily.              CONTINUE taking these medications        Instructions Last Dose Given Next Dose Due   apixaban 2.5 mg tablet  Commonly known as: Eliquis      Take 1 tablet (2.5 mg) by mouth 2 times a day.       metoprolol succinate XL 25 mg 24 hr tablet  Commonly known as: Toprol-XL      Take 1 tablet (25 mg) by mouth once daily.       rosuvastatin 40 mg tablet  Commonly known as: Crestor      Take 1 tablet (40 mg) by mouth once daily.       torsemide 20 mg tablet  Commonly known as: Demadex      Take 2 tablets (40 mg) by mouth once daily.                 Where to Get Your Medications        These medications were sent to [place] [id number]      Phone: [phone]   empagliflozin 10 mg  folic acid 1 mg tablet       Information about where to get these medications is not yet available    Ask your nurse or doctor about these medications  multivitamin tablet         Test Results Pending At Discharge  Pending Labs       Order Current Status    Extra Urine Gray Tube Collected (08/31/24 8562)    Urinalysis with Reflex Culture and Microscopic In process            Hospital Course   Gilmer Quiñonez is a 88 y.o. male presenting with intermittent dizziness. He just says feels like his legs are weak. No loss of vision nor speech, no weakness or numbness on one side his legs just kind of feel wobbly. He said he was outside working when this initially occurred he  does not have a history of stroke does have a pacer and other heart problems. Said currently feels fine but if he stands he does get dizzy. Patient sitting at bedside eating, no complaints at this time.  Patient reports he has been having this intermittent dizziness for about a month.  Reports he has not been compliant with his medication, hasn't been taking anything for a few weeks and just restarted it a couple of days ago.  Discussed treatment plan, patient states understanding, and agrees.     Hospital Course:  #Dizziness, improved   #Chronic Atrial fibrillation (Multi)  #Non MI Elevated Troponin  #Chronic systolic (congestive) heart failure (Multi)  -WBC 6.5  -Troponin 25 > 24  -  -ECHO 8/26/24: severely decreased LVSF with an EF of 25-30% with abnormal pattern of LVDF  -continue apixaban 2.5 mg PO BID  -continue Metoprolol succinate 25 mg PO Daily   -Add jardiance per cards   -Hold torsemide d/t renal function  -Cardiology consult, appreciate recs     #Metabolic encephalopathy 2/2 delirium vs dementia vs etoh abuse disorder, resolving    -Dtr reports increased confusion over the last week   -Pt reports drinking beer daily; unsure of last drink  -Confused and combative overnight; unable to be re-directed  -Sitter at bedside for safety; now discontinued   -CIWA protocol   -Thiamine, folic acid, MV  -Will obtain repeat UA to r/o infectious cause   -UA neg   -Pt alert and oriented today     #Community Acquired bacterial pneumonia, resolved   - supplemental oxygen to keep SpO2 > 90%  - currently on RA  - No oxygen at home  - started Levofloxacin 750 mg daily; day 1; discontinue  - Procal 0.05  - started duoneb q6h  - RT for bronchial hygiene     #Weakness of both legs  -CT Head: No evidence of acute intracranial abnormality or interval change. Stable senescent changes of cerebral volume loss and chronic  microangiopathic changes. Right maxillary sinus heterogeneous polypoid mass versus mucous retention cyst  is again seen. Correlation with nonemergent ENT evaluation is recommended, if not previously performed elsewhere.  -MRI/MRA Ordered; unable to complete due to pacemaker   -Neuro Checks discontinued   -PT Eval and Treat; recommending low level therapy     #Acute Renal Failure, stage 3a  -baseline creatine 1.13  -creatine on admission 2.88; today creatine 1.53  - ml IV given in ED  -D5-1/2- ml given in ED     #HLD (hyperlipidemia)  -continue rosuvastatin 40 mg PO HS     DVT ppx  -apixaban     F: PRN  E: Replete per protocol  N: Cardiac  A: PIV     Disposition: Pt stable for discharge home. Outpatient therapy rx sent. Will follow up with cardiology and PCP    Code Status: Full Code    Total cumulative time spent in preparation of this discharge including documentation review, coordination of care with the medical team including PT/SW/care coordinators and treating consultants, discussion with patient and pertinent family members and finalization of prescriptions, followup appointments and this discharge summary was approximately  45 minutes. Over 50% of the time was spent face-to-face counseling the patient on diagnosis, prescriptions, and follow up appointments.     Pertinent Physical Exam At Time of Discharge  Physical Exam  Vitals reviewed.   HENT:      Head: Normocephalic and atraumatic.      Right Ear: External ear normal.      Left Ear: External ear normal.      Nose: Nose normal.      Mouth/Throat:      Pharynx: Oropharynx is clear.   Eyes:      Conjunctiva/sclera: Conjunctivae normal.   Cardiovascular:      Rate and Rhythm: Normal rate. Rhythm irregular.      Pulses: Normal pulses.      Heart sounds: Normal heart sounds.   Pulmonary:      Effort: Pulmonary effort is normal.      Breath sounds: Decreased breath sounds present.   Abdominal:      General: Bowel sounds are normal.      Palpations: Abdomen is soft.   Musculoskeletal:         General: Normal range of motion.      Cervical back: Normal  range of motion and neck supple.   Skin:     General: Skin is dry.   Neurological:      General: No focal deficit present.      Mental Status: He is alert and oriented to person, place, and time.   Psychiatric:         Mood and Affect: Mood normal.         Behavior: Behavior normal.     Outpatient Follow-Up  Future Appointments   Date Time Provider Department Center   9/16/2024 12:00 PM Beronica Adames MD [id number] [facility]   11/5/2024  1:20 PM Sherine Grewal APRN-CNP [id number] [facility]         Laura Nix APRN-CNP

## 2024-09-02 NOTE — CARE PLAN
The patient's goals for the shift include      The clinical goals for the shift include Patient will be calm and alert today    Over the shift, the patient did make progress toward the following goals. Patient has been calm but confused throughout the night.   Problem: Fall/Injury  Goal: Not fall by end of shift  Outcome: Progressing     Problem: Fall/Injury  Goal: Be free from injury by end of the shift  Outcome: Progressing     Problem: Pain - Adult  Goal: Verbalizes/displays adequate comfort level or baseline comfort level  Outcome: Progressing

## 2024-09-02 NOTE — NURSING NOTE
Assumed patient care. No immediate needs at this time. Patient lying in bed. Call light within reach. Bed alarm on.

## 2024-09-03 ENCOUNTER — HOSPITAL ENCOUNTER (OUTPATIENT)
Dept: CARDIOLOGY | Facility: HOSPITAL | Age: 89
Discharge: HOME | End: 2024-09-03
Payer: MEDICARE

## 2024-09-03 LAB
ATRIAL RATE: 375 BPM
Q ONSET: 208 MS
QRS COUNT: 11 BEATS
QRS DURATION: 174 MS
QT INTERVAL: 458 MS
QTC CALCULATION(BAZETT): 480 MS
QTC FREDERICIA: 473 MS
R AXIS: -65 DEGREES
T AXIS: 75 DEGREES
T OFFSET: 437 MS
VENTRICULAR RATE: 66 BPM

## 2024-09-03 PROCEDURE — 93005 ELECTROCARDIOGRAM TRACING: CPT

## 2024-09-05 ENCOUNTER — TELEPHONE (OUTPATIENT)
Dept: PRIMARY CARE | Facility: CLINIC | Age: 89
End: 2024-09-05
Payer: MEDICARE

## 2024-09-05 NOTE — TELEPHONE ENCOUNTER
Transition of Care    Inpatient facility: [facility]  Discharge diagnosis: Dizziness  Discharged to: Home  Discharge date: 9/2/24  Initial Call date: 9/3/24  Spoke with patient/caregiver: Caregiver                                                                     Do you need assistance  visits prior to your PCP visit: No  Home health care ordered: No  Have you been contacted by home care and have a start of care date: No  Are you taking medications as prescribed at discharge: Yes    Referral to [facility] Pharmacist: No  Patient advised to bring all medications to PCP follow-up appointment.  Patient advised to follow discharge instructions until provider follow-up.  TCM visit date: 9/16/24  TCM provider visit with: BI Adames MD

## 2024-09-06 NOTE — DISCHARGE SUMMARY
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    Discharge Meds     Medication List      ASK your doctor about these medications     apixaban 2.5 mg tablet; Commonly known as: Eliquis; Take 1 tablet (2.5   mg) by mouth 2 times a day.   metoprolol succinate XL 25 mg 24 hr tablet; Commonly known as:   Toprol-XL; Take 1 tablet (25 mg) by mouth once daily.   rosuvastatin 40 mg tablet; Commonly known as: Crestor; Take 1 tablet (40   mg) by mouth once daily.   torsemide 20 mg tablet; Commonly known as: Demadex; Take 2 tablets (40   mg) by mouth once daily.       Test Results Pending At Discharge  Pending Labs       No current pending labs.            Hospital Course   #Acute on chronic combined systolic and diastolic heart failure   #Paroxysmal atrial fibrillation  #Essential HTN  #HLD  -Presented with SOB, out of lasix for a few weeks   -CXR: 1. 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  2. 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  3. Limited sensitivity for the detection of pulmonary infiltrates due  to the extensive calcific pleural plaque.  4. Probable hiatal hernia.  5. Postoperative findings as above.  -CT chest pending   -Echo (2/22): 1. The left ventricular systolic function is moderately decreased with a 35-40% estimated ejection fraction.   2. Spectral Doppler shows an impaired relaxation pattern of left ventricular diastolic filling.   3. The left atrium is severely dilated.   4. The right atrium is moderately dilated.   5. RVSP within normal limits.   6. Compared with study from 7/9/2021, no significant change.   7. There is global hypokinesis of the left ventricle with minor regional variations.  -Repeat echo pending  -  -Trop 17 > 19  -Cardiac monitoring  -Cardiology consult, appreciate recs  -Cardiac diet with 1500ml FR  -IVP  furosemide 20mg BID  -Strict IO  -Daily wt   -Continue apixaban, metoprolol, rosuvastatin     #Iron deficiency anemia   -Stable, H/H 13.5/44.3  -Daily CBC        Pertinent Physical Exam At Time of Discharge  HENT:      Head: Normocephalic and atraumatic.      Right Ear: External ear normal.      Left Ear: External ear normal.      Nose: Nose normal.      Mouth/Throat:      Pharynx: Oropharynx is clear.   Eyes:      Conjunctiva/sclera: Conjunctivae normal.   Cardiovascular:      Rate and Rhythm: Normal rate and regular rhythm.      Pulses: Normal pulses.      Heart sounds: Normal heart sounds.   Pulmonary:      Effort: Pulmonary effort is normal.      Breath sounds: Normal breath sounds.   Abdominal:      General: Bowel sounds are normal.      Palpations: Abdomen is soft.   Musculoskeletal:         General: Normal range of motion.      Cervical back: Normal range of motion and neck supple.   Skin:     General: Skin is dry.   Neurological:      General: No focal deficit present.      Mental Status: He is alert and oriented to person, place, and time.   Psychiatric:         Mood and Affect: Mood normal.         Behavior: Behavior normal.     Outpatient Follow-Up  Future Appointments   Date Time Provider Department Center   9/16/2024 12:00 PM Beronica Adames MD [id number] [facility]   11/5/2024  1:20 PM Sherine Grewal APRN-CNP [id number] [facility]         HARSHIL Soler-CNP

## 2024-09-10 DIAGNOSIS — I50.9 ACUTE ON CHRONIC CONGESTIVE HEART FAILURE, UNSPECIFIED HEART FAILURE TYPE (MULTI): Primary | ICD-10-CM

## 2024-09-10 DIAGNOSIS — I50.9 OTHER CONGESTIVE HEART FAILURE (MULTI): ICD-10-CM

## 2024-09-10 RX ORDER — TORSEMIDE 20 MG/1
40 TABLET ORAL DAILY
Qty: 180 TABLET | Refills: 0 | Status: SHIPPED | OUTPATIENT
Start: 2024-09-10

## 2024-09-12 ENCOUNTER — PATIENT OUTREACH (OUTPATIENT)
Dept: CARE COORDINATION | Facility: CLINIC | Age: 89
End: 2024-09-12
Payer: MEDICARE

## 2024-09-12 SDOH — ECONOMIC STABILITY: GENERAL: WOULD YOU LIKE HELP WITH ANY OF THE FOLLOWING NEEDS?: I DONT NEED HELP WITH ANY OF THESE

## 2024-09-16 ENCOUNTER — HOSPITAL ENCOUNTER (OUTPATIENT)
Dept: RADIOLOGY | Facility: HOSPITAL | Age: 89
Discharge: HOME | End: 2024-09-16
Payer: MEDICARE

## 2024-09-16 ENCOUNTER — LAB (OUTPATIENT)
Dept: LAB | Facility: LAB | Age: 89
End: 2024-09-16
Payer: MEDICARE

## 2024-09-16 ENCOUNTER — APPOINTMENT (OUTPATIENT)
Dept: PRIMARY CARE | Facility: CLINIC | Age: 89
End: 2024-09-16
Payer: MEDICARE

## 2024-09-16 VITALS
BODY MASS INDEX: 25.92 KG/M2 | TEMPERATURE: 97.4 F | WEIGHT: 160.6 LBS | DIASTOLIC BLOOD PRESSURE: 62 MMHG | SYSTOLIC BLOOD PRESSURE: 114 MMHG | OXYGEN SATURATION: 97 % | HEART RATE: 65 BPM

## 2024-09-16 DIAGNOSIS — I50.9 CONGESTIVE HEART FAILURE, UNSPECIFIED HF CHRONICITY, UNSPECIFIED HEART FAILURE TYPE: ICD-10-CM

## 2024-09-16 DIAGNOSIS — Z78.9 ALCOHOL USE: ICD-10-CM

## 2024-09-16 DIAGNOSIS — J18.9 COMMUNITY ACQUIRED PNEUMONIA, UNSPECIFIED LATERALITY: ICD-10-CM

## 2024-09-16 DIAGNOSIS — I25.10 CORONARY ARTERY DISEASE DUE TO LIPID RICH PLAQUE: ICD-10-CM

## 2024-09-16 DIAGNOSIS — J18.9 COMMUNITY ACQUIRED PNEUMONIA, UNSPECIFIED LATERALITY: Primary | ICD-10-CM

## 2024-09-16 DIAGNOSIS — N18.31 STAGE 3A CHRONIC KIDNEY DISEASE (MULTI): ICD-10-CM

## 2024-09-16 DIAGNOSIS — I48.11 LONGSTANDING PERSISTENT ATRIAL FIBRILLATION (MULTI): ICD-10-CM

## 2024-09-16 DIAGNOSIS — D46.4 REFRACTORY ANEMIA, UNSPECIFIED (MULTI): ICD-10-CM

## 2024-09-16 DIAGNOSIS — I10 HYPERTENSION, UNSPECIFIED TYPE: ICD-10-CM

## 2024-09-16 DIAGNOSIS — Z95.810 AICD (AUTOMATIC CARDIOVERTER/DEFIBRILLATOR) PRESENT: ICD-10-CM

## 2024-09-16 DIAGNOSIS — I25.83 CORONARY ARTERY DISEASE DUE TO LIPID RICH PLAQUE: ICD-10-CM

## 2024-09-16 LAB
ANION GAP SERPL CALC-SCNC: 11 MMOL/L (ref 10–20)
BASOPHILS # BLD AUTO: 0.03 X10*3/UL (ref 0–0.1)
BASOPHILS NFR BLD AUTO: 0.6 %
BUN SERPL-MCNC: 13 MG/DL (ref 6–23)
CALCIUM SERPL-MCNC: 9.5 MG/DL (ref 8.6–10.3)
CHLORIDE SERPL-SCNC: 101 MMOL/L (ref 98–107)
CO2 SERPL-SCNC: 30 MMOL/L (ref 21–32)
CREAT SERPL-MCNC: 1.49 MG/DL (ref 0.5–1.3)
EGFRCR SERPLBLD CKD-EPI 2021: 45 ML/MIN/1.73M*2
EOSINOPHIL # BLD AUTO: 0.28 X10*3/UL (ref 0–0.4)
EOSINOPHIL NFR BLD AUTO: 5.3 %
ERYTHROCYTE [DISTWIDTH] IN BLOOD BY AUTOMATED COUNT: 17.1 % (ref 11.5–14.5)
GLUCOSE SERPL-MCNC: 98 MG/DL (ref 74–99)
HCT VFR BLD AUTO: 42 % (ref 41–52)
HGB BLD-MCNC: 13 G/DL (ref 13.5–17.5)
IMM GRANULOCYTES # BLD AUTO: 0.02 X10*3/UL (ref 0–0.5)
IMM GRANULOCYTES NFR BLD AUTO: 0.4 % (ref 0–0.9)
LYMPHOCYTES # BLD AUTO: 0.74 X10*3/UL (ref 0.8–3)
LYMPHOCYTES NFR BLD AUTO: 14 %
MCH RBC QN AUTO: 25.4 PG (ref 26–34)
MCHC RBC AUTO-ENTMCNC: 31 G/DL (ref 32–36)
MCV RBC AUTO: 82 FL (ref 80–100)
MONOCYTES # BLD AUTO: 0.49 X10*3/UL (ref 0.05–0.8)
MONOCYTES NFR BLD AUTO: 9.3 %
NEUTROPHILS # BLD AUTO: 3.73 X10*3/UL (ref 1.6–5.5)
NEUTROPHILS NFR BLD AUTO: 70.4 %
NRBC BLD-RTO: 0 /100 WBCS (ref 0–0)
PLATELET # BLD AUTO: 153 X10*3/UL (ref 150–450)
POTASSIUM SERPL-SCNC: 3.8 MMOL/L (ref 3.5–5.3)
RBC # BLD AUTO: 5.12 X10*6/UL (ref 4.5–5.9)
SODIUM SERPL-SCNC: 138 MMOL/L (ref 136–145)
WBC # BLD AUTO: 5.3 X10*3/UL (ref 4.4–11.3)

## 2024-09-16 PROCEDURE — 36415 COLL VENOUS BLD VENIPUNCTURE: CPT

## 2024-09-16 PROCEDURE — 71046 X-RAY EXAM CHEST 2 VIEWS: CPT

## 2024-09-16 PROCEDURE — 1036F TOBACCO NON-USER: CPT | Performed by: INTERNAL MEDICINE

## 2024-09-16 PROCEDURE — 3074F SYST BP LT 130 MM HG: CPT | Performed by: INTERNAL MEDICINE

## 2024-09-16 PROCEDURE — 80048 BASIC METABOLIC PNL TOTAL CA: CPT

## 2024-09-16 PROCEDURE — 1160F RVW MEDS BY RX/DR IN RCRD: CPT | Performed by: INTERNAL MEDICINE

## 2024-09-16 PROCEDURE — 85025 COMPLETE CBC W/AUTO DIFF WBC: CPT

## 2024-09-16 PROCEDURE — 71046 X-RAY EXAM CHEST 2 VIEWS: CPT | Performed by: RADIOLOGY

## 2024-09-16 PROCEDURE — 1159F MED LIST DOCD IN RCRD: CPT | Performed by: INTERNAL MEDICINE

## 2024-09-16 PROCEDURE — 1111F DSCHRG MED/CURRENT MED MERGE: CPT | Performed by: INTERNAL MEDICINE

## 2024-09-16 PROCEDURE — 3078F DIAST BP <80 MM HG: CPT | Performed by: INTERNAL MEDICINE

## 2024-09-16 PROCEDURE — 99495 TRANSJ CARE MGMT MOD F2F 14D: CPT | Performed by: INTERNAL MEDICINE

## 2024-09-16 RX ORDER — ROSUVASTATIN CALCIUM 40 MG/1
40 TABLET, COATED ORAL DAILY
Qty: 90 TABLET | Refills: 1 | Status: SHIPPED | OUTPATIENT
Start: 2024-09-16

## 2024-09-16 RX ORDER — METOPROLOL SUCCINATE 25 MG/1
25 TABLET, EXTENDED RELEASE ORAL DAILY
Qty: 90 TABLET | Refills: 1 | Status: SHIPPED | OUTPATIENT
Start: 2024-09-16

## 2024-09-16 ASSESSMENT — ENCOUNTER SYMPTOMS
DIZZINESS: 0
BLOOD IN STOOL: 0
COUGH: 0
FATIGUE: 0
SINUS PAIN: 0
HEADACHES: 0
WHEEZING: 0
PALPITATIONS: 0
DIFFICULTY URINATING: 0
DIARRHEA: 0
ARTHRALGIAS: 0
ABDOMINAL PAIN: 0
UNEXPECTED WEIGHT CHANGE: 0
FEVER: 0
SORE THROAT: 0
BRUISES/BLEEDS EASILY: 0

## 2024-09-16 NOTE — PROGRESS NOTES
Subjective   Patient ID: Gilmer Quiñonez is a 88 y.o. male who presents for Hospital Follow-up (TCM).    Transition of care  Patient admission history and physical, hospital course, medications, verified and reviewed  Patient contacted after discharge, medications verified comes today for follow-up    Inpatient facility: [facility]  Discharge diagnosis: Dizziness  Discharged to: Home  Discharge date: 9/2/24  Initial Call date: 9/3/24  Spoke with patient/caregiver: Caregiver                                                                      Do you need assistance  visits prior to your PCP visit: No  Home health care ordered: No  Have you been contacted by home care and have a start of care date: No  Are you taking medications as prescribed at discharge: Yes     Referral to [facility] Pharmacist: No  Patient advised to bring all medications to PCP follow-up appointment.  Patient advised to follow discharge instructions until provider follow-up.  TCM visit date: 9/16/24  TCM provider visit with: BI Adames MD  Patient admitted for dizziness found to have pneumonia congestive heart failure respiratory failure patient quit taking all his medications recently patient restarted on his meds diet diuresed started antibiotics discharged home doing much better now came in today accompanied by his daughter    Status post community-acquired pneumonia patient finished antibiotic Levaquin need to follow-up chest x-ray  - Noncompliance patient counseled about compliance with medication  -CHF improved last echocardiogram EF 25 to 30%  Patient to start on Jardiance 10 mg restart torsemide 20 mg 2 tablets daily  -Chronic atrial fibrillation continue with Eliquis 2.5 twice a day metoprolol 25 mg daily  - Metabolic encephalopathy and delirium patient counseled about alcohol abstinence continue thiamine and folic acid supplement  Multivitamins avoid any beer drinking as counseled  - Weakness no significant  findings on scans stable continue with physical therapy  -Chronic kidney disease stage III AA compensated follow-up renal function closely repeat BMP  Hypercholesterolemia continue rosuvastatin  Follow-up cardiology as scheduled with Dr. Porter status post AICD pacemaker placement compensated  Follow-up results closely follow-up 4 weeks           Review of Systems   Constitutional:  Negative for fatigue, fever and unexpected weight change.   HENT:  Negative for congestion, ear discharge, ear pain, mouth sores, sinus pain and sore throat.    Eyes:  Negative for visual disturbance.   Respiratory:  Negative for cough and wheezing.    Cardiovascular:  Negative for chest pain, palpitations and leg swelling.   Gastrointestinal:  Negative for abdominal pain, blood in stool and diarrhea.   Genitourinary:  Negative for difficulty urinating.   Musculoskeletal:  Negative for arthralgias.   Skin:  Negative for rash.   Neurological:  Negative for dizziness and headaches.   Hematological:  Does not bruise/bleed easily.   Psychiatric/Behavioral:  Negative for behavioral problems.    All other systems reviewed and are negative.      Objective   Lab Results   Component Value Date    HGBA1C 6.2 (H) 08/30/2024      /62   Pulse 65   Temp 36.3 °C (97.4 °F)   Wt 72.8 kg (160 lb 9.6 oz)   SpO2 97%   BMI 25.92 kg/m²     Physical Exam  Vitals and nursing note reviewed.   Constitutional:       Appearance: Normal appearance.   HENT:      Head: Normocephalic.      Nose: Nose normal.   Eyes:      Conjunctiva/sclera: Conjunctivae normal.      Pupils: Pupils are equal, round, and reactive to light.   Cardiovascular:      Rate and Rhythm: Regular rhythm.   Pulmonary:      Effort: Pulmonary effort is normal.      Breath sounds: Normal breath sounds.   Abdominal:      General: Abdomen is flat.      Palpations: Abdomen is soft.   Musculoskeletal:      Cervical back: Neck supple.   Skin:     General: Skin is warm.   Neurological:       General: No focal deficit present.      Mental Status: He is oriented to person, place, and time.   Psychiatric:         Mood and Affect: Mood normal.         Assessment/Plan   Gilmer was seen today for hospital follow-up.  Diagnoses and all orders for this visit:  Community acquired pneumonia, unspecified laterality (Primary)  -     XR chest 2 views; Future  -     CBC and Auto Differential; Future  Coronary artery disease due to lipid rich plaque  -     apixaban (Eliquis) 2.5 mg tablet; Take 1 tablet (2.5 mg) by mouth 2 times a day.  -     rosuvastatin (Crestor) 40 mg tablet; Take 1 tablet (40 mg) by mouth once daily.  -     Basic metabolic panel; Future  Hypertension, unspecified type  -     metoprolol succinate XL (Toprol-XL) 25 mg 24 hr tablet; Take 1 tablet (25 mg) by mouth once daily.  Stage 3a chronic kidney disease (Multi)  Refractory anemia, unspecified (Multi)  Congestive heart failure, unspecified HF chronicity, unspecified heart failure type (Multi)  -     Basic metabolic panel; Future  Alcohol use  Longstanding persistent atrial fibrillation (Multi)  AICD (automatic cardioverter/defibrillator) present  Other orders  -     Follow Up In Primary Care - Established; Future   Referral to [facility] Pharmacist: No  Patient advised to bring all medications to PCP follow-up appointment.  Patient advised to follow discharge instructions until provider follow-up.  TCM visit date: 9/16/24  TCM provider visit with: BI Adames MD  Patient admitted for dizziness found to have pneumonia congestive heart failure respiratory failure patient quit taking all his medications recently patient restarted on his meds diet diuresed started antibiotics discharged home doing much better now came in today accompanied by his daughter    Status post community-acquired pneumonia patient finished antibiotic Levaquin need to follow-up chest x-ray  - Noncompliance patient counseled about compliance with medication  -CHF improved last  echocardiogram EF 25 to 30%  Patient to start on Jardiance 10 mg restart torsemide 20 mg 2 tablets daily  -Chronic atrial fibrillation continue with Eliquis 2.5 twice a day metoprolol 25 mg daily  - Metabolic encephalopathy and delirium patient counseled about alcohol abstinence continue thiamine and folic acid supplement  Multivitamins avoid any beer drinking as counseled  - Weakness no significant findings on scans stable continue with physical therapy  -Chronic kidney disease stage III AA compensated follow-up renal function closely repeat BMP  Hypercholesterolemia continue rosuvastatin  Follow-up cardiology as scheduled with Dr. Porter status post AICD pacemaker placement compensated  Follow-up results closely follow-up 4 weeks

## 2024-09-18 ENCOUNTER — HOSPITAL ENCOUNTER (OUTPATIENT)
Dept: CARDIOLOGY | Facility: CLINIC | Age: 89
Discharge: HOME | End: 2024-09-18
Payer: MEDICARE

## 2024-09-18 DIAGNOSIS — I47.29 OTHER VENTRICULAR TACHYCARDIA: ICD-10-CM

## 2024-09-18 DIAGNOSIS — Z95.810 PRESENCE OF AUTOMATIC (IMPLANTABLE) CARDIAC DEFIBRILLATOR: ICD-10-CM

## 2024-09-18 PROCEDURE — 93295 DEV INTERROG REMOTE 1/2/MLT: CPT | Performed by: INTERNAL MEDICINE

## 2024-09-18 PROCEDURE — 93296 REM INTERROG EVL PM/IDS: CPT

## 2024-10-16 ENCOUNTER — APPOINTMENT (OUTPATIENT)
Dept: PRIMARY CARE | Facility: CLINIC | Age: 89
End: 2024-10-16
Payer: MEDICARE

## 2024-10-16 VITALS
SYSTOLIC BLOOD PRESSURE: 140 MMHG | HEART RATE: 80 BPM | OXYGEN SATURATION: 95 % | DIASTOLIC BLOOD PRESSURE: 68 MMHG | TEMPERATURE: 97.3 F

## 2024-10-16 DIAGNOSIS — I50.43 CHF (CONGESTIVE HEART FAILURE), NYHA CLASS II, ACUTE ON CHRONIC, COMBINED: ICD-10-CM

## 2024-10-16 DIAGNOSIS — I25.10 CORONARY ARTERY DISEASE DUE TO LIPID RICH PLAQUE: ICD-10-CM

## 2024-10-16 DIAGNOSIS — J90 RECURRENT RIGHT PLEURAL EFFUSION: ICD-10-CM

## 2024-10-16 DIAGNOSIS — I50.9 OTHER CONGESTIVE HEART FAILURE: ICD-10-CM

## 2024-10-16 DIAGNOSIS — N18.31 STAGE 3A CHRONIC KIDNEY DISEASE (MULTI): ICD-10-CM

## 2024-10-16 DIAGNOSIS — I25.83 CORONARY ARTERY DISEASE DUE TO LIPID RICH PLAQUE: ICD-10-CM

## 2024-10-16 DIAGNOSIS — I50.9 CONGESTIVE HEART FAILURE, UNSPECIFIED HF CHRONICITY, UNSPECIFIED HEART FAILURE TYPE: Primary | ICD-10-CM

## 2024-10-16 DIAGNOSIS — I10 HYPERTENSION, UNSPECIFIED TYPE: ICD-10-CM

## 2024-10-16 PROCEDURE — 1160F RVW MEDS BY RX/DR IN RCRD: CPT | Performed by: INTERNAL MEDICINE

## 2024-10-16 PROCEDURE — 3077F SYST BP >= 140 MM HG: CPT | Performed by: INTERNAL MEDICINE

## 2024-10-16 PROCEDURE — 99214 OFFICE O/P EST MOD 30 MIN: CPT | Performed by: INTERNAL MEDICINE

## 2024-10-16 PROCEDURE — 1036F TOBACCO NON-USER: CPT | Performed by: INTERNAL MEDICINE

## 2024-10-16 PROCEDURE — 1159F MED LIST DOCD IN RCRD: CPT | Performed by: INTERNAL MEDICINE

## 2024-10-16 PROCEDURE — 3078F DIAST BP <80 MM HG: CPT | Performed by: INTERNAL MEDICINE

## 2024-10-16 PROCEDURE — G2211 COMPLEX E/M VISIT ADD ON: HCPCS | Performed by: INTERNAL MEDICINE

## 2024-10-16 RX ORDER — TORSEMIDE 20 MG/1
40 TABLET ORAL DAILY
Qty: 180 TABLET | Refills: 0 | Status: SHIPPED | OUTPATIENT
Start: 2024-10-16

## 2024-10-16 ASSESSMENT — ENCOUNTER SYMPTOMS
UNEXPECTED WEIGHT CHANGE: 0
COUGH: 0
BLOOD IN STOOL: 0
BRUISES/BLEEDS EASILY: 0
DIFFICULTY URINATING: 0
DIARRHEA: 0
ABDOMINAL PAIN: 0
SORE THROAT: 0
FEVER: 0
ARTHRALGIAS: 0
SINUS PAIN: 0
FATIGUE: 0
HEADACHES: 0
WHEEZING: 0
DIZZINESS: 0
PALPITATIONS: 0

## 2024-10-16 NOTE — PROGRESS NOTES
Subjective   Patient ID: Gilmer Quiñonez is a 88 y.o. male who presents for Pneumonia (1 month follow up).    - Recent blood work and x-ray reviewed with patient and his daughter  Discussed with patient complexity of his condition and the need for compliance follow-up cardiology as recommended we will follow-up results closely as needed  -Pneumonia resolved with  Underlying right pleural effusion compensated no shortness of breath no chest pain continue with current medication  -Mild renal sufficiency stable avoid any NSAID continue with torsemide  - -CHF improved last echocardiogram EF 25 to 30%  Continue on Jardiance 10 mg and torsemide 20 mg 2 tablets daily follow-up electrolyte as needed  -Chronic atrial fibrillation continue with Eliquis 2.5 twice a day metoprolol 25 mg daily.  - Metabolic encephalopathy and delirium patient counseled about alcohol abstinence continue thiamine and folic acid supplement  Multivitamins avoid any beer drinking as counseled.  - Weakness no significant findings on scans stable continue with physical therapy  -Chronic kidney disease stage III AA compensated follow-up renal function closely repeat BMP in 3 months  Hypercholesterolemia continue rosuvastatin  Follow-up cardiology as scheduled with Dr. Porter status post AICD pacemaker placement compensated.  Declined flu vaccine  Follow-up 3 months    Pneumonia  There is no cough or wheezing. Pertinent negatives include no chest pain, ear pain, fever, headaches or sore throat.          Review of Systems   Constitutional:  Negative for fatigue, fever and unexpected weight change.   HENT:  Negative for congestion, ear discharge, ear pain, mouth sores, sinus pain and sore throat.    Eyes:  Negative for visual disturbance.   Respiratory:  Negative for cough and wheezing.    Cardiovascular:  Negative for chest pain, palpitations and leg swelling.   Gastrointestinal:  Negative for abdominal pain, blood in stool and diarrhea.    Genitourinary:  Negative for difficulty urinating.   Musculoskeletal:  Negative for arthralgias.   Skin:  Negative for rash.   Neurological:  Negative for dizziness and headaches.   Hematological:  Does not bruise/bleed easily.   Psychiatric/Behavioral:  Negative for behavioral problems.    All other systems reviewed and are negative.      Objective   Lab Results   Component Value Date    HGBA1C 6.2 (H) 08/30/2024      /68   Pulse 80   Temp 36.3 °C (97.3 °F)   SpO2 95%   Lab Results   Component Value Date    WBC 5.3 09/16/2024    HGB 13.0 (L) 09/16/2024    HCT 42.0 09/16/2024     09/16/2024    CHOL 127 08/30/2024    TRIG 88 08/30/2024    HDL 38.4 08/30/2024    ALT 5 (L) 08/30/2024    AST 11 08/30/2024     09/16/2024    K 3.8 09/16/2024     09/16/2024    CREATININE 1.49 (H) 09/16/2024    BUN 13 09/16/2024    CO2 30 09/16/2024    TSH 1.07 04/01/2024    INR 1.3 (H) 08/29/2024    HGBA1C 6.2 (H) 08/30/2024     par === 09/16/24 ===    XR CHEST 2 VIEWS    - Impression -  No change from the prior. Bibasilar atelectatic changes. Small right  pleural effusion. Calcified pleural plaques bilaterally. The lungs  are hyperinflated      MACRO:  None    Signed by: Dinh Jenkins 9/17/2024 10:39 PM  Dictation workstation:   [id number]   Physical Exam  Vitals and nursing note reviewed.   Constitutional:       Appearance: Normal appearance.   HENT:      Head: Normocephalic.      Nose: Nose normal.   Eyes:      Conjunctiva/sclera: Conjunctivae normal.      Pupils: Pupils are equal, round, and reactive to light.   Cardiovascular:      Rate and Rhythm: Regular rhythm.   Pulmonary:      Effort: Pulmonary effort is normal.      Breath sounds: Normal breath sounds.   Abdominal:      General: Abdomen is flat.      Palpations: Abdomen is soft.   Musculoskeletal:      Cervical back: Neck supple.   Skin:     General: Skin is warm.   Neurological:      General: No focal deficit present.      Mental Status: He is  oriented to person, place, and time.   Psychiatric:         Mood and Affect: Mood normal.         Assessment/Plan   Gilmer was seen today for pneumonia.  Diagnoses and all orders for this visit:  Congestive heart failure, unspecified HF chronicity, unspecified heart failure type (Primary)  Recurrent right pleural effusion  Coronary artery disease due to lipid rich plaque  Hypertension, unspecified type  Stage 3a chronic kidney disease (Multi)  Other orders  -     Follow Up In Primary Care - Established  -     Follow Up In Primary Care - Established; Future   - Recent blood work and x-ray reviewed with patient and his daughter  -Pneumonia resolved with  Underlying right pleural effusion compensated no shortness of breath no chest pain continue with current medication  -Mild renal sufficiency stable avoid any NSAID continue with torsemide  - -CHF improved last echocardiogram EF 25 to 30%  Continue on Jardiance 10 mg and torsemide 20 mg 2 tablets daily follow-up electrolyte as needed  -Chronic atrial fibrillation continue with Eliquis 2.5 twice a day metoprolol 25 mg daily.  - Metabolic encephalopathy and delirium patient counseled about alcohol abstinence continue thiamine and folic acid supplement  Multivitamins avoid any beer drinking as counseled.  - Weakness no significant findings on scans stable continue with physical therapy  -Chronic kidney disease stage III AA compensated follow-up renal function closely repeat BMP in 3 months  Hypercholesterolemia continue rosuvastatin  Follow-up cardiology as scheduled with Dr. Porter status post AICD pacemaker placement compensated.  Declined flu vaccine  Follow-up 3 months

## 2025-01-06 ENCOUNTER — APPOINTMENT (OUTPATIENT)
Dept: PRIMARY CARE | Facility: CLINIC | Age: 89
End: 2025-01-06
Payer: MEDICARE

## 2025-01-06 ENCOUNTER — LAB (OUTPATIENT)
Dept: LAB | Facility: LAB | Age: 89
End: 2025-01-06
Payer: MEDICARE

## 2025-01-06 VITALS
WEIGHT: 158.6 LBS | DIASTOLIC BLOOD PRESSURE: 58 MMHG | BODY MASS INDEX: 27.08 KG/M2 | HEIGHT: 64 IN | SYSTOLIC BLOOD PRESSURE: 110 MMHG | TEMPERATURE: 98.4 F | OXYGEN SATURATION: 96 % | HEART RATE: 73 BPM

## 2025-01-06 DIAGNOSIS — I50.9 CONGESTIVE HEART FAILURE, UNSPECIFIED HF CHRONICITY, UNSPECIFIED HEART FAILURE TYPE: ICD-10-CM

## 2025-01-06 DIAGNOSIS — R39.14 BENIGN PROSTATIC HYPERPLASIA WITH INCOMPLETE BLADDER EMPTYING: ICD-10-CM

## 2025-01-06 DIAGNOSIS — E53.8 VITAMIN B12 DEFICIENCY: ICD-10-CM

## 2025-01-06 DIAGNOSIS — Z00.00 ROUTINE GENERAL MEDICAL EXAMINATION AT HEALTH CARE FACILITY: Primary | ICD-10-CM

## 2025-01-06 DIAGNOSIS — I50.43 CHF (CONGESTIVE HEART FAILURE), NYHA CLASS II, ACUTE ON CHRONIC, COMBINED: ICD-10-CM

## 2025-01-06 DIAGNOSIS — R30.0 DYSURIA: ICD-10-CM

## 2025-01-06 DIAGNOSIS — N18.31 STAGE 3A CHRONIC KIDNEY DISEASE (MULTI): ICD-10-CM

## 2025-01-06 DIAGNOSIS — N40.1 BENIGN PROSTATIC HYPERPLASIA WITH INCOMPLETE BLADDER EMPTYING: ICD-10-CM

## 2025-01-06 DIAGNOSIS — G62.9 NEUROPATHY: ICD-10-CM

## 2025-01-06 DIAGNOSIS — Z78.9 ALCOHOL USE: ICD-10-CM

## 2025-01-06 DIAGNOSIS — I10 HYPERTENSION, UNSPECIFIED TYPE: ICD-10-CM

## 2025-01-06 DIAGNOSIS — E55.9 VITAMIN D DEFICIENCY: ICD-10-CM

## 2025-01-06 DIAGNOSIS — I48.11 LONGSTANDING PERSISTENT ATRIAL FIBRILLATION (MULTI): ICD-10-CM

## 2025-01-06 DIAGNOSIS — Z95.810 AICD (AUTOMATIC CARDIOVERTER/DEFIBRILLATOR) PRESENT: ICD-10-CM

## 2025-01-06 DIAGNOSIS — Z00.00 ROUTINE GENERAL MEDICAL EXAMINATION AT HEALTH CARE FACILITY: ICD-10-CM

## 2025-01-06 DIAGNOSIS — E87.6 HYPOKALEMIA: ICD-10-CM

## 2025-01-06 LAB
25(OH)D3 SERPL-MCNC: 8 NG/ML (ref 30–100)
ALBUMIN SERPL BCP-MCNC: 4.4 G/DL (ref 3.4–5)
ALP SERPL-CCNC: 73 U/L (ref 33–136)
ALT SERPL W P-5'-P-CCNC: 11 U/L (ref 10–52)
ANION GAP SERPL CALC-SCNC: 12 MMOL/L (ref 10–20)
AST SERPL W P-5'-P-CCNC: 20 U/L (ref 9–39)
BASOPHILS # BLD AUTO: 0.02 X10*3/UL (ref 0–0.1)
BASOPHILS NFR BLD AUTO: 0.4 %
BILIRUB SERPL-MCNC: 0.8 MG/DL (ref 0–1.2)
BUN SERPL-MCNC: 9 MG/DL (ref 6–23)
CALCIUM SERPL-MCNC: 9.4 MG/DL (ref 8.6–10.3)
CHLORIDE SERPL-SCNC: 100 MMOL/L (ref 98–107)
CHOLEST SERPL-MCNC: 142 MG/DL (ref 0–199)
CHOLESTEROL/HDL RATIO: 2.7
CO2 SERPL-SCNC: 29 MMOL/L (ref 21–32)
CREAT SERPL-MCNC: 1.3 MG/DL (ref 0.5–1.3)
EGFRCR SERPLBLD CKD-EPI 2021: 53 ML/MIN/1.73M*2
EOSINOPHIL # BLD AUTO: 0.29 X10*3/UL (ref 0–0.4)
EOSINOPHIL NFR BLD AUTO: 5.2 %
ERYTHROCYTE [DISTWIDTH] IN BLOOD BY AUTOMATED COUNT: 15.9 % (ref 11.5–14.5)
EST. AVERAGE GLUCOSE BLD GHB EST-MCNC: 120 MG/DL
GLUCOSE SERPL-MCNC: 93 MG/DL (ref 74–99)
HBA1C MFR BLD: 5.8 %
HCT VFR BLD AUTO: 40.8 % (ref 41–52)
HDLC SERPL-MCNC: 52.5 MG/DL
HGB BLD-MCNC: 12.4 G/DL (ref 13.5–17.5)
IMM GRANULOCYTES # BLD AUTO: 0.02 X10*3/UL (ref 0–0.5)
IMM GRANULOCYTES NFR BLD AUTO: 0.4 % (ref 0–0.9)
LDLC SERPL CALC-MCNC: 77 MG/DL
LYMPHOCYTES # BLD AUTO: 0.91 X10*3/UL (ref 0.8–3)
LYMPHOCYTES NFR BLD AUTO: 16.5 %
MAGNESIUM SERPL-MCNC: 2.26 MG/DL (ref 1.6–2.4)
MCH RBC QN AUTO: 26.5 PG (ref 26–34)
MCHC RBC AUTO-ENTMCNC: 30.4 G/DL (ref 32–36)
MCV RBC AUTO: 87 FL (ref 80–100)
MONOCYTES # BLD AUTO: 0.53 X10*3/UL (ref 0.05–0.8)
MONOCYTES NFR BLD AUTO: 9.6 %
NEUTROPHILS # BLD AUTO: 3.76 X10*3/UL (ref 1.6–5.5)
NEUTROPHILS NFR BLD AUTO: 67.9 %
NON HDL CHOLESTEROL: 90 MG/DL (ref 0–149)
NRBC BLD-RTO: 0 /100 WBCS (ref 0–0)
PLATELET # BLD AUTO: 160 X10*3/UL (ref 150–450)
POTASSIUM SERPL-SCNC: 3.4 MMOL/L (ref 3.5–5.3)
PROT SERPL-MCNC: 7.4 G/DL (ref 6.4–8.2)
RBC # BLD AUTO: 4.68 X10*6/UL (ref 4.5–5.9)
SODIUM SERPL-SCNC: 138 MMOL/L (ref 136–145)
TRIGL SERPL-MCNC: 64 MG/DL (ref 0–149)
TSH SERPL-ACNC: 0.9 MIU/L (ref 0.44–3.98)
VIT B12 SERPL-MCNC: 401 PG/ML (ref 211–911)
VLDL: 13 MG/DL (ref 0–40)
WBC # BLD AUTO: 5.5 X10*3/UL (ref 4.4–11.3)

## 2025-01-06 PROCEDURE — 1158F ADVNC CARE PLAN TLK DOCD: CPT | Performed by: INTERNAL MEDICINE

## 2025-01-06 PROCEDURE — 3074F SYST BP LT 130 MM HG: CPT | Performed by: INTERNAL MEDICINE

## 2025-01-06 PROCEDURE — 99397 PER PM REEVAL EST PAT 65+ YR: CPT | Performed by: INTERNAL MEDICINE

## 2025-01-06 PROCEDURE — 1159F MED LIST DOCD IN RCRD: CPT | Performed by: INTERNAL MEDICINE

## 2025-01-06 PROCEDURE — 99214 OFFICE O/P EST MOD 30 MIN: CPT | Performed by: INTERNAL MEDICINE

## 2025-01-06 PROCEDURE — 1170F FXNL STATUS ASSESSED: CPT | Performed by: INTERNAL MEDICINE

## 2025-01-06 PROCEDURE — 82607 VITAMIN B-12: CPT

## 2025-01-06 PROCEDURE — 1036F TOBACCO NON-USER: CPT | Performed by: INTERNAL MEDICINE

## 2025-01-06 PROCEDURE — 82306 VITAMIN D 25 HYDROXY: CPT

## 2025-01-06 PROCEDURE — 1123F ACP DISCUSS/DSCN MKR DOCD: CPT | Performed by: INTERNAL MEDICINE

## 2025-01-06 PROCEDURE — 1160F RVW MEDS BY RX/DR IN RCRD: CPT | Performed by: INTERNAL MEDICINE

## 2025-01-06 PROCEDURE — 3078F DIAST BP <80 MM HG: CPT | Performed by: INTERNAL MEDICINE

## 2025-01-06 PROCEDURE — G0439 PPPS, SUBSEQ VISIT: HCPCS | Performed by: INTERNAL MEDICINE

## 2025-01-06 PROCEDURE — 83036 HEMOGLOBIN GLYCOSYLATED A1C: CPT

## 2025-01-06 RX ORDER — TAMSULOSIN HYDROCHLORIDE 0.4 MG/1
0.4 CAPSULE ORAL DAILY
Qty: 30 CAPSULE | Refills: 11 | Status: SHIPPED | OUTPATIENT
Start: 2025-01-06 | End: 2026-01-06

## 2025-01-06 ASSESSMENT — ENCOUNTER SYMPTOMS
DIFFICULTY URINATING: 0
HEADACHES: 0
PALPITATIONS: 0
SORE THROAT: 0
ARTHRALGIAS: 0
DYSURIA: 1
FATIGUE: 0
BLOOD IN STOOL: 0
DEPRESSION: 0
FEVER: 0
BRUISES/BLEEDS EASILY: 0
WHEEZING: 0
LOSS OF SENSATION IN FEET: 0
ABDOMINAL PAIN: 0
UNEXPECTED WEIGHT CHANGE: 0
SINUS PAIN: 0
DIARRHEA: 0
DIZZINESS: 0
OCCASIONAL FEELINGS OF UNSTEADINESS: 0
COUGH: 0

## 2025-01-06 ASSESSMENT — ACTIVITIES OF DAILY LIVING (ADL)
TAKING_MEDICATION: INDEPENDENT
MANAGING_FINANCES: INDEPENDENT
BATHING: INDEPENDENT
DRESSING: INDEPENDENT
GROCERY_SHOPPING: INDEPENDENT
DOING_HOUSEWORK: INDEPENDENT

## 2025-01-06 ASSESSMENT — PATIENT HEALTH QUESTIONNAIRE - PHQ9
SUM OF ALL RESPONSES TO PHQ9 QUESTIONS 1 AND 2: 0
1. LITTLE INTEREST OR PLEASURE IN DOING THINGS: NOT AT ALL
2. FEELING DOWN, DEPRESSED OR HOPELESS: NOT AT ALL

## 2025-01-06 NOTE — PROGRESS NOTES
Subjective   Reason for Visit: Gilmer Quiñonez is an 89 y.o. male here for a Medicare Wellness visit.     Past Medical, Surgical, and Family History reviewed and updated in chart.    Reviewed all medications by prescribing practitioner or clinical pharmacist (such as prescriptions, OTCs, herbal therapies and supplements) and documented in the medical record.    Medicare Annual Wellness Visit Initial, Diplopia (Double vision x 1-2 months), ears feel clogged (1-2 months), Urinary Symptom (Hard time urinated, dark urine), craving (Pepsi and drinking lots of it), and Med Refill    Annual preventive visit  - Vaccinations patient declined flu vaccine  - Screening for colon cancer obtained no need to repeat  - Screen for depression negative  - Advance of directive reviewed  - Diminished hearing bilaterally, using hearing aid  Needs complete blood work    Follow-up  - Underlying history of BPH patient not using Flomax as recommended having difficulty with urination due to change in color of the urine and mild confusion recently  At least for the last few days  Need to obtain stat urine test today for further recommendation  - Change in mental condition need to obtain stat blood work  - Status post pneumonia resolved with recently   Underlying right pleural effusion compensated no shortness of breath no chest pain continue with current medication  -Mild renal sufficiency stable avoid any NSAID continue with torsemide follow-up BMP today  - -CHF improved last echocardiogram EF 25 to 30%  Continue on Jardiance 10 mg and torsemide 20 mg 2 tablets daily follow-up electrolyte as needed follow-up electrolytes  -Chronic atrial fibrillation continue with Eliquis 2.5 twice a day metoprolol 25 mg daily.,  Follow-up CBC  - Metabolic encephalopathy and delirium patient counseled about alcohol abstinence continue thiamine and folic acid supplement.  -Hypercholesterolemia continue rosuvastatin continue low-fat diet  Follow-up cardiology  "as scheduled with Dr. Porter status post AICD pacemaker placement compensated.  Declined flu vaccine  Follow-up 4 weeks      Med Refill  Pertinent negatives include no abdominal pain, arthralgias, chest pain, congestion, coughing, fatigue, fever, headaches, rash or sore throat.       Patient Care Team:  Beronica Adames MD as PCP - General  Beronica Adames MD as PCP - [employer] PCP  SOFIE Chaudhry as Nurse Practitioner (Cardiology)  SOFIE Shirley as Nurse Practitioner (Cardiology)     Review of Systems   Constitutional:  Negative for fatigue, fever and unexpected weight change.   HENT:  Negative for congestion, ear discharge, ear pain, mouth sores, sinus pain and sore throat.    Eyes:  Negative for visual disturbance.   Respiratory:  Negative for cough and wheezing.    Cardiovascular:  Negative for chest pain, palpitations and leg swelling.   Gastrointestinal:  Negative for abdominal pain, blood in stool and diarrhea.   Genitourinary:  Positive for dysuria. Negative for difficulty urinating.   Musculoskeletal:  Negative for arthralgias.   Skin:  Negative for rash.   Neurological:  Negative for dizziness and headaches.   Hematological:  Does not bruise/bleed easily.   Psychiatric/Behavioral:  Negative for behavioral problems.    All other systems reviewed and are negative.      Objective   Vitals:  /58   Pulse 73   Temp 36.9 °C (98.4 °F)   Ht 1.626 m (5' 4\")   Wt 71.9 kg (158 lb 9.6 oz)   SpO2 96%   BMI 27.22 kg/m²     Lab Results   Component Value Date    WBC 5.3 09/16/2024    HGB 13.0 (L) 09/16/2024    HCT 42.0 09/16/2024     09/16/2024    CHOL 127 08/30/2024    TRIG 88 08/30/2024    HDL 38.4 08/30/2024    ALT 5 (L) 08/30/2024    AST 11 08/30/2024     09/16/2024    K 3.8 09/16/2024     09/16/2024    CREATININE 1.49 (H) 09/16/2024    BUN 13 09/16/2024    CO2 30 09/16/2024    TSH 1.07 04/01/2024    INR 1.3 (H) 08/29/2024    HGBA1C 6.2 (H) " 08/30/2024     par   Physical Exam  Vitals and nursing note reviewed.   Constitutional:       Appearance: Normal appearance.   HENT:      Head: Normocephalic.      Nose: Nose normal.   Eyes:      Conjunctiva/sclera: Conjunctivae normal.      Pupils: Pupils are equal, round, and reactive to light.   Cardiovascular:      Rate and Rhythm: Regular rhythm.   Pulmonary:      Effort: Pulmonary effort is normal.      Breath sounds: Normal breath sounds.   Abdominal:      General: Abdomen is flat.      Palpations: Abdomen is soft.   Musculoskeletal:      Cervical back: Neck supple.   Skin:     General: Skin is warm.   Neurological:      General: No focal deficit present.      Mental Status: He is oriented to person, place, and time.   Psychiatric:         Mood and Affect: Mood normal.         Assessment & Plan  CHF (congestive heart failure), NYHA class II, acute on chronic, combined    Orders:    empagliflozin (Jardiance) 10 mg; Take 1 tablet (10 mg) by mouth once daily.    Routine general medical examination at health care facility    Orders:    1 Year Follow Up In Primary Care - Wellness Exam; Future    CBC and Auto Differential; Future    Comprehensive Metabolic Panel; Future    Hemoglobin A1C; Future    Lipid Panel; Future    Magnesium; Future    TSH with reflex to Free T4 if abnormal; Future    Vitamin D 25-Hydroxy,Total (for eval of Vitamin D levels); Future    Congestive heart failure, unspecified HF chronicity, unspecified heart failure type    Orders:    CBC and Auto Differential; Future    Hypertension, unspecified type    Orders:    CBC and Auto Differential; Future    Stage 3a chronic kidney disease (Multi)    Orders:    CBC and Auto Differential; Future    Longstanding persistent atrial fibrillation (Multi)         Hypokalemia         Dysuria    Orders:    Urinalysis with Reflex Culture and Microscopic; Future    Vitamin D deficiency    Orders:    Vitamin D 25-Hydroxy,Total (for eval of Vitamin D levels);  Future    Neuropathy         Vitamin B12 deficiency    Orders:    Vitamin B12; Future    Alcohol use         AICD (automatic cardioverter/defibrillator) present       Medicare Annual Wellness Visit Initial, Diplopia (Double vision x 1-2 months), ears feel clogged (1-2 months), Urinary Symptom (Hard time urinated, dark urine), craving (Pepsi and drinking lots of it), and Med Refill    Annual preventive visit  - Vaccinations patient declined flu vaccine  - Screening for colon cancer obtained no need to repeat  - Screen for depression negative  - Advance of directive reviewed  - Diminished hearing bilaterally, using hearing aid  Needs complete blood work    Follow-up  - Underlying history of BPH patient not using Flomax as recommended having difficulty with urination due to change in color of the urine and mild confusion recently  At least for the last few days  Need to obtain stat urine test today for further recommendation  - Change in mental condition need to obtain stat blood work  - Status post pneumonia resolved with recently   Underlying right pleural effusion compensated no shortness of breath no chest pain continue with current medication  -Mild renal sufficiency stable avoid any NSAID continue with torsemide follow-up BMP today  - -CHF improved last echocardiogram EF 25 to 30%  Continue on Jardiance 10 mg and torsemide 20 mg 2 tablets daily follow-up electrolyte as needed follow-up electrolytes  -Chronic atrial fibrillation continue with Eliquis 2.5 twice a day metoprolol 25 mg daily.,  Follow-up CBC  - Metabolic encephalopathy and delirium patient counseled about alcohol abstinence continue thiamine and folic acid supplement.  -Hypercholesterolemia continue rosuvastatin continue low-fat diet  Follow-up cardiology as scheduled with Dr. Porter status post AICD pacemaker placement compensated.  Declined flu vaccine  Follow-up 4 weeks    Benign prostatic hyperplasia with incomplete bladder emptying    Orders:     tamsulosin (Flomax) 0.4 mg 24 hr capsule; Take 1 capsule (0.4 mg) by mouth once daily.

## 2025-01-06 NOTE — PROGRESS NOTES
"Subjective   Patient ID: Gilmer Quiñonez is a 89 y.o. male who presents for Medicare Annual Wellness Visit Initial, Diplopia (Double vision x 1-2 months), ears feel clogged (1-2 months), Urinary Symptom (Hard time urinated, dark urine), craving (Pepsi and drinking lots of it), and Med Refill.    Med Refill           Review of Systems    Objective   Lab Results   Component Value Date    HGBA1C 6.2 (H) 08/30/2024      /58   Pulse 73   Temp 36.9 °C (98.4 °F)   Ht 1.626 m (5' 4\")   Wt 71.9 kg (158 lb 9.6 oz)   SpO2 96%   BMI 27.22 kg/m²     Physical Exam    Assessment/Plan   Gilmer was seen today for medicare annual wellness visit initial, diplopia, ears feel clogged, urinary symptom, craving and med refill.  Diagnoses and all orders for this visit:  CHF (congestive heart failure), NYHA class II, acute on chronic, combined     "

## 2025-01-06 NOTE — ASSESSMENT & PLAN NOTE
Medicare Annual Wellness Visit Initial, Diplopia (Double vision x 1-2 months), ears feel clogged (1-2 months), Urinary Symptom (Hard time urinated, dark urine), craving (Pepsi and drinking lots of it), and Med Refill    Annual preventive visit  - Vaccinations patient declined flu vaccine  - Screening for colon cancer obtained no need to repeat  - Screen for depression negative  - Advance of directive reviewed  - Diminished hearing bilaterally, using hearing aid  Needs complete blood work    Follow-up  - Underlying history of BPH patient not using Flomax as recommended having difficulty with urination due to change in color of the urine and mild confusion recently  At least for the last few days  Need to obtain stat urine test today for further recommendation  - Change in mental condition need to obtain stat blood work  - Status post pneumonia resolved with recently   Underlying right pleural effusion compensated no shortness of breath no chest pain continue with current medication  -Mild renal sufficiency stable avoid any NSAID continue with torsemide follow-up BMP today  - -CHF improved last echocardiogram EF 25 to 30%  Continue on Jardiance 10 mg and torsemide 20 mg 2 tablets daily follow-up electrolyte as needed follow-up electrolytes  -Chronic atrial fibrillation continue with Eliquis 2.5 twice a day metoprolol 25 mg daily.,  Follow-up CBC  - Metabolic encephalopathy and delirium patient counseled about alcohol abstinence continue thiamine and folic acid supplement.  -Hypercholesterolemia continue rosuvastatin continue low-fat diet  Follow-up cardiology as scheduled with Dr. Porter status post AICD pacemaker placement compensated.  Declined flu vaccine  Follow-up 4 weeks

## 2025-01-07 DIAGNOSIS — E55.9 VITAMIN D DEFICIENCY: Primary | ICD-10-CM

## 2025-01-07 RX ORDER — ERGOCALCIFEROL 1.25 MG/1
50000 CAPSULE ORAL WEEKLY
Qty: 12 CAPSULE | Refills: 0 | Status: SHIPPED | OUTPATIENT
Start: 2025-01-07 | End: 2025-04-01

## 2025-02-06 ENCOUNTER — APPOINTMENT (OUTPATIENT)
Dept: PRIMARY CARE | Facility: CLINIC | Age: 89
End: 2025-02-06
Payer: MEDICARE

## 2025-03-11 ENCOUNTER — APPOINTMENT (OUTPATIENT)
Dept: CARDIOLOGY | Facility: HOSPITAL | Age: 89
End: 2025-03-11
Payer: MEDICARE

## 2025-03-11 ENCOUNTER — HOSPITAL ENCOUNTER (EMERGENCY)
Facility: HOSPITAL | Age: 89
Discharge: HOME | End: 2025-03-11
Attending: EMERGENCY MEDICINE
Payer: MEDICARE

## 2025-03-11 ENCOUNTER — APPOINTMENT (OUTPATIENT)
Dept: RADIOLOGY | Facility: HOSPITAL | Age: 89
End: 2025-03-11
Payer: MEDICARE

## 2025-03-11 VITALS
SYSTOLIC BLOOD PRESSURE: 157 MMHG | RESPIRATION RATE: 16 BRPM | WEIGHT: 174.38 LBS | TEMPERATURE: 96 F | OXYGEN SATURATION: 96 % | HEART RATE: 87 BPM | BODY MASS INDEX: 26.43 KG/M2 | DIASTOLIC BLOOD PRESSURE: 92 MMHG | HEIGHT: 68 IN
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
## 2025-03-11 DIAGNOSIS — R53.1 GENERALIZED WEAKNESS: ICD-10-CM

## 2025-03-11 LAB
ALBUMIN SERPL BCP-MCNC: 4.7 G/DL (ref 3.4–5)
ALP SERPL-CCNC: 75 U/L (ref 33–136)
ALT SERPL W P-5'-P-CCNC: 19 U/L (ref 10–52)
ANION GAP SERPL CALC-SCNC: 11 MMOL/L (ref 10–20)
APPEARANCE UR: CLEAR
AST SERPL W P-5'-P-CCNC: 57 U/L (ref 9–39)
ATRIAL RATE: 375 BPM
BASOPHILS # BLD AUTO: 0.02 X10*3/UL (ref 0–0.1)
BASOPHILS NFR BLD AUTO: 0.4 %
BILIRUB SERPL-MCNC: 0.8 MG/DL (ref 0–1.2)
BILIRUB UR STRIP.AUTO-MCNC: NEGATIVE MG/DL
BUN SERPL-MCNC: 9 MG/DL (ref 6–23)
CALCIUM SERPL-MCNC: 9.5 MG/DL (ref 8.6–10.3)
CARDIAC TROPONIN I PNL SERPL HS: 16 NG/L (ref 0–20)
CHLORIDE SERPL-SCNC: 103 MMOL/L (ref 98–107)
CO2 SERPL-SCNC: 27 MMOL/L (ref 21–32)
COLOR UR: ABNORMAL
CREAT SERPL-MCNC: 1.26 MG/DL (ref 0.5–1.3)
EGFRCR SERPLBLD CKD-EPI 2021: 55 ML/MIN/1.73M*2
EOSINOPHIL # BLD AUTO: 0.34 X10*3/UL (ref 0–0.4)
EOSINOPHIL NFR BLD AUTO: 6.4 %
ERYTHROCYTE [DISTWIDTH] IN BLOOD BY AUTOMATED COUNT: 17.2 % (ref 11.5–14.5)
GLUCOSE SERPL-MCNC: 105 MG/DL (ref 74–99)
GLUCOSE UR STRIP.AUTO-MCNC: ABNORMAL MG/DL
HCT VFR BLD AUTO: 40.1 % (ref 41–52)
HGB BLD-MCNC: 12.2 G/DL (ref 13.5–17.5)
HOLD SPECIMEN: NORMAL
HYALINE CASTS #/AREA URNS AUTO: ABNORMAL /LPF
IMM GRANULOCYTES # BLD AUTO: 0.02 X10*3/UL (ref 0–0.5)
IMM GRANULOCYTES NFR BLD AUTO: 0.4 % (ref 0–0.9)
KETONES UR STRIP.AUTO-MCNC: NEGATIVE MG/DL
LEUKOCYTE ESTERASE UR QL STRIP.AUTO: NEGATIVE
LYMPHOCYTES # BLD AUTO: 1.03 X10*3/UL (ref 0.8–3)
LYMPHOCYTES NFR BLD AUTO: 19.3 %
MAGNESIUM SERPL-MCNC: 2.24 MG/DL (ref 1.6–2.4)
MCH RBC QN AUTO: 26.1 PG (ref 26–34)
MCHC RBC AUTO-ENTMCNC: 30.4 G/DL (ref 32–36)
MCV RBC AUTO: 86 FL (ref 80–100)
MONOCYTES # BLD AUTO: 0.52 X10*3/UL (ref 0.05–0.8)
MONOCYTES NFR BLD AUTO: 9.8 %
MUCOUS THREADS #/AREA URNS AUTO: ABNORMAL /LPF
NEUTROPHILS # BLD AUTO: 3.4 X10*3/UL (ref 1.6–5.5)
NEUTROPHILS NFR BLD AUTO: 63.7 %
NITRITE UR QL STRIP.AUTO: NEGATIVE
NRBC BLD-RTO: 0 /100 WBCS (ref 0–0)
PH UR STRIP.AUTO: 5.5 [PH]
PLATELET # BLD AUTO: 147 X10*3/UL (ref 150–450)
POTASSIUM SERPL-SCNC: 5 MMOL/L (ref 3.5–5.3)
PROT SERPL-MCNC: 7.5 G/DL (ref 6.4–8.2)
PROT UR STRIP.AUTO-MCNC: ABNORMAL MG/DL
Q ONSET: 196 MS
QRS COUNT: 11 BEATS
QRS DURATION: 182 MS
QT INTERVAL: 468 MS
QTC CALCULATION(BAZETT): 497 MS
QTC FREDERICIA: 488 MS
R AXIS: -81 DEGREES
RBC # BLD AUTO: 4.67 X10*6/UL (ref 4.5–5.9)
RBC # UR STRIP.AUTO: NEGATIVE MG/DL
RBC #/AREA URNS AUTO: ABNORMAL /HPF
SODIUM SERPL-SCNC: 136 MMOL/L (ref 136–145)
SP GR UR STRIP.AUTO: 1.01
SQUAMOUS #/AREA URNS AUTO: ABNORMAL /HPF
T AXIS: 70 DEGREES
T OFFSET: 430 MS
UROBILINOGEN UR STRIP.AUTO-MCNC: NORMAL MG/DL
VENTRICULAR RATE: 68 BPM
WBC # BLD AUTO: 5.3 X10*3/UL (ref 4.4–11.3)
WBC #/AREA URNS AUTO: ABNORMAL /HPF

## 2025-03-11 PROCEDURE — 93005 ELECTROCARDIOGRAM TRACING: CPT

## 2025-03-11 PROCEDURE — 72125 CT NECK SPINE W/O DYE: CPT | Performed by: RADIOLOGY

## 2025-03-11 PROCEDURE — 70450 CT HEAD/BRAIN W/O DYE: CPT | Performed by: RADIOLOGY

## 2025-03-11 PROCEDURE — 83735 ASSAY OF MAGNESIUM: CPT | Performed by: EMERGENCY MEDICINE

## 2025-03-11 PROCEDURE — 70450 CT HEAD/BRAIN W/O DYE: CPT

## 2025-03-11 PROCEDURE — 84484 ASSAY OF TROPONIN QUANT: CPT | Performed by: EMERGENCY MEDICINE

## 2025-03-11 PROCEDURE — 71250 CT THORAX DX C-: CPT | Performed by: RADIOLOGY

## 2025-03-11 PROCEDURE — 81001 URINALYSIS AUTO W/SCOPE: CPT | Performed by: EMERGENCY MEDICINE

## 2025-03-11 PROCEDURE — 36415 COLL VENOUS BLD VENIPUNCTURE: CPT | Performed by: EMERGENCY MEDICINE

## 2025-03-11 PROCEDURE — 85025 COMPLETE CBC W/AUTO DIFF WBC: CPT | Performed by: EMERGENCY MEDICINE

## 2025-03-11 PROCEDURE — 99285 EMERGENCY DEPT VISIT HI MDM: CPT | Mod: 25 | Performed by: EMERGENCY MEDICINE

## 2025-03-11 PROCEDURE — 72125 CT NECK SPINE W/O DYE: CPT

## 2025-03-11 PROCEDURE — 84075 ASSAY ALKALINE PHOSPHATASE: CPT | Performed by: EMERGENCY MEDICINE

## 2025-03-11 PROCEDURE — 71250 CT THORAX DX C-: CPT

## 2025-03-11 ASSESSMENT — PAIN SCALES - GENERAL
PAINLEVEL_OUTOF10: 0 - NO PAIN

## 2025-03-11 ASSESSMENT — COLUMBIA-SUICIDE SEVERITY RATING SCALE - C-SSRS
6. HAVE YOU EVER DONE ANYTHING, STARTED TO DO ANYTHING, OR PREPARED TO DO ANYTHING TO END YOUR LIFE?: NO
2. HAVE YOU ACTUALLY HAD ANY THOUGHTS OF KILLING YOURSELF?: NO
1. IN THE PAST MONTH, HAVE YOU WISHED YOU WERE DEAD OR WISHED YOU COULD GO TO SLEEP AND NOT WAKE UP?: NO

## 2025-03-11 ASSESSMENT — PAIN - FUNCTIONAL ASSESSMENT: PAIN_FUNCTIONAL_ASSESSMENT: 0-10

## 2025-03-11 NOTE — PROGRESS NOTES
Emergency Medicine Transition of Care Note.    I received Gilmer Quiñonez in signout from Dr. COLLIER.  Please see the previous ED provider note for all HPI, PE and MDM up to the time of signout at 0700. This is in addition to the primary record.    In brief Gilmer Quiñonez is an 89 y.o. male presenting for   Chief Complaint   Patient presents with    Fall     Pt was found on the floor of his home by his daughter, per ems, the patient said he had been laying there for 4 hours, his daughter who lives with him said that he had only been laying there for 4-5 minutes. Pt denies any pain, he states he was too week to get up.      At the time of signout we were awaiting: CTs    ED Course as of 03/11/25 1419 Tue Mar 11, 2025   0535 EKG obtained at 440, interpreted by myself.  Ventricular paced rhythm at a rate of 68, left axis deviation, QRS of 182, no acute ischemic changes [VT]      ED Course User Index  [VT] Dayami PITTMAN MD         Diagnoses as of 03/11/25 1419   Fall, initial encounter   Generalized weakness       Medical Decision Making  Pleasant 89-year-old gentleman patient is slightly confused but that this is new baseline.  I had a long conversation with the daughter at this time the plan is to discharge the patient home she is not interested in placing him in a nursing home or anywhere to wants to take care of him.  Again clinically patient is well appearing vital signs are stable workup is negative for any emergent findings.  Patient is sitting in the chair and the daughter is in the bed.  With no complaints no other issues at this time plan is to discharge the patient home follow-up as an outpatient.  I did notify the patient's PCP Dr. Rodriges and he will follow-up with the patient.        Final diagnoses:   [W19.XXXA] Fall, initial encounter   [R53.1] Generalized weakness           Procedure  Procedures    Riley Joshua, DO

## 2025-03-11 NOTE — ED TRIAGE NOTES
Pt was found on the floor of his home by his daughter, per ems, the patient said he had been laying there for 4 hours, his daughter who lives with him said that he had only been laying there for 4-5 minutes. Pt denies any pain, he states he was too week to get up.

## 2025-03-11 NOTE — ED PROVIDER NOTES
HPI   Chief Complaint   Patient presents with    Fall     Pt was found on the floor of his home by his daughter, per ems, the patient said he had been laying there for 4 hours, his daughter who lives with him said that he had only been laying there for 4-5 minutes. Pt denies any pain, he states he was too week to get up.        HPI  Patient is an 89-year-old male brought to the ED today via EMS for a fall and generalized weakness.  Patient explains that he accidentally rolled out of bed tonight.  He believes he was on the ground for about 4 hours, but daughter who lives with him states that he was only there for 4 to 5 minutes.  Patient explains that he was too weak to get up on his own, which is why EMS was contacted.  Per daughter, patient has been having increasing generalized weakness and maybe some increasing confusion over the past week or so.  Patient himself otherwise has no complaints at this time.  He denies any areas of pain.  He denies hitting his head or losing consciousness.  He specifically denies any chest pain, shortness of breath, or abdominal pain.      Patient History   Past Medical History:   Diagnosis Date    Personal history of pneumonia (recurrent) 07/15/2021    History of pneumonia     Past Surgical History:   Procedure Laterality Date    APPENDECTOMY  02/06/2015    Appendectomy    CORONARY ARTERY BYPASS GRAFT  07/17/2017    CABG    CT ABDOMEN PELVIS ANGIOGRAM W AND/OR WO IV CONTRAST  2/28/2022    CT ABDOMEN PELVIS ANGIOGRAM W AND/OR WO IV CONTRAST 2/28/2022 [facility]    CT ANGIO NECK  4/1/2023    CT NECK ANGIO W AND WO IV CONTRAST GEN CT    CT HEAD ANGIO W AND WO IV CONTRAST  4/1/2023    CT HEAD ANGIO W AND WO IV CONTRAST GEN CT    KNEE SURGERY  02/06/2015    Knee Surgery    OTHER SURGICAL HISTORY  01/06/2020    Cardioverter defibrillator insertion    OTHER SURGICAL HISTORY  01/06/2020    Coronary artery stent placement     Family History   Problem Relation Name Age of Onset     Coronary artery disease Father       Social History     Tobacco Use    Smoking status: Former     Current packs/day: 0.00     Average packs/day: 1 pack/day for 49.0 years (49.0 ttl pk-yrs)     Types: Cigarettes     Start date:      Quit date:      Years since quittin.2    Smokeless tobacco: Never   Vaping Use    Vaping status: Never Used   Substance Use Topics    Alcohol use: Not Currently     Alcohol/week: 1.0 standard drink of alcohol     Types: 1 Cans of beer per week    Drug use: Never       Physical Exam   ED Triage Vitals [25 0436]   Temperature Heart Rate Respirations BP   36.6 °C (97.9 °F) 73 17 (!) 169/99      Pulse Ox Temp Source Heart Rate Source Patient Position   98 % Temporal Monitor --      BP Location FiO2 (%)     -- --       Physical Exam  Vitals and nursing note reviewed.   Constitutional:       General: He is not in acute distress.     Appearance: He is not toxic-appearing.   HENT:      Head: Normocephalic and atraumatic.      Mouth/Throat:      Mouth: Mucous membranes are moist.   Eyes:      Extraocular Movements: Extraocular movements intact.      Conjunctiva/sclera: Conjunctivae normal.   Cardiovascular:      Rate and Rhythm: Normal rate and regular rhythm.      Pulses: Normal pulses.      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  Well-healed midline surgical scar is present.  Pulmonary:      Effort: Pulmonary effort is normal. No respiratory distress.      Breath sounds: Normal breath sounds. No wheezing.   Abdominal:      General: There is no distension.      Palpations: Abdomen is soft.      Tenderness: There is no abdominal tenderness.   Musculoskeletal:         General: No swelling.      Cervical back: Neck supple.      Comments: Full active range of motion of bilateral upper and lower extremities without pain.  No bony tenderness to palpation.   Skin:     General: Skin is warm and dry.      Capillary Refill: Capillary refill takes less than 2  seconds.   Neurological:      General: No focal deficit present.      Mental Status: He is alert. Mental status is at baseline.      Comments: This patient is awake, alert and oriented to person, place, and situation. Speech is clear and fluent. Cranial nerves II-XII are grossly intact. Strength and sensation are intact in all extremities.              ED Course & MDM   ED Course as of 03/11/25 0653   Tue Mar 11, 2025   0535 EKG obtained at 440, interpreted by myself.  Ventricular paced rhythm at a rate of 68, left axis deviation, QRS of 182, no acute ischemic changes [VT]      ED Course User Index  [VT] Dayami PITTMAN MD         Diagnoses as of 03/11/25 0653   Fall, initial encounter   Generalized weakness             No data recorded     Bentley Coma Scale Score: 15 (03/11/25 0508 : Chelsea Salinas, RN)       NIH Stroke Scale: 0 (03/11/25 0508 : Chelsea Salinas, RN)                   Medical Decision Making  Patient was seen and evaluated for a mechanical fall and generalized weakness.  On arrival, vital signs are unremarkable.  Patient himself has no complaints.  Patient is placed on a cardiac monitor with continuous pulse ox.  Additional lab work and imaging are ordered for further evaluation.    CBC is unremarkable.  CMP is unremarkable.  Magnesium is normal at 2.24.  High-sensitivity troponin is normal at 16.  Urinalysis is not indicative of infection.    At this time, CT imaging remains pending.  Patient will be signed out to the oncoming physician, Dr. Joshua, pending imaging results and further disposition.    Procedure  Procedures     Dayami PITTMAN MD  03/11/25 0694

## 2025-03-12 ENCOUNTER — APPOINTMENT (OUTPATIENT)
Dept: RADIOLOGY | Facility: HOSPITAL | Age: 89
DRG: 312 | End: 2025-03-12
Payer: MEDICARE

## 2025-03-12 ENCOUNTER — HOSPITAL ENCOUNTER (INPATIENT)
Facility: HOSPITAL | Age: 89
DRG: 312 | End: 2025-03-12
Attending: EMERGENCY MEDICINE | Admitting: INTERNAL MEDICINE
Payer: MEDICARE

## 2025-03-12 ENCOUNTER — APPOINTMENT (OUTPATIENT)
Dept: CARDIOLOGY | Facility: HOSPITAL | Age: 89
DRG: 312 | End: 2025-03-12
Payer: MEDICARE

## 2025-03-12 DIAGNOSIS — R29.6 FREQUENT FALLS: ICD-10-CM

## 2025-03-12 DIAGNOSIS — R41.82 ACUTE ALTERATION IN MENTAL STATUS: ICD-10-CM

## 2025-03-12 DIAGNOSIS — R55 SYNCOPE, UNSPECIFIED SYNCOPE TYPE: ICD-10-CM

## 2025-03-12 DIAGNOSIS — R55 SYNCOPE AND COLLAPSE: Primary | ICD-10-CM
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
## 2025-03-12 LAB
ALBUMIN SERPL BCP-MCNC: 4.4 G/DL (ref 3.4–5)
ALP SERPL-CCNC: 75 U/L (ref 33–136)
ALT SERPL W P-5'-P-CCNC: 11 U/L (ref 10–52)
ANION GAP SERPL CALC-SCNC: 11 MMOL/L (ref 10–20)
APPEARANCE UR: CLEAR
AST SERPL W P-5'-P-CCNC: 18 U/L (ref 9–39)
BASOPHILS # BLD AUTO: 0.02 X10*3/UL (ref 0–0.1)
BASOPHILS NFR BLD AUTO: 0.4 %
BILIRUB SERPL-MCNC: 0.8 MG/DL (ref 0–1.2)
BILIRUB UR STRIP.AUTO-MCNC: NEGATIVE MG/DL
BUN SERPL-MCNC: 9 MG/DL (ref 6–23)
CALCIUM SERPL-MCNC: 9.7 MG/DL (ref 8.6–10.3)
CARDIAC TROPONIN I PNL SERPL HS: 19 NG/L (ref 0–20)
CARDIAC TROPONIN I PNL SERPL HS: 19 NG/L (ref 0–20)
CHLORIDE SERPL-SCNC: 107 MMOL/L (ref 98–107)
CO2 SERPL-SCNC: 27 MMOL/L (ref 21–32)
COLOR UR: ABNORMAL
CREAT SERPL-MCNC: 1.17 MG/DL (ref 0.5–1.3)
EGFRCR SERPLBLD CKD-EPI 2021: 60 ML/MIN/1.73M*2
EOSINOPHIL # BLD AUTO: 0.27 X10*3/UL (ref 0–0.4)
EOSINOPHIL NFR BLD AUTO: 4.8 %
ERYTHROCYTE [DISTWIDTH] IN BLOOD BY AUTOMATED COUNT: 17.2 % (ref 11.5–14.5)
FLUAV RNA RESP QL NAA+PROBE: NOT DETECTED
FLUBV RNA RESP QL NAA+PROBE: NOT DETECTED
GLUCOSE SERPL-MCNC: 92 MG/DL (ref 74–99)
GLUCOSE UR STRIP.AUTO-MCNC: ABNORMAL MG/DL
HCT VFR BLD AUTO: 38.8 % (ref 41–52)
HGB BLD-MCNC: 11.8 G/DL (ref 13.5–17.5)
HOLD SPECIMEN: NORMAL
IMM GRANULOCYTES # BLD AUTO: 0.01 X10*3/UL (ref 0–0.5)
IMM GRANULOCYTES NFR BLD AUTO: 0.2 % (ref 0–0.9)
INR PPP: 1.2 (ref 0.9–1.1)
KETONES UR STRIP.AUTO-MCNC: NEGATIVE MG/DL
LACTATE SERPL-SCNC: 1.5 MMOL/L (ref 0.4–2)
LEUKOCYTE ESTERASE UR QL STRIP.AUTO: NEGATIVE
LYMPHOCYTES # BLD AUTO: 0.62 X10*3/UL (ref 0.8–3)
LYMPHOCYTES NFR BLD AUTO: 10.9 %
MAGNESIUM SERPL-MCNC: 1.99 MG/DL (ref 1.6–2.4)
MCH RBC QN AUTO: 26 PG (ref 26–34)
MCHC RBC AUTO-ENTMCNC: 30.4 G/DL (ref 32–36)
MCV RBC AUTO: 86 FL (ref 80–100)
MONOCYTES # BLD AUTO: 0.49 X10*3/UL (ref 0.05–0.8)
MONOCYTES NFR BLD AUTO: 8.6 %
MUCOUS THREADS #/AREA URNS AUTO: NORMAL /LPF
NEUTROPHILS # BLD AUTO: 4.27 X10*3/UL (ref 1.6–5.5)
NEUTROPHILS NFR BLD AUTO: 75.1 %
NITRITE UR QL STRIP.AUTO: NEGATIVE
NRBC BLD-RTO: 0 /100 WBCS (ref 0–0)
PH UR STRIP.AUTO: 5.5 [PH]
PLATELET # BLD AUTO: 139 X10*3/UL (ref 150–450)
POTASSIUM SERPL-SCNC: 3.8 MMOL/L (ref 3.5–5.3)
PROT SERPL-MCNC: 6.8 G/DL (ref 6.4–8.2)
PROT UR STRIP.AUTO-MCNC: ABNORMAL MG/DL
PROTHROMBIN TIME: 12.8 SECONDS (ref 9.8–12.4)
Q ONSET: 213 MS
QRS COUNT: 11 BEATS
QRS DURATION: 164 MS
QT INTERVAL: 444 MS
QTC CALCULATION(BAZETT): 469 MS
QTC FREDERICIA: 461 MS
R AXIS: -60 DEGREES
RBC # BLD AUTO: 4.54 X10*6/UL (ref 4.5–5.9)
RBC # UR STRIP.AUTO: NEGATIVE MG/DL
RBC #/AREA URNS AUTO: NORMAL /HPF
SARS-COV-2 RNA RESP QL NAA+PROBE: NOT DETECTED
SODIUM SERPL-SCNC: 141 MMOL/L (ref 136–145)
SP GR UR STRIP.AUTO: 1.02
T AXIS: 9 DEGREES
T OFFSET: 435 MS
UROBILINOGEN UR STRIP.AUTO-MCNC: NORMAL MG/DL
VENTRICULAR RATE: 67 BPM
WBC # BLD AUTO: 5.7 X10*3/UL (ref 4.4–11.3)
WBC #/AREA URNS AUTO: NORMAL /HPF

## 2025-03-12 PROCEDURE — 85610 PROTHROMBIN TIME: CPT | Performed by: EMERGENCY MEDICINE

## 2025-03-12 PROCEDURE — G0378 HOSPITAL OBSERVATION PER HR: HCPCS

## 2025-03-12 PROCEDURE — 85025 COMPLETE CBC W/AUTO DIFF WBC: CPT | Performed by: EMERGENCY MEDICINE

## 2025-03-12 PROCEDURE — 70450 CT HEAD/BRAIN W/O DYE: CPT | Performed by: RADIOLOGY

## 2025-03-12 PROCEDURE — 84484 ASSAY OF TROPONIN QUANT: CPT | Performed by: EMERGENCY MEDICINE

## 2025-03-12 PROCEDURE — 72125 CT NECK SPINE W/O DYE: CPT | Performed by: RADIOLOGY

## 2025-03-12 PROCEDURE — 72125 CT NECK SPINE W/O DYE: CPT

## 2025-03-12 PROCEDURE — 36415 COLL VENOUS BLD VENIPUNCTURE: CPT | Performed by: EMERGENCY MEDICINE

## 2025-03-12 PROCEDURE — 99285 EMERGENCY DEPT VISIT HI MDM: CPT | Mod: 25 | Performed by: EMERGENCY MEDICINE

## 2025-03-12 PROCEDURE — 87636 SARSCOV2 & INF A&B AMP PRB: CPT | Performed by: EMERGENCY MEDICINE

## 2025-03-12 PROCEDURE — 2500000001 HC RX 250 WO HCPCS SELF ADMINISTERED DRUGS (ALT 637 FOR MEDICARE OP)

## 2025-03-12 PROCEDURE — 93005 ELECTROCARDIOGRAM TRACING: CPT

## 2025-03-12 PROCEDURE — 70450 CT HEAD/BRAIN W/O DYE: CPT

## 2025-03-12 PROCEDURE — 94760 N-INVAS EAR/PLS OXIMETRY 1: CPT

## 2025-03-12 PROCEDURE — 84075 ASSAY ALKALINE PHOSPHATASE: CPT | Performed by: EMERGENCY MEDICINE

## 2025-03-12 PROCEDURE — 83735 ASSAY OF MAGNESIUM: CPT | Performed by: EMERGENCY MEDICINE

## 2025-03-12 PROCEDURE — P9612 CATHETERIZE FOR URINE SPEC: HCPCS

## 2025-03-12 PROCEDURE — 83605 ASSAY OF LACTIC ACID: CPT | Performed by: EMERGENCY MEDICINE

## 2025-03-12 PROCEDURE — 2500000005 HC RX 250 GENERAL PHARMACY W/O HCPCS

## 2025-03-12 PROCEDURE — 81003 URINALYSIS AUTO W/O SCOPE: CPT | Performed by: EMERGENCY MEDICINE

## 2025-03-12 PROCEDURE — 2500000001 HC RX 250 WO HCPCS SELF ADMINISTERED DRUGS (ALT 637 FOR MEDICARE OP): Performed by: NURSE PRACTITIONER

## 2025-03-12 PROCEDURE — 2500000002 HC RX 250 W HCPCS SELF ADMINISTERED DRUGS (ALT 637 FOR MEDICARE OP, ALT 636 FOR OP/ED)

## 2025-03-12 RX ORDER — ONDANSETRON 4 MG/1
4 TABLET, FILM COATED ORAL EVERY 8 HOURS PRN
Status: DISCONTINUED | OUTPATIENT
Start: 2025-03-12 | End: 2025-03-17 | Stop reason: HOSPADM

## 2025-03-12 RX ORDER — ROSUVASTATIN CALCIUM 10 MG/1
40 TABLET, COATED ORAL DAILY
Status: DISCONTINUED | OUTPATIENT
Start: 2025-03-12 | End: 2025-03-17 | Stop reason: HOSPADM

## 2025-03-12 RX ORDER — ACETAMINOPHEN 160 MG/5ML
650 SOLUTION ORAL EVERY 4 HOURS PRN
Status: DISCONTINUED | OUTPATIENT
Start: 2025-03-12 | End: 2025-03-17 | Stop reason: HOSPADM

## 2025-03-12 RX ORDER — LIDOCAINE HYDROCHLORIDE 20 MG/ML
JELLY TOPICAL
Status: COMPLETED
Start: 2025-03-12 | End: 2025-03-12

## 2025-03-12 RX ORDER — PANTOPRAZOLE SODIUM 40 MG/10ML
40 INJECTION, POWDER, LYOPHILIZED, FOR SOLUTION INTRAVENOUS
Status: DISCONTINUED | OUTPATIENT
Start: 2025-03-13 | End: 2025-03-17 | Stop reason: HOSPADM

## 2025-03-12 RX ORDER — PANTOPRAZOLE SODIUM 40 MG/1
40 TABLET, DELAYED RELEASE ORAL
Status: DISCONTINUED | OUTPATIENT
Start: 2025-03-13 | End: 2025-03-17 | Stop reason: HOSPADM

## 2025-03-12 RX ORDER — TORSEMIDE 20 MG/1
40 TABLET ORAL DAILY
Status: DISCONTINUED | OUTPATIENT
Start: 2025-03-12 | End: 2025-03-17 | Stop reason: HOSPADM

## 2025-03-12 RX ORDER — LIDOCAINE HYDROCHLORIDE 20 MG/ML
1 JELLY TOPICAL ONCE
Status: COMPLETED | OUTPATIENT
Start: 2025-03-12 | End: 2025-03-12

## 2025-03-12 RX ORDER — ACETAMINOPHEN 325 MG/1
650 TABLET ORAL EVERY 4 HOURS PRN
Status: DISCONTINUED | OUTPATIENT
Start: 2025-03-12 | End: 2025-03-17 | Stop reason: HOSPADM

## 2025-03-12 RX ORDER — AMOXICILLIN 250 MG
1 CAPSULE ORAL NIGHTLY PRN
Status: DISCONTINUED | OUTPATIENT
Start: 2025-03-12 | End: 2025-03-17 | Stop reason: HOSPADM

## 2025-03-12 RX ORDER — TAMSULOSIN HYDROCHLORIDE 0.4 MG/1
0.4 CAPSULE ORAL DAILY
Status: DISCONTINUED | OUTPATIENT
Start: 2025-03-12 | End: 2025-03-17 | Stop reason: HOSPADM

## 2025-03-12 RX ORDER — ONDANSETRON HYDROCHLORIDE 2 MG/ML
4 INJECTION, SOLUTION INTRAVENOUS EVERY 8 HOURS PRN
Status: DISCONTINUED | OUTPATIENT
Start: 2025-03-12 | End: 2025-03-17 | Stop reason: HOSPADM

## 2025-03-12 RX ORDER — ACETAMINOPHEN 500 MG
5 TABLET ORAL NIGHTLY PRN
Status: DISCONTINUED | OUTPATIENT
Start: 2025-03-12 | End: 2025-03-17 | Stop reason: HOSPADM

## 2025-03-12 RX ORDER — CALCIUM CARBONATE 200(500)MG
500 TABLET,CHEWABLE ORAL 4 TIMES DAILY PRN
Status: DISCONTINUED | OUTPATIENT
Start: 2025-03-12 | End: 2025-03-17 | Stop reason: HOSPADM

## 2025-03-12 RX ORDER — ACETAMINOPHEN 650 MG/1
650 SUPPOSITORY RECTAL EVERY 4 HOURS PRN
Status: DISCONTINUED | OUTPATIENT
Start: 2025-03-12 | End: 2025-03-17 | Stop reason: HOSPADM

## 2025-03-12 RX ORDER — METOPROLOL SUCCINATE 25 MG/1
25 TABLET, EXTENDED RELEASE ORAL DAILY
Status: DISCONTINUED | OUTPATIENT
Start: 2025-03-12 | End: 2025-03-17 | Stop reason: HOSPADM

## 2025-03-12 RX ADMIN — EMPAGLIFLOZIN 10 MG: 10 TABLET, FILM COATED ORAL at 16:30

## 2025-03-12 RX ADMIN — TAMSULOSIN HYDROCHLORIDE 0.4 MG: 0.4 CAPSULE ORAL at 16:30

## 2025-03-12 RX ADMIN — Medication 5 MG: at 20:37

## 2025-03-12 RX ADMIN — METOPROLOL SUCCINATE 25 MG: 25 TABLET, EXTENDED RELEASE ORAL at 16:30

## 2025-03-12 RX ADMIN — ROSUVASTATIN CALCIUM 40 MG: 10 TABLET, FILM COATED ORAL at 20:37

## 2025-03-12 RX ADMIN — APIXABAN 2.5 MG: 2.5 TABLET, FILM COATED ORAL at 20:37

## 2025-03-12 RX ADMIN — TORSEMIDE 40 MG: 20 TABLET ORAL at 16:30

## 2025-03-12 RX ADMIN — LIDOCAINE HYDROCHLORIDE 1 APPLICATION: 20 JELLY TOPICAL at 09:35

## 2025-03-12 SDOH — ECONOMIC STABILITY: HOUSING INSECURITY: IN THE LAST 12 MONTHS, WAS THERE A TIME WHEN YOU WERE NOT ABLE TO PAY THE MORTGAGE OR RENT ON TIME?: NO

## 2025-03-12 SDOH — ECONOMIC STABILITY: FOOD INSECURITY: HOW HARD IS IT FOR YOU TO PAY FOR THE VERY BASICS LIKE FOOD, HOUSING, MEDICAL CARE, AND HEATING?: NOT VERY HARD

## 2025-03-12 SDOH — SOCIAL STABILITY: SOCIAL INSECURITY
WITHIN THE LAST YEAR, HAVE YOU BEEN KICKED, HIT, SLAPPED, OR OTHERWISE PHYSICALLY HURT BY YOUR PARTNER OR EX-PARTNER?: NO

## 2025-03-12 SDOH — SOCIAL STABILITY: SOCIAL INSECURITY: ARE YOU OR HAVE YOU BEEN THREATENED OR ABUSED PHYSICALLY, EMOTIONALLY, OR SEXUALLY BY ANYONE?: NO

## 2025-03-12 SDOH — SOCIAL STABILITY: SOCIAL INSECURITY: WITHIN THE LAST YEAR, HAVE YOU BEEN AFRAID OF YOUR PARTNER OR EX-PARTNER?: NO

## 2025-03-12 SDOH — ECONOMIC STABILITY: FOOD INSECURITY: WITHIN THE PAST 12 MONTHS, THE FOOD YOU BOUGHT JUST DIDN'T LAST AND YOU DIDN'T HAVE MONEY TO GET MORE.: NEVER TRUE

## 2025-03-12 SDOH — SOCIAL STABILITY: SOCIAL INSECURITY: WITHIN THE LAST YEAR, HAVE YOU BEEN HUMILIATED OR EMOTIONALLY ABUSED IN OTHER WAYS BY YOUR PARTNER OR EX-PARTNER?: NO

## 2025-03-12 SDOH — SOCIAL STABILITY: SOCIAL INSECURITY: ARE THERE ANY APPARENT SIGNS OF INJURIES/BEHAVIORS THAT COULD BE RELATED TO ABUSE/NEGLECT?: NO

## 2025-03-12 SDOH — SOCIAL STABILITY: SOCIAL INSECURITY: HAVE YOU HAD THOUGHTS OF HARMING ANYONE ELSE?: YES

## 2025-03-12 SDOH — HEALTH STABILITY: MENTAL HEALTH: HOW OFTEN DO YOU HAVE SIX OR MORE DRINKS ON ONE OCCASION?: NEVER

## 2025-03-12 SDOH — ECONOMIC STABILITY: HOUSING INSECURITY: IN THE PAST 12 MONTHS, HOW MANY TIMES HAVE YOU MOVED WHERE YOU WERE LIVING?: 0

## 2025-03-12 SDOH — SOCIAL STABILITY: SOCIAL INSECURITY: DOES ANYONE TRY TO KEEP YOU FROM HAVING/CONTACTING OTHER FRIENDS OR DOING THINGS OUTSIDE YOUR HOME?: NO

## 2025-03-12 SDOH — SOCIAL STABILITY: SOCIAL INSECURITY: DO YOU FEEL ANYONE HAS EXPLOITED OR TAKEN ADVANTAGE OF YOU FINANCIALLY OR OF YOUR PERSONAL PROPERTY?: NO

## 2025-03-12 SDOH — ECONOMIC STABILITY: HOUSING INSECURITY: AT ANY TIME IN THE PAST 12 MONTHS, WERE YOU HOMELESS OR LIVING IN A SHELTER (INCLUDING NOW)?: NO

## 2025-03-12 SDOH — ECONOMIC STABILITY: INCOME INSECURITY: IN THE PAST 12 MONTHS HAS THE ELECTRIC, GAS, OIL, OR WATER COMPANY THREATENED TO SHUT OFF SERVICES IN YOUR HOME?: NO

## 2025-03-12 SDOH — SOCIAL STABILITY: SOCIAL INSECURITY: WERE YOU ABLE TO COMPLETE ALL THE BEHAVIORAL HEALTH SCREENINGS?: YES

## 2025-03-12 SDOH — SOCIAL STABILITY: SOCIAL INSECURITY: ABUSE: ADULT

## 2025-03-12 SDOH — SOCIAL STABILITY: SOCIAL INSECURITY
WITHIN THE LAST YEAR, HAVE YOU BEEN RAPED OR FORCED TO HAVE ANY KIND OF SEXUAL ACTIVITY BY YOUR PARTNER OR EX-PARTNER?: NO

## 2025-03-12 SDOH — HEALTH STABILITY: PHYSICAL HEALTH: ON AVERAGE, HOW MANY MINUTES DO YOU ENGAGE IN EXERCISE AT THIS LEVEL?: 0 MIN

## 2025-03-12 SDOH — ECONOMIC STABILITY: FOOD INSECURITY: WITHIN THE PAST 12 MONTHS, YOU WORRIED THAT YOUR FOOD WOULD RUN OUT BEFORE YOU GOT THE MONEY TO BUY MORE.: NEVER TRUE

## 2025-03-12 SDOH — SOCIAL STABILITY: SOCIAL INSECURITY: DO YOU FEEL UNSAFE GOING BACK TO THE PLACE WHERE YOU ARE LIVING?: NO

## 2025-03-12 SDOH — HEALTH STABILITY: MENTAL HEALTH
DO YOU FEEL STRESS - TENSE, RESTLESS, NERVOUS, OR ANXIOUS, OR UNABLE TO SLEEP AT NIGHT BECAUSE YOUR MIND IS TROUBLED ALL THE TIME - THESE DAYS?: NOT AT ALL

## 2025-03-12 SDOH — HEALTH STABILITY: MENTAL HEALTH: HOW OFTEN DO YOU HAVE A DRINK CONTAINING ALCOHOL?: MONTHLY OR LESS

## 2025-03-12 SDOH — HEALTH STABILITY: MENTAL HEALTH: HOW MANY DRINKS CONTAINING ALCOHOL DO YOU HAVE ON A TYPICAL DAY WHEN YOU ARE DRINKING?: PATIENT DOES NOT DRINK

## 2025-03-12 SDOH — HEALTH STABILITY: PHYSICAL HEALTH: ON AVERAGE, HOW MANY DAYS PER WEEK DO YOU ENGAGE IN MODERATE TO STRENUOUS EXERCISE (LIKE A BRISK WALK)?: 0 DAYS

## 2025-03-12 SDOH — ECONOMIC STABILITY: TRANSPORTATION INSECURITY: IN THE PAST 12 MONTHS, HAS LACK OF TRANSPORTATION KEPT YOU FROM MEDICAL APPOINTMENTS OR FROM GETTING MEDICATIONS?: NO

## 2025-03-12 SDOH — SOCIAL STABILITY: SOCIAL INSECURITY: HAS ANYONE EVER THREATENED TO HURT YOUR FAMILY OR YOUR PETS?: NO

## 2025-03-12 SDOH — SOCIAL STABILITY: SOCIAL INSECURITY: HAVE YOU HAD ANY THOUGHTS OF HARMING ANYONE ELSE?: NO

## 2025-03-12 ASSESSMENT — COGNITIVE AND FUNCTIONAL STATUS - GENERAL
STANDING UP FROM CHAIR USING ARMS: A LITTLE
DAILY ACTIVITIY SCORE: 18
DRESSING REGULAR UPPER BODY CLOTHING: A LITTLE
MOVING FROM LYING ON BACK TO SITTING ON SIDE OF FLAT BED WITH BEDRAILS: A LITTLE
EATING MEALS: A LITTLE
TURNING FROM BACK TO SIDE WHILE IN FLAT BAD: A LITTLE
TOILETING: A LITTLE
MOVING TO AND FROM BED TO CHAIR: A LITTLE
PATIENT BASELINE BEDBOUND: NO
PERSONAL GROOMING: A LITTLE
WALKING IN HOSPITAL ROOM: A LOT
HELP NEEDED FOR BATHING: A LITTLE
CLIMB 3 TO 5 STEPS WITH RAILING: A LOT
MOBILITY SCORE: 16
DRESSING REGULAR LOWER BODY CLOTHING: A LITTLE

## 2025-03-12 ASSESSMENT — COLUMBIA-SUICIDE SEVERITY RATING SCALE - C-SSRS
2. HAVE YOU ACTUALLY HAD ANY THOUGHTS OF KILLING YOURSELF?: NO
1. IN THE PAST MONTH, HAVE YOU WISHED YOU WERE DEAD OR WISHED YOU COULD GO TO SLEEP AND NOT WAKE UP?: NO
6. HAVE YOU EVER DONE ANYTHING, STARTED TO DO ANYTHING, OR PREPARED TO DO ANYTHING TO END YOUR LIFE?: NO

## 2025-03-12 ASSESSMENT — LIFESTYLE VARIABLES
SKIP TO QUESTIONS 9-10: 1
AUDIT-C TOTAL SCORE: 1

## 2025-03-12 ASSESSMENT — ACTIVITIES OF DAILY LIVING (ADL)
PATIENT'S MEMORY ADEQUATE TO SAFELY COMPLETE DAILY ACTIVITIES?: YES
BATHING: INDEPENDENT
DRESSING YOURSELF: INDEPENDENT
LACK_OF_TRANSPORTATION: NO
LACK_OF_TRANSPORTATION: NO
JUDGMENT_ADEQUATE_SAFELY_COMPLETE_DAILY_ACTIVITIES: YES
TOILETING: INDEPENDENT
GROOMING: INDEPENDENT
FEEDING YOURSELF: INDEPENDENT
WALKS IN HOME: INDEPENDENT
HEARING - LEFT EAR: DIFFICULTY WITH NOISE
ASSISTIVE_DEVICE: DENTURES UPPER;DENTURES LOWER
HEARING - RIGHT EAR: HEARING AID
ADEQUATE_TO_COMPLETE_ADL: YES

## 2025-03-12 ASSESSMENT — PAIN SCALES - GENERAL
PAINLEVEL_OUTOF10: 0 - NO PAIN
PAINLEVEL_OUTOF10: 0 - NO PAIN

## 2025-03-12 ASSESSMENT — PAIN - FUNCTIONAL ASSESSMENT
PAIN_FUNCTIONAL_ASSESSMENT: 0-10
PAIN_FUNCTIONAL_ASSESSMENT: 0-10

## 2025-03-12 NOTE — PROGRESS NOTES
03/12/25 1514   Discharge Planning   Living Arrangements Children  (Daughter)   Support Systems Children;Family members   Assistance Needed Patient alert and oriented; Daughter present and says patient can be forgetful at time.  Currently, they live in a A-frame house   Type of Residence Private residence   Number of Stairs to Enter Residence 1   Number of Stairs Within Residence 0   Do you have animals or pets at home? No   Home or Post Acute Services In home services   Expected Discharge Disposition Home H  (Daughter requesting therapy at home if recommended; declined  going to rehab)   Does the patient need discharge transport arranged? No   Financial Resource Strain   How hard is it for you to pay for the very basics like food, housing, medical care, and heating? Not hard   Housing Stability   In the last 12 months, was there a time when you were not able to pay the mortgage or rent on time? N   In the past 12 months, how many times have you moved where you were living? 0   At any time in the past 12 months, were you homeless or living in a shelter (including now)? N   Transportation Needs   In the past 12 months, has lack of transportation kept you from medical appointments or from getting medications? no   In the past 12 months, has lack of transportation kept you from meetings, work, or from getting things needed for daily living? No   Stroke Family Assessment   Stroke Family Assessment Needed No   Intensity of Service   Intensity of Service 0-30 min

## 2025-03-12 NOTE — CARE PLAN
"The patient's goals for the shift include  \" I don't know \"     The clinical goals for the shift include   Pt will remain free from falls and injury throughout the shift     Problem: Fall/Injury  Goal: Not fall by end of shift  Outcome: Progressing  Goal: Be free from injury by end of the shift  Outcome: Progressing  Goal: Verbalize understanding of personal risk factors for fall in the hospital  Outcome: Progressing  Goal: Verbalize understanding of risk factor reduction measures to prevent injury from fall in the home  Outcome: Progressing  Goal: Use assistive devices by end of the shift  Outcome: Progressing  Goal: Pace activities to prevent fatigue by end of the shift  Outcome: Progressing     Problem: Pain - Adult  Goal: Verbalizes/displays adequate comfort level or baseline comfort level  Outcome: Progressing     Problem: Safety - Adult  Goal: Free from fall injury  Outcome: Progressing     "

## 2025-03-12 NOTE — PROGRESS NOTES
Pharmacy Medication History Review    Gilmer Quiñonez is a 89 y.o. male admitted for Fall at home, sequela. Pharmacy reviewed the patient's [id number] medications and allergies for accuracy.    Sources used to confirm medication list: Informant interview  Informant: Child (Catherine)  Informant credibility: Excellent (Able to recall medication, indication, strength, frequency, and/or prescriber for >90% of medications).    Total number of adjustments: 0     The list below reflectives the updated PTA list. Please review each medication in order reconciliation for additional clarification and justification.  Medications Prior to Admission   Medication Sig Dispense Refill Last Dose/Taking    apixaban (Eliquis) 2.5 mg tablet Take 1 tablet (2.5 mg) by mouth 2 times a day. 180 tablet 1     empagliflozin (Jardiance) 10 mg Take 1 tablet (10 mg) by mouth once daily. 90 tablet 1     ergocalciferol (Vitamin D-2) 1.25 MG (96423 UT) capsule Take 1 capsule (50,000 Units) by mouth 1 (one) time per week. 12 capsule 0     metoprolol succinate XL (Toprol-XL) 25 mg 24 hr tablet Take 1 tablet (25 mg) by mouth once daily. 90 tablet 1     rosuvastatin (Crestor) 40 mg tablet Take 1 tablet (40 mg) by mouth once daily. 90 tablet 1     tamsulosin (Flomax) 0.4 mg 24 hr capsule Take 1 capsule (0.4 mg) by mouth once daily. 30 capsule 11     torsemide (Demadex) 20 mg tablet Take 2 tablets (40 mg) by mouth once daily. 180 tablet 0         The list below reflectives the updated allergy list. Please review each documented allergy for additional clarification and justification.  Allergies  Reviewed by Jaki Foley RN on 3/12/2025        Severity Reactions Comments    Penicillins Not Specified Swelling     Rivaroxaban Not Specified GI bleeding required 9 blood transfsions while on it, unable to find source of bleeding    Morphine Low Rash             Below are additional concerns with the patient's PTA list.  Spoke w/pt's daughter, Catherine at  bedside. Reviewed PTA list. No changes were made to the PTA list at this time. She did report that he has only taken one or two doses of the ergocalciferol and then the bottle went missing. She is requesting that a new script with refills be sent to the pharmacy.    Simran Gamez CPhT  Medication History Pharmacy Technician  [facility]YR[facility] 8-4:30  Available via [facility] Secure Chat  OR  [phone]
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
## 2025-03-12 NOTE — ED PROVIDER NOTES
"Department of Emergency Medicine   ED  Provider Note  Admit Date/RoomTime: 3/12/2025  7:12 AM  ED Room: [facility]                  History of Present Illness:   Gilmer Quiñonez is a 89 y.o. male presenting to the ED for fall and passing out at home, beginning last night.  Patient has dementia.  He says he \"passed out twice last night\".  Patient denies any chest pain or shortness of breath.  He denies any head or neck pain.  The patient is on Eliquis.  The complaint has been  persistently and intermittent.  Was seen here yesterday for similar complaints.    I discussed with his daughter.  She states that he has fallen 3 times overnight.  Third time she is admitted for further assessment.  He was here yesterday.  She says as soon as he stands up he has generalized weakness and falls.  The third time he actually \"passed out\".  No seizure-like activity.  Daughter states he is much more confused than baseline.        Review of Systems:   Pertinent positives and review of systems as noted above.  Remaining 10 review of systems is negative or noncontributory to today's episode of care.        --------------------------------------------- PAST HISTORY ---------------------------------------------  Past Medical History:  has a past medical history of Personal history of pneumonia (recurrent) (07/15/2021).    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    Social History:  reports that he quit smoking about 27 years ago. His smoking use included cigarettes. He started smoking about 76 years ago. He has a 49 pack-year smoking history. He has never used smokeless tobacco. He reports current alcohol " use of about 1.0 standard drink of alcohol per week. He reports that he does not use drugs.    Family History: family history includes Coronary artery disease in his father. Unless otherwise noted, family history is non contributory    Patient's Medications   New Prescriptions    No medications on file   Previous Medications    APIXABAN (ELIQUIS) 2.5 MG TABLET    Take 1 tablet (2.5 mg) by mouth 2 times a day.    EMPAGLIFLOZIN (JARDIANCE) 10 MG    Take 1 tablet (10 mg) by mouth once daily.    ERGOCALCIFEROL (VITAMIN D-2) 1.25 MG (60240 UT) CAPSULE    Take 1 capsule (50,000 Units) by mouth 1 (one) time per week.    METOPROLOL SUCCINATE XL (TOPROL-XL) 25 MG 24 HR TABLET    Take 1 tablet (25 mg) by mouth once daily.    ROSUVASTATIN (CRESTOR) 40 MG TABLET    Take 1 tablet (40 mg) by mouth once daily.    TAMSULOSIN (FLOMAX) 0.4 MG 24 HR CAPSULE    Take 1 capsule (0.4 mg) by mouth once daily.    TORSEMIDE (DEMADEX) 20 MG TABLET    Take 2 tablets (40 mg) by mouth once daily.   Modified Medications    No medications on file   Discontinued Medications    No medications on file      The patient’s home medications have been reviewed.    Allergies: Penicillins, Rivaroxaban, and Morphine    -------------------------------------------------- RESULTS -------------------------------------------------  All laboratory and radiology results have been personally reviewed by myself   LABS:  Labs Reviewed   CBC WITH AUTO DIFFERENTIAL - Abnormal       Result Value    WBC 5.7      nRBC 0.0      RBC 4.54      Hemoglobin 11.8 (*)     Hematocrit 38.8 (*)     MCV 86      MCH 26.0      MCHC 30.4 (*)     RDW 17.2 (*)     Platelets 139 (*)     Neutrophils % 75.1      Immature Granulocytes %, Automated 0.2      Lymphocytes % 10.9      Monocytes % 8.6      Eosinophils % 4.8      Basophils % 0.4      Neutrophils Absolute 4.27      Immature Granulocytes Absolute, Automated 0.01      Lymphocytes Absolute 0.62 (*)     Monocytes Absolute 0.49       Eosinophils Absolute 0.27      Basophils Absolute 0.02     COMPREHENSIVE METABOLIC PANEL - Abnormal    Glucose 92      Sodium 141      Potassium 3.8      Chloride 107      Bicarbonate 27      Anion Gap 11      Urea Nitrogen 9      Creatinine 1.17      eGFR 60 (*)     Calcium 9.7      Albumin 4.4      Alkaline Phosphatase 75      Total Protein 6.8      AST 18      Bilirubin, Total 0.8      ALT 11     PROTIME-INR - Abnormal    Protime 12.8 (*)     INR 1.2 (*)    URINALYSIS WITH REFLEX CULTURE AND MICROSCOPIC - Abnormal    Color, Urine Light-Yellow      Appearance, Urine Clear      Specific Gravity, Urine 1.016      pH, Urine 5.5      Protein, Urine 20 (TRACE)      Glucose, Urine 500 (3+) (*)     Blood, Urine NEGATIVE      Ketones, Urine NEGATIVE      Bilirubin, Urine NEGATIVE      Urobilinogen, Urine Normal      Nitrite, Urine NEGATIVE      Leukocyte Esterase, Urine NEGATIVE     MAGNESIUM - Normal    Magnesium 1.99     LACTATE - Normal    Lactate 1.5      Narrative:     Venipuncture immediately after or during the administration of Metamizole may lead to falsely low results. Testing should be performed immediately prior to Metamizole dosing.   SARS-COV-2 AND INFLUENZA A/B PCR - Normal    Flu A Result Not Detected      Flu B Result Not Detected      Coronavirus 2019, PCR Not Detected      Narrative:     This assay is an FDA-cleared, in vitro diagnostic nucleic acid amplification test for the qualitative detection and differentiation of SARS CoV-2/ Influenza A/B from nasopharyngeal specimens collected from individuals with signs and symptoms of respiratory tract infections, and has been validated for use at [facility]. Negative results do not preclude COVID-19/ Influenza A/B infections and should not be used as the sole basis for diagnosis, treatment, or other management decisions. Testing for SARS CoV-2 is recommended only for patients who meet current clinical and/or epidemiological criteria  defined by federal, state, or local public health directives.   SERIAL TROPONIN-INITIAL - Normal    Troponin I, High Sensitivity 19      Narrative:     Less than 99th percentile of normal range cutoff-  Female and children under 18 years old <14 ng/L; Male <21 ng/L: Negative  Repeat testing should be performed if clinically indicated.     Female and children under 18 years old 14-50 ng/L; Male 21-50 ng/L:  Consistent with possible cardiac damage and possible increased clinical   risk. Serial measurements may help to assess extent of myocardial damage.     >50 ng/L: Consistent with cardiac damage, increased clinical risk and  myocardial infarction. Serial measurements may help assess extent of   myocardial damage.      NOTE: Children less than 1 year old may have higher baseline troponin   levels and results should be interpreted in conjunction with the overall   clinical context.     NOTE: Troponin I testing is performed using a different   testing methodology at [facility] than at other   [facility]. Direct result comparisons should only   be made within the same method.   SERIAL TROPONIN, 1 HOUR - Normal    Troponin I, High Sensitivity 19      Narrative:     Less than 99th percentile of normal range cutoff-  Female and children under 18 years old <14 ng/L; Male <21 ng/L: Negative  Repeat testing should be performed if clinically indicated.     Female and children under 18 years old 14-50 ng/L; Male 21-50 ng/L:  Consistent with possible cardiac damage and possible increased clinical   risk. Serial measurements may help to assess extent of myocardial damage.     >50 ng/L: Consistent with cardiac damage, increased clinical risk and  myocardial infarction. Serial measurements may help assess extent of   myocardial damage.      NOTE: Children less than 1 year old may have higher baseline troponin   levels and results should be interpreted in conjunction with the overall   clinical context.     NOTE:  Troponin I testing is performed using a different   testing methodology at [facility] than at other   [facility]. Direct result comparisons should only   be made within the same method.   TROPONIN SERIES- (INITIAL, 1 HR)    Narrative:     The following orders were created for panel order Troponin I Series, High Sensitivity (0, 1 HR).  Procedure                               Abnormality         Status                     ---------                               -----------         ------                     Troponin I, High Sensiti...[[id number]]  Normal              Final result               Troponin, High Sensitivi...[[id number]]  Normal              Final result                 Please view results for these tests on the individual orders.   URINALYSIS WITH REFLEX CULTURE AND MICROSCOPIC    Narrative:     The following orders were created for panel order Urinalysis with Reflex Culture and Microscopic.  Procedure                               Abnormality         Status                     ---------                               -----------         ------                     Urinalysis with Reflex C...[[id number]]  Abnormal            Final result               Extra Urine Gray Tube[[id number]]                            In process                   Please view results for these tests on the individual orders.   EXTRA URINE GRAY TUBE   URINALYSIS MICROSCOPIC WITH REFLEX CULTURE    WBC, Urine NONE      RBC, Urine 1-2      Mucus, Urine FEW           RADIOLOGY:  Interpreted by Radiologist.  CT cervical spine wo IV contrast   Final Result   Severe multilevel cervical spondylosis. There is mild to moderate   multilevel hypertrophic facet arthrosis with mild degenerative   posterior subluxation of C5.        No acute fracture or traumatic subluxation.        Osteopenia.        Multilevel bulging disc and marginal osteophyte with moderate   multilevel central canal narrowing and bilateral multilevel neural    foraminal narrowing as detailed above.        Lipomatous mass within the posterior subcutaneous fat at the C7-T1   level to the right of midline measuring 4.2 x 5.0 x 3.2 cm. There is   some strandy linear and amorphous internal density. Characteristics   are similar to a previous CT examination of 03/31/2023 though the   lesion is slightly increased in size in the interval. Need for   further workup should be determined clinically. Correlate with pain   at this site.             MACRO:   None        Signed by: José Luis Gary 3/12/2025 7:38 AM   Dictation workstation:   [id number]      CT head W O contrast trauma protocol   Final Result   Severe age-related atrophy and mild small-vessel ischemic changes of   the cerebral white matter.        No CT evidence of acute intracranial hemorrhage or mass effect.        Maxillary and ethmoid sinusitis. There is some thinning and expansion   of the medial wall the right maxillary sinus similar to previous   studies.        No sign of acute fracture.             MACRO:   None        Signed by: José Luis Gary 3/12/2025 7:28 AM   Dictation workstation:   [id number]          Encounter Date: 03/12/25   ECG 12 lead   Result Value    Ventricular Rate 67    QRS Duration 164    QT Interval 444    QTC Calculation(Bazett) 469    R Axis -60    T Axis 9    QRS Count 11    Q Onset 213    T Offset 435    QTC Fredericia 461    Narrative    Atrial fibrillation with premature ventricular or aberrantly conducted complexes  Left axis deviation  Nonspecific intraventricular block  Minimal voltage criteria for LVH, may be normal variant ( Masr product )  Inferior infarct , age undetermined  Abnormal ECG  When compared with ECG of 11-MAR-2025 04:40,  Atrial fibrillation has replaced Electronic ventricular pacemaker  See ED provider note for full interpretation and clinical correlation  Confirmed by Danette Elizondo ([id number]) on 3/12/2025 11:26:10 AM     -------------------------  "NURSING NOTES AND VITALS REVIEWED ---------------------------   The nursing notes within the ED encounter and vital signs as below have been reviewed.   /88   Pulse 72   Temp 36.2 °C (97.1 °F) (Temporal)   Resp 17   Ht 1.702 m (5' 7\")   Wt 81.6 kg (180 lb)   SpO2 96%   BMI 28.19 kg/m²   Oxygen Saturation Interpretation: Normal      ---------------------------------------------------PHYSICAL EXAM--------------------------------------  Physical Exam  Vitals and nursing note reviewed.   Constitutional:       General: He is not in acute distress.     Appearance: He is well-developed. He is not ill-appearing or toxic-appearing.   HENT:      Head: Normocephalic and atraumatic.      Right Ear: Tympanic membrane and ear canal normal.      Left Ear: Tympanic membrane and ear canal normal.      Nose: Nose normal.      Mouth/Throat:      Mouth: Mucous membranes are moist.      Pharynx: Oropharynx is clear. No oropharyngeal exudate or posterior oropharyngeal erythema.   Eyes:      General: No scleral icterus.     Extraocular Movements: Extraocular movements intact.      Conjunctiva/sclera: Conjunctivae normal.      Pupils: Pupils are equal, round, and reactive to light.   Cardiovascular:      Rate and Rhythm: Normal rate and regular rhythm.      Pulses: Normal pulses.      Heart sounds: Normal heart sounds. No murmur heard.  Pulmonary:      Effort: Pulmonary effort is normal. No respiratory distress.      Breath sounds: Normal breath sounds. No wheezing, rhonchi or rales.   Abdominal:      General: There is no distension.      Palpations: Abdomen is soft. There is no mass.      Tenderness: There is no abdominal tenderness. There is no right CVA tenderness, left CVA tenderness, guarding or rebound.      Hernia: No hernia is present.   Musculoskeletal:         General: No swelling, tenderness, deformity or signs of injury. Normal range of motion.      Cervical back: Normal range of motion and neck supple. No rigidity " or tenderness.      Right lower leg: No edema.      Left lower leg: No edema.   Lymphadenopathy:      Cervical: No cervical adenopathy.   Skin:     General: Skin is warm and dry.      Capillary Refill: Capillary refill takes less than 2 seconds.      Findings: No rash.   Neurological:      Mental Status: He is alert. He is disoriented.      Cranial Nerves: No cranial nerve deficit.      Sensory: No sensory deficit.      Motor: No weakness.      Coordination: Coordination normal.      Comments: Patient has definite confusion.  He was unable to tell me he was at the hospital.  He states that he passed out.  His daughter confirms this.  Motor and sensory intact in the upper and lower extremities.  No unilateral weakness.   Psychiatric:         Mood and Affect: Mood normal.            Procedures  None  ------------------------------ ED COURSE/MEDICAL DECISION MAKING----------------------    Medical Decision Making:   Patient was seen and examined by me.    ED Course as of 03/12/25 1221   Wed Mar 12, 2025   0751 An EKG at 0725 interpreted by me.  Atrial fibrillation with PVCs.  Rate 67 bpm.  Left axis deviation.   ms  ms.  Left bundle branch block/paced rhythm.  LVH.  Nonspecific ST-T change.  No STEMI. No significant change with previous [EC]   0803 CT HEAD  IMPRESSION:  Severe age-related atrophy and mild small-vessel ischemic changes of  the cerebral white matter.      No CT evidence of acute intracranial hemorrhage or mass effect.      Maxillary and ethmoid sinusitis. There is some thinning and expansion  of the medial wall the right maxillary sinus similar to previous  studies.      No sign of acute fracture.       [EC]   0804 CT C Spine:  IMPRESSION:  Severe multilevel cervical spondylosis. There is mild to moderate  multilevel hypertrophic facet arthrosis with mild degenerative  posterior subluxation of C5.      No acute fracture or traumatic subluxation.      Osteopenia.      Multilevel bulging disc  and marginal osteophyte with moderate  multilevel central canal narrowing and bilateral multilevel neural  foraminal narrowing as detailed above.      Lipomatous mass within the posterior subcutaneous fat at the C7-T1  level to the right of midline measuring 4.2 x 5.0 x 3.2 cm. There is  some strandy linear and amorphous internal density. Characteristics  are similar to a previous CT examination of 03/31/2023 though the  lesion is slightly increased in size in the interval. Need for  further workup should be determined clinically. Correlate with pain  at this site.   [EC]   1122 CBC is unremarkable [EC]   1122 Comprehensive metabolic panel is grossly unremarkable [EC]   1122 Urinalysis is unremarkable [EC]   1122 Influenza A/influenza B is not detected/not detected  Coronavirus is not detected  Magnesium is normal at 1.99  Lactate is normal at 1.5 [EC]   1123 First troponin is normal at 19  Second troponin is normal at 19 [EC]   1205 The patient was seen in the ER yesterday  Since yesterday patient has had 3 falls at home.  According to the daughter he has increased confusion.  The patient will require admission and further workup and treatment.  There is question of syncope with the third fall today. [EC]   1205 I have discussed the case with the hospitalist team and the patient is excepted for observation telemetry stay under the service of Dr. Plaza [EC]      ED Course User Index  [EC] Wade Bailey,          Diagnoses as of 03/12/25 1221   Syncope and collapse   Frequent falls   Acute alteration in mental status      Counseling:   The emergency provider has spoken with the patient and daughter  and discussed today’s results, in addition to providing specific details for the plan of care and counseling regarding the diagnosis and prognosis.  Questions are answered at this time and they are agreeable with the plan.      --------------------------------- IMPRESSION AND DISPOSITION  ---------------------------------        IMPRESSION  1. Syncope and collapse    2. Frequent falls    3. Acute alteration in mental status        DISPOSITION  Disposition:   Patient condition is Stable      Billing Provider Critical Care Time: 0 minutes     Wade Bailey DO  03/12/25 1229

## 2025-03-13 ENCOUNTER — APPOINTMENT (OUTPATIENT)
Dept: PRIMARY CARE | Facility: CLINIC | Age: 89
End: 2025-03-13
Payer: MEDICARE

## 2025-03-13 PROBLEM — R55 SYNCOPE AND COLLAPSE: Status: ACTIVE | Noted: 2025-03-13

## 2025-03-13 LAB
ANION GAP SERPL CALC-SCNC: 13 MMOL/L (ref 10–20)
BUN SERPL-MCNC: 11 MG/DL (ref 6–23)
CALCIUM SERPL-MCNC: 9 MG/DL (ref 8.6–10.3)
CHLORIDE SERPL-SCNC: 105 MMOL/L (ref 98–107)
CO2 SERPL-SCNC: 28 MMOL/L (ref 21–32)
CREAT SERPL-MCNC: 1.4 MG/DL (ref 0.5–1.3)
EGFRCR SERPLBLD CKD-EPI 2021: 49 ML/MIN/1.73M*2
ERYTHROCYTE [DISTWIDTH] IN BLOOD BY AUTOMATED COUNT: 17.4 % (ref 11.5–14.5)
GLUCOSE SERPL-MCNC: 85 MG/DL (ref 74–99)
HCT VFR BLD AUTO: 36.3 % (ref 41–52)
HGB BLD-MCNC: 11.6 G/DL (ref 13.5–17.5)
MCH RBC QN AUTO: 26.7 PG (ref 26–34)
MCHC RBC AUTO-ENTMCNC: 32 G/DL (ref 32–36)
MCV RBC AUTO: 84 FL (ref 80–100)
NRBC BLD-RTO: 0 /100 WBCS (ref 0–0)
PLATELET # BLD AUTO: 125 X10*3/UL (ref 150–450)
POTASSIUM SERPL-SCNC: 3.5 MMOL/L (ref 3.5–5.3)
RBC # BLD AUTO: 4.34 X10*6/UL (ref 4.5–5.9)
SODIUM SERPL-SCNC: 142 MMOL/L (ref 136–145)
WBC # BLD AUTO: 4.2 X10*3/UL (ref 4.4–11.3)

## 2025-03-13 PROCEDURE — 36415 COLL VENOUS BLD VENIPUNCTURE: CPT | Performed by: NURSE PRACTITIONER

## 2025-03-13 PROCEDURE — 97535 SELF CARE MNGMENT TRAINING: CPT | Mod: GO

## 2025-03-13 PROCEDURE — 85027 COMPLETE CBC AUTOMATED: CPT | Performed by: NURSE PRACTITIONER

## 2025-03-13 PROCEDURE — 80048 BASIC METABOLIC PNL TOTAL CA: CPT | Performed by: NURSE PRACTITIONER

## 2025-03-13 PROCEDURE — 2500000001 HC RX 250 WO HCPCS SELF ADMINISTERED DRUGS (ALT 637 FOR MEDICARE OP)

## 2025-03-13 PROCEDURE — 99223 1ST HOSP IP/OBS HIGH 75: CPT | Performed by: NURSE PRACTITIONER

## 2025-03-13 PROCEDURE — 94760 N-INVAS EAR/PLS OXIMETRY 1: CPT | Mod: IPSPLIT

## 2025-03-13 PROCEDURE — 2500000002 HC RX 250 W HCPCS SELF ADMINISTERED DRUGS (ALT 637 FOR MEDICARE OP, ALT 636 FOR OP/ED): Mod: IPSPLIT

## 2025-03-13 PROCEDURE — 2500000001 HC RX 250 WO HCPCS SELF ADMINISTERED DRUGS (ALT 637 FOR MEDICARE OP): Performed by: NURSE PRACTITIONER

## 2025-03-13 PROCEDURE — 97165 OT EVAL LOW COMPLEX 30 MIN: CPT | Mod: GO

## 2025-03-13 PROCEDURE — 1200000002 HC GENERAL ROOM WITH TELEMETRY DAILY: Mod: IPSPLIT

## 2025-03-13 PROCEDURE — 97161 PT EVAL LOW COMPLEX 20 MIN: CPT | Mod: GP | Performed by: PHYSICAL THERAPIST

## 2025-03-13 RX ADMIN — EMPAGLIFLOZIN 10 MG: 10 TABLET, FILM COATED ORAL at 08:19

## 2025-03-13 RX ADMIN — METOPROLOL SUCCINATE 25 MG: 25 TABLET, EXTENDED RELEASE ORAL at 08:17

## 2025-03-13 RX ADMIN — PANTOPRAZOLE SODIUM 40 MG: 40 TABLET, DELAYED RELEASE ORAL at 06:49

## 2025-03-13 RX ADMIN — APIXABAN 2.5 MG: 2.5 TABLET, FILM COATED ORAL at 21:57

## 2025-03-13 RX ADMIN — APIXABAN 2.5 MG: 2.5 TABLET, FILM COATED ORAL at 08:17

## 2025-03-13 RX ADMIN — TORSEMIDE 40 MG: 20 TABLET ORAL at 08:17

## 2025-03-13 RX ADMIN — TAMSULOSIN HYDROCHLORIDE 0.4 MG: 0.4 CAPSULE ORAL at 08:31

## 2025-03-13 RX ADMIN — ROSUVASTATIN CALCIUM 40 MG: 10 TABLET, FILM COATED ORAL at 21:58

## 2025-03-13 ASSESSMENT — ACTIVITIES OF DAILY LIVING (ADL)
ADL_ASSISTANCE: INDEPENDENT
HOME_MANAGEMENT_TIME_ENTRY: 13
BATHING_ASSISTANCE: MINIMAL

## 2025-03-13 ASSESSMENT — COGNITIVE AND FUNCTIONAL STATUS - GENERAL
DRESSING REGULAR LOWER BODY CLOTHING: A LITTLE
HELP NEEDED FOR BATHING: A LITTLE
TURNING FROM BACK TO SIDE WHILE IN FLAT BAD: A LITTLE
WALKING IN HOSPITAL ROOM: A LITTLE
PERSONAL GROOMING: A LITTLE
HELP NEEDED FOR BATHING: A LITTLE
MOVING FROM LYING ON BACK TO SITTING ON SIDE OF FLAT BED WITH BEDRAILS: A LITTLE
MOVING FROM LYING ON BACK TO SITTING ON SIDE OF FLAT BED WITH BEDRAILS: A LITTLE
MOBILITY SCORE: 18
DAILY ACTIVITIY SCORE: 19
TURNING FROM BACK TO SIDE WHILE IN FLAT BAD: A LITTLE
MOBILITY SCORE: 17
DRESSING REGULAR UPPER BODY CLOTHING: A LITTLE
TOILETING: A LITTLE
DRESSING REGULAR LOWER BODY CLOTHING: A LITTLE
STANDING UP FROM CHAIR USING ARMS: A LITTLE
DRESSING REGULAR UPPER BODY CLOTHING: A LITTLE
CLIMB 3 TO 5 STEPS WITH RAILING: A LITTLE
DAILY ACTIVITIY SCORE: 18
STANDING UP FROM CHAIR USING ARMS: A LITTLE
WALKING IN HOSPITAL ROOM: A LITTLE
PERSONAL GROOMING: A LITTLE
TOILETING: A LITTLE
EATING MEALS: A LITTLE
CLIMB 3 TO 5 STEPS WITH RAILING: A LOT
MOVING TO AND FROM BED TO CHAIR: A LITTLE
MOVING TO AND FROM BED TO CHAIR: A LITTLE

## 2025-03-13 ASSESSMENT — ENCOUNTER SYMPTOMS
EYES NEGATIVE: 1
SLEEP DISTURBANCE: 1
CARDIOVASCULAR NEGATIVE: 1
ENDOCRINE NEGATIVE: 1
NERVOUS/ANXIOUS: 1
RESPIRATORY NEGATIVE: 1
ALLERGIC/IMMUNOLOGIC NEGATIVE: 1
CONSTITUTIONAL NEGATIVE: 1
HEMATOLOGIC/LYMPHATIC NEGATIVE: 1
MUSCULOSKELETAL NEGATIVE: 1
GASTROINTESTINAL NEGATIVE: 1

## 2025-03-13 ASSESSMENT — PAIN - FUNCTIONAL ASSESSMENT
PAIN_FUNCTIONAL_ASSESSMENT: 0-10

## 2025-03-13 ASSESSMENT — PAIN SCALES - GENERAL
PAINLEVEL_OUTOF10: 0 - NO PAIN

## 2025-03-13 NOTE — CARE PLAN
The patient's goals for the shift include      The clinical goals for the shift include Pt free of falls/injury.    Over the shift, the patient make progress toward the following goals. Pt with sitter at bedside, free of injury falls, no changes to POC.    Problem: Fall/Injury  Goal: Not fall by end of shift  Outcome: Met  Goal: Be free from injury by end of the shift  Outcome: Met  Goal: Verbalize understanding of personal risk factors for fall in the hospital  Outcome: Met  Goal: Verbalize understanding of risk factor reduction measures to prevent injury from fall in the home  Outcome: Met  Goal: Use assistive devices by end of the shift  Outcome: Met  Goal: Pace activities to prevent fatigue by end of the shift  Outcome: Met     Problem: Fall/Injury  Goal: Not fall by end of shift  Outcome: Met  Goal: Be free from injury by end of the shift  Outcome: Met  Goal: Verbalize understanding of personal risk factors for fall in the hospital  Outcome: Met  Goal: Verbalize understanding of risk factor reduction measures to prevent injury from fall in the home  Outcome: Met  Goal: Use assistive devices by end of the shift  Outcome: Met  Goal: Pace activities to prevent fatigue by end of the shift  Outcome: Met     Problem: Pain - Adult  Goal: Verbalizes/displays adequate comfort level or baseline comfort level  Outcome: Met

## 2025-03-13 NOTE — H&P
History Of Present Illness  Gilmer Quiñonez is a 89 y.o. male presenting with frequent falls, syncope.    Patient had been in the ED on 3/11 and fell x 3 times since discharge from the ED.    ED reported that patient's daughter lives with him.     ED note from yesterday afternoon  on 3/12 noted that patient passed out twice. Hx of dementia. On eliquis. Much confused than baseline. No seizure activity noted.     Patient was accepted to [facility], operated by [facility] internal medicine team with consult to neurology pending bed availability.    Labs on 3/12 at 8 am showed  Glucose 92  Sodium was 141, potassium 3.8, chloride 107, bicarb 27,  anion gap 11, bun creat 9/1.17, magnesium 1.99, lactate 1.5,               troponin 19,  wbc 5.7, HNH 11.8/38.8, platelets 139. Glucose a b covid negative, urine negative for infection..    EKG showed  Atrial fibrillation with premature ventricular or aberrantly conducted complexes  Left axis deviation  Nonspecific intraventricular block  Minimal voltage criteria for LVH, may be normal variant ( Mars product )  Inferior infarct , age undetermined  Abnormal ECG  When compared with ECG of 11-MAR-2025 04:40,  Atrial fibrillation has replaced Electronic ventricular pacemaker    Ct scan head  IMPRESSION:  Severe age-related atrophy and mild small-vessel ischemic changes of  the cerebral white matter.   No CT evidence of acute intracranial hemorrhage or mass effect.  Maxillary and ethmoid sinusitis. There is some thinning and expansion  of the medial wall the right maxillary sinus similar to previous  studies.  Ct scan c spine    IMPRESSION:  Severe multilevel cervical spondylosis. There is mild to moderate  multilevel hypertrophic facet arthrosis with mild degenerative  posterior subluxation of C5.  No acute fracture or traumatic subluxation.  Osteopenia.  Multilevel bulging disc and marginal osteophyte with moderate  multilevel central canal narrowing and bilateral multilevel neural  foraminal narrowing  as detailed above.  Lipomatous mass within the posterior subcutaneous fat at the C7-T1  level to the right of midline measuring 4.2 x 5.0 x 3.2 cm. There is  some strandy linear and amorphous internal density. Characteristics  are similar to a previous CT examination of 03/31/2023 though the  lesion is slightly increased in size in the interval. Need for  further workup should be determined clinically. Correlate with pain  at this site.    Last admission was Sept 2024  for non stemi, echo showed 8/26/2024 with 35 to 30% ef.  Noted at that time to have episode of metabolic encephalopathy 2/2 delirium vs etoph abuse.   At that time patient was drinking better but reports no longer.  Patient last admission needed sitter at bedside for safety.    He was noted to be weak at that time but MRI MRA was unable to be completed at [place] due to pacemaker.   Also recommended to see ENT but appears did not          followup.     Was in acute renal failure at that time.     Had pacemaker check in September 2024.       Vss upon ED arrival 97.1 HR 77 RR 16 /55, pulse ox 96%.    Full code.    Cardiology consulted.     During early am rounds, pt did appear to be sundowning, hard to redirect, has sitter at bedside. He requires assistance to get safely to the bathroom. Patient stated that he drinks a beer daily cannot confirm when his last drink was. CIWA protocol was initiated and sitter remains for safety.    Patient was then able to state at one point that he had 3 beer garden/bars on the [place] but now reports that he only has one.                             Past Medical History  Past Medical History:   Diagnosis Date    Personal history of pneumonia (recurrent) 07/15/2021    History of pneumonia       Surgical History  Past Surgical History:   Procedure Laterality Date    APPENDECTOMY  02/06/2015    Appendectomy    CORONARY ARTERY BYPASS GRAFT  07/17/2017    CABG    CT ABDOMEN PELVIS ANGIOGRAM W AND/OR WO IV CONTRAST   2/28/2022    CT ABDOMEN PELVIS ANGIOGRAM W AND/OR WO IV CONTRAST 2/28/2022 [facility]    CT ANGIO NECK  4/1/2023    CT NECK ANGIO W AND WO IV CONTRAST GEN CT    CT HEAD ANGIO W AND WO IV CONTRAST  4/1/2023    CT HEAD ANGIO W AND WO IV CONTRAST GEN CT    KNEE SURGERY  02/06/2015    Knee Surgery    OTHER SURGICAL HISTORY  01/06/2020    Cardioverter defibrillator insertion    OTHER SURGICAL HISTORY  01/06/2020    Coronary artery stent placement        Social History  He reports that he quit smoking about 27 years ago. His smoking use included cigarettes. He started smoking about 76 years ago. He has a 49 pack-year smoking history. He has never used smokeless tobacco. He reports current alcohol use of about 1.0 standard drink of alcohol per week. He reports that he does not use drugs.    Family History  Family History   Problem Relation Name Age of Onset    Coronary artery disease Father          Allergies  Penicillins, Rivaroxaban, and Morphine    Review of Systems   Constitutional: Negative.    HENT: Negative.     Eyes: Negative.    Respiratory: Negative.     Cardiovascular: Negative.    Gastrointestinal: Negative.    Endocrine: Negative.    Genitourinary: Negative.    Musculoskeletal: Negative.    Skin: Negative.    Allergic/Immunologic: Negative.    Hematological: Negative.    Psychiatric/Behavioral:  Positive for behavioral problems and sleep disturbance. The patient is nervous/anxious.         Physical Exam  Vitals and nursing note reviewed.   HENT:      Head: Normocephalic and atraumatic.      Nose: Nose normal.      Mouth/Throat:      Mouth: Mucous membranes are dry.   Eyes:      Extraocular Movements: Extraocular movements intact.      Pupils: Pupils are equal, round, and reactive to light.   Cardiovascular:      Rate and Rhythm: Normal rate and regular rhythm.      Pulses: Normal pulses.      Heart sounds: Normal heart sounds.   Pulmonary:      Effort: Pulmonary effort is normal.      Breath sounds:  "Normal breath sounds.   Abdominal:      General: Bowel sounds are normal.      Palpations: Abdomen is soft.   Musculoskeletal:         General: Normal range of motion.      Cervical back: Normal range of motion and neck supple.   Skin:     General: Skin is warm and dry.   Neurological:      Mental Status: He is alert. He is disoriented.   Psychiatric:      Comments: irritable          Last Recorded Vitals  Blood pressure 108/52, pulse 53, temperature 36.5 °C (97.7 °F), temperature source Temporal, resp. rate 18, height 1.702 m (5' 7\"), weight 72.9 kg (160 lb 11.5 oz), SpO2 96%.       Assessment/Plan   Assessment & Plan  Fall at home, sequela    Syncope and collapse    #Syncope and Collapse  #Chronic Atrial fibrillation (Multi)  #Chronic systolic (congestive) heart failure (Multi)  -WBC 5.7  -Troponin 19  -ECHO 8/26/24: severely decreased LVSF with an EF of 25-30% with abnormal pattern of LVDF  -continue apixaban 2.5 mg PO BID  -continue Metoprolol succinate 25 mg PO Daily   Jardiance 10 mg po daily  Torsemide 40 mg po daily  -Cardiology consult, appreciate recs     #fall,          on flomax 0.4 mg which could contribute to falls, bladder scan   Ct scan head  IMPRESSION:  Severe age-related atrophy and mild small-vessel ischemic changes of  the cerebral white matter.   No CT evidence of acute intracranial hemorrhage or mass effect.  Maxillary and ethmoid sinusitis. There is some thinning and expansion  of the medial wall the right maxillary sinus similar to previous  studies.  Ct scan c spine    IMPRESSION:  Severe multilevel cervical spondylosis. There is mild to moderate  multilevel hypertrophic facet arthrosis with mild degenerative  posterior subluxation of C5.  No acute fracture or traumatic subluxation.  Osteopenia.  Multilevel bulging disc and marginal osteophyte with moderate  multilevel central canal narrowing and bilateral multilevel neural  foraminal narrowing as detailed above.  Lipomatous mass within the " posterior subcutaneous fat at the C7-T1  level to the right of midline measuring 4.2 x 5.0 x 3.2 cm. There is  some strandy linear and amorphous internal density. Characteristics  are similar to a previous CT examination of 03/31/2023 though the  lesion is slightly increased in size in the interval. Need for  further workup should be determined clinically. Correlate with pain  at this site.  - transfer to OSH for neurology consult when available    #Metabolic encephalopathy 2/2 delirium vs dementia vs etoh abuse disorder, stable  -Dtr reports increased confusion in ED with 3 falls after first ED course 3/11  -Pt reports drinking beer daily; unsure of last drink  -Confused  overnight; unable to be re-directed  -Sitter at bedside for safety-[facility] protocol   -Thiamine, folic acid, MV  -Pt alert and oriented today    #Weakness, fall  -MRI/MRA unable to complete due to pacemaker at [place]. Will have to be done [facility].   -Neuro Checks    -PT Eval and Treat    #Chronic Renal Failure, stage 3a  -baseline creatine 1.13  -creatine on admission 1.17; today creatine 1.40 gfr 49.      #HLD (hyperlipidemia)  -continue rosuvastatin 40 mg PO HS - can cause confusion and worsening mentation in some elderly pts     DVT ppx  -apixaban   GI ppx continue PPI    F: PRN  E: Replete per protocol  N: Cardiac  A: PIV     Disposition: Patient meets inpatient criteria. Patient will be transferred to [facility] for bed availability once available. Will follow up with cardiology and PCP outpatient after discharge. Patient and family should consider ENT outpatient referral made in Sept 2024      Code Status: Full Code      SOFIE Mathur  Attending Attestation:    Patient was seen and examined face to face, history and physical was taken personally at bedside the APRN-CNP, was present for the whole duration of the exam who participated in the documentation of this note. I performed the medical decision-making components  (assessment and plan of care). I have reviewed the documentation and verified the findings in the note as written with additions or exceptions as stated in the body of this note.   Patient with history of atrial fibrillation, status post pacemaker placement severe cardiomyopathy with ejection fraction of 25%, he visited the emergency room few times, he was found to be more obtunded and confused than previous 1, workup was negative for any acute finding on CT of head, was admitted, he was very agitated and aggressive, he needed to have sitter overnight.  He was seen this morning he woke up, he is pleasant answering question appropriately he is somehow confused, he is known to us for previous admission, he says he is still drinking alcohol, he denies headache, dizziness, lightheadedness or blurry vision, on exam there is no focal neurological deficit except in cranial nerve exam patient seems to have oculomotor nerve dysfunction or some problem with extrinsic eyes muscles patient has double vision when adducting his eyes.  Patient has pacemaker we cannot do MRI, will continue neurocheck, teleneuro consult, possible alcohol has some contribution in patient increasing confusion and fall    Dr. Mirta Plaza MD  Internal Medicine

## 2025-03-13 NOTE — NURSING NOTE
Assumed care of pt, pt sleeping in bed with jcarlos Hoffman at bedside.     2045- daughter arrived to visit, update on stay, no needs or concerns. Pt waiting bed transfer.

## 2025-03-13 NOTE — PROGRESS NOTES
03/13/25 4410   Discharge Planning   Assistance Needed Patient with confusion- PT/OT recommend MOD intensity.  Called Catherine/dtr and discussed.  She wants father home-she said it will add to his forgetfulness.  She is setting up baby monitors in the house.  She asked about therapy in the home.  Discussed HHC-she choiced [facility].  Plan will be for patient to be discharge with [facility]-PT/OT.   Home or Post Acute Services In home services  (Would like PT/OT when discharged back home)   Expected Discharge Disposition Short Term A  (Waiting on Neurology Bed)

## 2025-03-13 NOTE — CARE PLAN
"The patient's goals for the shift include  \" I don't know\" \"I need to go home\"    The clinical goals for the shift include Pt will remain free of falls and injury throughout the shift      Problem: Safety - Adult  Goal: Free from fall injury  Outcome: Progressing     Problem: Discharge Planning  Goal: Discharge to home or other facility with appropriate resources  Outcome: Progressing     Problem: Chronic Conditions and Co-morbidities  Goal: Patient's chronic conditions and co-morbidity symptoms are monitored and maintained or improved  Outcome: Progressing     Problem: Nutrition  Goal: Nutrient intake appropriate for maintaining nutritional needs  Outcome: Progressing   Pt had an uneventful day. Pt had a sitter in the room throughout the shift. Vitals have remained stable. Pt has no complaints of pain. Still waiting on a bed for transfer. Pt has no needs at this time and call light is within reach, Sitter still at bedside.   "

## 2025-03-13 NOTE — PROGRESS NOTES
Occupational Therapy    Evaluation/Treatment    Patient Name: Gilmer Quiñonez  MRN: [id number]  Department: [facility]  Room: [address] [place], [place] [address]  Today's Date: 03/13/25  Time Calculation  Start Time: 0832  Stop Time: 0915  Time Calculation (min): 43 min     Assessment:  OT Assessment: Pt is an 89 yo M referred to occupational therapy for impaired self-care and functional mobility 2/2 hospitalization for fall at home and increased dizziness and confusion. Pt demonstrates impaired functional mobility, endurance, and ADL completion. Pt demonstrates STS w/ CGA and functional mobility w/ CGA. Pt would benefit from continued OT services at the MOD intensity level to increase functional independence and help w/ return to PLOF.  Prognosis: Good  Barriers to Discharge Home: Caregiver assistance, Cognition needs, Physical needs  Caregiver Assistance: Caregiver assistance needed per identified barriers - however, level of patient's required assistance exceeds assistance available at home  Cognition Needs: 24hr supervision for safety awareness needed, Cognition-related high falls risk  Physical Needs: Stair navigation into home limited by function/safety, Intermittent mobility assistance needed, Intermittent ADL assistance needed, High falls risk due to function or environment  Evaluation/Treatment Tolerance: Patient tolerated treatment well  Medical Staff Made Aware: Yes  End of Session Communication: PCT/NA/CTA  End of Session Patient Position: Bed, 3 rail up, Alarm on (Sitter in room)  OT Assessment Results: Decreased ADL status, Decreased safe judgment during ADL, Decreased endurance, Decreased functional mobility, Decreased IADLs  Prognosis: Good  Barriers to Discharge: None  Evaluation/Treatment Tolerance: Patient tolerated treatment well  Medical Staff Made Aware: Yes  Strengths: Support of Caregivers  Barriers to Participation:  (n/a)  Plan:  Treatment Interventions: ADL retraining, Functional transfer training, UE strengthening/ROM,  Endurance training, Patient/family training, Equipment evaluation/education, Compensatory technique education  OT Frequency: 3 times per week  OT Discharge Recommendations: Moderate intensity level of continued care  OT Recommended Transfer Status: Stand by assist, Assist of 1  OT - OK to Discharge: Yes (Based on completed evaluation and care plan recommendations, no barriers to discharge to next site of care)  Treatment Interventions: ADL retraining, Functional transfer training, UE strengthening/ROM, Endurance training, Patient/family training, Equipment evaluation/education, Compensatory technique education    Subjective   Current Problem:  1. Syncope and collapse        2. Frequent falls        3. Acute alteration in mental status          General:   OT Received On: 03/13/25  General  Reason for Referral: Pt is an 89 yo M referred to occupational therapy for impaired self-care and functional mobility 2/2 hospitalization for fall at home. Pt presented to ED for confusion and multiple falls/dizziness at home.  Referred By: HARSHIL Cortez-CNP  Past Medical History Relevant to Rehab: CAD, CKD, CHF, syncope, acute R MCA stroke (2023), HLD, chronic congestive heart failure, chronic kidney disease, pneumonia  Family/Caregiver Present: Yes (Daughter present at beginning of session, assisting w/ home setup)  Prior to Session Communication: Bedside nurse  Patient Position Received: Bed, 3 rail up, Alarm on (Sitter present in room)  Preferred Learning Style: verbal, visual  General Comment: Pt pleasant and agreeable to therapy session. Pt cleared by RN prior to session.   Precautions:  Medical Precautions: Fall precautions  Precautions Comment: Pt w/ mild dizziness thorughout session. Tele, masimo     Date/Time Vitals Session Patient Position Pulse Resp SpO2 BP MAP (mmHg)    03/13/25 0953 --  --  50  --  99 %  --  --           Pain:  Pain Assessment  Pain Assessment: 0-10  0-10 (Numeric) Pain Score: 0 - No  pain    Objective   Cognition:  Overall Cognitive Status: Impaired  Arousal/Alertness: Appropriate responses to stimuli  Orientation Level: Disoriented to place, Disoriented to situation    Home Living:  Type of Home: House  Lives With: Adult children (Daughter)  Home Adaptive Equipment: Walker rolling or standard, Cane  Home Layout: Two level, Able to live on main level with bedroom/bathroom  Home Access: Stairs to enter without rails  Entrance Stairs-Number of Steps: 1  Bathroom Shower/Tub: Tub/shower unit  Bathroom Toilet: Standard  Bathroom Equipment: Grab bars in shower  Prior Function:  Level of [place]: Independent with ADLs and functional transfers, Independent with homemaking with ambulation  Receives Help From: Family (Daughter)  ADL Assistance: Independent (Daughter reports requiring more assist i nthe past week 2/2 falls)  Homemaking Assistance: Independent (Pt cooks ind, daughter assists w/ other IADLs as needed)  Ambulatory Assistance: Independent (no AD)    ADL:  Eating Assistance: Independent  Grooming Assistance: Stand by  Bathing Assistance: Minimal  UE Dressing Assistance: Stand by  LE Dressing Assistance: Minimal  Toileting Assistance with Device: Stand by  Toileting Deficit: Perineal hygiene  Functional Assistance: Stand by  ADL Comments: Pt completed bed mobility w/ close sup and STS w/ CGA. Pt completed functional mobility w/ CGA and toilet transfer w/ CGA. Other ADLs anticipated.  Activities of Daily Living:    Grooming  Grooming Level of Assistance: Contact guard  Grooming Where Assessed: Standing sinkside  Grooming Comments: Pt completed hand washing standing sinkside w/ CGA    Toileting  Toileting Level of Assistance: Close supervision  Where Assessed: Toilet  Toileting Comments: Pt able to complete front and rear pericare seated on toilet.  Activity Tolerance:  Endurance: Decreased tolerance for upright activites    Bed Mobility/Transfers:   Bed Mobility  Bed Mobility: Yes  Bed  Mobility 1  Bed Mobility 1: Supine to sitting, Sitting to supine  Level of Assistance 1: Close supervision  Bed Mobility Comments 1: HOB elevated    Transfers  Transfer: Yes  Transfer 1  Transfer From 1: Bed to  Transfer to 1: Stand  Technique 1: Sit to stand, Stand to sit  Transfer Device 1: Walker  Transfer Level of Assistance 1: Contact guard  Trials/Comments 1: x2  Transfers 2  Transfer From 2: Toilet to  Transfer to 2: Stand  Technique 2: Sit to stand, Stand to sit  Transfer Device 2: Walker  Transfer Level of Assistance 2: Contact guard    Functional Mobility:  Functional Mobility  Functional Mobility Performed: Yes  Functional Mobility 1  Surface 1: Level tile  Device 1: Rolling walker  Assistance 1: Contact guard  Comments 1: Functional mobility in room and hallway w/ CGA and FWW  Sitting Balance:  Static Sitting Balance  Static Sitting-Balance Support: Feet supported  Static Sitting-Level of Assistance: Distant supervision  Dynamic Sitting Balance  Dynamic Sitting-Balance Support: Feet supported  Dynamic Sitting-Level of Assistance: Distant supervision  Standing Balance:  Static Standing Balance  Static Standing-Balance Support: Bilateral upper extremity supported  Static Standing-Level of Assistance: Contact guard  Dynamic Standing Balance  Dynamic Standing-Balance Support: Bilateral upper extremity supported  Dynamic Standing-Level of Assistance: Contact guard    Sensation:  Light Touch: No apparent deficits  Strength:  Strength Comments: BUE grossly 4/5  Coordination:  Movements are Fluid and Coordinated: Yes   Hand Function:  Hand Function  Gross Grasp: Functional  Coordination: Functional  Extremities:  RUE: Within Functional Limits   LUE: Within Functional Limits    Outcome Measures:   [facility] Daily Activity  Putting on and taking off regular lower body clothing: A little  Bathing (including washing, rinsing, drying): A little  Putting on and taking off regular upper body clothing: A little  Toileting,  which includes using toilet, bedpan or urinal: A little  Taking care of personal grooming such as brushing teeth: A little  Eating Meals: None  Daily Activity - Total Score: 19    Education Documentation  Body Mechanics, taught by Andreia Cohen OT at 3/13/2025 11:11 AM.  Learner: Patient  Readiness: Acceptance  Method: Explanation  Response: Verbalizes Understanding  Comment: Pt educated on POC, DC recs, safety and body mechanics when completing transfers and ADLs    ADL Training, taught by Andreia Cohen OT at 3/13/2025 11:11 AM.  Learner: Patient  Readiness: Acceptance  Method: Explanation  Response: Verbalizes Understanding  Comment: Pt educated on POC, DC recs, safety and body mechanics when completing transfers and ADLs    Goals:  Encounter Problems       Encounter Problems (Active)       ADLs       Patient will perform UB and LB bathing with set-up level of assistance and PRN bathroom equipment.       Start:  03/13/25    Expected End:  03/27/25            Patient with complete upper body dressing with set-up level of assistance donning and doffing all UE clothes with PRN adaptive equipment while edge of bed        Start:  03/13/25    Expected End:  03/27/25            Patient with complete lower body dressing with set-up level of assistance donning and doffing all LE clothes  with PRN adaptive equipment while edge of bed  and standing       Start:  03/13/25    Expected End:  03/27/25               BALANCE       Pt will maintain dynamic standing balance during ADL task with supervision level of assistance in order to demonstrate decreased risk of falling and improved postural control.       Start:  03/13/25    Expected End:  03/27/25               MOBILITY       Patient will perform Functional mobility mod  Household distances/Community Distances with supervision level of assistance and least restrictive device in order to improve safety and functional mobility.       Start:  03/13/25    Expected End:   03/27/25

## 2025-03-13 NOTE — PROGRESS NOTES
Physical Therapy    Physical Therapy Evaluation    Patient Name: Gilmer Quiñonez  MRN: [id number]  Department: [facility]  Room: 206[facility]  Today's Date: 3/13/2025   Time Calculation  Start Time: 0953  Stop Time: 1012  Time Calculation (min): 19 min    Assessment/Plan   PT Assessment  PT Assessment Results: Decreased strength, Impaired balance, Decreased mobility, Decreased endurance, Decreased safety awareness, Impaired judgement  Rehab Prognosis: Good  Barriers to Discharge Home: Cognition needs, Caregiver assistance  Caregiver Assistance: Caregiver assistance needed per identified barriers - however, level of patient's required assistance exceeds assistance available at home  Cognition Needs: 24hr supervision for safety awareness needed  Evaluation/Treatment Tolerance: Patient tolerated treatment well  Medical Staff Made Aware: Yes  Strengths: Support of Caregivers  Barriers to Participation:  (n/a)  End of Session Communication: PCT/NA/CTA  Assessment Comment: Pleasantly confused 89 y.o presents with weakness and impaired mobility. Pt. lives with daughter and is normally FADY with WW. Pt. has had multiple recent falls and is more confused. Pt. currently requires CGA when amb. and would benefit from additional PT to address above noted limitations and prevent further decline.  Plan is for pt. to be transferred to another facility for neuro f/u per team. Will cont. to follow.  End of Session Patient Position: Up in chair, Alarm on  IP OR SWING BED PT PLAN  Inpatient or Swing Bed: Inpatient  PT Plan  Treatment/Interventions: Transfer training, Bed mobility, Gait training, Stair training, Balance training, Strengthening, Endurance training, Therapeutic exercise, Therapeutic activity, Home exercise program, Neuromuscular re-education  PT Plan: Ongoing PT  PT Frequency: 3 times per week  PT Discharge Recommendations: Moderate intensity level of continued care  PT Recommended Transfer Status: Assist x1  PT - OK to Discharge:  Yes Based on completed evaluation and care plan recommendations, no barriers to discharge to next site of care      Subjective   General Visit Information:  General  Reason for Referral: impaired mobility, fall at homoe  Referred By: SOFIE Cortez  Past Medical History Relevant to Rehab: CAD, CKD, CHF, syncope, acute R MCA stroke (2023), HLD, chronic congestive heart failure, chronic kidney disease, pneumonia  Family/Caregiver Present: Yes (sitter present)  Prior to Session Communication: PCT/NA/CTA  Patient Position Received: Bed, 3 rail up, Alarm on  General Comment: severiano cano  Home Living:  Home Living  Type of Home: House (pt. is a poor historian)  Lives With:  (daughter (not home all the time))  Home Adaptive Equipment: Walker rolling or standard, Cane  Home Layout:  (states there are 13 steps upstairs but he does not have to use)  Home Access:  (1 step to enter)  Bathroom Shower/Tub: Walk-in shower  Bathroom Equipment: Grab bars in shower (shower chair)  Prior Level of Function:  Prior Function Per Pt/Caregiver Report  Level of [place]: Independent with ADLs and functional transfers  Receives Help From:  (daughter)  Precautions:  Precautions  Medical Precautions: Fall precautions (multiple recent falls; c/o mild dizziness throughout session)      Date/Time Vitals Session Patient Position Pulse Resp SpO2 BP MAP (mmHg)    03/13/25 0953 --  --  50  --  99 %  --  --              Objective   Pain:  Pain Assessment  Pain Assessment: 0-10  0-10 (Numeric) Pain Score: 0 - No pain  Cognition:  Cognition  Overall Cognitive Status: Impaired (knew name (needed cues for year of birth))  Orientation Level: Disoriented to place, Disoriented to time, Disoriented to situation    General Assessments:     Activity Tolerance  Endurance: Decreased tolerance for upright activites (limited 2/2/ dizziness)    Sensation  Sensation Comment: denies deficits    Strength  Strength Comments: BLE: grossly  4-/5  Coordination  Movements are Fluid and Coordinated: Yes    Static Standing Balance  Static Standing-Comment/Number of Minutes: F+; with BUE support  Dynamic Standing Balance  Dynamic Standing-Comments: F+; with BUE support  Functional Assessments:  Bed Mobility  Bed Mobility: Yes  Bed Mobility 1  Bed Mobility 1: Supine to sitting  Level of Assistance 1: Close supervision  Bed Mobility 2  Bed Mobility  2: Sitting to supine  Level of Assistance 2: Minimum assistance    Transfers  Transfer: Yes  Transfer 1  Technique 1: Sit to stand, Stand to sit  Transfer Device 1: Walker, Gait belt  Transfer Level of Assistance 1: Contact guard  Trials/Comments 1: x2    Ambulation/Gait Training  Ambulation/Gait Training Performed: Yes  Ambulation/Gait Training 1  Surface 1: Level tile  Device 1: Rolling walker  Gait Support Devices: Gait belt  Assistance 1: Contact guard  Quality of Gait 1: Decreased step length, Forward flexed posture  Comments/Distance (ft) 1: 40  Extremity/Trunk Assessments:  Defer to OT for UE detail   RLE   RLE : Within Functional Limits  LLE   LLE : Within Functional Limits  Outcome Measures:  [facility] Basic Mobility  Turning from your back to your side while in a flat bed without using bedrails: A little  Moving from lying on your back to sitting on the side of a flat bed without using bedrails: A little  Moving to and from bed to chair (including a wheelchair): A little  Standing up from a chair using your arms (e.g. wheelchair or bedside chair): A little  To walk in hospital room: A little  Climbing 3-5 steps with railing: A lot  Basic Mobility - Total Score: 17    Other Measures  5x Sit to Stand: unable to complete without UE support    Encounter Problems       Encounter Problems (Active)       Balance       STG - Maintains dynamic standing balance with 1  upper extremity support with >=good- balance        Start:  03/13/25    Expected End:  03/27/25               Mobility       LTG - Patient will  ambulate community distance FADY with WW       Start:  03/13/25    Expected End:  03/27/25            STG - Patient will ambulate up and down a curb/step IND       Start:  03/13/25    Expected End:  03/27/25               PT Transfers       STG - Patient will perform bed mobility IND       Start:  03/13/25    Expected End:  03/27/25            STG - Patient will transfer sit to and from stand FADY with WW        Start:  03/13/25    Expected End:  03/27/25            Pt. will be able to complete 5 sit to stands <=30 seconds        Start:  03/13/25    Expected End:  03/27/25               Pain - Adult              Education Documentation  Mobility Training, taught by Indira Andres, PT at 3/13/2025 11:02 AM.  Learner: Patient  Readiness: Acceptance  Method: Explanation  Response: Needs Reinforcement  Comment: Educated pt. on PT POC    Education Comments  No comments found.

## 2025-03-14 LAB
ANION GAP SERPL CALC-SCNC: 15 MMOL/L (ref 10–20)
BUN SERPL-MCNC: 16 MG/DL (ref 6–23)
CALCIUM SERPL-MCNC: 9.2 MG/DL (ref 8.6–10.3)
CHLORIDE SERPL-SCNC: 101 MMOL/L (ref 98–107)
CO2 SERPL-SCNC: 28 MMOL/L (ref 21–32)
CREAT SERPL-MCNC: 1.57 MG/DL (ref 0.5–1.3)
EGFRCR SERPLBLD CKD-EPI 2021: 42 ML/MIN/1.73M*2
ERYTHROCYTE [DISTWIDTH] IN BLOOD BY AUTOMATED COUNT: 17.2 % (ref 11.5–14.5)
GLUCOSE SERPL-MCNC: 103 MG/DL (ref 74–99)
HCT VFR BLD AUTO: 41.9 % (ref 41–52)
HGB BLD-MCNC: 13 G/DL (ref 13.5–17.5)
MCH RBC QN AUTO: 26.5 PG (ref 26–34)
MCHC RBC AUTO-ENTMCNC: 31 G/DL (ref 32–36)
MCV RBC AUTO: 86 FL (ref 80–100)
NRBC BLD-RTO: 0 /100 WBCS (ref 0–0)
PLATELET # BLD AUTO: 144 X10*3/UL (ref 150–450)
POTASSIUM SERPL-SCNC: 3.6 MMOL/L (ref 3.5–5.3)
RBC # BLD AUTO: 4.9 X10*6/UL (ref 4.5–5.9)
SODIUM SERPL-SCNC: 140 MMOL/L (ref 136–145)
WBC # BLD AUTO: 5 X10*3/UL (ref 4.4–11.3)

## 2025-03-14 PROCEDURE — 99222 1ST HOSP IP/OBS MODERATE 55: CPT | Performed by: NURSE PRACTITIONER

## 2025-03-14 PROCEDURE — 2500000002 HC RX 250 W HCPCS SELF ADMINISTERED DRUGS (ALT 637 FOR MEDICARE OP, ALT 636 FOR OP/ED): Mod: IPSPLIT

## 2025-03-14 PROCEDURE — 99232 SBSQ HOSP IP/OBS MODERATE 35: CPT | Performed by: NURSE PRACTITIONER

## 2025-03-14 PROCEDURE — 97530 THERAPEUTIC ACTIVITIES: CPT | Mod: GP,CQ,IPSPLIT

## 2025-03-14 PROCEDURE — 97116 GAIT TRAINING THERAPY: CPT | Mod: GP,CQ,IPSPLIT

## 2025-03-14 PROCEDURE — 2500000001 HC RX 250 WO HCPCS SELF ADMINISTERED DRUGS (ALT 637 FOR MEDICARE OP): Mod: IPSPLIT | Performed by: NURSE PRACTITIONER

## 2025-03-14 PROCEDURE — 2500000001 HC RX 250 WO HCPCS SELF ADMINISTERED DRUGS (ALT 637 FOR MEDICARE OP): Mod: IPSPLIT

## 2025-03-14 PROCEDURE — 94760 N-INVAS EAR/PLS OXIMETRY 1: CPT | Mod: IPSPLIT

## 2025-03-14 PROCEDURE — 80048 BASIC METABOLIC PNL TOTAL CA: CPT | Mod: IPSPLIT | Performed by: NURSE PRACTITIONER

## 2025-03-14 PROCEDURE — 97110 THERAPEUTIC EXERCISES: CPT | Mod: GP,CQ,IPSPLIT

## 2025-03-14 PROCEDURE — 85027 COMPLETE CBC AUTOMATED: CPT | Mod: IPSPLIT | Performed by: NURSE PRACTITIONER

## 2025-03-14 PROCEDURE — 36415 COLL VENOUS BLD VENIPUNCTURE: CPT | Mod: IPSPLIT | Performed by: NURSE PRACTITIONER

## 2025-03-14 PROCEDURE — 1200000002 HC GENERAL ROOM WITH TELEMETRY DAILY: Mod: IPSPLIT

## 2025-03-14 RX ADMIN — PANTOPRAZOLE SODIUM 40 MG: 40 TABLET, DELAYED RELEASE ORAL at 06:55

## 2025-03-14 RX ADMIN — APIXABAN 2.5 MG: 2.5 TABLET, FILM COATED ORAL at 21:00

## 2025-03-14 RX ADMIN — TAMSULOSIN HYDROCHLORIDE 0.4 MG: 0.4 CAPSULE ORAL at 08:07

## 2025-03-14 RX ADMIN — TORSEMIDE 40 MG: 20 TABLET ORAL at 08:08

## 2025-03-14 RX ADMIN — ROSUVASTATIN CALCIUM 40 MG: 10 TABLET, FILM COATED ORAL at 21:00

## 2025-03-14 RX ADMIN — METOPROLOL SUCCINATE 25 MG: 25 TABLET, EXTENDED RELEASE ORAL at 08:07

## 2025-03-14 RX ADMIN — EMPAGLIFLOZIN 10 MG: 10 TABLET, FILM COATED ORAL at 08:07

## 2025-03-14 RX ADMIN — APIXABAN 2.5 MG: 2.5 TABLET, FILM COATED ORAL at 08:08

## 2025-03-14 ASSESSMENT — PAIN SCALES - GENERAL
PAINLEVEL_OUTOF10: 0 - NO PAIN

## 2025-03-14 ASSESSMENT — COGNITIVE AND FUNCTIONAL STATUS - GENERAL
WALKING IN HOSPITAL ROOM: A LITTLE
EATING MEALS: A LITTLE
WALKING IN HOSPITAL ROOM: A LITTLE
PERSONAL GROOMING: A LITTLE
DAILY ACTIVITIY SCORE: 18
HELP NEEDED FOR BATHING: A LITTLE
TOILETING: A LITTLE
DAILY ACTIVITIY SCORE: 18
DRESSING REGULAR UPPER BODY CLOTHING: A LITTLE
MOVING FROM LYING ON BACK TO SITTING ON SIDE OF FLAT BED WITH BEDRAILS: A LITTLE
MOVING TO AND FROM BED TO CHAIR: A LITTLE
STANDING UP FROM CHAIR USING ARMS: A LITTLE
CLIMB 3 TO 5 STEPS WITH RAILING: A LITTLE
CLIMB 3 TO 5 STEPS WITH RAILING: A LOT
EATING MEALS: A LITTLE
MOVING TO AND FROM BED TO CHAIR: A LITTLE
DRESSING REGULAR UPPER BODY CLOTHING: A LITTLE
MOBILITY SCORE: 20
WALKING IN HOSPITAL ROOM: A LITTLE
TURNING FROM BACK TO SIDE WHILE IN FLAT BAD: A LITTLE
CLIMB 3 TO 5 STEPS WITH RAILING: A LITTLE
TOILETING: A LITTLE
STANDING UP FROM CHAIR USING ARMS: A LITTLE
DRESSING REGULAR LOWER BODY CLOTHING: A LITTLE
STANDING UP FROM CHAIR USING ARMS: A LITTLE
MOVING TO AND FROM BED TO CHAIR: A LITTLE
MOBILITY SCORE: 17
DRESSING REGULAR LOWER BODY CLOTHING: A LITTLE
PERSONAL GROOMING: A LITTLE
MOBILITY SCORE: 20
HELP NEEDED FOR BATHING: A LITTLE

## 2025-03-14 ASSESSMENT — PAIN - FUNCTIONAL ASSESSMENT: PAIN_FUNCTIONAL_ASSESSMENT: 0-10

## 2025-03-14 NOTE — CONSULTS
"Nutrition Initial Assessment:   Nutrition Assessment    Reason for Assessment: Admission nursing screening (nutrition assessment/recomendations)    Patient is a 89 y.o. male presenting with Fall at home, sequela.    PMH: syncope and collapse    Nutrition History:  Energy Intake: Good > 75 %, Fair 50-75 %  Food and Nutrient History: Visited patient in room, but unable to talk with pt. The patient has a history of multiple falls, episodes of syncope, worsening cognitive function, and overall functional decline. Physical and occupational therapy evaluations indicate the patient requires contact guard assist (CGA) for most mobility and activities of daily living (ADLs), with moderate rehabilitation intensity recommended. The patient requires assistance with mobility and ADLs, and decreased endurance and functional decline may negatively impact nutritional intake if not supported.  Cognitive impairment further increases the risk for inadequate food and fluid intake without appropriate supervision or assistance. Despite these concerns, the nursing flowsheet indicates that the patient is currently consuming % of meals. Will continue to monitor nutritional status and reassess as needed.       Anthropometrics:  Height: 170.2 cm (5' 7\")   Weight: 72.9 kg (160 lb 11.5 oz)   BMI (Calculated): 25.17  IBW/kg (Dietitian Calculated): 67 kg  Percent of IBW: 108 %       Weight History:   Wt Readings from Last 7 Encounters:   03/12/25 72.9 kg (160 lb 11.5 oz)   03/11/25 79.1 kg (174 lb 6.1 oz)   01/06/25 71.9 kg (158 lb 9.6 oz)   09/16/24 72.8 kg (160 lb 9.6 oz)   08/29/24 73.8 kg (162 lb 11.2 oz)   08/27/24 71.8 kg (158 lb 4.6 oz)   04/01/24 78.4 kg (172 lb 12.8 oz)       Weight Change %:  Weight History / % Weight Change: 03/12/25 (72.9 kg) to 01/06/25 (71.9 kg), 1.4% gain in ~2 months. 03/12/25 (72.9 kg) to 09/16/24 (72.8 kg), no significant change in ~6 months. 03/12/25 (72.9 kg) to 04/01/24 (78.4 kg), 7% loss in ~1 " year.    Nutrition Focused Physical Exam Findings:    Subcutaneous Fat Loss:   Defer Subcutaneous Fat Loss Assessment: Defer all  Defer All Reason: Attempts to visit the patient were unsuccessful, as other providers were present at each attempt.  Muscle Wasting:  Defer Muscle Wasting Assessment: Defer all  Defer All Reason: Attempts to visit the patient were unsuccessful, as other providers were present at each attempt. However, on observation during these encounters, the patient's temporalis region appeared to show moderate to severe scooping or depression. Facial skin appeared jaundiced.  Edema:  Edema Location: Generalized, Right lower extremity, Left lower extremity  Generalized Edema: Non-pitting  RLE Edema: Mild pitting, slight indentation  LLE Edema: Mild pitting, slight indentation per nursing flowsheet  Physical Findings:  Teeth Findings: Edentulous (Dentures upper;Dentures lower;Missing teeth;)    Nutrition Significant Labs:  CBC Trend:   Results from last 7 days   Lab Units 03/16/25  0524 03/15/25  0717 03/14/25  0740 03/13/25  0538   WBC AUTO x10*3/uL 4.7 5.3 5.0 4.2*   RBC AUTO x10*6/uL 4.38* 4.62 4.90 4.34*   HEMOGLOBIN g/dL 11.5* 12.2* 13.0* 11.6*   HEMATOCRIT % 40.7* 39.0* 41.9 36.3*   MCV fL 93 84 86 84   PLATELETS AUTO x10*3/uL 147* 133* 144* 125*    , BMP Trend:   Results from last 7 days   Lab Units 03/16/25  0524 03/15/25  0717 03/14/25  0740 03/13/25  0538   GLUCOSE mg/dL 85 95 103* 85   CALCIUM mg/dL 8.6 8.9 9.2 9.0   SODIUM mmol/L 137 139 140 142   POTASSIUM mmol/L 3.4* 3.3* 3.6 3.5   CO2 mmol/L 23 26 28 28   CHLORIDE mmol/L 103 100 101 105   BUN mg/dL 22 22 16 11   CREATININE mg/dL 1.33* 1.70* 1.57* 1.40*       Nutrition Specific Medications:  Scheduled medications  apixaban, 2.5 mg, oral, BID  [Held by provider] empagliflozin, 10 mg, oral, Daily  metoprolol succinate XL, 25 mg, oral, Daily  pantoprazole, 40 mg, oral, Daily before breakfast   Or  pantoprazole, 40 mg, intravenous, Daily  before breakfast  rosuvastatin, 40 mg, oral, Daily  tamsulosin, 0.4 mg, oral, Daily  [Held by provider] torsemide, 40 mg, oral, Daily      I/O:   Last BM Date: 03/09/25;      Dietary Orders (From admission, onward)       Start     Ordered    03/12/25 2142  Oral nutritional supplements  Until discontinued        Question Answer Comment   Deliver with Breakfast    Select supplement: Ensure Plus High Protein        03/12/25 2141 03/12/25 1529  Adult diet Regular  Diet effective now        Question:  Diet type  Answer:  Regular    03/12/25 1528    03/12/25 1511  May Participate in Room Service  ( ROOM SERVICE MAY PARTICIPATE)  Once        Question:  .  Answer:  Yes    03/12/25 1510                Estimated Needs:   Total Energy Estimated Needs in 24 hours (kCal): 2187 kCal (4036-1565 kcal)  Method for Estimating Needs: 25-30 kcal/kg of actual BW  Total Protein Estimated Needs in 24 Hours (g): 87.5 g  Method for Estimating 24 Hour Protein Needs: ~1.2 g/kg of actual BW  Total Fluid Estimated Needs in 24 Hours (mL):  (9389-9125 mL)  Method for Estimating 24 Hour Fluid Needs: 1 mL/kcal or per medical team.  Patient on Order Fluid Restriction: No        Nutrition Diagnosis        Nutrition Diagnosis  Patient has Nutrition Diagnosis: Yes  Diagnosis Status (1): New  Nutrition Diagnosis 1: Inadequate energy intake  Related to (1): impaired self-feeding ability and increased rehabilitation needs  As Evidenced by (1): 7% weight loss over past year and cognitive decline       Nutrition Interventions/Recommendations        Nutrition Recommendations:  Individualized Nutrition Prescription Provided for : Regular diet and Ensure Plus High Protein once a day    Nutrition Interventions/Goals:   Interventions: Medical food supplement, Meals and snacks, Feeding assistance management  Meals and Snacks: General healthful diet  Medical Food Supplement: Commercial beverage medical food supplement therapy  Goal: Ensure Plus High Protein  (350kcal and 20g protein per 8 fluid oz)  Coordination of Care with Providers:  (IDT rounds)    Education Documentation  Not appropriate at this time.      Nutrition Monitoring and Evaluation   Food/Nutrient Related History Monitoring  Monitoring and Evaluation Plan: Intake / amount of food  Intake / Amount of food: Consumes at least 75% or more of meals/snacks/supplements    Anthropometric Measurements  Monitoring and Evaluation Plan: Body weight  Body Weight: Body weight - Maintain stable weight         Physical Exam Findings  Other: Evaluate nutrition intervention as compared to nutrition goal(s) and estimated nutrient need criteria.    Goal Status: New goal(s) identified    Time Spent (min): 75 minutes

## 2025-03-14 NOTE — PROGRESS NOTES
Physical Therapy    Physical Therapy Treatment    Patient Name: Gilmer Quiñonez  MRN: [id number]  Department: [facility]  Room: 229229A  Today's Date: 3/14/2025  Time Calculation  Start Time: 0853  Stop Time: 0934  Time Calculation (min): 41 min         Assessment/Plan   PT Assessment  PT Assessment Results: Decreased strength, Impaired balance, Decreased mobility, Decreased endurance, Decreased safety awareness, Impaired judgement  Rehab Prognosis: Good  Barriers to Discharge Home: Cognition needs, Caregiver assistance  Caregiver Assistance: Caregiver assistance needed per identified barriers - however, level of patient's required assistance exceeds assistance available at home  Cognition Needs: 24hr supervision for safety awareness needed  Evaluation/Treatment Tolerance: Patient tolerated treatment well  Medical Staff Made Aware: Yes  Strengths: Support of Caregivers, Rehab experience  Barriers to Participation: Comorbidities, Coping skills  End of Session Communication: PCT/NA/CTA  Assessment Comment: Pt cont to make slow but steady progress toward goals with improved funcitonal transfers to CGA with cues for ue sequencing to and from AD and surfaces. Pt improved posture and gait safety mechanics with training to aid in mobility and safety but easily fatigued with some reports of dizziness but disipated after rest.  Pt cont to require skilled PT to improve functional mobility and endurnace, reduce risk for falls and prevent further decline while here in hospital. Pt left up in chair. alarm on. call bell wihtin reach and all needs addressed.  End of Session Patient Position: Up in chair, Alarm on     PT Plan  Treatment/Interventions: Transfer training, Gait training, Strengthening, Endurance training, Range of motion, Therapeutic exercise, Therapeutic activity  PT Plan: Ongoing PT  PT Frequency: 3 times per week  PT Discharge Recommendations: Moderate intensity level of continued care  Equipment Recommended upon  Discharge: Wheeled walker  PT Recommended Transfer Status: Assist x1  PT - OK to Discharge: Yes      General Visit Information:   PT  Visit  PT Received On: 03/14/25  Response to Previous Treatment: Patient with no complaints from previous session.  General  Reason for Referral: impaired mobility, fall at homoe  Referred By: SOFIE Cortez  Past Medical History Relevant to Rehab: CAD, CKD, CHF, syncope, acute R MCA stroke (2023), HLD, chronic congestive heart failure, chronic kidney disease, pneumonia  Family/Caregiver Present: No  Prior to Session Communication: Bedside nurse  Patient Position Received: Up in chair, Alarm on  Preferred Learning Style: verbal, visual  General Comment: Pt pleasant and agreeable to PT being cleared by nursing prior to session.    Subjective Feeling good overall just reports increased fatigue and dizziness.   Precautions:  Precautions  Medical Precautions: Fall precautions  Precautions Comment: Pt w/ mild dizziness thorughout session. Tele, rachael          Objective   Pain:  Pain Assessment  Pain Assessment: 0-10  0-10 (Numeric) Pain Score: 0 - No pain  Cognition:  Cognition  Overall Cognitive Status: Impaired at baseline  Arousal/Alertness: Appropriate responses to stimuli  Orientation Level: Disoriented to place, Disoriented to time, Disoriented to situation  Coordination:  Movements are Fluid and Coordinated: Yes    Activity Tolerance:  Activity Tolerance  Endurance: Tolerates 10 - 20 min exercise with multiple rests  Treatments:  Therapeutic Exercise  Therapeutic Exercise Performed: Yes  Therapeutic Exercise Activity 1: 2x20 PF/DF  Therapeutic Exercise Activity 2: 2x20 alt march  Therapeutic Exercise Activity 3: 2x20 LAQ  Therapeutic Exercise Activity 4: 2x20 long axis hip abd snow angels  Therapeutic Exercise Activity 5: 2x8 STS      Ambulation/Gait Training  Ambulation/Gait Training Performed: Yes  Ambulation/Gait Training 1  Surface 1: Level tile  Device 1: Rolling  walker  Gait Support Devices: Gait belt  Assistance 1: Contact guard  Quality of Gait 1: Decreased step length, Forward flexed posture  Comments/Distance (ft) 1: 60-70 ft x 1 with slower pace and some reports of ache in knees and back but after cues for erect posture and longer stride lengths reports no pain or soreness.  Ambulation/Gait Training 2  Surface 2: Level tile  Device 2: Rolling walker  Gait Support Devices: Gait belt  Assistance 2: Contact guard  Quality of Gait 2: Wide base of support, Diminished heel strike, Inconsistent stride length, Decreased step length, Shuffling gait, Forward flexed posture  Comments/Distance (ft) 2: 150-200 ft gait x 1 trial with slower pace and CGA needed at all times with increased confusion during gait and more frequent cues for posture needed as well as safety for AD during turns.  Transfers  Transfer: Yes  Transfer 1  Transfer From 1: Chair with arms to  Transfer to 1: Stand  Technique 1: Stand to sit, Sit to stand, Stand pivot  Transfer Device 1: Walker, Gait belt  Transfer Level of Assistance 1: Contact guard  Trials/Comments 1: cues for ue sequencing needed 50% of the time or more for safety.       Outcome Measures:  [facility] Basic Mobility  Turning from your back to your side while in a flat bed without using bedrails: A little  Moving from lying on your back to sitting on the side of a flat bed without using bedrails: A little  Moving to and from bed to chair (including a wheelchair): A little  Standing up from a chair using your arms (e.g. wheelchair or bedside chair): A little  To walk in hospital room: A little  Climbing 3-5 steps with railing: A lot  Basic Mobility - Total Score: 17    Education Documentation  Body Mechanics, taught by Alma Gatica PTA at 3/14/2025 10:59 AM.  Learner: Patient  Readiness: Acceptance  Method: Explanation  Response: Verbalizes Understanding, Needs Reinforcement  Comment: Education and cues for safer posture and gait mechanics  to reduce risk for falls as well as keeping head up and b le wihtin base of walker to improve safety up on feet.    Mobility Training, taught by Alma Gatica PTA at 3/14/2025 10:59 AM.  Learner: Patient  Readiness: Acceptance  Method: Explanation  Response: Verbalizes Understanding, Needs Reinforcement  Comment: Education and cues for safer posture and gait mechanics to reduce risk for falls as well as keeping head up and b le wihtin base of walker to improve safety up on feet.    Education Comments  No comments found.        Encounter Problems       Encounter Problems (Active)       Balance       STG - Maintains dynamic standing balance with 1  upper extremity support with >=good- balance  (Progressing)       Start:  03/13/25    Expected End:  03/27/25               Mobility       LTG - Patient will ambulate community distance FADY with WW (Progressing)       Start:  03/13/25    Expected End:  03/27/25            STG - Patient will ambulate up and down a curb/step IND (Progressing)       Start:  03/13/25    Expected End:  03/27/25               PT Transfers       STG - Patient will perform bed mobility IND (Progressing)       Start:  03/13/25    Expected End:  03/27/25            STG - Patient will transfer sit to and from stand FADY with WW  (Progressing)       Start:  03/13/25    Expected End:  03/27/25            Pt. will be able to complete 5 sit to stands <=30 seconds  (Progressing)       Start:  03/13/25    Expected End:  03/27/25               Pain - Adult

## 2025-03-14 NOTE — NURSING NOTE
Patients  daughter at bedside and thinks patient has a facial droop and slurred speech. Nurse did neuro check at bedside and it was neg. Provider aware

## 2025-03-14 NOTE — PROGRESS NOTES
Gilmer Quiñonez is a 89 y.o. male on day 1 of admission presenting with Fall at home, sequela.      Subjective   Pt alert and pleasant , cooperative with care - seemed appropriate during interview this am - complains dizziness at rest - no rm spinning - denies chest pain or sob        Objective     Last Recorded Vitals  /62 (BP Location: Left arm, Patient Position: Sitting)   Pulse 58   Temp 36.5 °C (97.7 °F) (Temporal)   Resp 16   Wt 72.9 kg (160 lb 11.5 oz)   SpO2 93%   Intake/Output last 3 Shifts:    Intake/Output Summary (Last 24 hours) at 3/14/2025 1159  Last data filed at 3/14/2025 0825  Gross per 24 hour   Intake 600 ml   Output 550 ml   Net 50 ml       Admission Weight  Weight: 81.6 kg (180 lb) (03/12/25 0715)    Daily Weight  03/12/25 : 72.9 kg (160 lb 11.5 oz)    Image Results  ECG 12 lead  Atrial fibrillation with premature ventricular or aberrantly conducted complexes  Left axis deviation  Nonspecific intraventricular block  Minimal voltage criteria for LVH, may be normal variant ( [place] product )  Inferior infarct , age undetermined  Abnormal ECG  When compared with ECG of 11-MAR-2025 04:40,  Atrial fibrillation has replaced Electronic ventricular pacemaker  See ED provider note for full interpretation and clinical correlation  Confirmed by Danette Elizondo ([id number]) on 3/12/2025 11:26:10 AM  CT cervical spine wo IV contrast  Narrative: Interpreted By:  José Luis Gary,   STUDY:  CT CERVICAL SPINE WO IV CONTRAST; ;  3/12/2025 7:15 am      INDICATION:  Signs/Symptoms:trauma.      COMPARISON:  None.      ACCESSION NUMBER(S):  [id number]      ORDERING CLINICIAN:  CYNTHIA DUBOIS      TECHNIQUE:  Contiguous axial CT sections are performed from the skullbase to the  upper thoracic spine and supplemented with coronal and sagittal  reformatted images.      FINDINGS:  There is some reversal of the cervical lordosis. The facet joints  align normally. There is mild degenerative posterior  subluxation of  C5 relative to C4 and C6 measuring 2 mm.      There severe changes of multilevel cervical spondylosis with disc  space narrowing greatest at C3-4 and C5-6.      The osseous structures are diffusely demineralized. The cervical  vertebral body heights are maintained. There is no sign of cervical  vertebral fracture. There is no bone destruction or aggressive  periosteal reaction. No lytic or blastic lesion is detected. The  visualized surrounding osseous structures are also intact.      The C1-2 relationship is within normal limits.      The C2-3 disc space level demonstrates moderate facet arthrosis on  the left and mild facet arthrosis on the right. There is no  significant central canal or neural foraminal stenosis.      The C3-4 disc space level demonstrates mild to moderate bilateral  facet arthrosis greater on the right. There is bulging disc and  marginal osteophyte with moderate central canal narrowing and some  mass effect upon the ventral spinal cord. There is mild-to-moderate  narrowing of the left neural foramen and moderate narrowing of the  right neural foramen.      The C4-5 disc space level demonstrates moderate bilateral facet  arthrosis. There is circumferential bulging disc and marginal  osteophyte with moderate central canal narrowing and some effacement  of the anterior surface of the cord. There is moderate bilateral  neural foraminal narrowing, greater on the right.      The C5-6 disc space level demonstrates mild to moderate bilateral  facet arthrosis and posterior subluxation of C5 5. There is bulging  disc and marginal osteophyte with at least moderate central canal  narrowing and mass effect upon the anterior surface of the spinal  cord. There is moderate to severe bilateral neural foraminal  narrowing.      The C6-7 disc space level demonstrates mild bilateral facet  arthrosis. There is bulging disc and marginal osteophyte with mild  central canal narrowing. There is mild to  moderate narrowing of the  right neural foramen and mild narrowing left neural foramen.      The C7-T1 disc space level demonstrates mild bilateral facet  arthrosis. There is no central canal or neural foraminal stenosis.      There is no prevertebral soft tissue swelling or retropharyngeal air.  Incidentally noted is a fat density mass in the posterior  subcutaneous fat to the right of midline measuring 4.2 cm in  craniocaudal diameter and 5.0 x 3.2 cm in cross-section. There is  deformity of the posterior skin surface. There is a well-defined  margin though strandy and some amorphous density internally.      Impression: Severe multilevel cervical spondylosis. There is mild to moderate  multilevel hypertrophic facet arthrosis with mild degenerative  posterior subluxation of C5.      No acute fracture or traumatic subluxation.      Osteopenia.      Multilevel bulging disc and marginal osteophyte with moderate  multilevel central canal narrowing and bilateral multilevel neural  foraminal narrowing as detailed above.      Lipomatous mass within the posterior subcutaneous fat at the C7-T1  level to the right of midline measuring 4.2 x 5.0 x 3.2 cm. There is  some strandy linear and amorphous internal density. Characteristics  are similar to a previous CT examination of 03/31/2023 though the  lesion is slightly increased in size in the interval. Need for  further workup should be determined clinically. Correlate with pain  at this site.          MACRO:  None      Signed by: José Luis Gary 3/12/2025 7:38 AM  Dictation workstation:   [id number]  CT head W O contrast trauma protocol  Narrative: Interpreted By:  José Luis Gary,   STUDY:  CT HEAD W/O CONTRAST TRAUMA PROTOCOL; ;  3/12/2025 7:15 am      INDICATION:  Signs/Symptoms:trauma.      COMPARISON:  03/11/2025      ACCESSION NUMBER(S):  [id number]      ORDERING CLINICIAN:  CYNTHIA DUBOIS      TECHNIQUE:  Contiguous unenhanced axial CT sections are  performed from the skull  base to the vertex.      FINDINGS:  The osseous structures are intact. There is opacification of the  right sphenoid compartment. There is lobular mucoperiosteal  thickening and partial opacification of the ethmoid air cells. There  is large lobular opacity in the right maxillary sinus inferiorly with  some thinning and expansion of the medial wall. There is mild lobular  mucoperiosteal thickening or polyposis in the left maxillary sinus as  well. The frontal sinus in the left sphenoid compartment are clear.  The mastoid air cells are clear.      There is severe generalized parenchymal volume loss with enlargement  of the cortical sulci and CSF spaces. There is mild diffuse  hypoattenuation in the cerebral white matter compatible with  small-vessel ischemia.      There is no sign of parenchymal hematoma or dense extra-axial fluid  collection. There is no mass effect or midline shift. The gray  matter/white-matter differentiation is preserved.      Impression: Severe age-related atrophy and mild small-vessel ischemic changes of  the cerebral white matter.      No CT evidence of acute intracranial hemorrhage or mass effect.      Maxillary and ethmoid sinusitis. There is some thinning and expansion  of the medial wall the right maxillary sinus similar to previous  studies.      No sign of acute fracture.          MACRO:  None      Signed by: José Luis Gary 3/12/2025 7:28 AM  Dictation workstation:   [id number]      Physical Exam  Constitutional:       Appearance: Normal appearance.   HENT:      Head: Normocephalic.      Mouth/Throat:      Mouth: Mucous membranes are moist.   Eyes:      Extraocular Movements: Extraocular movements intact.   Cardiovascular:      Rate and Rhythm: Bradycardia present.      Pulses: Normal pulses.      Heart sounds: Normal heart sounds.   Pulmonary:      Effort: Pulmonary effort is normal.      Breath sounds: Normal breath sounds.   Abdominal:      General:  Bowel sounds are normal.      Palpations: Abdomen is soft.   Musculoskeletal:         General: Normal range of motion.      Cervical back: Normal range of motion.   Skin:     General: Skin is warm and dry.      Capillary Refill: Capillary refill takes less than 2 seconds.   Neurological:      Mental Status: He is alert. Mental status is at baseline.   Psychiatric:         Mood and Affect: Mood normal.         Behavior: Behavior normal.         Relevant Results             Scheduled medications  apixaban, 2.5 mg, oral, BID  [Held by provider] empagliflozin, 10 mg, oral, Daily  metoprolol succinate XL, 25 mg, oral, Daily  pantoprazole, 40 mg, oral, Daily before breakfast   Or  pantoprazole, 40 mg, intravenous, Daily before breakfast  rosuvastatin, 40 mg, oral, Daily  tamsulosin, 0.4 mg, oral, Daily  [Held by provider] torsemide, 40 mg, oral, Daily      Continuous medications     PRN medications  PRN medications: acetaminophen **OR** acetaminophen **OR** acetaminophen, calcium carbonate, melatonin, ondansetron **OR** ondansetron, sennosides-docusate sodium  Results for orders placed or performed during the hospital encounter of 03/12/25 (from the past 24 hours)   Basic metabolic panel   Result Value Ref Range    Glucose 103 (H) 74 - 99 mg/dL    Sodium 140 136 - 145 mmol/L    Potassium 3.6 3.5 - 5.3 mmol/L    Chloride 101 98 - 107 mmol/L    Bicarbonate 28 21 - 32 mmol/L    Anion Gap 15 10 - 20 mmol/L    Urea Nitrogen 16 6 - 23 mg/dL    Creatinine 1.57 (H) 0.50 - 1.30 mg/dL    eGFR 42 (L) >60 mL/min/1.73m*2    Calcium 9.2 8.6 - 10.3 mg/dL   CBC   Result Value Ref Range    WBC 5.0 4.4 - 11.3 x10*3/uL    nRBC 0.0 0.0 - 0.0 /100 WBCs    RBC 4.90 4.50 - 5.90 x10*6/uL    Hemoglobin 13.0 (L) 13.5 - 17.5 g/dL    Hematocrit 41.9 41.0 - 52.0 %    MCV 86 80 - 100 fL    MCH 26.5 26.0 - 34.0 pg    MCHC 31.0 (L) 32.0 - 36.0 g/dL    RDW 17.2 (H) 11.5 - 14.5 %    Platelets 144 (L) 150 - 450 x10*3/uL     *Note: Due to a large number of  results and/or encounters for the requested time period, some results have not been displayed. A complete set of results can be found in Results Review.     ECG 12 lead    Result Date: 3/12/2025  Atrial fibrillation with premature ventricular or aberrantly conducted complexes Left axis deviation Nonspecific intraventricular block Minimal voltage criteria for LVH, may be normal variant ( [place] product ) Inferior infarct , age undetermined Abnormal ECG When compared with ECG of 11-MAR-2025 04:40, Atrial fibrillation has replaced Electronic ventricular pacemaker See ED provider note for full interpretation and clinical correlation Confirmed by Danette Elizondo ([id number]) on 3/12/2025 11:26:10 AM    CT cervical spine wo IV contrast    Result Date: 3/12/2025  Interpreted By:  José Luis Gary, STUDY: CT CERVICAL SPINE WO IV CONTRAST; ;  3/12/2025 7:15 am   INDICATION: Signs/Symptoms:trauma.   COMPARISON: None.   ACCESSION NUMBER(S): [id number]   ORDERING CLINICIAN: CYNTHIA DUBOIS   TECHNIQUE: Contiguous axial CT sections are performed from the skullbase to the upper thoracic spine and supplemented with coronal and sagittal reformatted images.   FINDINGS: There is some reversal of the cervical lordosis. The facet joints align normally. There is mild degenerative posterior subluxation of C5 relative to C4 and C6 measuring 2 mm.   There severe changes of multilevel cervical spondylosis with disc space narrowing greatest at C3-4 and C5-6.   The osseous structures are diffusely demineralized. The cervical vertebral body heights are maintained. There is no sign of cervical vertebral fracture. There is no bone destruction or aggressive periosteal reaction. No lytic or blastic lesion is detected. The visualized surrounding osseous structures are also intact.   The C1-2 relationship is within normal limits.   The C2-3 disc space level demonstrates moderate facet arthrosis on the left and mild facet arthrosis on the right.  There is no significant central canal or neural foraminal stenosis.   The C3-4 disc space level demonstrates mild to moderate bilateral facet arthrosis greater on the right. There is bulging disc and marginal osteophyte with moderate central canal narrowing and some mass effect upon the ventral spinal cord. There is mild-to-moderate narrowing of the left neural foramen and moderate narrowing of the right neural foramen.   The C4-5 disc space level demonstrates moderate bilateral facet arthrosis. There is circumferential bulging disc and marginal osteophyte with moderate central canal narrowing and some effacement of the anterior surface of the cord. There is moderate bilateral neural foraminal narrowing, greater on the right.   The C5-6 disc space level demonstrates mild to moderate bilateral facet arthrosis and posterior subluxation of C5 5. There is bulging disc and marginal osteophyte with at least moderate central canal narrowing and mass effect upon the anterior surface of the spinal cord. There is moderate to severe bilateral neural foraminal narrowing.   The C6-7 disc space level demonstrates mild bilateral facet arthrosis. There is bulging disc and marginal osteophyte with mild central canal narrowing. There is mild to moderate narrowing of the right neural foramen and mild narrowing left neural foramen.   The C7-T1 disc space level demonstrates mild bilateral facet arthrosis. There is no central canal or neural foraminal stenosis.   There is no prevertebral soft tissue swelling or retropharyngeal air. Incidentally noted is a fat density mass in the posterior subcutaneous fat to the right of midline measuring 4.2 cm in craniocaudal diameter and 5.0 x 3.2 cm in cross-section. There is deformity of the posterior skin surface. There is a well-defined margin though strandy and some amorphous density internally.       Severe multilevel cervical spondylosis. There is mild to moderate multilevel hypertrophic facet  arthrosis with mild degenerative posterior subluxation of C5.   No acute fracture or traumatic subluxation.   Osteopenia.   Multilevel bulging disc and marginal osteophyte with moderate multilevel central canal narrowing and bilateral multilevel neural foraminal narrowing as detailed above.   Lipomatous mass within the posterior subcutaneous fat at the C7-T1 level to the right of midline measuring 4.2 x 5.0 x 3.2 cm. There is some strandy linear and amorphous internal density. Characteristics are similar to a previous CT examination of 03/31/2023 though the lesion is slightly increased in size in the interval. Need for further workup should be determined clinically. Correlate with pain at this site.     MACRO: None   Signed by: José Luis Gary 3/12/2025 7:38 AM Dictation workstation:   [id number]    CT head W O contrast trauma protocol    Result Date: 3/12/2025  Interpreted By:  José Luis Gary, STUDY: CT HEAD W/O CONTRAST TRAUMA PROTOCOL; ;  3/12/2025 7:15 am   INDICATION: Signs/Symptoms:trauma.   COMPARISON: 03/11/2025   ACCESSION NUMBER(S): [id number]   ORDERING CLINICIAN: CYNTHIA DUBOIS   TECHNIQUE: Contiguous unenhanced axial CT sections are performed from the skull base to the vertex.   FINDINGS: The osseous structures are intact. There is opacification of the right sphenoid compartment. There is lobular mucoperiosteal thickening and partial opacification of the ethmoid air cells. There is large lobular opacity in the right maxillary sinus inferiorly with some thinning and expansion of the medial wall. There is mild lobular mucoperiosteal thickening or polyposis in the left maxillary sinus as well. The frontal sinus in the left sphenoid compartment are clear. The mastoid air cells are clear.   There is severe generalized parenchymal volume loss with enlargement of the cortical sulci and CSF spaces. There is mild diffuse hypoattenuation in the cerebral white matter compatible with small-vessel  ischemia.   There is no sign of parenchymal hematoma or dense extra-axial fluid collection. There is no mass effect or midline shift. The gray matter/white-matter differentiation is preserved.       Severe age-related atrophy and mild small-vessel ischemic changes of the cerebral white matter.   No CT evidence of acute intracranial hemorrhage or mass effect.   Maxillary and ethmoid sinusitis. There is some thinning and expansion of the medial wall the right maxillary sinus similar to previous studies.   No sign of acute fracture.     MACRO: None   Signed by: José Luis Gary 3/12/2025 7:28 AM Dictation workstation:   [id number]    ECG 12 lead    Result Date: 3/11/2025  Ventricular-paced rhythm with occasional AV dual-paced complexes Abnormal ECG When compared with ECG of 29-AUG-2024 18:12, Electronic ventricular pacemaker has replaced Atrial flutter See ED provider note for full interpretation and clinical correlation Confirmed by Danette Elizondo ([id number]) on 3/11/2025 11:55:45 AM    CT chest wo IV contrast    Result Date: 3/11/2025  Interpreted By:  Finkelstein, Evan, STUDY: CT CHEST WO IV CONTRAST;  3/11/2025 5:38 am   INDICATION: Signs/Symptoms:weakness.     COMPARISON: CT chest 08/27/2024   ACCESSION NUMBER(S): [id number]   ORDERING CLINICIAN: SHELL TRINIDAD   TECHNIQUE: Axial CT images of the chest obtained  without IV contrast.  Sagittal and coronal reconstructions were created..   FINDINGS: CHEST WALL AND LOWER NECK: Left chest wall pacemaker. Gynecomastia bilaterally. UPPER ABDOMEN: No acute abnormality of the partially visualized abdomen.   VESSELS: Aorta and pulmonary artery are normal caliber. Atherosclerotic calcifications in the aorta and coronary arteries. HEART: Enlarged in size. No pericardial effusion. MEDIASTINUM AND GEO: No pathologically enlarged lymph nodes. Large hiatal hernia. LUNG, PLEURA, AND LARGE AIRWAYS: There are pleural calcifications. Redemonstrated opacities scattered throughout  the lungs, predominantly curvilinear in orientation and similar compared to prior imaging.   BONES: No acute osseous abnormality. Median sternotomy wires are present.       Redemonstrated opacities scattered throughout the lungs, which are predominantly curvilinear in orientation and similar compared to prior imaging. Findings are favored to represent scarring versus atelectasis.   Large hiatal hernia.   MACRO: None.   Signed by: Evan Finkelstein 3/11/2025 6:57 AM Dictation workstation:   [id number]    CT head wo IV contrast    Result Date: 3/11/2025  Interpreted By:  Finkelstein, Evan, STUDY: CT HEAD WO IV CONTRAST; CT CERVICAL SPINE WO IV CONTRAST;  3/11/2025 5:38 am   INDICATION: Signs/Symptoms:fall.     COMPARISON: CT brain 08/29/2024   ACCESSION NUMBER(S): [id number]; [id number]   ORDERING CLINICIAN: SHELL TRINIDAD   TECHNIQUE: Noncontrast CT images of the head with coronal and sagittal reconstructions. Axial noncontrast CT images of the cervical spine with coronal and sagittal reconstructed images.   FINDINGS: EXTRACRANIAL SOFT TISSUES: Right frontal scalp and periorbital soft tissue laceration.   CALVARIUM: No depressed skull fracture. No destructive osseous lesion.   PARANASAL SINUSES/MASTOIDS: Partial opacification of the ethmoid sinuses and right maxillary sinus. Mastoid air cells are aerated.   HEMORRHAGE: No acute intracranial hemorrhage.   BRAIN PARENCHYMA: Gray-white matter interfaces are preserved. No mass effect or midline shift. There are nonspecific scattered white matter hypodensities.   VENTRICLES and EXTRA-AXIAL SPACES: Parenchymal atrophy with prominence of the ventricles and cortical sulci.   OTHER FINDINGS: There are calcifications within the cavernous carotids   CERVICAL SPINE:   ALIGNMENT: Straightening of the normal cervical lordosis, which may be positional or related to spasm. VERTEBRAE: No acute loss of vertebral body height. Bones are demineralized DISC SPACE: Disc space narrowing  C3/C4, C4/C5 and C5/C6. SPINAL CANAL: Multilevel facet and uncovertebral arthropathy with varying degrees of neural foraminal stenosis. Prominent posterior disc osteophyte complexes from C3/C4 through C5/C6 contribute to moderate central narrowing. PREVERTEBRAL SOFT TISSUES: No prevertebral soft tissue swelling. LUNG APICES: Emphysematous changes in the visualized lung apices.   OTHER FINDINGS: 4.8 x 2.9 cm encapsulated fat attenuating lesion in the posterior right neck soft tissues       Right frontal scalp and periorbital soft tissue laceration. No underlying fracture.   No acute intracranial hemorrhage, mass effect or midline shift.   Nonspecific scattered white matter hypodensities favored to represent sequela of small vessel ischemia. Cervical spondylosis without acute loss of vertebral body height or traumatic malalignment.   4.8 x 2.9 cm lipomatous lesion in the right lower neck/upper back soft tissues.   MACRO: None.   Signed by: Evan Finkelstein 3/11/2025 6:53 AM Dictation workstation:   [id number]    CT cervical spine wo IV contrast    Result Date: 3/11/2025  Interpreted By:  Finkelstein, Evan, STUDY: CT HEAD WO IV CONTRAST; CT CERVICAL SPINE WO IV CONTRAST;  3/11/2025 5:38 am   INDICATION: Signs/Symptoms:fall.     COMPARISON: CT brain 08/29/2024   ACCESSION NUMBER(S): [id number]; [id number]   ORDERING CLINICIAN: SHELL TRINIDAD   TECHNIQUE: Noncontrast CT images of the head with coronal and sagittal reconstructions. Axial noncontrast CT images of the cervical spine with coronal and sagittal reconstructed images.   FINDINGS: EXTRACRANIAL SOFT TISSUES: Right frontal scalp and periorbital soft tissue laceration.   CALVARIUM: No depressed skull fracture. No destructive osseous lesion.   PARANASAL SINUSES/MASTOIDS: Partial opacification of the ethmoid sinuses and right maxillary sinus. Mastoid air cells are aerated.   HEMORRHAGE: No acute intracranial hemorrhage.   BRAIN PARENCHYMA: Gray-white matter  interfaces are preserved. No mass effect or midline shift. There are nonspecific scattered white matter hypodensities.   VENTRICLES and EXTRA-AXIAL SPACES: Parenchymal atrophy with prominence of the ventricles and cortical sulci.   OTHER FINDINGS: There are calcifications within the cavernous carotids   CERVICAL SPINE:   ALIGNMENT: Straightening of the normal cervical lordosis, which may be positional or related to spasm. VERTEBRAE: No acute loss of vertebral body height. Bones are demineralized DISC SPACE: Disc space narrowing C3/C4, C4/C5 and C5/C6. SPINAL CANAL: Multilevel facet and uncovertebral arthropathy with varying degrees of neural foraminal stenosis. Prominent posterior disc osteophyte complexes from C3/C4 through C5/C6 contribute to moderate central narrowing. PREVERTEBRAL SOFT TISSUES: No prevertebral soft tissue swelling. LUNG APICES: Emphysematous changes in the visualized lung apices.   OTHER FINDINGS: 4.8 x 2.9 cm encapsulated fat attenuating lesion in the posterior right neck soft tissues       Right frontal scalp and periorbital soft tissue laceration. No underlying fracture.   No acute intracranial hemorrhage, mass effect or midline shift.   Nonspecific scattered white matter hypodensities favored to represent sequela of small vessel ischemia. Cervical spondylosis without acute loss of vertebral body height or traumatic malalignment.   4.8 x 2.9 cm lipomatous lesion in the right lower neck/upper back soft tissues.   MACRO: None.   Signed by: Evan Finkelstein 3/11/2025 6:53 AM Dictation workstation:   [id number]     Assessment/Plan      Assessment & Plan  Fall at home, sequela    Syncope and collapse    #Syncope and Collapse  #Chronic Atrial fibrillation (Multi)  #Chronic systolic (congestive) heart failure (Multi) EF 25-30%  -WBC 5.7  -Troponin 19  -ECHO 8/26/24: severely decreased LVSF with an EF of 25-30% with abnormal pattern of LVDF  -continue apixaban 2.5 mg PO BID  -continue Metoprolol  succinate 25 mg PO Daily   Jardiance 10 mg po daily  Torsemide 40 mg po daily  - 3/14 jardiance and torsemide on hold r/t worsening renal fxn  -Cardiology consult, appreciate recs  - pt with complaints dizziness at rest - denies rm spinning   -  - cardiology consult - recs appreciated -  #fall,          on flomax 0.4 mg which could contribute to falls, bladder scan   - ICD interrogation benign - demonstrates < 1 hr activity per day       Ct scan head  IMPRESSION:  Severe age-related atrophy and mild small-vessel ischemic changes of  the cerebral white matter.   No CT evidence of acute intracranial hemorrhage or mass effect.  Maxillary and ethmoid sinusitis. There is some thinning and expansion  of the medial wall the right maxillary sinus similar to previous  studies.  Ct scan c spine    IMPRESSION:  Severe multilevel cervical spondylosis. There is mild to moderate  multilevel hypertrophic facet arthrosis with mild degenerative  posterior subluxation of C5.  No acute fracture or traumatic subluxation.  Osteopenia.  Multilevel bulging disc and marginal osteophyte with moderate  multilevel central canal narrowing and bilateral multilevel neural  foraminal narrowing as detailed above.  Lipomatous mass within the posterior subcutaneous fat at the C7-T1  level to the right of midline measuring 4.2 x 5.0 x 3.2 cm. There is  some strandy linear and amorphous internal density. Characteristics  are similar to a previous CT examination of 03/31/2023 though the  lesion is slightly increased in size in the interval. Need for  further workup should be determined clinically. Correlate with pain  at this site.  - transfer to OSH for neurology consult when available       #Metabolic encephalopathy 2/2 delirium vs dementia vs etoh abuse disorder, stable  -Dtr reports increased confusion in ED with 3 falls after first ED course 3/11  -Pt reports drinking beer daily; unsure of last drink  -Confused  overnight; unable to be  re-directed  -Sitter at bedside for safety-[facility] protocol   -Thiamine, folic acid, MV - some concern for Wernickes  -Pt alert and oriented today     #Weakness, fall  -MRI/MRA unable to complete due to pacemaker at [facility]. Will have to be done at [facility] - look for Wernicke's as well    -   -Neuro Checks    -PT Eval and Treat - mod recs and reporting poor endurance        #Chronic Renal Failure, stage 3a  -baseline creatine 1.13  -creatine on admission 1.17; today creatine 1.40 gfr 49. 3/14 Cr 1.57 GFR 42 - suspect poor po intake - holding jardiance and torsemide      #HLD (hyperlipidemia)  -continue rosuvastatin 40 mg PO HS - can cause confusion and worsening mentation in some elderly pts  - consult to dietician - pt has significant muscle wasting      DVT ppx  -apixaban   GI ppx continue PPI     F: PRN  E: Replete per protocol  N: Cardiac  A: PIV     Disposition: Patient meets inpatient criteria. Patient will be transferred to [facility] for bed availability once available. Will follow up with cardiology and PCP outpatient after discharge. Patient and family should consider ENT outpatient referral made in Sept 2024               HARSHIL Rucker-CNP

## 2025-03-14 NOTE — PROGRESS NOTES
03/14/25 1239   Discharge Planning   Living Arrangements Children  (Daughter)   Support Systems Children;Family members   Assistance Needed Patient with confusion- PT/OT recommend MOD intensity. Called Catherine/dtr and discussed. She wants father home-she said it will add to his forgetfulness. She is setting up baby monitors in the house. She asked about therapy in the home. Discussed C-she choiced [facility]. Plan will be for patient to be discharge with [facility]-PT/OT.   Type of Residence Private residence   Number of Stairs to Enter Residence 1   Number of Stairs Within Residence 0   Do you have animals or pets at home? No   Who is requesting discharge planning? Provider   Home or Post Acute Services In home services  (Would like PT/OT when discharged back home, choiced [facility].)   Type of Home Care Services Home PT;Home OT   Expected Discharge Disposition Short Term A  (waiting on transfer to neurology bed.)   Intensity of Service   Intensity of Service 0-30 min

## 2025-03-14 NOTE — CARE PLAN
The patient's goals for the shift include  JAROCHO    The clinical goals for the shift include Maintain pt safety. Free from falls and injuries.    Pt remained stable and free from injuries. Pt slept all night. Assisted with walker to use restroom.  Over the shift, the patient did make progress toward the following goals      Problem: Safety - Adult  Goal: Free from fall injury  Outcome: Progressing     Problem: Discharge Planning  Goal: Discharge to home or other facility with appropriate resources  Outcome: Progressing     Problem: Chronic Conditions and Co-morbidities  Goal: Patient's chronic conditions and co-morbidity symptoms are monitored and maintained or improved  Outcome: Progressing     Problem: Nutrition  Goal: Nutrient intake appropriate for maintaining nutritional needs  Outcome: Progressing     Problem: Skin  Goal: Prevent/manage excess moisture  3/14/2025 0456 by Royal Black RN  Outcome: Progressing  3/13/2025 2355 by Royal Black RN  Flowsheets (Taken 3/13/2025 2355)  Prevent/manage excess moisture:   Cleanse incontinence/protect with barrier cream   Moisturize dry skin  Goal: Prevent/minimize sheer/friction injuries  Outcome: Progressing

## 2025-03-14 NOTE — CONSULTS
Cardiology Consult Note  Patient: Gilmer Quiñonez  Unit/Bed: 229/229-A  YOB: 1935    Admitting Diagnosis: Syncope and collapse [R55]  Frequent falls [R29.6]  Acute alteration in mental status [R41.82]  Fall at home, sequela [W19.XXXS, Y92.009]  Date:  3/12/2025  Hospital Day: 1  Attending: Cardiology consulted at the request of  Mirta Plaza MD  for Syncope   PCP: Beronica Adames MD      Chief Complaint   Patient presents with    Fall    Syncope        History of Present Illness:   Gilmer Quiñonez is a 89 y.o. Male who presented via [facility] with c/o confusion. Multiple ED visits in the last 3 days. Initial with complaints of being found on the floor and generalized weakness. Increasing weakness and confusion for the past week. He was discharged home and returned 3/12 after passing out twice over night and falling 3 times. Increased confusion from baseline. Per daughter report Never been diagnosed with dementia and is typically very sharp.        Past medical history significant for  HTN, HLD, atrial fibrillation /flutter,  hx ablation in 2015 with recurrence, on long term OAC with Eliquis,  ischemic cardiomyopathy LVEF 35-40% s/p ICD,  hx VT,  hx GI bleed---declining watchman,  Hx CVA,  numerus PCI and hx CABG    Primary Cardiology outpatient: [facility]--Dr. Porter/Sherine Marie CNP     SUBJECTIVE:   Sitting up in the chair. Unsure why he is in the hospital but knows he is. He inappropriately answered month and year. Denies CP, SOB or palpitations. He endorses dizziness from time to time. Denies ICD shocks. NO acute distress, oxygenating on Room air.       ALLERGIES:   Allergies   Allergen Reactions    Penicillins Swelling    Rivaroxaban GI bleeding     required 9 blood transfsions while on it, unable to find source of bleeding    Morphine Rash      Home Medication:  Medications Prior to Admission   Medication Sig Dispense Refill Last Dose/Taking    apixaban (Eliquis) 2.5 mg tablet Take 1  tablet (2.5 mg) by mouth 2 times a day. 180 tablet 1     empagliflozin (Jardiance) 10 mg Take 1 tablet (10 mg) by mouth once daily. 90 tablet 1     ergocalciferol (Vitamin D-2) 1.25 MG (72138 UT) capsule Take 1 capsule (50,000 Units) by mouth 1 (one) time per week. 12 capsule 0     metoprolol succinate XL (Toprol-XL) 25 mg 24 hr tablet Take 1 tablet (25 mg) by mouth once daily. 90 tablet 1     rosuvastatin (Crestor) 40 mg tablet Take 1 tablet (40 mg) by mouth once daily. 90 tablet 1     tamsulosin (Flomax) 0.4 mg 24 hr capsule Take 1 capsule (0.4 mg) by mouth once daily. 30 capsule 11     torsemide (Demadex) 20 mg tablet Take 2 tablets (40 mg) by mouth once daily. 180 tablet 0       PMHx:  Past Medical History:   Diagnosis Date    Personal history of pneumonia (recurrent) 07/15/2021    History of pneumonia       PSHx:  Past Surgical History:   Procedure Laterality Date    APPENDECTOMY  2015    Appendectomy    CORONARY ARTERY BYPASS GRAFT  2017    CABG    CT ABDOMEN PELVIS ANGIOGRAM W AND/OR WO IV CONTRAST  2022    CT ABDOMEN PELVIS ANGIOGRAM W AND/OR WO IV CONTRAST 2022 [facility]    CT ANGIO NECK  2023    CT NECK ANGIO W AND WO IV CONTRAST GEN CT    CT HEAD ANGIO W AND WO IV CONTRAST  2023    CT HEAD ANGIO W AND WO IV CONTRAST GEN CT    KNEE SURGERY  2015    Knee Surgery    OTHER SURGICAL HISTORY  2020    Cardioverter defibrillator insertion    OTHER SURGICAL HISTORY  2020    Coronary artery stent placement       SOCIAL Hx:  Social History     Socioeconomic History    Marital status:    Tobacco Use    Smoking status: Former     Current packs/day: 0.00     Average packs/day: 1 pack/day for 49.0 years (49.0 ttl pk-yrs)     Types: Cigarettes     Start date:      Quit date:      Years since quittin.2    Smokeless tobacco: Never   Vaping Use    Vaping status: Never Used   Substance and Sexual Activity    Alcohol use: Yes     Alcohol/week: 1.0  standard drink of alcohol     Types: 1 Cans of beer per week    Drug use: Never     Social Drivers of Health     Financial Resource Strain: Low Risk  (3/12/2025)    Overall Financial Resource Strain (CARDIA)     Difficulty of Paying Living Expenses: Not very hard   Food Insecurity: No Food Insecurity (3/12/2025)    Hunger Vital Sign     Worried About Running Out of Food in the Last Year: Never true     Ran Out of Food in the Last Year: Never true   Transportation Needs: No Transportation Needs (3/12/2025)    PRAPARE - Transportation     Lack of Transportation (Medical): No     Lack of Transportation (Non-Medical): No   Physical Activity: Inactive (3/12/2025)    Exercise Vital Sign     Days of Exercise per Week: 0 days     Minutes of Exercise per Session: 0 min   Stress: No Stress Concern Present (3/12/2025)    [employer] - Occupational Stress Questionnaire     Feeling of Stress : Not at all   Intimate Partner Violence: Not At Risk (3/12/2025)    Humiliation, Afraid, Rape, and Kick questionnaire     Fear of Current or Ex-Partner: No     Emotionally Abused: No     Physically Abused: No     Sexually Abused: No   Housing Stability: Low Risk  (3/12/2025)    Housing Stability Vital Sign     Unable to Pay for Housing in the Last Year: No     Number of Times Moved in the Last Year: 0     Homeless in the Last Year: No       FAMILY Hx:  Family History   Problem Relation Name Age of Onset    Coronary artery disease Father         REVIEW OF SYMPTOMS:   12 system review of symptoms is negative except as noted above          OBJECTIVE:     VITALS:   Visit Vitals  /62 (BP Location: Left arm, Patient Position: Sitting)   Pulse 58   Temp 36.5 °C (97.7 °F) (Temporal)   Resp 16        Wt Readings from Last 5 Encounters:   03/12/25 72.9 kg (160 lb 11.5 oz)   03/11/25 79.1 kg (174 lb 6.1 oz)   01/06/25 71.9 kg (158 lb 9.6 oz)   09/16/24 72.8 kg (160 lb 9.6 oz)   08/29/24 73.8 kg (162 lb 11.2 oz)           INTAKE/ OUTPUT:   I/O last 3 completed shifts:  In: 720 (9.9 mL/kg) [P.O.:720]  Out: 2075 (28.5 mL/kg) [Urine:2075 (0.8 mL/kg/hr)]  Weight: 72.9 kg      TELEMETRY MONITORING: SR    PHYSICAL EXAMINATION:    Vitals reviewed.   Constitutional:       General: Awake.      Appearance: Normal appearance. Well-developed and not in distress.   Eyes:      General: Lids are normal.      Pupils: Pupils are equal, round, and reactive to light.   HENT:      Right Ear: External ear normal.      Left Ear: External ear normal.      Nose: Nose normal.    Mouth/Throat:      Lips: Pink.      Mouth: Mucous membranes are moist.   Neck:      Vascular: JVD normal.   Pulmonary:      Breath sounds: Normal air entry.   Chest:      Comments: ICD left chest   Cardiovascular:      PMI at left midclavicular line. Normal rate. Regular rhythm. Normal S1. Normal S2.       Murmurs: There is no murmur.   Edema:     Peripheral edema absent.   Abdominal:      General: Bowel sounds are normal.      Palpations: Abdomen is soft.      Tenderness: There is no abdominal tenderness.   Musculoskeletal: Normal range of motion.      Cervical back: Full passive range of motion without pain, normal range of motion and neck supple. Skin:     General: Skin is warm and dry.   Neurological:      General: No focal deficit present.      Mental Status: Alert, oriented to person, place, and time and oriented to person, place and time. Mental status is at baseline.   Psychiatric:         Attention and Perception: Attention normal.         Mood and Affect: Mood normal.         Speech: Speech normal.         Behavior: Behavior is cooperative.          LABS:       CMP:   Recent Labs     03/13/25  0538 03/12/25  0756 03/11/25  0457 01/06/25  0947 09/16/24  1234 09/02/24  0627 09/01/24  0729 08/31/24  0703 08/30/24  0655 08/29/24  1702 08/27/24  0733 08/26/24  1026 04/01/24  1210 09/20/23  1324 05/08/23  1519 04/03/23  0443 04/02/23  0510 04/01/23  0852 03/31/23 2010  12/24/21 2015 12/01/21  0612    141 136 138 138 139 137 135* 136 136   < > 135* 136   < > 140 142 141   < > 137   < > 133*   K 3.5 3.8 5.0 3.4* 3.8 3.8 3.8 3.8 3.6 3.5   < > 4.4 4.3   < > 3.8 3.7 3.7   < > 3.9   < > 3.9    107 103 100 101 103 103 99 98 95*   < > 98 102   < > 102 107 105   < > 104   < > 97*   CO2 28 27 27 29 30 27 24 25 27 31   < > 29 26   < > 27 27 23   < > 22   < > 30   ANIONGAP 13 11 11 12 11 13 14 15 15 14   < > 12 12   < > 15 12 17   < > 15   < > 10   BUN 11 9 9 9 13 33* 31* 37* 39* 48*   < > 23 10   < > 14 19 18   < > 14   < > 37*   CREATININE 1.40* 1.17 1.26 1.30 1.49* 1.61* 1.53* 1.90* 2.05* 2.88*   < > 1.44* 1.13   < > 1.26 1.35* 1.42*   < > 1.08   < > 1.84*   EGFR 49* 60* 55* 53* 45* 41* 44* 34* 31* 21*   < > 47* 63   < >  --   --   --   --   --   --   --    MG  --  1.99 2.24 2.26  --   --   --   --   --   --   --  2.25 2.18  --  1.99 1.99 1.92  --  2.04  --  2.41*    < > = values in this interval not displayed.     LIVER ENZYMES:   Recent Labs     03/12/25  0756 03/11/25  0457 01/06/25  0947 08/30/24  0655 08/29/24  1702 08/26/24  1026 04/01/23  0852 03/31/23 2010 02/28/22  1847 12/16/19  1533 12/06/19  1142 11/28/19  1641   ALBUMIN 4.4 4.7 4.4 4.0 4.3 4.4 4.1 4.5 3.8   < > 3.9 4.3   ALT 11 19 11 5* 6* 7* 9* 11 11   < > 20 22   AST 18 57* 20 11 13 21 13 16 17   < > 36 27   BILITOT 0.8 0.8 0.8 0.8 0.7 0.9 1.2 1.2 1.0   < > 0.6 0.6   LIPASE  --   --   --   --   --  14  --  14 14  --  40 60    < > = values in this interval not displayed.     CBC:  Recent Labs     03/14/25  0740 03/13/25  0538 03/12/25  0756 03/11/25  0457 01/06/25  0947 09/16/24  1234 09/02/24  0628 09/01/24  0729   WBC 5.0 4.2* 5.7 5.3 5.5 5.3 7.0 5.6   HGB 13.0* 11.6* 11.8* 12.2* 12.4* 13.0* 13.1* 13.1*   HCT 41.9 36.3* 38.8* 40.1* 40.8* 42.0 42.2 44.0   * 125* 139* 147* 160 153 157 142*   MCV 86 84 86 86 87 82 81 85     COAG:   Recent Labs     03/12/25  0756 08/29/24  1702 06/04/23  1006 02/28/22  1847  "02/18/22  1454 02/17/22  1945 11/28/21  1600 11/25/21  1520   INR 1.2* 1.3* 1.2* 1.2* 1.4* 1.5* 1.4* 1.3*     ABO:   Recent Labs     02/28/22  1847   ABO O     HEME/ENDO:  Recent Labs     01/06/25  0947 08/30/24  0656 04/01/24  1210 04/02/23  0510 04/18/22  1040   FERRITIN  --   --   --  35 70   IRONSAT  --   --   --  83* 12*   TSH 0.90  --  1.07  --   --    HGBA1C 5.8* 6.2*  --   --   --       CARDIAC:   Recent Labs     03/12/25  0933 03/12/25  0756 03/11/25  0457 08/30/24  0655 08/29/24  1803 08/29/24  1702 08/26/24  1152 08/26/24  1026 04/01/24  1210 04/02/23  0510 04/01/23  0852 03/31/23  2158 03/31/23 2010 02/28/22  2219 02/17/22  1945 10/31/20  2353 07/30/20  1127   LDH  --   --   --   --   --   --   --   --   --   --   --   --   --   --   --   --  165   TROPHS 19 19 16  --  24* 25* 19 17  --   --  21*   < > 23*  CANCELED  --   --   --   --    BNP  --   --   --  314*  --  264*  --  375* 670* 708*  --   --  642* 619* 366*   < >  --     < > = values in this interval not displayed.       Lipid Panel:  No results found for: \"HDL\", \"CHHDL\", \"VLDL\", \"TRIG\", \"NHDL\"     BNP:  BNP   Date Value Ref Range Status   08/30/2024 314 (H) 0 - 99 pg/mL Final     TSH  Lab Results   Component Value Date    TSH 0.90 01/06/2025     Hemoglobin A1C   Date Value Ref Range Status   01/06/2025 5.8 (H) See comment % Final         CURRENT MEDICATIONS:   Infusions:     Scheduled:  apixaban, 2.5 mg, BID  empagliflozin, 10 mg, Daily  metoprolol succinate XL, 25 mg, Daily  pantoprazole, 40 mg, Daily before breakfast   Or  pantoprazole, 40 mg, Daily before breakfast  rosuvastatin, 40 mg, Daily  tamsulosin, 0.4 mg, Daily  torsemide, 40 mg, Daily      PRN:  acetaminophen, 650 mg, q4h PRN   Or  acetaminophen, 650 mg, q4h PRN   Or  acetaminophen, 650 mg, q4h PRN  calcium carbonate, 500 mg, 4x daily PRN  melatonin, 5 mg, Nightly PRN  ondansetron, 4 mg, q8h PRN   Or  ondansetron, 4 mg, q8h PRN  sennosides-docusate sodium, 1 tablet, Nightly " PRN          LAST IMAGING RESULTS INDEPENDENTLY REVIEWED:   ECG 12 lead  Atrial fibrillation with premature ventricular or aberrantly conducted complexes  Left axis deviation  Nonspecific intraventricular block  Minimal voltage criteria for LVH, may be normal variant ( Whitman product )  Inferior infarct , age undetermined  Abnormal ECG  When compared with ECG of 11-MAR-2025 04:40,  Atrial fibrillation has replaced Electronic ventricular pacemaker  See ED provider note for full interpretation and clinical correlation  Confirmed by Danette Elizondo ([id number]) on 3/12/2025 11:26:10 AM  CT cervical spine wo IV contrast  Narrative: Interpreted By:  José Luis Gary,   STUDY:  CT CERVICAL SPINE WO IV CONTRAST; ;  3/12/2025 7:15 am      INDICATION:  Signs/Symptoms:trauma.      COMPARISON:  None.      ACCESSION NUMBER(S):  [id number]      ORDERING CLINICIAN:  CYNTHIA DUBOIS      TECHNIQUE:  Contiguous axial CT sections are performed from the skullbase to the  upper thoracic spine and supplemented with coronal and sagittal  reformatted images.      FINDINGS:  There is some reversal of the cervical lordosis. The facet joints  align normally. There is mild degenerative posterior subluxation of  C5 relative to C4 and C6 measuring 2 mm.      There severe changes of multilevel cervical spondylosis with disc  space narrowing greatest at C3-4 and C5-6.      The osseous structures are diffusely demineralized. The cervical  vertebral body heights are maintained. There is no sign of cervical  vertebral fracture. There is no bone destruction or aggressive  periosteal reaction. No lytic or blastic lesion is detected. The  visualized surrounding osseous structures are also intact.      The C1-2 relationship is within normal limits.      The C2-3 disc space level demonstrates moderate facet arthrosis on  the left and mild facet arthrosis on the right. There is no  significant central canal or neural foraminal stenosis.      The C3-4  disc space level demonstrates mild to moderate bilateral  facet arthrosis greater on the right. There is bulging disc and  marginal osteophyte with moderate central canal narrowing and some  mass effect upon the ventral spinal cord. There is mild-to-moderate  narrowing of the left neural foramen and moderate narrowing of the  right neural foramen.      The C4-5 disc space level demonstrates moderate bilateral facet  arthrosis. There is circumferential bulging disc and marginal  osteophyte with moderate central canal narrowing and some effacement  of the anterior surface of the cord. There is moderate bilateral  neural foraminal narrowing, greater on the right.      The C5-6 disc space level demonstrates mild to moderate bilateral  facet arthrosis and posterior subluxation of C5 5. There is bulging  disc and marginal osteophyte with at least moderate central canal  narrowing and mass effect upon the anterior surface of the spinal  cord. There is moderate to severe bilateral neural foraminal  narrowing.      The C6-7 disc space level demonstrates mild bilateral facet  arthrosis. There is bulging disc and marginal osteophyte with mild  central canal narrowing. There is mild to moderate narrowing of the  right neural foramen and mild narrowing left neural foramen.      The C7-T1 disc space level demonstrates mild bilateral facet  arthrosis. There is no central canal or neural foraminal stenosis.      There is no prevertebral soft tissue swelling or retropharyngeal air.  Incidentally noted is a fat density mass in the posterior  subcutaneous fat to the right of midline measuring 4.2 cm in  craniocaudal diameter and 5.0 x 3.2 cm in cross-section. There is  deformity of the posterior skin surface. There is a well-defined  margin though strandy and some amorphous density internally.      Impression: Severe multilevel cervical spondylosis. There is mild to moderate  multilevel hypertrophic facet arthrosis with mild  degenerative  posterior subluxation of C5.      No acute fracture or traumatic subluxation.      Osteopenia.      Multilevel bulging disc and marginal osteophyte with moderate  multilevel central canal narrowing and bilateral multilevel neural  foraminal narrowing as detailed above.      Lipomatous mass within the posterior subcutaneous fat at the C7-T1  level to the right of midline measuring 4.2 x 5.0 x 3.2 cm. There is  some strandy linear and amorphous internal density. Characteristics  are similar to a previous CT examination of 03/31/2023 though the  lesion is slightly increased in size in the interval. Need for  further workup should be determined clinically. Correlate with pain  at this site.          MACRO:  None      Signed by: José Luis Gary 3/12/2025 7:38 AM  Dictation workstation:   [id number]  CT head W O contrast trauma protocol  Narrative: Interpreted By:  José Luis Gary,   STUDY:  CT HEAD W/O CONTRAST TRAUMA PROTOCOL; ;  3/12/2025 7:15 am      INDICATION:  Signs/Symptoms:trauma.      COMPARISON:  03/11/2025      ACCESSION NUMBER(S):  [id number]      ORDERING CLINICIAN:  CYNTHIA DUBOIS      TECHNIQUE:  Contiguous unenhanced axial CT sections are performed from the skull  base to the vertex.      FINDINGS:  The osseous structures are intact. There is opacification of the  right sphenoid compartment. There is lobular mucoperiosteal  thickening and partial opacification of the ethmoid air cells. There  is large lobular opacity in the right maxillary sinus inferiorly with  some thinning and expansion of the medial wall. There is mild lobular  mucoperiosteal thickening or polyposis in the left maxillary sinus as  well. The frontal sinus in the left sphenoid compartment are clear.  The mastoid air cells are clear.      There is severe generalized parenchymal volume loss with enlargement  of the cortical sulci and CSF spaces. There is mild diffuse  hypoattenuation in the cerebral white matter  compatible with  small-vessel ischemia.      There is no sign of parenchymal hematoma or dense extra-axial fluid  collection. There is no mass effect or midline shift. The gray  matter/white-matter differentiation is preserved.      Impression: Severe age-related atrophy and mild small-vessel ischemic changes of  the cerebral white matter.      No CT evidence of acute intracranial hemorrhage or mass effect.      Maxillary and ethmoid sinusitis. There is some thinning and expansion  of the medial wall the right maxillary sinus similar to previous  studies.      No sign of acute fracture.          MACRO:  None      Signed by: José Luis Gary 3/12/2025 7:28 AM  Dictation workstation:   [id number]    CARDIOVASCULAR STUDIES:     EKGs independently reviewed     Afib with PVCs, LVH  Vpaced rhythm    Echocardiogram 8/26/2024  CONCLUSIONS:   1. Left ventricular ejection fraction is severely decreased, by visual estimate at 25-30%.   2. There is global hypokinesis of the left ventricle with minor regional variations.   3. Spectral Doppler shows an abnormal pattern of left ventricular diastolic filling.   4. Left ventricular cavity size is mildly dilated.   5. There is moderate concentric left ventricular hypertrophy.   6. There is mildly reduced right ventricular systolic function.   7. Mildly enlarged right ventricle.   8. The left atrium is severely dilated.   9. The right atrium is moderately dilated.  10. The patient is in atrial fibrillation which may influence the estimate of left ventricular function and transvalvular flows.     Echocardiogram 2/21/2022  CONCLUSIONS:   1. The left ventricular systolic function is moderately decreased with a 35-40% estimated ejection fraction.   2. Spectral Doppler shows an impaired relaxation pattern of left ventricular diastolic filling.   3. The left atrium is severely dilated.   4. The right atrium is moderately dilated.   5. RVSP within normal limits.   6. Compared with study  from 7/9/2021, no significant change.   7. There is global hypokinesis of the left ventricle with minor regional variations.       Echocardiogram 11/02/2020  CONCLUSIONS:   1. The left ventricular systolic function is mildly to moderately decreased with a 40-45% estimated ejection fraction.   2. Poorly visualized anatomical structures due to suboptimal image quality.   3. Abnormal septal motion consistent with post-operative status.   4. Thee is inferior akinesia and inferolateral and distal septal-apical hypokinesia.   5. There is severe eccentric left ventricular hypertrophy.   6. The left atrium is severely dilated.   7. The right atrium is severely dilated.   8. The left ventricular cavity size is moderate to severely dilated.   9. There is no evidence of a patent foramen ovale.  10. The patient is in atrial fibrillation which may influence the estimate of left ventricular function and transvalvular flows.     Cardiac catheterization 10/31/2016  CONCLUSIONS:   1. Left main: patent stent into left Cx with mild ISR.   2. LAD: 100% ostial occlusion.   3. LCx: patient LM-proximal LCx stent with mild ISR, mild diffuse disease in vessel proper.   4. RCA: 95% ostial disease, 100% prox-mid  with right to right collaterals.   5. Grafts: (1) WENDY to LAD (and jump to diagonal) patent (2) LIMA to ramus patent (3) SVG to RPDA with 99% ISR at distal anastomosis.   6. LVEDP 6mmHg, no significant aortic stenosis on LV-AO gradient.        ASSESSMENT:   89 YOM with Altered Mental status, near syncope and collapse  chronic HFrEF with Rx non-compliance.  Worsening LV systolic function 25-30% now also with reduced RV systolic function.     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   Clinically euvolemic - suspect dry   Agree with holding PO diuresis  Encourage Oral Fluid intake  OK to continue Jardiance 10 mg daily  4. Supportive treatment per primary team    Thank you  for the consult   Will sign off   Please call or message with questions, concerns or changes in clinical condition   The case was discussed with SOFIE Calvillo    Electronically signed by SOFIE Ramirez on 3/14/2025 at 8:08 AM  I spent 60 minutes in the professional and overall care of this patient.

## 2025-03-15 PROBLEM — I50.22 CHRONIC SYSTOLIC (CONGESTIVE) HEART FAILURE: Status: ACTIVE | Noted: 2025-03-15

## 2025-03-15 PROBLEM — R53.1 GENERALIZED WEAKNESS: Status: ACTIVE | Noted: 2025-03-15

## 2025-03-15 PROBLEM — D69.6 THROMBOCYTOPENIA (CMS-HCC): Status: ACTIVE | Noted: 2025-03-15

## 2025-03-15 PROBLEM — R41.82 ALTERED MENTAL STATUS: Status: ACTIVE | Noted: 2025-03-15

## 2025-03-15 LAB
ANION GAP SERPL CALC-SCNC: 16 MMOL/L (ref 10–20)
BUN SERPL-MCNC: 22 MG/DL (ref 6–23)
CALCIUM SERPL-MCNC: 8.9 MG/DL (ref 8.6–10.3)
CHLORIDE SERPL-SCNC: 100 MMOL/L (ref 98–107)
CO2 SERPL-SCNC: 26 MMOL/L (ref 21–32)
CREAT SERPL-MCNC: 1.7 MG/DL (ref 0.5–1.3)
EGFRCR SERPLBLD CKD-EPI 2021: 39 ML/MIN/1.73M*2
ERYTHROCYTE [DISTWIDTH] IN BLOOD BY AUTOMATED COUNT: 17 % (ref 11.5–14.5)
GLUCOSE SERPL-MCNC: 95 MG/DL (ref 74–99)
HCT VFR BLD AUTO: 39 % (ref 41–52)
HGB BLD-MCNC: 12.2 G/DL (ref 13.5–17.5)
MCH RBC QN AUTO: 26.4 PG (ref 26–34)
MCHC RBC AUTO-ENTMCNC: 31.3 G/DL (ref 32–36)
MCV RBC AUTO: 84 FL (ref 80–100)
NRBC BLD-RTO: ABNORMAL /100{WBCS}
PLATELET # BLD AUTO: 133 X10*3/UL (ref 150–450)
POTASSIUM SERPL-SCNC: 3.3 MMOL/L (ref 3.5–5.3)
RBC # BLD AUTO: 4.62 X10*6/UL (ref 4.5–5.9)
SODIUM SERPL-SCNC: 139 MMOL/L (ref 136–145)
WBC # BLD AUTO: 5.3 X10*3/UL (ref 4.4–11.3)

## 2025-03-15 PROCEDURE — 36415 COLL VENOUS BLD VENIPUNCTURE: CPT | Mod: IPSPLIT | Performed by: NURSE PRACTITIONER

## 2025-03-15 PROCEDURE — 99233 SBSQ HOSP IP/OBS HIGH 50: CPT

## 2025-03-15 PROCEDURE — 2500000001 HC RX 250 WO HCPCS SELF ADMINISTERED DRUGS (ALT 637 FOR MEDICARE OP): Mod: IPSPLIT | Performed by: NURSE PRACTITIONER

## 2025-03-15 PROCEDURE — 85027 COMPLETE CBC AUTOMATED: CPT | Mod: IPSPLIT | Performed by: NURSE PRACTITIONER

## 2025-03-15 PROCEDURE — 2500000001 HC RX 250 WO HCPCS SELF ADMINISTERED DRUGS (ALT 637 FOR MEDICARE OP): Mod: IPSPLIT

## 2025-03-15 PROCEDURE — 80048 BASIC METABOLIC PNL TOTAL CA: CPT | Mod: IPSPLIT | Performed by: NURSE PRACTITIONER

## 2025-03-15 PROCEDURE — 1200000002 HC GENERAL ROOM WITH TELEMETRY DAILY: Mod: IPSPLIT

## 2025-03-15 PROCEDURE — 2500000002 HC RX 250 W HCPCS SELF ADMINISTERED DRUGS (ALT 637 FOR MEDICARE OP, ALT 636 FOR OP/ED): Mod: IPSPLIT

## 2025-03-15 PROCEDURE — 94760 N-INVAS EAR/PLS OXIMETRY 1: CPT | Mod: IPSPLIT

## 2025-03-15 RX ORDER — POTASSIUM CHLORIDE 20 MEQ/1
40 TABLET, EXTENDED RELEASE ORAL ONCE
Status: COMPLETED | OUTPATIENT
Start: 2025-03-15 | End: 2025-03-15

## 2025-03-15 RX ADMIN — APIXABAN 2.5 MG: 2.5 TABLET, FILM COATED ORAL at 09:32

## 2025-03-15 RX ADMIN — METOPROLOL SUCCINATE 25 MG: 25 TABLET, EXTENDED RELEASE ORAL at 09:31

## 2025-03-15 RX ADMIN — Medication 5 MG: at 20:27

## 2025-03-15 RX ADMIN — POTASSIUM CHLORIDE 40 MEQ: 1500 TABLET, EXTENDED RELEASE ORAL at 10:49

## 2025-03-15 RX ADMIN — PANTOPRAZOLE SODIUM 40 MG: 40 TABLET, DELAYED RELEASE ORAL at 06:31

## 2025-03-15 RX ADMIN — ROSUVASTATIN CALCIUM 40 MG: 10 TABLET, FILM COATED ORAL at 20:27

## 2025-03-15 RX ADMIN — TAMSULOSIN HYDROCHLORIDE 0.4 MG: 0.4 CAPSULE ORAL at 09:28

## 2025-03-15 RX ADMIN — APIXABAN 2.5 MG: 2.5 TABLET, FILM COATED ORAL at 20:27

## 2025-03-15 ASSESSMENT — PAIN SCALES - GENERAL
PAINLEVEL_OUTOF10: 0 - NO PAIN
PAINLEVEL_OUTOF10: 0 - NO PAIN

## 2025-03-15 ASSESSMENT — COGNITIVE AND FUNCTIONAL STATUS - GENERAL
HELP NEEDED FOR BATHING: A LITTLE
STANDING UP FROM CHAIR USING ARMS: A LITTLE
DRESSING REGULAR UPPER BODY CLOTHING: A LITTLE
CLIMB 3 TO 5 STEPS WITH RAILING: A LITTLE
DAILY ACTIVITIY SCORE: 18
TOILETING: A LITTLE
WALKING IN HOSPITAL ROOM: A LITTLE
MOVING TO AND FROM BED TO CHAIR: A LITTLE
PERSONAL GROOMING: A LITTLE
MOBILITY SCORE: 20
EATING MEALS: A LITTLE
DRESSING REGULAR LOWER BODY CLOTHING: A LITTLE

## 2025-03-15 ASSESSMENT — PAIN - FUNCTIONAL ASSESSMENT: PAIN_FUNCTIONAL_ASSESSMENT: WONG-BAKER FACES

## 2025-03-15 NOTE — CARE PLAN
The patient's goals for the shift include      The clinical goals for the shift include Pt mi remain safe this shift      Problem: Safety - Adult  Goal: Free from fall injury  Outcome: Not Progressing     Problem: Discharge Planning  Goal: Discharge to home or other facility with appropriate resources  Outcome: Not Progressing     Problem: Chronic Conditions and Co-morbidities  Goal: Patient's chronic conditions and co-morbidity symptoms are monitored and maintained or improved  Outcome: Not Progressing     Problem: Nutrition  Goal: Nutrient intake appropriate for maintaining nutritional needs  Outcome: Not Progressing     Problem: Skin  Goal: Prevent/manage excess moisture  Outcome: Not Progressing  Goal: Prevent/minimize sheer/friction injuries  Outcome: Not Progressing     Problem: Fall/Injury  Goal: Not fall by end of shift  Outcome: Not Progressing  Goal: Be free from injury by end of the shift  Outcome: Not Progressing  Goal: Verbalize understanding of personal risk factors for fall in the hospital  Outcome: Not Progressing  Goal: Verbalize understanding of risk factor reduction measures to prevent injury from fall in the home  Outcome: Not Progressing  Goal: Use assistive devices by end of the shift  Outcome: Not Progressing  Goal: Pace activities to prevent fatigue by end of the shift  Outcome: Not Progressing

## 2025-03-15 NOTE — CARE PLAN
The patient's goals for the shift include  JAROCHO    The clinical goals for the shift include Maintain pt safety. Bed alarm. Free from falls and injuries.    Pt remained stable and free from injuries. VSS. Pt remained confused and awake a lot throughout night. Pleasantly confused. Easily redirected. Over the shift, the patient did make progress toward the following goals.       Problem: Safety - Adult  Goal: Free from fall injury  Outcome: Progressing     Problem: Discharge Planning  Goal: Discharge to home or other facility with appropriate resources  Outcome: Progressing     Problem: Chronic Conditions and Co-morbidities  Goal: Patient's chronic conditions and co-morbidity symptoms are monitored and maintained or improved  Outcome: Progressing     Problem: Nutrition  Goal: Nutrient intake appropriate for maintaining nutritional needs  Outcome: Progressing     Problem: Skin  Goal: Prevent/manage excess moisture  Outcome: Progressing  Goal: Prevent/minimize sheer/friction injuries  Outcome: Progressing     Problem: Fall/Injury  Goal: Not fall by end of shift  Outcome: Progressing  Goal: Be free from injury by end of the shift  Outcome: Progressing  Goal: Verbalize understanding of personal risk factors for fall in the hospital  Outcome: Progressing  Goal: Verbalize understanding of risk factor reduction measures to prevent injury from fall in the home  Outcome: Progressing  Goal: Use assistive devices by end of the shift  Outcome: Progressing  Goal: Pace activities to prevent fatigue by end of the shift  Outcome: Progressing      no

## 2025-03-15 NOTE — PROGRESS NOTES
"Gilmer Quiñonez is a 89 y.o. male on day 2 of admission presenting with Fall at home, sequela.    Subjective     No overnight events reported.     Patient is resting in bed on room air this morning. He knows he is in [place] and that the year is 2025 but he thinks it is November. He has a good appetite and denies any pain or shortness of breath. No bed available at [facility] today; will continue to follow up with transfer center daily. Nursing supervisor aware of pending transfer.            Objective     Physical Exam  Constitutional:       General: He is not in acute distress.     Appearance: He is not toxic-appearing.   HENT:      Head: Normocephalic and atraumatic.      Mouth/Throat:      Mouth: Mucous membranes are moist.   Eyes:      Conjunctiva/sclera: Conjunctivae normal.   Cardiovascular:      Rate and Rhythm: Normal rate and regular rhythm.   Pulmonary:      Effort: No respiratory distress.      Breath sounds: Normal breath sounds.      Comments: On room air   Abdominal:      General: There is no distension.      Palpations: Abdomen is soft.      Tenderness: There is no abdominal tenderness.   Musculoskeletal:      Right lower leg: No edema.      Left lower leg: No edema.   Skin:     General: Skin is warm and dry.   Neurological:      Mental Status: He is alert. He is disoriented.   Psychiatric:         Mood and Affect: Mood normal.         Behavior: Behavior normal.         Last Recorded Vitals  Blood pressure 103/53, pulse 57, temperature 36.6 °C (97.9 °F), temperature source Temporal, resp. rate 16, height 1.702 m (5' 7\"), weight 72.9 kg (160 lb 11.5 oz), SpO2 94%.  Intake/Output last 3 Shifts:  I/O last 3 completed shifts:  In: 120 (1.6 mL/kg) [P.O.:120]  Out: 3 (0 mL/kg) [Urine:3 (0 mL/kg/hr)]  Weight: 72.9 kg     Relevant Results          Scheduled medications  apixaban, 2.5 mg, oral, BID  [Held by provider] empagliflozin, 10 mg, oral, Daily  metoprolol succinate XL, 25 mg, oral, Daily  pantoprazole, 40 " mg, oral, Daily before breakfast   Or  pantoprazole, 40 mg, intravenous, Daily before breakfast  rosuvastatin, 40 mg, oral, Daily  tamsulosin, 0.4 mg, oral, Daily  [Held by provider] torsemide, 40 mg, oral, Daily      Continuous medications     PRN medications  PRN medications: acetaminophen **OR** acetaminophen **OR** acetaminophen, calcium carbonate, melatonin, ondansetron **OR** ondansetron, sennosides-docusate sodium    Results for orders placed or performed during the hospital encounter of 03/12/25 (from the past 24 hours)   Basic metabolic panel   Result Value Ref Range    Glucose 95 74 - 99 mg/dL    Sodium 139 136 - 145 mmol/L    Potassium 3.3 (L) 3.5 - 5.3 mmol/L    Chloride 100 98 - 107 mmol/L    Bicarbonate 26 21 - 32 mmol/L    Anion Gap 16 10 - 20 mmol/L    Urea Nitrogen 22 6 - 23 mg/dL    Creatinine 1.70 (H) 0.50 - 1.30 mg/dL    eGFR 39 (L) >60 mL/min/1.73m*2    Calcium 8.9 8.6 - 10.3 mg/dL   CBC   Result Value Ref Range    WBC 5.3 4.4 - 11.3 x10*3/uL    nRBC      RBC 4.62 4.50 - 5.90 x10*6/uL    Hemoglobin 12.2 (L) 13.5 - 17.5 g/dL    Hematocrit 39.0 (L) 41.0 - 52.0 %    MCV 84 80 - 100 fL    MCH 26.4 26.0 - 34.0 pg    MCHC 31.3 (L) 32.0 - 36.0 g/dL    RDW 17.0 (H) 11.5 - 14.5 %    Platelets 133 (L) 150 - 450 x10*3/uL     *Note: Due to a large number of results and/or encounters for the requested time period, some results have not been displayed. A complete set of results can be found in Results Review.     ECG 12 lead    Result Date: 3/12/2025  Atrial fibrillation with premature ventricular or aberrantly conducted complexes Left axis deviation Nonspecific intraventricular block Minimal voltage criteria for LVH, may be normal variant ( [place] product ) Inferior infarct , age undetermined Abnormal ECG When compared with ECG of 11-MAR-2025 04:40, Atrial fibrillation has replaced Electronic ventricular pacemaker See ED provider note for full interpretation and clinical correlation Confirmed by Mikhail  Danette ([id number]) on 3/12/2025 11:26:10 AM    CT cervical spine wo IV contrast    Result Date: 3/12/2025  Interpreted By:  José Luis Gary, STUDY: CT CERVICAL SPINE WO IV CONTRAST; ;  3/12/2025 7:15 am   INDICATION: Signs/Symptoms:trauma.   COMPARISON: None.   ACCESSION NUMBER(S): [id number]   ORDERING CLINICIAN: CYNTHIA DUBOIS   TECHNIQUE: Contiguous axial CT sections are performed from the skullbase to the upper thoracic spine and supplemented with coronal and sagittal reformatted images.   FINDINGS: There is some reversal of the cervical lordosis. The facet joints align normally. There is mild degenerative posterior subluxation of C5 relative to C4 and C6 measuring 2 mm.   There severe changes of multilevel cervical spondylosis with disc space narrowing greatest at C3-4 and C5-6.   The osseous structures are diffusely demineralized. The cervical vertebral body heights are maintained. There is no sign of cervical vertebral fracture. There is no bone destruction or aggressive periosteal reaction. No lytic or blastic lesion is detected. The visualized surrounding osseous structures are also intact.   The C1-2 relationship is within normal limits.   The C2-3 disc space level demonstrates moderate facet arthrosis on the left and mild facet arthrosis on the right. There is no significant central canal or neural foraminal stenosis.   The C3-4 disc space level demonstrates mild to moderate bilateral facet arthrosis greater on the right. There is bulging disc and marginal osteophyte with moderate central canal narrowing and some mass effect upon the ventral spinal cord. There is mild-to-moderate narrowing of the left neural foramen and moderate narrowing of the right neural foramen.   The C4-5 disc space level demonstrates moderate bilateral facet arthrosis. There is circumferential bulging disc and marginal osteophyte with moderate central canal narrowing and some effacement of the anterior surface of the cord.  There is moderate bilateral neural foraminal narrowing, greater on the right.   The C5-6 disc space level demonstrates mild to moderate bilateral facet arthrosis and posterior subluxation of C5 5. There is bulging disc and marginal osteophyte with at least moderate central canal narrowing and mass effect upon the anterior surface of the spinal cord. There is moderate to severe bilateral neural foraminal narrowing.   The C6-7 disc space level demonstrates mild bilateral facet arthrosis. There is bulging disc and marginal osteophyte with mild central canal narrowing. There is mild to moderate narrowing of the right neural foramen and mild narrowing left neural foramen.   The C7-T1 disc space level demonstrates mild bilateral facet arthrosis. There is no central canal or neural foraminal stenosis.   There is no prevertebral soft tissue swelling or retropharyngeal air. Incidentally noted is a fat density mass in the posterior subcutaneous fat to the right of midline measuring 4.2 cm in craniocaudal diameter and 5.0 x 3.2 cm in cross-section. There is deformity of the posterior skin surface. There is a well-defined margin though strandy and some amorphous density internally.       Severe multilevel cervical spondylosis. There is mild to moderate multilevel hypertrophic facet arthrosis with mild degenerative posterior subluxation of C5.   No acute fracture or traumatic subluxation.   Osteopenia.   Multilevel bulging disc and marginal osteophyte with moderate multilevel central canal narrowing and bilateral multilevel neural foraminal narrowing as detailed above.   Lipomatous mass within the posterior subcutaneous fat at the C7-T1 level to the right of midline measuring 4.2 x 5.0 x 3.2 cm. There is some strandy linear and amorphous internal density. Characteristics are similar to a previous CT examination of 03/31/2023 though the lesion is slightly increased in size in the interval. Need for further workup should be  determined clinically. Correlate with pain at this site.     MACRO: None   Signed by: José Luis Gary 3/12/2025 7:38 AM Dictation workstation:   [id number]    CT head W O contrast trauma protocol    Result Date: 3/12/2025  Interpreted By:  José Luis Gary, STUDY: CT HEAD W/O CONTRAST TRAUMA PROTOCOL; ;  3/12/2025 7:15 am   INDICATION: Signs/Symptoms:trauma.   COMPARISON: 03/11/2025   ACCESSION NUMBER(S): [id number]   ORDERING CLINICIAN: CYNTHIA DUBOIS   TECHNIQUE: Contiguous unenhanced axial CT sections are performed from the skull base to the vertex.   FINDINGS: The osseous structures are intact. There is opacification of the right sphenoid compartment. There is lobular mucoperiosteal thickening and partial opacification of the ethmoid air cells. There is large lobular opacity in the right maxillary sinus inferiorly with some thinning and expansion of the medial wall. There is mild lobular mucoperiosteal thickening or polyposis in the left maxillary sinus as well. The frontal sinus in the left sphenoid compartment are clear. The mastoid air cells are clear.   There is severe generalized parenchymal volume loss with enlargement of the cortical sulci and CSF spaces. There is mild diffuse hypoattenuation in the cerebral white matter compatible with small-vessel ischemia.   There is no sign of parenchymal hematoma or dense extra-axial fluid collection. There is no mass effect or midline shift. The gray matter/white-matter differentiation is preserved.       Severe age-related atrophy and mild small-vessel ischemic changes of the cerebral white matter.   No CT evidence of acute intracranial hemorrhage or mass effect.   Maxillary and ethmoid sinusitis. There is some thinning and expansion of the medial wall the right maxillary sinus similar to previous studies.   No sign of acute fracture.     MACRO: None   Signed by: José Luis Gary 3/12/2025 7:28 AM Dictation workstation:   [id number]    ECG 12  lead    Result Date: 3/11/2025  Ventricular-paced rhythm with occasional AV dual-paced complexes Abnormal ECG When compared with ECG of 29-AUG-2024 18:12, Electronic ventricular pacemaker has replaced Atrial flutter See ED provider note for full interpretation and clinical correlation Confirmed by Danette Elizondo ([id number]) on 3/11/2025 11:55:45 AM    CT chest wo IV contrast    Result Date: 3/11/2025  Interpreted By:  Finkelstein, Evan, STUDY: CT CHEST WO IV CONTRAST;  3/11/2025 5:38 am   INDICATION: Signs/Symptoms:weakness.     COMPARISON: CT chest 08/27/2024   ACCESSION NUMBER(S): [id number]   ORDERING CLINICIAN: SHELL TRINIDAD   TECHNIQUE: Axial CT images of the chest obtained  without IV contrast.  Sagittal and coronal reconstructions were created..   FINDINGS: CHEST WALL AND LOWER NECK: Left chest wall pacemaker. Gynecomastia bilaterally. UPPER ABDOMEN: No acute abnormality of the partially visualized abdomen.   VESSELS: Aorta and pulmonary artery are normal caliber. Atherosclerotic calcifications in the aorta and coronary arteries. HEART: Enlarged in size. No pericardial effusion. MEDIASTINUM AND GEO: No pathologically enlarged lymph nodes. Large hiatal hernia. LUNG, PLEURA, AND LARGE AIRWAYS: There are pleural calcifications. Redemonstrated opacities scattered throughout the lungs, predominantly curvilinear in orientation and similar compared to prior imaging.   BONES: No acute osseous abnormality. Median sternotomy wires are present.       Redemonstrated opacities scattered throughout the lungs, which are predominantly curvilinear in orientation and similar compared to prior imaging. Findings are favored to represent scarring versus atelectasis.   Large hiatal hernia.   MACRO: None.   Signed by: Evan Finkelstein 3/11/2025 6:57 AM Dictation workstation:   [id number]    CT head wo IV contrast    Result Date: 3/11/2025  Interpreted By:  Finkelstein, Evan, STUDY: CT HEAD WO IV CONTRAST; CT CERVICAL SPINE WO IV  CONTRAST;  3/11/2025 5:38 am   INDICATION: Signs/Symptoms:fall.     COMPARISON: CT brain 08/29/2024   ACCESSION NUMBER(S): [id number]; [id number]   ORDERING CLINICIAN: SHELL TRINIDAD   TECHNIQUE: Noncontrast CT images of the head with coronal and sagittal reconstructions. Axial noncontrast CT images of the cervical spine with coronal and sagittal reconstructed images.   FINDINGS: EXTRACRANIAL SOFT TISSUES: Right frontal scalp and periorbital soft tissue laceration.   CALVARIUM: No depressed skull fracture. No destructive osseous lesion.   PARANASAL SINUSES/MASTOIDS: Partial opacification of the ethmoid sinuses and right maxillary sinus. Mastoid air cells are aerated.   HEMORRHAGE: No acute intracranial hemorrhage.   BRAIN PARENCHYMA: Gray-white matter interfaces are preserved. No mass effect or midline shift. There are nonspecific scattered white matter hypodensities.   VENTRICLES and EXTRA-AXIAL SPACES: Parenchymal atrophy with prominence of the ventricles and cortical sulci.   OTHER FINDINGS: There are calcifications within the cavernous carotids   CERVICAL SPINE:   ALIGNMENT: Straightening of the normal cervical lordosis, which may be positional or related to spasm. VERTEBRAE: No acute loss of vertebral body height. Bones are demineralized DISC SPACE: Disc space narrowing C3/C4, C4/C5 and C5/C6. SPINAL CANAL: Multilevel facet and uncovertebral arthropathy with varying degrees of neural foraminal stenosis. Prominent posterior disc osteophyte complexes from C3/C4 through C5/C6 contribute to moderate central narrowing. PREVERTEBRAL SOFT TISSUES: No prevertebral soft tissue swelling. LUNG APICES: Emphysematous changes in the visualized lung apices.   OTHER FINDINGS: 4.8 x 2.9 cm encapsulated fat attenuating lesion in the posterior right neck soft tissues       Right frontal scalp and periorbital soft tissue laceration. No underlying fracture.   No acute intracranial hemorrhage, mass effect or midline shift.    Nonspecific scattered white matter hypodensities favored to represent sequela of small vessel ischemia. Cervical spondylosis without acute loss of vertebral body height or traumatic malalignment.   4.8 x 2.9 cm lipomatous lesion in the right lower neck/upper back soft tissues.   MACRO: None.   Signed by: Evan Finkelstein 3/11/2025 6:53 AM Dictation workstation:   [id number]    CT cervical spine wo IV contrast    Result Date: 3/11/2025  Interpreted By:  Finkelstein, Evan, STUDY: CT HEAD WO IV CONTRAST; CT CERVICAL SPINE WO IV CONTRAST;  3/11/2025 5:38 am   INDICATION: Signs/Symptoms:fall.     COMPARISON: CT brain 08/29/2024   ACCESSION NUMBER(S): [id number]; [id number]   ORDERING CLINICIAN: SHELL TRINIDAD   TECHNIQUE: Noncontrast CT images of the head with coronal and sagittal reconstructions. Axial noncontrast CT images of the cervical spine with coronal and sagittal reconstructed images.   FINDINGS: EXTRACRANIAL SOFT TISSUES: Right frontal scalp and periorbital soft tissue laceration.   CALVARIUM: No depressed skull fracture. No destructive osseous lesion.   PARANASAL SINUSES/MASTOIDS: Partial opacification of the ethmoid sinuses and right maxillary sinus. Mastoid air cells are aerated.   HEMORRHAGE: No acute intracranial hemorrhage.   BRAIN PARENCHYMA: Gray-white matter interfaces are preserved. No mass effect or midline shift. There are nonspecific scattered white matter hypodensities.   VENTRICLES and EXTRA-AXIAL SPACES: Parenchymal atrophy with prominence of the ventricles and cortical sulci.   OTHER FINDINGS: There are calcifications within the cavernous carotids   CERVICAL SPINE:   ALIGNMENT: Straightening of the normal cervical lordosis, which may be positional or related to spasm. VERTEBRAE: No acute loss of vertebral body height. Bones are demineralized DISC SPACE: Disc space narrowing C3/C4, C4/C5 and C5/C6. SPINAL CANAL: Multilevel facet and uncovertebral arthropathy with varying degrees of neural  foraminal stenosis. Prominent posterior disc osteophyte complexes from C3/C4 through C5/C6 contribute to moderate central narrowing. PREVERTEBRAL SOFT TISSUES: No prevertebral soft tissue swelling. LUNG APICES: Emphysematous changes in the visualized lung apices.   OTHER FINDINGS: 4.8 x 2.9 cm encapsulated fat attenuating lesion in the posterior right neck soft tissues       Right frontal scalp and periorbital soft tissue laceration. No underlying fracture.   No acute intracranial hemorrhage, mass effect or midline shift.   Nonspecific scattered white matter hypodensities favored to represent sequela of small vessel ischemia. Cervical spondylosis without acute loss of vertebral body height or traumatic malalignment.   4.8 x 2.9 cm lipomatous lesion in the right lower neck/upper back soft tissues.   MACRO: None.   Signed by: Evan Finkelstein 3/11/2025 6:53 AM Dictation workstation:   [id number]                  Assessment/Plan   Assessment & Plan  Fall at home, sequela    Syncope and collapse    CAD (coronary artery disease)    BPH (benign prostatic hyperplasia)    Atrial fibrillation (Multi)    AICD (automatic cardioverter/defibrillator) present    Hypokalemia    HLD (hyperlipidemia)    JONAS (acute kidney injury) (CMS-HCC)    Altered mental status    Generalized weakness    Thrombocytopenia (CMS-HCC)    Chronic systolic (congestive) heart failure    #Syncope  #Altered mental status  #Generalized weakness  - Presented to ED with daughter for worsening confusion, frequent falls, syncopal episode at home  - UA 3+ glucose, otherwise negative   - No leukocytosis  - Trop 19 > 19  - Lactate 1.5  - Flu, COVID negative   - CT head: Severe age-related atrophy and mild small-vessel ischemic changes of   the cerebral white matter. No CT evidence of acute intracranial hemorrhage or mass effect. Maxillary and ethmoid sinusitis. There is some thinning and expansion   of the medial wall the right maxillary sinus similar to  previous   studies. No sign of acute fracture.   - CT C-spine: Severe multilevel cervical spondylosis. There is mild to moderate   multilevel hypertrophic facet arthrosis with mild degenerative posterior subluxation of C5. No acute fracture or traumatic subluxation. Osteopenia. Multilevel bulging disc and marginal osteophyte with moderate multilevel central canal narrowing and bilateral multilevel neural foraminal narrowing as detailed above.   - Patient cannot have MRI at [place] due to ICD   - ICD interrogated, no episodes of VT/VF  - Telemetry monitoring  - Encourage PO intake   - PT/OT evals  - Pending transfer to OSH for neurology consult per family request- no bed as of 1400 on 3/15     #JONAS  - Cr 1.17 > 1.40 > 1.57 > 1.70  - Hold torsemide, empagliflozin   - Renally dose meds   - Encourage PO intake  - No IVF at this time; patient has systolic CHF with severely reduced EF   - Monitor urine output  - Daily BMP    #Hypokalemia  - K 3.6 > 3.3, repleted per protocol  - Daily BMP    #Thrombocytopenia  - Plts 144 > 133  - Continue apixaban for history of AF  - Daily CBC    #Atrial fibrillation  #Chronic systolic congestive heart failure  #CAD   #ICD in situ   #HLD  - Trop 19 > 19  - Echo 8/2024: LVEF 25-30%, abnormal pattern of LVDF, moderate LVH, mildly reduced RV systolic function   - Appears euvolemic on exam   - Torsemide and empagliflozin held in setting of JONAS; resume as renal function permits  - Continue apixaban 2.5 mg BID, metoprolol succinate 25 mg every day, rosuvastatin 40 mg every day   - Telemetry monitoring  - 2g Na diet/1800 mL fluid restriction  - Monitor volume status  - Cardiology consulted, appreciate recs    #BPH  - Continue tamsulosin 0.4 mg every day   - Bladder scan PRN     DVT PPx: Apixaban   GI PPx: Protonix   Diet: 2g Na, 1800 mL FR   Code Status: Full     Disposition: Patient requires inpatient management at this time.            Pushpa Aparicio PA-C

## 2025-03-16 VITALS
SYSTOLIC BLOOD PRESSURE: 123 MMHG | OXYGEN SATURATION: 96 % | RESPIRATION RATE: 16 BRPM | BODY MASS INDEX: 25.22 KG/M2 | DIASTOLIC BLOOD PRESSURE: 53 MMHG | TEMPERATURE: 97.9 F | WEIGHT: 160.72 LBS | HEART RATE: 51 BPM | HEIGHT: 67 IN

## 2025-03-16 LAB
ANION GAP SERPL CALC-SCNC: 14 MMOL/L (ref 10–20)
BUN SERPL-MCNC: 22 MG/DL (ref 6–23)
CALCIUM SERPL-MCNC: 8.6 MG/DL (ref 8.6–10.3)
CHLORIDE SERPL-SCNC: 103 MMOL/L (ref 98–107)
CO2 SERPL-SCNC: 23 MMOL/L (ref 21–32)
CREAT SERPL-MCNC: 1.33 MG/DL (ref 0.5–1.3)
EGFRCR SERPLBLD CKD-EPI 2021: 52 ML/MIN/1.73M*2
ERYTHROCYTE [DISTWIDTH] IN BLOOD BY AUTOMATED COUNT: 16.9 % (ref 11.5–14.5)
GLUCOSE SERPL-MCNC: 85 MG/DL (ref 74–99)
HCT VFR BLD AUTO: 40.7 % (ref 41–52)
HGB BLD-MCNC: 11.5 G/DL (ref 13.5–17.5)
MCH RBC QN AUTO: 26.3 PG (ref 26–34)
MCHC RBC AUTO-ENTMCNC: 28.3 G/DL (ref 32–36)
MCV RBC AUTO: 93 FL (ref 80–100)
NRBC BLD-RTO: 0 /100 WBCS (ref 0–0)
PLATELET # BLD AUTO: 147 X10*3/UL (ref 150–450)
POTASSIUM SERPL-SCNC: 3.4 MMOL/L (ref 3.5–5.3)
RBC # BLD AUTO: 4.38 X10*6/UL (ref 4.5–5.9)
SODIUM SERPL-SCNC: 137 MMOL/L (ref 136–145)
WBC # BLD AUTO: 4.7 X10*3/UL (ref 4.4–11.3)

## 2025-03-16 PROCEDURE — 94760 N-INVAS EAR/PLS OXIMETRY 1: CPT | Mod: IPSPLIT

## 2025-03-16 PROCEDURE — 2500000001 HC RX 250 WO HCPCS SELF ADMINISTERED DRUGS (ALT 637 FOR MEDICARE OP): Mod: IPSPLIT

## 2025-03-16 PROCEDURE — 99232 SBSQ HOSP IP/OBS MODERATE 35: CPT | Performed by: NURSE PRACTITIONER

## 2025-03-16 PROCEDURE — 36415 COLL VENOUS BLD VENIPUNCTURE: CPT | Mod: IPSPLIT | Performed by: NURSE PRACTITIONER

## 2025-03-16 PROCEDURE — 2500000002 HC RX 250 W HCPCS SELF ADMINISTERED DRUGS (ALT 637 FOR MEDICARE OP, ALT 636 FOR OP/ED): Mod: IPSPLIT

## 2025-03-16 PROCEDURE — 4B02XTZ MEASUREMENT OF CARDIAC DEFIBRILLATOR, EXTERNAL APPROACH: ICD-10-PCS | Performed by: NURSE PRACTITIONER

## 2025-03-16 PROCEDURE — 2500000001 HC RX 250 WO HCPCS SELF ADMINISTERED DRUGS (ALT 637 FOR MEDICARE OP): Mod: IPSPLIT | Performed by: NURSE PRACTITIONER

## 2025-03-16 PROCEDURE — 80048 BASIC METABOLIC PNL TOTAL CA: CPT | Mod: IPSPLIT | Performed by: NURSE PRACTITIONER

## 2025-03-16 PROCEDURE — 2500000002 HC RX 250 W HCPCS SELF ADMINISTERED DRUGS (ALT 637 FOR MEDICARE OP, ALT 636 FOR OP/ED): Mod: IPSPLIT | Performed by: NURSE PRACTITIONER

## 2025-03-16 PROCEDURE — 1200000002 HC GENERAL ROOM WITH TELEMETRY DAILY: Mod: IPSPLIT

## 2025-03-16 PROCEDURE — 85027 COMPLETE CBC AUTOMATED: CPT | Mod: IPSPLIT | Performed by: NURSE PRACTITIONER

## 2025-03-16 RX ORDER — POTASSIUM CHLORIDE 20 MEQ/1
40 TABLET, EXTENDED RELEASE ORAL ONCE
Status: COMPLETED | OUTPATIENT
Start: 2025-03-16 | End: 2025-03-16

## 2025-03-16 RX ADMIN — ROSUVASTATIN CALCIUM 40 MG: 10 TABLET, FILM COATED ORAL at 21:30

## 2025-03-16 RX ADMIN — APIXABAN 2.5 MG: 2.5 TABLET, FILM COATED ORAL at 09:36

## 2025-03-16 RX ADMIN — METOPROLOL SUCCINATE 25 MG: 25 TABLET, EXTENDED RELEASE ORAL at 09:36

## 2025-03-16 RX ADMIN — Medication 5 MG: at 21:30

## 2025-03-16 RX ADMIN — TAMSULOSIN HYDROCHLORIDE 0.4 MG: 0.4 CAPSULE ORAL at 09:36

## 2025-03-16 RX ADMIN — PANTOPRAZOLE SODIUM 40 MG: 40 TABLET, DELAYED RELEASE ORAL at 06:02

## 2025-03-16 RX ADMIN — APIXABAN 2.5 MG: 2.5 TABLET, FILM COATED ORAL at 21:29

## 2025-03-16 RX ADMIN — POTASSIUM CHLORIDE 40 MEQ: 1500 TABLET, EXTENDED RELEASE ORAL at 09:40

## 2025-03-16 ASSESSMENT — COGNITIVE AND FUNCTIONAL STATUS - GENERAL
MOVING TO AND FROM BED TO CHAIR: A LITTLE
EATING MEALS: A LITTLE
MOVING FROM LYING ON BACK TO SITTING ON SIDE OF FLAT BED WITH BEDRAILS: A LITTLE
DRESSING REGULAR UPPER BODY CLOTHING: A LITTLE
TOILETING: A LITTLE
DAILY ACTIVITIY SCORE: 18
TURNING FROM BACK TO SIDE WHILE IN FLAT BAD: A LITTLE
STANDING UP FROM CHAIR USING ARMS: A LITTLE
HELP NEEDED FOR BATHING: A LITTLE
MOBILITY SCORE: 16
DRESSING REGULAR LOWER BODY CLOTHING: A LITTLE
PERSONAL GROOMING: A LITTLE
WALKING IN HOSPITAL ROOM: A LOT
CLIMB 3 TO 5 STEPS WITH RAILING: A LOT

## 2025-03-16 ASSESSMENT — PAIN - FUNCTIONAL ASSESSMENT: PAIN_FUNCTIONAL_ASSESSMENT: 0-10

## 2025-03-16 ASSESSMENT — PAIN SCALES - GENERAL
PAINLEVEL_OUTOF10: 0 - NO PAIN
PAINLEVEL_OUTOF10: 0 - NO PAIN

## 2025-03-16 NOTE — PROGRESS NOTES
Gilmer Quiñonez is a 89 y.o. male on day 3 of admission presenting with Fall at home, sequela.      Subjective   Patient assessed at bedside; lying in bed. He is [place]. He is alert to self, place and year. He denies pain, fever, chills, N/V/D/C, or SOB. Still no beds available for [facility]       Objective     Last Recorded Vitals  /70 (BP Location: Left arm)   Pulse 57   Temp 36.7 °C (98.1 °F) (Temporal)   Resp 16   Wt 72.9 kg (160 lb 11.5 oz)   SpO2 97%   Intake/Output last 3 Shifts:    Intake/Output Summary (Last 24 hours) at 3/16/2025 1109  Last data filed at 3/16/2025 0879  Gross per 24 hour   Intake --   Output 351 ml   Net -351 ml       Admission Weight  Weight: 81.6 kg (180 lb) (03/12/25 0715)    Daily Weight  03/12/25 : 72.9 kg (160 lb 11.5 oz)    Image Results      Physical Exam  Constitutional:       Appearance: Normal appearance. He is normal weight.   HENT:      Head: Normocephalic and atraumatic.      Right Ear: External ear normal.      Left Ear: External ear normal.      Nose: Nose normal.      Mouth/Throat:      Mouth: Mucous membranes are moist.      Pharynx: Oropharynx is clear.   Eyes:      Extraocular Movements: Extraocular movements intact.      Conjunctiva/sclera: Conjunctivae normal.      Pupils: Pupils are equal, round, and reactive to light.   Cardiovascular:      Rate and Rhythm: Normal rate. Rhythm irregular.      Pulses: Normal pulses.      Heart sounds: Normal heart sounds.   Pulmonary:      Effort: Pulmonary effort is normal.      Breath sounds: Normal breath sounds.   Abdominal:      General: Bowel sounds are normal.      Palpations: Abdomen is soft.   Musculoskeletal:         General: Normal range of motion.      Cervical back: Normal range of motion and neck supple.   Skin:     General: Skin is warm and dry.   Neurological:      General: No focal deficit present.      Mental Status: He is alert. He is disoriented.   Psychiatric:         Mood and Affect: Mood normal.          Behavior: Behavior normal.         Relevant Results    Scheduled medications  apixaban, 2.5 mg, oral, BID  [Held by provider] empagliflozin, 10 mg, oral, Daily  metoprolol succinate XL, 25 mg, oral, Daily  pantoprazole, 40 mg, oral, Daily before breakfast   Or  pantoprazole, 40 mg, intravenous, Daily before breakfast  rosuvastatin, 40 mg, oral, Daily  tamsulosin, 0.4 mg, oral, Daily  [Held by provider] torsemide, 40 mg, oral, Daily      Continuous medications     PRN medications  PRN medications: acetaminophen **OR** acetaminophen **OR** acetaminophen, calcium carbonate, melatonin, ondansetron **OR** ondansetron, sennosides-docusate sodium    Results for orders placed or performed during the hospital encounter of 03/12/25 (from the past 24 hours)   Basic metabolic panel   Result Value Ref Range    Glucose 85 74 - 99 mg/dL    Sodium 137 136 - 145 mmol/L    Potassium 3.4 (L) 3.5 - 5.3 mmol/L    Chloride 103 98 - 107 mmol/L    Bicarbonate 23 21 - 32 mmol/L    Anion Gap 14 10 - 20 mmol/L    Urea Nitrogen 22 6 - 23 mg/dL    Creatinine 1.33 (H) 0.50 - 1.30 mg/dL    eGFR 52 (L) >60 mL/min/1.73m*2    Calcium 8.6 8.6 - 10.3 mg/dL   CBC   Result Value Ref Range    WBC 4.7 4.4 - 11.3 x10*3/uL    nRBC 0.0 0.0 - 0.0 /100 WBCs    RBC 4.38 (L) 4.50 - 5.90 x10*6/uL    Hemoglobin 11.5 (L) 13.5 - 17.5 g/dL    Hematocrit 40.7 (L) 41.0 - 52.0 %    MCV 93 80 - 100 fL    MCH 26.3 26.0 - 34.0 pg    MCHC 28.3 (L) 32.0 - 36.0 g/dL    RDW 16.9 (H) 11.5 - 14.5 %    Platelets 147 (L) 150 - 450 x10*3/uL     *Note: Due to a large number of results and/or encounters for the requested time period, some results have not been displayed. A complete set of results can be found in Results Review.                   Assessment/Plan      Assessment & Plan  Fall at home, sequela    Syncope and collapse    CAD (coronary artery disease)    BPH (benign prostatic hyperplasia)    Atrial fibrillation (Multi)    AICD (automatic cardioverter/defibrillator)  present    Hypokalemia    HLD (hyperlipidemia)    JONAS (acute kidney injury) (CMS-[facility])    Altered mental status    Generalized weakness    Thrombocytopenia (CMS-[facility])    Chronic systolic (congestive) heart failure    Syncope and collapse  Altered mental status  Generalized weakness  - Presented to ED with daughter for worsening confusion, frequent falls, syncopal episode at home  - UA 3+ glucose, otherwise negative   - No leukocytosis  - Trop 19 > 19  - Lactate 1.5  - Flu, COVID negative   - CT head: Severe age-related atrophy and mild small-vessel ischemic changes of the cerebral white matter. No CT evidence of acute intracranial hemorrhage or mass effect. Maxillary and ethmoid sinusitis. There is some thinning and expansion of the medial wall the right maxillary sinus similar to previous studies. No sign of acute fracture.   - CT C-spine: Severe multilevel cervical spondylosis. There is mild to moderate multilevel hypertrophic facet arthrosis with mild degenerative posterior subluxation of C5. No acute fracture or traumatic subluxation. Osteopenia. Multilevel bulging disc and marginal osteophyte with moderate multilevel central canal narrowing and bilateral multilevel neural foraminal narrowing as detailed above.   - Patient cannot have MRI at [place] due to ICD   - ICD interrogated, no episodes of VT/VF  - discontinued Telemetry monitoring  - Encourage PO intake   - PT/OT evaluation; recommending moderate intensity therapy  - Pending transfer to OSH for neurology consult per family request- no bed as of 1117 on 3/16     Acute Kidney Failure  - Cr 1.17 > 1.40 > 1.57 > 1.70 > today 1.33  - Hold torsemide, empagliflozin   - Renally dose meds   - Encourage PO intake  - No IVF at this time; patient has systolic CHF with severely reduced EF   - Monitor urine output  - Daily BMP     Hypokalemia  - K 3.6 > 3.3 > 3.4  - repleted per protocol  - Daily BMP     Thrombocytopenia  - Plts 144 > 133 > 147  - Continue apixaban 2.5  mg BID for history of AF  - Daily CBC     Persistent Atrial fibrillation  Chronic systolic congestive heart failure  ASHD  ICD in situ   Dyslipidemia  - Trop 19 > 19  - Echo 8/2024: LVEF 25-30%, abnormal pattern of LVDF, moderate LVH, mildly reduced RV systolic function   - Appears euvolemic on exam   - Torsemide and empagliflozin held in setting of JONAS; resume as renal function permits  - Continue apixaban 2.5 mg BID, metoprolol succinate 25 mg every day, rosuvastatin 40 mg every day   - discontinued Telemetry monitoring  - 2g Na diet/1800 mL fluid restriction  - Monitor volume status  - Cardiology consulted, appreciate recs  - ICD needs interogated     BPH  - Continue tamsulosin 0.4 mg every day   - Bladder scan PRN      DVT PPx:   - continue Apixaban  2.5 mg BID    GI PPx:  - continue pantoprazole 40 mg daily     Code Status: Full      Disposition: Patient requires inpatient management at this time.          Bailey Martini, APRN-CNP

## 2025-03-16 NOTE — CARE PLAN
The patient's goals for the shift include      The clinical goals for the shift include Maintain safety, Will remain free from falls    Alert.  Confused, knows year and at [facility], but unable to say [place] or why here.  Impulsive.  Safety maintained.  Tolerating po intake well without c/o n/v.  HS medications given without difficulty.  Bed alarms remain on.  Still awaiting bed availability and transfer to [place].

## 2025-03-16 NOTE — CARE PLAN
Problem: Fall/Injury  Goal: Not fall by end of shift  Outcome: Progressing  Goal: Be free from injury by end of the shift  Outcome: Progressing  Goal: Verbalize understanding of personal risk factors for fall in the hospital  Outcome: Progressing  Goal: Verbalize understanding of risk factor reduction measures to prevent injury from fall in the home  Outcome: Progressing  Goal: Use assistive devices by end of the shift  Outcome: Progressing  Goal: Pace activities to prevent fatigue by end of the shift  Outcome: Progressing   The patient's goals for the shift include      The clinical goals for the shift include Maintain safety, Will remain free from falls    Over the shift, patient had a lot of visitors. VSS, pt had good appetite. In between visitors, pt napped. Continues to c/o some dizziness when standing. No drop in HR or BP.  Family aware there are no beds at [facility] at this time and that supervisor has been working on it.

## 2025-03-17 ENCOUNTER — APPOINTMENT (OUTPATIENT)
Dept: CARDIOLOGY | Facility: CLINIC | Age: 89
End: 2025-03-17
Payer: MEDICARE

## 2025-03-17 ENCOUNTER — HOSPITAL ENCOUNTER (OUTPATIENT)
Facility: HOSPITAL | Age: 89
Setting detail: OBSERVATION
Discharge: HOME HEALTH | End: 2025-03-18
Attending: STUDENT IN AN ORGANIZED HEALTH CARE EDUCATION/TRAINING PROGRAM | Admitting: INTERNAL MEDICINE
Payer: MEDICARE

## 2025-03-17 VITALS
DIASTOLIC BLOOD PRESSURE: 53 MMHG | BODY MASS INDEX: 25.22 KG/M2 | WEIGHT: 160.72 LBS | OXYGEN SATURATION: 97 % | RESPIRATION RATE: 16 BRPM | HEIGHT: 67 IN | SYSTOLIC BLOOD PRESSURE: 117 MMHG | HEART RATE: 59 BPM | TEMPERATURE: 97.5 F

## 2025-03-17 DIAGNOSIS — R53.81 PHYSICAL DECONDITIONING: ICD-10-CM

## 2025-03-17 DIAGNOSIS — R42 DIZZINESS: ICD-10-CM

## 2025-03-17 DIAGNOSIS — Z95.810 PRESENCE OF AUTOMATIC (IMPLANTABLE) CARDIAC DEFIBRILLATOR: Primary | ICD-10-CM

## 2025-03-17 DIAGNOSIS — R29.6 RECURRENT FALLS: ICD-10-CM

## 2025-03-17 DIAGNOSIS — I47.29 OTHER VENTRICULAR TACHYCARDIA: ICD-10-CM

## 2025-03-17 LAB
ANION GAP SERPL CALC-SCNC: 15 MMOL/L (ref 10–20)
BUN SERPL-MCNC: 21 MG/DL (ref 6–23)
CALCIUM SERPL-MCNC: 9.1 MG/DL (ref 8.6–10.3)
CHLORIDE SERPL-SCNC: 104 MMOL/L (ref 98–107)
CO2 SERPL-SCNC: 25 MMOL/L (ref 21–32)
CREAT SERPL-MCNC: 1.25 MG/DL (ref 0.5–1.3)
EGFRCR SERPLBLD CKD-EPI 2021: 56 ML/MIN/1.73M*2
ERYTHROCYTE [DISTWIDTH] IN BLOOD BY AUTOMATED COUNT: 16.7 % (ref 11.5–14.5)
GLUCOSE SERPL-MCNC: 100 MG/DL (ref 74–99)
HCT VFR BLD AUTO: 40.8 % (ref 41–52)
HGB BLD-MCNC: 12.3 G/DL (ref 13.5–17.5)
MCH RBC QN AUTO: 25.9 PG (ref 26–34)
MCHC RBC AUTO-ENTMCNC: 30.1 G/DL (ref 32–36)
MCV RBC AUTO: 86 FL (ref 80–100)
NRBC BLD-RTO: 0 /100 WBCS (ref 0–0)
PLATELET # BLD AUTO: 136 X10*3/UL (ref 150–450)
POTASSIUM SERPL-SCNC: 3.7 MMOL/L (ref 3.5–5.3)
RBC # BLD AUTO: 4.74 X10*6/UL (ref 4.5–5.9)
SODIUM SERPL-SCNC: 140 MMOL/L (ref 136–145)
WBC # BLD AUTO: 5.6 X10*3/UL (ref 4.4–11.3)

## 2025-03-17 PROCEDURE — 2500000001 HC RX 250 WO HCPCS SELF ADMINISTERED DRUGS (ALT 637 FOR MEDICARE OP): Performed by: INTERNAL MEDICINE

## 2025-03-17 PROCEDURE — 2500000001 HC RX 250 WO HCPCS SELF ADMINISTERED DRUGS (ALT 637 FOR MEDICARE OP): Mod: IPSPLIT

## 2025-03-17 PROCEDURE — 85027 COMPLETE CBC AUTOMATED: CPT | Mod: IPSPLIT | Performed by: NURSE PRACTITIONER

## 2025-03-17 PROCEDURE — G0378 HOSPITAL OBSERVATION PER HR: HCPCS

## 2025-03-17 PROCEDURE — 97116 GAIT TRAINING THERAPY: CPT | Mod: GP,CQ,IPSPLIT

## 2025-03-17 PROCEDURE — 97110 THERAPEUTIC EXERCISES: CPT | Mod: GP,CQ,IPSPLIT

## 2025-03-17 PROCEDURE — 99223 1ST HOSP IP/OBS HIGH 75: CPT | Performed by: INTERNAL MEDICINE

## 2025-03-17 PROCEDURE — 36415 COLL VENOUS BLD VENIPUNCTURE: CPT | Mod: IPSPLIT | Performed by: NURSE PRACTITIONER

## 2025-03-17 PROCEDURE — 2500000002 HC RX 250 W HCPCS SELF ADMINISTERED DRUGS (ALT 637 FOR MEDICARE OP, ALT 636 FOR OP/ED): Mod: IPSPLIT

## 2025-03-17 PROCEDURE — 97530 THERAPEUTIC ACTIVITIES: CPT | Mod: GP,CQ,IPSPLIT

## 2025-03-17 PROCEDURE — 2500000001 HC RX 250 WO HCPCS SELF ADMINISTERED DRUGS (ALT 637 FOR MEDICARE OP): Mod: IPSPLIT | Performed by: NURSE PRACTITIONER

## 2025-03-17 PROCEDURE — 80048 BASIC METABOLIC PNL TOTAL CA: CPT | Mod: IPSPLIT | Performed by: NURSE PRACTITIONER

## 2025-03-17 PROCEDURE — 99239 HOSP IP/OBS DSCHRG MGMT >30: CPT

## 2025-03-17 PROCEDURE — 2500000002 HC RX 250 W HCPCS SELF ADMINISTERED DRUGS (ALT 637 FOR MEDICARE OP, ALT 636 FOR OP/ED): Mod: MUE | Performed by: INTERNAL MEDICINE

## 2025-03-17 RX ORDER — ACETAMINOPHEN 325 MG/1
650 TABLET ORAL EVERY 4 HOURS PRN
Status: DISCONTINUED | OUTPATIENT
Start: 2025-03-17 | End: 2025-03-18 | Stop reason: HOSPADM

## 2025-03-17 RX ORDER — CALCIUM CARBONATE 200(500)MG
1000 TABLET,CHEWABLE ORAL 4 TIMES DAILY PRN
Status: DISCONTINUED | OUTPATIENT
Start: 2025-03-17 | End: 2025-03-18 | Stop reason: HOSPADM

## 2025-03-17 RX ORDER — METOPROLOL SUCCINATE 25 MG/1
25 TABLET, EXTENDED RELEASE ORAL DAILY
Status: DISCONTINUED | OUTPATIENT
Start: 2025-03-18 | End: 2025-03-18 | Stop reason: HOSPADM

## 2025-03-17 RX ORDER — ONDANSETRON HYDROCHLORIDE 2 MG/ML
4 INJECTION, SOLUTION INTRAVENOUS EVERY 8 HOURS PRN
Status: DISCONTINUED | OUTPATIENT
Start: 2025-03-17 | End: 2025-03-18 | Stop reason: HOSPADM

## 2025-03-17 RX ORDER — ROSUVASTATIN CALCIUM 20 MG/1
40 TABLET, COATED ORAL NIGHTLY
Status: DISCONTINUED | OUTPATIENT
Start: 2025-03-17 | End: 2025-03-18 | Stop reason: HOSPADM

## 2025-03-17 RX ORDER — TAMSULOSIN HYDROCHLORIDE 0.4 MG/1
0.4 CAPSULE ORAL DAILY
Status: DISCONTINUED | OUTPATIENT
Start: 2025-03-18 | End: 2025-03-18 | Stop reason: HOSPADM

## 2025-03-17 RX ORDER — ACETAMINOPHEN 160 MG/5ML
650 SOLUTION ORAL EVERY 4 HOURS PRN
Status: DISCONTINUED | OUTPATIENT
Start: 2025-03-17 | End: 2025-03-18 | Stop reason: HOSPADM

## 2025-03-17 RX ORDER — ACETAMINOPHEN 500 MG
5 TABLET ORAL NIGHTLY PRN
Status: DISCONTINUED | OUTPATIENT
Start: 2025-03-17 | End: 2025-03-18 | Stop reason: HOSPADM

## 2025-03-17 RX ORDER — TORSEMIDE 20 MG/1
40 TABLET ORAL DAILY
Status: DISCONTINUED | OUTPATIENT
Start: 2025-03-18 | End: 2025-03-18 | Stop reason: HOSPADM

## 2025-03-17 RX ORDER — ACETAMINOPHEN 650 MG/1
650 SUPPOSITORY RECTAL EVERY 4 HOURS PRN
Status: DISCONTINUED | OUTPATIENT
Start: 2025-03-17 | End: 2025-03-18 | Stop reason: HOSPADM

## 2025-03-17 RX ORDER — ONDANSETRON 4 MG/1
4 TABLET, FILM COATED ORAL EVERY 8 HOURS PRN
Status: DISCONTINUED | OUTPATIENT
Start: 2025-03-17 | End: 2025-03-18 | Stop reason: HOSPADM

## 2025-03-17 RX ORDER — AMOXICILLIN 250 MG
1 CAPSULE ORAL NIGHTLY PRN
Status: DISCONTINUED | OUTPATIENT
Start: 2025-03-17 | End: 2025-03-18 | Stop reason: HOSPADM

## 2025-03-17 RX ADMIN — APIXABAN 2.5 MG: 2.5 TABLET, FILM COATED ORAL at 09:45

## 2025-03-17 RX ADMIN — ROSUVASTATIN 40 MG: 20 TABLET, FILM COATED ORAL at 20:32

## 2025-03-17 RX ADMIN — TAMSULOSIN HYDROCHLORIDE 0.4 MG: 0.4 CAPSULE ORAL at 09:45

## 2025-03-17 RX ADMIN — PANTOPRAZOLE SODIUM 40 MG: 40 TABLET, DELAYED RELEASE ORAL at 06:21

## 2025-03-17 RX ADMIN — CALCIUM CARBONATE 1000 MG: 500 TABLET, CHEWABLE ORAL at 20:32

## 2025-03-17 RX ADMIN — EMPAGLIFLOZIN 10 MG: 10 TABLET, FILM COATED ORAL at 09:45

## 2025-03-17 RX ADMIN — APIXABAN 2.5 MG: 2.5 TABLET, FILM COATED ORAL at 20:32

## 2025-03-17 RX ADMIN — METOPROLOL SUCCINATE 25 MG: 25 TABLET, EXTENDED RELEASE ORAL at 09:45

## 2025-03-17 SDOH — SOCIAL STABILITY: SOCIAL NETWORK: HOW OFTEN DO YOU GET TOGETHER WITH FRIENDS OR RELATIVES?: MORE THAN THREE TIMES A WEEK

## 2025-03-17 SDOH — SOCIAL STABILITY: SOCIAL NETWORK
DO YOU BELONG TO ANY CLUBS OR ORGANIZATIONS SUCH AS CHURCH GROUPS, UNIONS, FRATERNAL OR ATHLETIC GROUPS, OR SCHOOL GROUPS?: NO

## 2025-03-17 SDOH — SOCIAL STABILITY: SOCIAL NETWORK: HOW OFTEN DO YOU ATTEND MEETINGS OF THE CLUBS OR ORGANIZATIONS YOU BELONG TO?: NEVER

## 2025-03-17 SDOH — ECONOMIC STABILITY: HOUSING INSECURITY: IN THE PAST 12 MONTHS, HOW MANY TIMES HAVE YOU MOVED WHERE YOU WERE LIVING?: 0

## 2025-03-17 SDOH — SOCIAL STABILITY: SOCIAL INSECURITY: DO YOU FEEL UNSAFE GOING BACK TO THE PLACE WHERE YOU ARE LIVING?: NO

## 2025-03-17 SDOH — ECONOMIC STABILITY: INCOME INSECURITY: IN THE PAST 12 MONTHS HAS THE ELECTRIC, GAS, OIL, OR WATER COMPANY THREATENED TO SHUT OFF SERVICES IN YOUR HOME?: NO

## 2025-03-17 SDOH — ECONOMIC STABILITY: FOOD INSECURITY: HOW HARD IS IT FOR YOU TO PAY FOR THE VERY BASICS LIKE FOOD, HOUSING, MEDICAL CARE, AND HEATING?: NOT HARD AT ALL

## 2025-03-17 SDOH — ECONOMIC STABILITY: HOUSING INSECURITY: AT ANY TIME IN THE PAST 12 MONTHS, WERE YOU HOMELESS OR LIVING IN A SHELTER (INCLUDING NOW)?: NO

## 2025-03-17 SDOH — SOCIAL STABILITY: SOCIAL NETWORK: HOW OFTEN DO YOU ATTEND CHURCH OR RELIGIOUS SERVICES?: NEVER

## 2025-03-17 SDOH — SOCIAL STABILITY: SOCIAL INSECURITY: WITHIN THE LAST YEAR, HAVE YOU BEEN AFRAID OF YOUR PARTNER OR EX-PARTNER?: NO

## 2025-03-17 SDOH — SOCIAL STABILITY: SOCIAL INSECURITY: DO YOU FEEL ANYONE HAS EXPLOITED OR TAKEN ADVANTAGE OF YOU FINANCIALLY OR OF YOUR PERSONAL PROPERTY?: NO

## 2025-03-17 SDOH — SOCIAL STABILITY: SOCIAL INSECURITY: WITHIN THE LAST YEAR, HAVE YOU BEEN HUMILIATED OR EMOTIONALLY ABUSED IN OTHER WAYS BY YOUR PARTNER OR EX-PARTNER?: NO

## 2025-03-17 SDOH — SOCIAL STABILITY: SOCIAL INSECURITY: ARE YOU OR HAVE YOU BEEN THREATENED OR ABUSED PHYSICALLY, EMOTIONALLY, OR SEXUALLY BY ANYONE?: NO

## 2025-03-17 SDOH — SOCIAL STABILITY: SOCIAL INSECURITY: HAS ANYONE EVER THREATENED TO HURT YOUR FAMILY OR YOUR PETS?: NO

## 2025-03-17 SDOH — ECONOMIC STABILITY: FOOD INSECURITY: WITHIN THE PAST 12 MONTHS, YOU WORRIED THAT YOUR FOOD WOULD RUN OUT BEFORE YOU GOT THE MONEY TO BUY MORE.: NEVER TRUE

## 2025-03-17 SDOH — ECONOMIC STABILITY: HOUSING INSECURITY: IN THE LAST 12 MONTHS, WAS THERE A TIME WHEN YOU WERE NOT ABLE TO PAY THE MORTGAGE OR RENT ON TIME?: NO

## 2025-03-17 SDOH — ECONOMIC STABILITY: TRANSPORTATION INSECURITY: IN THE PAST 12 MONTHS, HAS LACK OF TRANSPORTATION KEPT YOU FROM MEDICAL APPOINTMENTS OR FROM GETTING MEDICATIONS?: NO

## 2025-03-17 SDOH — SOCIAL STABILITY: SOCIAL INSECURITY: ARE YOU MARRIED, WIDOWED, DIVORCED, SEPARATED, NEVER MARRIED, OR LIVING WITH A PARTNER?: PATIENT DECLINED

## 2025-03-17 SDOH — ECONOMIC STABILITY: FOOD INSECURITY: WITHIN THE PAST 12 MONTHS, THE FOOD YOU BOUGHT JUST DIDN'T LAST AND YOU DIDN'T HAVE MONEY TO GET MORE.: NEVER TRUE

## 2025-03-17 SDOH — SOCIAL STABILITY: SOCIAL INSECURITY: WERE YOU ABLE TO COMPLETE ALL THE BEHAVIORAL HEALTH SCREENINGS?: YES

## 2025-03-17 SDOH — SOCIAL STABILITY: SOCIAL INSECURITY: HAVE YOU HAD THOUGHTS OF HARMING ANYONE ELSE?: NO

## 2025-03-17 SDOH — SOCIAL STABILITY: SOCIAL INSECURITY: HAVE YOU HAD ANY THOUGHTS OF HARMING ANYONE ELSE?: NO

## 2025-03-17 SDOH — SOCIAL STABILITY: SOCIAL INSECURITY: ABUSE: ADULT

## 2025-03-17 SDOH — SOCIAL STABILITY: SOCIAL INSECURITY: DOES ANYONE TRY TO KEEP YOU FROM HAVING/CONTACTING OTHER FRIENDS OR DOING THINGS OUTSIDE YOUR HOME?: NO

## 2025-03-17 SDOH — HEALTH STABILITY: PHYSICAL HEALTH: ON AVERAGE, HOW MANY MINUTES DO YOU ENGAGE IN EXERCISE AT THIS LEVEL?: 20 MIN

## 2025-03-17 SDOH — HEALTH STABILITY: PHYSICAL HEALTH: ON AVERAGE, HOW MANY DAYS PER WEEK DO YOU ENGAGE IN MODERATE TO STRENUOUS EXERCISE (LIKE A BRISK WALK)?: 3 DAYS

## 2025-03-17 SDOH — SOCIAL STABILITY: SOCIAL NETWORK: IN A TYPICAL WEEK, HOW MANY TIMES DO YOU TALK ON THE PHONE WITH FAMILY, FRIENDS, OR NEIGHBORS?: THREE TIMES A WEEK

## 2025-03-17 SDOH — SOCIAL STABILITY: SOCIAL INSECURITY: ARE THERE ANY APPARENT SIGNS OF INJURIES/BEHAVIORS THAT COULD BE RELATED TO ABUSE/NEGLECT?: NO

## 2025-03-17 ASSESSMENT — PAIN SCALES - GENERAL
PAINLEVEL_OUTOF10: 0 - NO PAIN

## 2025-03-17 ASSESSMENT — COGNITIVE AND FUNCTIONAL STATUS - GENERAL
MOVING FROM LYING ON BACK TO SITTING ON SIDE OF FLAT BED WITH BEDRAILS: A LITTLE
DAILY ACTIVITIY SCORE: 18
TOILETING: A LITTLE
MOVING TO AND FROM BED TO CHAIR: A LITTLE
MOVING TO AND FROM BED TO CHAIR: A LITTLE
TURNING FROM BACK TO SIDE WHILE IN FLAT BAD: A LITTLE
MOBILITY SCORE: 15
WALKING IN HOSPITAL ROOM: A LOT
CLIMB 3 TO 5 STEPS WITH RAILING: A LOT
HELP NEEDED FOR BATHING: A LITTLE
MOBILITY SCORE: 19
STANDING UP FROM CHAIR USING ARMS: A LITTLE
TURNING FROM BACK TO SIDE WHILE IN FLAT BAD: A LITTLE
DRESSING REGULAR LOWER BODY CLOTHING: A LITTLE
EATING MEALS: A LITTLE
PERSONAL GROOMING: A LITTLE
MOVING FROM LYING ON BACK TO SITTING ON SIDE OF FLAT BED WITH BEDRAILS: A LITTLE
PATIENT BASELINE BEDBOUND: NO
DRESSING REGULAR UPPER BODY CLOTHING: A LITTLE
CLIMB 3 TO 5 STEPS WITH RAILING: TOTAL

## 2025-03-17 ASSESSMENT — ACTIVITIES OF DAILY LIVING (ADL)
WALKS IN HOME: INDEPENDENT
JUDGMENT_ADEQUATE_SAFELY_COMPLETE_DAILY_ACTIVITIES: NO
HEARING - RIGHT EAR: HEARING AID
LACK_OF_TRANSPORTATION: NO
TOILETING: INDEPENDENT
ASSISTIVE_DEVICE: DENTURES UPPER;DENTURES LOWER
BATHING: INDEPENDENT
HEARING - LEFT EAR: DIFFICULTY WITH NOISE
PATIENT'S MEMORY ADEQUATE TO SAFELY COMPLETE DAILY ACTIVITIES?: YES
GROOMING: INDEPENDENT
DRESSING YOURSELF: INDEPENDENT
ADEQUATE_TO_COMPLETE_ADL: YES
FEEDING YOURSELF: INDEPENDENT

## 2025-03-17 ASSESSMENT — COLUMBIA-SUICIDE SEVERITY RATING SCALE - C-SSRS
1. IN THE PAST MONTH, HAVE YOU WISHED YOU WERE DEAD OR WISHED YOU COULD GO TO SLEEP AND NOT WAKE UP?: NO
6. HAVE YOU EVER DONE ANYTHING, STARTED TO DO ANYTHING, OR PREPARED TO DO ANYTHING TO END YOUR LIFE?: NO
2. HAVE YOU ACTUALLY HAD ANY THOUGHTS OF KILLING YOURSELF?: NO

## 2025-03-17 ASSESSMENT — LIFESTYLE VARIABLES
AUDIT-C TOTAL SCORE: 0
HOW MANY STANDARD DRINKS CONTAINING ALCOHOL DO YOU HAVE ON A TYPICAL DAY: PATIENT DOES NOT DRINK
HOW OFTEN DO YOU HAVE A DRINK CONTAINING ALCOHOL: NEVER
SKIP TO QUESTIONS 9-10: 1
HOW OFTEN DO YOU HAVE 6 OR MORE DRINKS ON ONE OCCASION: NEVER
AUDIT-C TOTAL SCORE: 0

## 2025-03-17 ASSESSMENT — PATIENT HEALTH QUESTIONNAIRE - PHQ9
SUM OF ALL RESPONSES TO PHQ9 QUESTIONS 1 & 2: 0
2. FEELING DOWN, DEPRESSED OR HOPELESS: NOT AT ALL
1. LITTLE INTEREST OR PLEASURE IN DOING THINGS: NOT AT ALL

## 2025-03-17 ASSESSMENT — PAIN - FUNCTIONAL ASSESSMENT: PAIN_FUNCTIONAL_ASSESSMENT: 0-10

## 2025-03-17 NOTE — H&P
History Of Present Illness  Gilmer Quiñonez is a 89 y.o. male presenting with episodes of dizziness on standing, and recurrent falls.   He has a history of ischemic cardiomyopathy status post ICD placement, atrial fibrillation on anticoagulation with Eliquis. He presented to the ED at [facility] on 3/12 with his daughter because of frequent falls, at the time he was also reportedly confused.  His daughter requested he be evaluated by neurology, therefore he was accepted for transfer to [facility] pending availability of a bed.  In the meantime he was admitted at [facility] and eventually was transferred to [facility] today.  He was awake, alert, hard of hearing, oriented to self and place and stated the month and year correctly.  History was obtained from the patient and from previous records.  He stated that he frequently feels dizzy when he stands up, and had had recurrent falls, about 4 times recently within the last few days that he had spent at home prior to hospitalization.  On 1 occasion he rolled out of bed and fell and was unable to get up from the floor.  He did not recall hitting his head at any time.  He had no difficulty moving his arms or legs.  There was no blurring of vision, vertigo, tinnitus, nausea, vomiting, palpitations, shortness of breath, chest pain or limb weakness or numbness.  He reported no loss of consciousness.  A head CT done at [facility] showed no acute changes.  An MRI of the brain was not done because he has a defibrillator.  He was seen by cardiology while at [facility] and the defibrillator was interrogated.  It was noted that there were episodes of paroxysmal atrial fibrillation, but no episodes of ventricular tachycardia or ventricular fibrillation.  He was transferred to [facility] to be evaluated by neurology.  He had no acute complaints.     Past Medical History  Past Medical History:   Diagnosis Date    Acute right MCA stroke (Multi)     Angiodysplasia of intestine     Atrial fibrillation  and flutter     Chronic systolic heart failure     COPD (chronic obstructive pulmonary disease) (Multi)     Coronary artery disease     Dementia     GI bleed     HLD (hyperlipidemia)     Hypertension        Surgical History  Past Surgical History:   Procedure Laterality Date    APPENDECTOMY  02/06/2015    Appendectomy    CARDIAC DEFIBRILLATOR PLACEMENT      CORONARY ARTERY BYPASS GRAFT  07/17/2017    CABG    CT ABDOMEN PELVIS ANGIOGRAM W AND/OR WO IV CONTRAST  02/28/2022    CT ABDOMEN PELVIS ANGIOGRAM W AND/OR WO IV CONTRAST 2/28/2022 [facility]    CT ANGIO NECK  04/01/2023    CT NECK ANGIO W AND WO IV CONTRAST GEN CT    CT HEAD ANGIO W AND WO IV CONTRAST  04/01/2023    CT HEAD ANGIO W AND WO IV CONTRAST GEN CT    OTHER SURGICAL HISTORY  01/06/2020    Coronary artery stent placement    TOTAL KNEE ARTHROPLASTY Left     TOTAL KNEE ARTHROPLASTY Right         Social History  He reports that he quit smoking about 27 years ago. His smoking use included cigarettes. He started smoking about 76 years ago. He has a 49 pack-year smoking history. He has never used smokeless tobacco. He reports current alcohol use of about 1.0 standard drink of alcohol per week. He reports that he does not use drugs.    Family History  Family History   Problem Relation Name Age of Onset    Coronary artery disease Father          Allergies  Penicillins, Rivaroxaban, and Morphine    Review of Systems   General: Negative for fever,  chills or fatigue.    HEENT: Negative for headache, blurring of vision or double vision.    Cardiovascular: Negative for chest pain, palpitations or orthopnea.    Respiratory: Negative for cough, shortness of breath or wheezing.    Gastrointestinal: Negative for nausea, vomiting, hematemesis, abdominal pain or diarrhea.   Genitourinary: Negative for dysuria, hematuria, frequency or nocturia.   Musculoskeletal: Negative for joint pain, joint swelling or deformity.   Skin: Negative for rash, itching, or  jaundice.   Hematologic: Negative for bleeding or bruising.   Neurologic: As in the HPI     Physical Exam   General.: Awake alert well-developed, responsive   HEENT: Normocephalic, not icteric, not pale, EOMI, no facial asymmetry, no pharyngeal erythema.   Neck: Supple, no carotid bruit, no thyroid enlargement.   Cardiovascular: Regular heart rate and rhythm normal S1 and S2.   Respiratory: Equal breath sounds bilaterally clear to auscultation.   Abdomen: Soft, nontender to palpation, bowel sounds present and normoactive.   Extremities: No peripheral cyanosis, no pedal edema.   Neurologic: Alert and oriented to self, place and date, CN II-XII intact, sensation intact and equal bilaterally, muscle strength 5/5 in all extremities, gait was not tested.    Dermatologic: No rash, ecchymosis, or jaundice.   Psychological: Appropriate affect and behavior.       Last Recorded Vitals  Blood pressure 147/73, pulse 67, temperature 36.5 °C (97.7 °F), temperature source Oral, resp. rate 17, SpO2 98%.    Relevant Results  Results for orders placed or performed during the hospital encounter of 03/12/25 (from the past 24 hours)   CBC   Result Value Ref Range    WBC 5.6 4.4 - 11.3 x10*3/uL    nRBC 0.0 0.0 - 0.0 /100 WBCs    RBC 4.74 4.50 - 5.90 x10*6/uL    Hemoglobin 12.3 (L) 13.5 - 17.5 g/dL    Hematocrit 40.8 (L) 41.0 - 52.0 %    MCV 86 80 - 100 fL    MCH 25.9 (L) 26.0 - 34.0 pg    MCHC 30.1 (L) 32.0 - 36.0 g/dL    RDW 16.7 (H) 11.5 - 14.5 %    Platelets 136 (L) 150 - 450 x10*3/uL   Basic metabolic panel   Result Value Ref Range    Glucose 100 (H) 74 - 99 mg/dL    Sodium 140 136 - 145 mmol/L    Potassium 3.7 3.5 - 5.3 mmol/L    Chloride 104 98 - 107 mmol/L    Bicarbonate 25 21 - 32 mmol/L    Anion Gap 15 10 - 20 mmol/L    Urea Nitrogen 21 6 - 23 mg/dL    Creatinine 1.25 0.50 - 1.30 mg/dL    eGFR 56 (L) >60 mL/min/1.73m*2    Calcium 9.1 8.6 - 10.3 mg/dL     *Note: Due to a large number of results and/or encounters for the requested  time period, some results have not been displayed. A complete set of results can be found in Results Review.     Results for orders placed or performed during the hospital encounter of 03/12/25 (from the past 24 hours)   CBC   Result Value Ref Range    WBC 5.6 4.4 - 11.3 x10*3/uL    nRBC 0.0 0.0 - 0.0 /100 WBCs    RBC 4.74 4.50 - 5.90 x10*6/uL    Hemoglobin 12.3 (L) 13.5 - 17.5 g/dL    Hematocrit 40.8 (L) 41.0 - 52.0 %    MCV 86 80 - 100 fL    MCH 25.9 (L) 26.0 - 34.0 pg    MCHC 30.1 (L) 32.0 - 36.0 g/dL    RDW 16.7 (H) 11.5 - 14.5 %    Platelets 136 (L) 150 - 450 x10*3/uL   Basic metabolic panel   Result Value Ref Range    Glucose 100 (H) 74 - 99 mg/dL    Sodium 140 136 - 145 mmol/L    Potassium 3.7 3.5 - 5.3 mmol/L    Chloride 104 98 - 107 mmol/L    Bicarbonate 25 21 - 32 mmol/L    Anion Gap 15 10 - 20 mmol/L    Urea Nitrogen 21 6 - 23 mg/dL    Creatinine 1.25 0.50 - 1.30 mg/dL    eGFR 56 (L) >60 mL/min/1.73m*2    Calcium 9.1 8.6 - 10.3 mg/dL          Assessment/Plan   Assessment & Plan    Dizziness and recurrent falls  CT scan of the head done on 3/12 showed no acute changes  Check vital signs for orthostatic changes  Neurology consult: The transfer to [facility] [place] was requested in order to have him evaluated by neurology  PT/OT    Paroxysmal atrial fibrillation  On long-term anticoagulation with Eliquis.  Heart rate is controlled    Ischemic cardiomyopathy  Ejection fraction 25 to 30% in 8/2024, currently compensated    Presence of cardiac defibrillator  Status post ICD placement because of ischemic cardiomyopathy    Metabolic encephalopathy  Reported to be confused at [place], appears improved, currently awake and oriented.    Chronic kidney disease stage III  Creatinine slightly elevated, trending down, monitor    Plan  Check orthostatics  PT/OT  Neurology consult  Monitor kidney function    Reji Jacobs MD

## 2025-03-17 NOTE — CARE PLAN
The patient's goals for the shift include      The clinical goals for the shift include Maintain safety.  Will remain free from falls    Alert, confused to situation and time.  Incontinent at times.  Tolerating po intake well without c/o n/v.  HS medication given without difficulty.  Bed alarm on.  Safety maintained.
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
## 2025-03-17 NOTE — NURSING NOTE
Call from transfer center: [facility] 4th floor, Room 405A; N2N # [phone], pick-up @ 1000am

## 2025-03-17 NOTE — PROGRESS NOTES
03/17/25 1600   Discharge Planning   Expected Discharge Disposition Home H  ([facility])     Patient is transfer from [place] for neuro consult  Per notes plan is for pt to return home with daughter with HC   Daughter declining SNF, states it will make patients confusion worse     Will need internal referral for home health upon discharge  ** do not discharge without speaking to care coordination**

## 2025-03-17 NOTE — NURSING NOTE
Patient arrived from [facility].     Dr Jacobs notified of patients arrival.

## 2025-03-17 NOTE — PROGRESS NOTES
Spiritual Care Visit  Spiritual Care Request    Reason for Visit:  Routine Visit: Introduction     Request Received From:       Focus of Care:  Visited With: Patient         Refer to :          Spiritual Care Assessment    Spiritual Assessment:                      Care Provided:       Sense of Community and or [employer] Affiliation:  Christian   Values/Beliefs  Spiritual Requests During Hospitalization: I gave Gilmer a  blessing while he was sleeping today.     Addressed Needs/Concerns and/or Mike Through:          Outcome:        Advance Directives:         Spiritual Care Annotation    Annotation:  I gave Gilmer a blessing while he was sleeping today.  German Silva

## 2025-03-17 NOTE — CARE PLAN
The patient's goals for the shift include Maintain safety    The clinical goals for the shift include Maintain safety

## 2025-03-17 NOTE — PROGRESS NOTES
Physical Therapy    Physical Therapy Treatment    Patient Name: Gilmer Quiñonez  MRN: [id number]  Department:   Room: 229/229-A  Today's Date: 3/17/2025  Time Calculation  Start Time: 0736  Stop Time: 0816  Time Calculation (min): 40 min         Assessment/Plan   PT Assessment  PT Assessment Results: Decreased strength, Impaired balance, Decreased mobility, Decreased endurance, Decreased safety awareness, Impaired judgement  Rehab Prognosis: Good  Barriers to Discharge Home: Cognition needs, Caregiver assistance  Caregiver Assistance: Caregiver assistance needed per identified barriers - however, level of patient's required assistance exceeds assistance available at home  Cognition Needs: 24hr supervision for safety awareness needed  Evaluation/Treatment Tolerance: Patient tolerated treatment well  Medical Staff Made Aware: Yes  Strengths: Support of Caregivers, Rehab experience, Ability to acquire knowledge  Barriers to Participation: Comorbidities  End of Session Communication: Bedside nurse  Assessment Comment: Pt demonstrated improved bed mobility and functional transfers with CGAto SBA post cues for ue and safety.  Pt demonstrated improved ROM and speed for te this date to improve endurance with gait mechanics and distances improved without SOB to lob.  Pt cont to require skilled PT to aid in b le strenght and functional balance to reduce risk for falls and prevent further declinewhile here hosptial.  Pt left up in chair, alarm on, call bell within reach and all needs addressed.  End of Session Patient Position: Up in chair, Alarm on     PT Plan  Treatment/Interventions: Bed mobility, Transfer training, Gait training, Strengthening, Endurance training, Therapeutic exercise, Range of motion, Therapeutic activity  PT Plan: Ongoing PT  PT Frequency: 3 times per week  PT Discharge Recommendations: Moderate intensity level of continued care  Equipment Recommended upon Discharge: Wheeled walker  PT Recommended  Transfer Status: Assist x1  PT - OK to Discharge: Yes      General Visit Information:   PT  Visit  PT Received On: 03/17/25  Response to Previous Treatment: Patient with no complaints from previous session.  General  Reason for Referral: impaired mobility, fall at homoe  Referred By: SOFIE Cortez  Past Medical History Relevant to Rehab: CAD, CKD, CHF, syncope, acute R MCA stroke (2023), HLD, chronic congestive heart failure, chronic kidney disease, pneumonia  Family/Caregiver Present: No  Prior to Session Communication: Bedside nurse  Patient Position Received: Bed, 2 rail up, Alarm on  Preferred Learning Style: verbal, visual  General Comment: Pt pleasant and agreeable to therapy with reports of dizziness cont. Pt cleared bny nursing prior to sessionn.    Subjective  Feeling tired and dizzy.  Precautions:  Precautions  Medical Precautions: Fall precautions  Precautions Comment: Pt w/ mild dizziness thorughout session. Tele, masimo     Date/Time Vitals Session Patient Position Pulse Resp SpO2 BP MAP (mmHg)    03/17/25 1016 --  --  59  --  97 %  117/53  74                 Objective   Pain:  Pain Assessment  Pain Assessment: 0-10  0-10 (Numeric) Pain Score: 0 - No pain  Cognition:  Cognition  Overall Cognitive Status: Within Functional Limits  Arousal/Alertness: Appropriate responses to stimuli  Orientation Level: Disoriented to situation  Coordination:  Movements are Fluid and Coordinated: Yes      Activity Tolerance:  Activity Tolerance  Endurance: Tolerates 10 - 20 min exercise with multiple rests  Treatments:  Therapeutic Exercise  Therapeutic Exercise Performed: Yes  Therapeutic Exercise Activity 1: 2x20 PF/DF  Therapeutic Exercise Activity 2: 2x20 alt march  Therapeutic Exercise Activity 3: 2x20 LAQ         Bed Mobility  Bed Mobility: Yes  Bed Mobility 1  Bed Mobility 1: Supine to sitting  Level of Assistance 1: Close supervision  Bed Mobility Comments 1: HOB elevated and heavy use bed rail   Increased time due to dizziness.    Ambulation/Gait Training  Ambulation/Gait Training Performed: Yes  Ambulation/Gait Training 1  Surface 1: Level tile  Device 1: Rolling walker  Gait Support Devices: Gait belt  Assistance 1: Close supervision  Quality of Gait 1: Decreased step length, Forward flexed posture  Comments/Distance (ft) 1: 150-200 ft x 2 trials with CGA x 2 during turns with cues for head up and erect posture and safer gait clearance ble  Transfers  Transfer: Yes  Transfer 1  Transfer From 1: Bed to  Transfer to 1: Stand  Technique 1: Stand to sit, Sit to stand, Stand pivot  Transfer Device 1: Walker, Gait belt  Transfer Level of Assistance 1: Contact guard  Trials/Comments 1: SBA with CGA during apprroach to sit with fww with sfety cues for keeping ad closer to body and ue sequencing  Transfers 2  Transfer From 2: Chair with arms to  Transfer to 2: Stand  Technique 2: Sit to stand, Stand to sit  Transfer Device 2: Walker, Gait belt  Transfer Level of Assistance 2: Close supervision  Trials/Comments 2: improved safety during subsequent transfers with ad and ue         Outcome Measures:  [facility] Basic Mobility  Turning from your back to your side while in a flat bed without using bedrails: A little  Moving from lying on your back to sitting on the side of a flat bed without using bedrails: A little  Moving to and from bed to chair (including a wheelchair): A little  Standing up from a chair using your arms (e.g. wheelchair or bedside chair): None  To walk in hospital room: None  Climbing 3-5 steps with railing: A lot  Basic Mobility - Total Score: 19    Education Documentation  Body Mechanics, taught by Alma Gatiac PTA at 3/17/2025 10:56 AM.  Learner: Patient  Readiness: Acceptance  Method: Explanation  Response: Verbalizes Understanding  Comment: Education and cues for sfer gait mechanics and posture during gait andturns to reduce risk for falls.    Mobility Training, taught by Alma RIOS  NASIR Gatica at 3/17/2025 10:56 AM.  Learner: Patient  Readiness: Acceptance  Method: Explanation  Response: Verbalizes Understanding  Comment: Education and cues for sfer gait mechanics and posture during gait andturns to reduce risk for falls.    Education Comments  No comments found.        Encounter Problems       Encounter Problems (Active)       Balance       STG - Maintains dynamic standing balance with 1  upper extremity support with >=good- balance  (Progressing)       Start:  03/13/25    Expected End:  03/27/25               Mobility       LTG - Patient will ambulate community distance FADY with WW (Progressing)       Start:  03/13/25    Expected End:  03/27/25            STG - Patient will ambulate up and down a curb/step IND (Progressing)       Start:  03/13/25    Expected End:  03/27/25               PT Transfers       STG - Patient will perform bed mobility IND (Progressing)       Start:  03/13/25    Expected End:  03/27/25            STG - Patient will transfer sit to and from stand FADY with WW  (Progressing)       Start:  03/13/25    Expected End:  03/27/25            Pt. will be able to complete 5 sit to stands <=30 seconds  (Progressing)       Start:  03/13/25    Expected End:  03/27/25               Pain - Adult

## 2025-03-18 ENCOUNTER — HOME HEALTH ADMISSION (OUTPATIENT)
Dept: HOME HEALTH SERVICES | Facility: HOME HEALTH | Age: 89
End: 2025-03-18
Payer: MEDICARE

## 2025-03-18 ENCOUNTER — DOCUMENTATION (OUTPATIENT)
Dept: HOME HEALTH SERVICES | Facility: HOME HEALTH | Age: 89
End: 2025-03-18
Payer: MEDICARE

## 2025-03-18 VITALS
HEART RATE: 57 BPM | BODY MASS INDEX: 25.22 KG/M2 | WEIGHT: 160.72 LBS | HEIGHT: 67 IN | TEMPERATURE: 97.5 F | DIASTOLIC BLOOD PRESSURE: 48 MMHG | SYSTOLIC BLOOD PRESSURE: 134 MMHG | RESPIRATION RATE: 18 BRPM | OXYGEN SATURATION: 98 %

## 2025-03-18 LAB
ANION GAP SERPL CALCULATED.3IONS-SCNC: 11 MMOL/L (ref 10–20)
BUN SERPL-MCNC: 19 MG/DL (ref 6–23)
CALCIUM SERPL-MCNC: 8.6 MG/DL (ref 8.6–10.3)
CHLORIDE SERPL-SCNC: 106 MMOL/L (ref 98–107)
CO2 SERPL-SCNC: 25 MMOL/L (ref 21–32)
CREAT SERPL-MCNC: 1.39 MG/DL (ref 0.5–1.3)
EGFRCR SERPLBLD CKD-EPI 2021: 49 ML/MIN/1.73M*2
ERYTHROCYTE [DISTWIDTH] IN BLOOD BY AUTOMATED COUNT: 16.7 % (ref 11.5–14.5)
GLUCOSE SERPL-MCNC: 90 MG/DL (ref 74–99)
HCT VFR BLD AUTO: 36.9 % (ref 41–52)
HGB BLD-MCNC: 11.4 G/DL (ref 13.5–17.5)
MCH RBC QN AUTO: 26.3 PG (ref 26–34)
MCHC RBC AUTO-ENTMCNC: 30.9 G/DL (ref 32–36)
MCV RBC AUTO: 85 FL (ref 80–100)
NRBC BLD-RTO: 0 /100 WBCS (ref 0–0)
PLATELET # BLD AUTO: 110 X10*3/UL (ref 150–450)
POTASSIUM SERPL-SCNC: 3.6 MMOL/L (ref 3.5–5.3)
RBC # BLD AUTO: 4.33 X10*6/UL (ref 4.5–5.9)
SODIUM SERPL-SCNC: 138 MMOL/L (ref 136–145)
WBC # BLD AUTO: 5.6 X10*3/UL (ref 4.4–11.3)

## 2025-03-18 PROCEDURE — G0378 HOSPITAL OBSERVATION PER HR: HCPCS

## 2025-03-18 PROCEDURE — 80048 BASIC METABOLIC PNL TOTAL CA: CPT | Performed by: INTERNAL MEDICINE

## 2025-03-18 PROCEDURE — 99238 HOSP IP/OBS DSCHRG MGMT 30/<: CPT | Performed by: INTERNAL MEDICINE

## 2025-03-18 PROCEDURE — 97161 PT EVAL LOW COMPLEX 20 MIN: CPT | Mod: GP

## 2025-03-18 PROCEDURE — 36415 COLL VENOUS BLD VENIPUNCTURE: CPT | Performed by: INTERNAL MEDICINE

## 2025-03-18 PROCEDURE — 2500000002 HC RX 250 W HCPCS SELF ADMINISTERED DRUGS (ALT 637 FOR MEDICARE OP, ALT 636 FOR OP/ED): Performed by: INTERNAL MEDICINE

## 2025-03-18 PROCEDURE — 97165 OT EVAL LOW COMPLEX 30 MIN: CPT | Mod: GO

## 2025-03-18 PROCEDURE — 85027 COMPLETE CBC AUTOMATED: CPT | Performed by: INTERNAL MEDICINE

## 2025-03-18 PROCEDURE — 2500000001 HC RX 250 WO HCPCS SELF ADMINISTERED DRUGS (ALT 637 FOR MEDICARE OP): Performed by: INTERNAL MEDICINE

## 2025-03-18 RX ADMIN — TAMSULOSIN HYDROCHLORIDE 0.4 MG: 0.4 CAPSULE ORAL at 08:31

## 2025-03-18 RX ADMIN — METOPROLOL SUCCINATE 25 MG: 25 TABLET, EXTENDED RELEASE ORAL at 08:31

## 2025-03-18 RX ADMIN — APIXABAN 2.5 MG: 2.5 TABLET, FILM COATED ORAL at 08:31

## 2025-03-18 ASSESSMENT — COGNITIVE AND FUNCTIONAL STATUS - GENERAL
DRESSING REGULAR LOWER BODY CLOTHING: A LITTLE
TOILETING: A LITTLE
CLIMB 3 TO 5 STEPS WITH RAILING: A LOT
MOVING FROM LYING ON BACK TO SITTING ON SIDE OF FLAT BED WITH BEDRAILS: A LITTLE
DAILY ACTIVITIY SCORE: 19
DRESSING REGULAR UPPER BODY CLOTHING: A LITTLE
WALKING IN HOSPITAL ROOM: A LITTLE
TURNING FROM BACK TO SIDE WHILE IN FLAT BAD: A LITTLE
HELP NEEDED FOR BATHING: A LITTLE
STANDING UP FROM CHAIR USING ARMS: A LITTLE
PERSONAL GROOMING: A LITTLE
MOBILITY SCORE: 17
MOVING TO AND FROM BED TO CHAIR: A LITTLE

## 2025-03-18 ASSESSMENT — PAIN SCALES - GENERAL
PAINLEVEL_OUTOF10: 0 - NO PAIN
PAINLEVEL_OUTOF10: 0 - NO PAIN

## 2025-03-18 ASSESSMENT — ACTIVITIES OF DAILY LIVING (ADL)
BATHING_ASSISTANCE: MINIMAL
ADL_ASSISTANCE: INDEPENDENT
ADL_ASSISTANCE: INDEPENDENT

## 2025-03-18 ASSESSMENT — PAIN - FUNCTIONAL ASSESSMENT
PAIN_FUNCTIONAL_ASSESSMENT: 0-10
PAIN_FUNCTIONAL_ASSESSMENT: 0-10

## 2025-03-18 NOTE — NURSING NOTE
Assumed care.  Seen patient sitting at the edge of the bed, awake, no distress or discomfort noted. Safety measures ensured and call light within reach.  Mitch was in the room.

## 2025-03-18 NOTE — PROGRESS NOTES
Occupational Therapy    Evaluation    Patient Name: Gilmer Quiñonez  MRN: [id number]  Department:   Room: formerly [facility]403-A  Today's Date: 3/18/2025  Time Calculation  Start Time: 1059  Stop Time: 1113  Time Calculation (min): 14 min        Assessment:  OT Assessment: pt presents with mild confusion, reduced endurance, generalized weakness and decreased balance which impedes ADL performance. pt would benefit from skilled OT services to address these deficits and to facilitate highest level of independence.  Prognosis: Fair  Barriers to Discharge Home: No anticipated barriers  Physical Needs: Intermittent mobility assistance needed, Intermittent ADL assistance needed  Evaluation/Treatment Tolerance: Patient tolerated treatment well  Medical Staff Made Aware: Yes  End of Session Communication: Bedside nurse  End of Session Patient Position: Up in chair, Alarm off, caregiver present  OT Assessment Results: Decreased ADL status, Decreased cognition, Decreased endurance, Decreased functional mobility  Prognosis: Fair  Evaluation/Treatment Tolerance: Patient tolerated treatment well  Medical Staff Made Aware: Yes  Strengths: Ability to acquire knowledge, Attitude of self  Barriers to Participation: Comorbidities  Plan:  Treatment Interventions: ADL retraining, Functional transfer training, UE strengthening/ROM, Endurance training, Cognitive reorientation, Equipment evaluation/education, Compensatory technique education  OT Frequency: 3 times per week  OT Discharge Recommendations: Low intensity level of continued care  Equipment Recommended upon Discharge: Wheeled walker  OT Recommended Transfer Status: Assist of 1, Stand by assist  OT - OK to Discharge: Yes  Treatment Interventions: ADL retraining, Functional transfer training, UE strengthening/ROM, Endurance training, Cognitive reorientation, Equipment evaluation/education, Compensatory technique education    Subjective     General:  General  Reason for Referral: ADL impairment;  "Pt presented to ED for confusion and multiple falls/dizziness at home.  Past Medical History Relevant to Rehab: CAD, CKD, CHF, syncope, acute R MCA stroke (2023), HLD, chronic congestive heart failure, chronic kidney disease, pneumonia  Family/Caregiver Present: Yes  Caregiver Feedback: daughter  Co-Treatment: PT  Co-Treatment Reason: to maximize safety and participation  Prior to Session Communication: Bedside nurse  Patient Position Received: Up in chair, Alarm off, caregiver present  Preferred Learning Style: verbal, visual  General Comment: pt's daughter present and frustrated stating that the patient was supposed to discharge while at [place] however got transferred to [facility].  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  RN aware  Precautions:  Medical Precautions: Fall precautions     Date/Time Vitals Session Patient Position Pulse Resp SpO2 BP MAP (mmHg)    03/18/25 1049 --  --  58  18  99 %  138/56  75     03/18/25 1055 --  --  57  18  98 %  134/48  70     03/18/25 1100 --  --  50  18  99 %  124/50  66                 Pain:  Pain Assessment  Pain Assessment: 0-10  0-10 (Numeric) Pain Score: 0 - No pain    Objective   Cognition:  Overall Cognitive Status: Impaired  Orientation Level: Oriented X4  Cognition Comments: pt and daughter stating cognition has improved significantly however pt stating that he still feels a little \"brain fog\".           Home Living:  Type of Home: House  Lives With: Adult children (daughter)  Home Adaptive Equipment: Walker rolling or standard, Cane  Home Layout: Two level, Able to live on main level with bedroom/bathroom  Home Access: Stairs to enter without rails  Entrance Stairs-Number of Steps: 1  Bathroom Shower/Tub: Walk-in shower  Bathroom Toilet: Standard  Bathroom Equipment: Grab bars in shower, Shower chair " with back  Prior Function:  Level of [place]: Independent with ADLs and functional transfers  Receives Help From: Family  ADL Assistance: Independent  Homemaking Assistance: Independent (Pt cooks ind, daughter assists w/ other IADLs as needed)  Ambulatory Assistance: Independent (no AD)  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)     ADL:  Eating Assistance: Independent  Grooming Assistance: Stand by (anticipated)  Bathing Assistance: Minimal (anticipated)  UE Dressing Assistance: Minimal (MIN A for donning t-shirt and jacket)  LE Dressing Assistance: Minimal (anticipated)  Toileting Assistance with Device: Minimal (anticipated)  Activity Tolerance:  Endurance: Decreased tolerance for upright activites  Bed Mobility/Transfers: Bed Mobility  Bed Mobility: No    Transfer 1  Technique 1: Sit to stand, Stand to sit  Transfer Level of Assistance 1: Close supervision  Trials/Comments 1: from standard chair, pt with fair control when ascending/descendingn  Transfers 2  Transfer to 2: Chair with arms  Technique 2: Stand to sit  Transfer Device 2: Walker  Transfer Level of Assistance 2: Close supervision  Trials/Comments 2: pt transferred back to chair at end of session after functional mobility trial with use of FWW      Functional Mobility:  Functional Mobility 1  Surface 1: Level tile  Device 1: No device, Rolling walker  Assistance 1: Contact guard, Close supervision  Comments 1: pt performed functional mobility task throughout unit hallway; first attempt without a device and performed at CGA.  2nd attempt performed with use of FWW in which  pt demonstrated greater stability and performed at SBA  Sitting Balance:  Static Sitting Balance  Static Sitting-Balance Support: Feet supported  Static Sitting-Level of Assistance: Independent  Standing Balance:  Static Standing Balance  Static Standing-Balance Support: No upper extremity supported  Static Standing-Level of Assistance: Close supervision       Vision:Vision - Basic Assessment  Current Vision: No visual deficits  Sensation:  Light Touch: No apparent deficits  Strength:  Strength Comments: WASHINGTONE 4/5  Perception:  Inattention/Neglect: Appears intact  Coordination:  Movements are Fluid and Coordinated: Yes   Hand Function:  Gross Grasp: Functional  Coordination: Functional  Extremities: RUE   RUE : Within Functional Limits and LUE   LUE: Within Functional Limits    Outcome Measures:[facility] Daily Activity  Putting on and taking off regular lower body clothing: A little  Bathing (including washing, rinsing, drying): A little  Putting on and taking off regular upper body clothing: A little  Toileting, which includes using toilet, bedpan or urinal: A little  Taking care of personal grooming such as brushing teeth: A little  Eating Meals: None  Daily Activity - Total Score: 19        Education Documentation  Body Mechanics, taught by Quique Llamas OT at 3/18/2025 11:38 AM.  Learner: Patient  Readiness: Acceptance  Method: Explanation, Demonstration  Response: Verbalizes Understanding  Comment: ADL/functional mobility techniques, benefits of OOB activity, OT POC    ADL Training, taught by Quique Llamas OT at 3/18/2025 11:38 AM.  Learner: Patient  Readiness: Acceptance  Method: Explanation, Demonstration  Response: Verbalizes Understanding  Comment: ADL/functional mobility techniques, benefits of OOB activity, OT POC    Education Comments  No comments found.        OP EDUCATION:       Goals:  Encounter Problems       Encounter Problems (Active)       ADLs       Patient with complete upper body dressing with independent level of assistance donning and doffing all UE clothes  while edge of bed  (Progressing)       Start:  03/18/25    Expected End:  04/18/25            Patient with complete lower body dressing with modified independent level of assistance donning and doffing all LE clothes  with PRN adaptive equipment while edge of bed  (Progressing)       Start:   03/18/25    Expected End:  04/18/25            Patient will complete toileting including hygiene clothing management/hygiene with MOD I  level of assistance and grab bars. (Progressing)       Start:  03/18/25    Expected End:  04/18/25               MOBILITY       Patient will perform Functional mobility max Household distances/Community Distances with modified independent level of assistance and least restrictive device in order to improve safety and functional mobility. (Progressing)       Start:  03/18/25    Expected End:  04/18/25

## 2025-03-18 NOTE — NURSING NOTE
Order review at end of shift completed. Patient on bed awake, no distress noted.  Expresses no other needs at the present.  Safety measures in place, call light within reach.

## 2025-03-18 NOTE — DISCHARGE SUMMARY
Discharge Diagnosis  Dizziness  Recurrent falls  Resolved metabolic encephalopathy  Ischemic cardiomyopathy  Presence of cardiac defibrillator    Issues Requiring Follow-Up      Discharge Meds     Medication List      CONTINUE taking these medications     apixaban 2.5 mg tablet; Commonly known as: Eliquis; Take 1 tablet (2.5   mg) by mouth 2 times a day.   empagliflozin 10 mg; Commonly known as: Jardiance; Take 1 tablet (10 mg)   by mouth once daily.   ergocalciferol 1250 mcg (50,000 units) capsule; Commonly known as:   Vitamin D-2; Take 1 capsule (50,000 Units) by mouth 1 (one) time per week.   metoprolol succinate XL 25 mg 24 hr tablet; Commonly known as:   Toprol-XL; Take 1 tablet (25 mg) by mouth once daily.   rosuvastatin 40 mg tablet; Commonly known as: Crestor; Take 1 tablet (40   mg) by mouth once daily.   tamsulosin 0.4 mg 24 hr capsule; Commonly known as: Flomax; Take 1   capsule (0.4 mg) by mouth once daily.     STOP taking these medications     torsemide 20 mg tablet; Commonly known as: Demadex       Test Results Pending At Discharge  Pending Labs       No current pending labs.            Hospital Course  89-year-old male patient with a history of ischemic cardiomyopathy status post ICD placement, atrial fibrillation on anticoagulation with Eliquis, presented to the emergency department at [place] because of frequent falls and confusion.  His daughter requested evaluation by a neurologist and he was accepted at [facility] for transfer.  He arrived at [facility] 5 days later.  In the meantime, at [place] a head CT showed no acute changes.  He did not have an MRI of the brain because he has a fibrillator.  The defibrillator was interrogated by cardiology at [place] and showed paroxysmal atrial fibrillation but no ventricular tachycardia or ventricular fibrillation.  At the time of arrival at [facility], he was oriented to self, place and was able to state the month and year.  He was evaluated by neurology  and it was felt that he had a metabolic encephalopathy in the setting of fluctuating kidney dysfunction.  Further neurologic workup was recommended.  His home torsemide has been on hold because of elevated creatinine which had improved.  He was medically stable for discharge.  OT recommended low intensity level of continued care.  He was discharged home with home health care.  He is asked to remain off torsemide and restart Jardiance in 2 days.  He is to follow-up with his primary physician in the office.  This was discussed with his daughter at the bedside.      Outpatient Follow-Up  No future appointments.      Reji Jacobs MD

## 2025-03-18 NOTE — NURSING NOTE
Daughter at bedside requesting discharge asap. Awaiting set up of HHC per CM. Daughter removed tele and IV and refuses to wait for discharge papers. She wheeled pt to elevator.  pulled AMA paper and she refused to sign that as well.

## 2025-03-18 NOTE — PROGRESS NOTES
03/18/25 1300   Discharge Planning   Expected Discharge Disposition Home H  ([facility] SOC 3/19-3/20)

## 2025-03-18 NOTE — CARE PLAN
The clinical goals for the shift include Maintain normal wake / sleep cycle. Maintain fluid restriction as ordered.

## 2025-03-18 NOTE — PROGRESS NOTES
Physical Therapy    Physical Therapy Evaluation    Patient Name: Gilmer Quiñonez  MRN: [id number]  Department:   Room: [facility]403-A  Today's Date: 3/18/2025   Time Calculation  Start Time: 1100  Stop Time: 1114  Time Calculation (min): 14 min    Assessment/Plan   PT Assessment  PT Assessment Results: Decreased strength, Decreased endurance, Impaired balance, Decreased mobility, Decreased cognition, Decreased safety awareness  Rehab Prognosis: Good  Barriers to Discharge Home: Cognition needs, Caregiver assistance, Physical needs  Caregiver Assistance: Caregiver assistance needed per identified barriers - however, level of patient's required assistance exceeds assistance available at home  Cognition Needs: 24hr supervision for safety awareness needed  Physical Needs: Ambulating household distances limited by function/safety, High falls risk due to function or environment, Intermittent mobility assistance needed  Evaluation/Treatment Tolerance: Patient limited by fatigue  Medical Staff Made Aware: Yes  Strengths: Ability to acquire knowledge, Support of Caregivers  Barriers to Participation: Comorbidities  End of Session Communication: Bedside nurse  Assessment Comment: Pt. would benefit from continued physical therapy at low intensity level.  End of Session Patient Position: Up in chair, Alarm off, caregiver present  IP OR SWING BED PT PLAN  Inpatient or Swing Bed: Inpatient  PT Plan  Treatment/Interventions: Bed mobility, Transfer training, Gait training, Stair training, Balance training, Endurance training, Strengthening, Therapeutic exercise, Therapeutic activity  PT Plan: Ongoing PT  PT Frequency: 3 times per week  PT Discharge Recommendations: Low intensity level of continued care  Equipment Recommended upon Discharge: Wheeled walker    Subjective   General Visit Information:  General  Reason for Referral: impaired functional mobility; PT eval and treat; OOB with assist  Referred By: Arlene  Past Medical History  "Relevant to Rehab: CAD, CKD, CHF, syncope, acute R MCA stroke (2023), HLD, chronic congestive heart failure, chronic kidney disease, pneumonia  Family/Caregiver Present: Yes  Caregiver Feedback: daughter  Co-Treatment: OT  Co-Treatment Reason: to maximize safety and participation  Prior to Session Communication: Bedside nurse ([place])  Patient Position Received: Up in chair, Alarm off, caregiver present  Preferred Learning Style: verbal, visual  General Comment: pt's daughter present and frustrated stating that the patient was supposed to discharge while at [facility] however got transferred to [facility].  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  RN aware  Home Living:  Home Living  Type of Home: House  Lives With: Adult children (daughter)  Home Adaptive Equipment: Walker rolling or standard, Cane  Home Layout: Two level, Able to live on main level with bedroom/bathroom  Home Access: Stairs to enter without rails  Entrance Stairs-Number of Steps: 1  Bathroom Shower/Tub: Walk-in shower  Bathroom Toilet: Standard  Bathroom Equipment: Grab bars in shower, Shower chair with back  Prior Level of Function:  Prior Function Per Pt/Caregiver Report  Level of [place]: Independent with ADLs and functional transfers  Receives Help From: Family  ADL Assistance: Independent  Homemaking Assistance: Independent (Pt cooks ind, daughter assists w/ other IADLs as needed)  Ambulatory Assistance: Independent (no AD)  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 is pleasant and cooperative for therapy.    Activity Tolerance  Endurance: Decreased tolerance for upright activites    Sensation  Light Touch: No apparent deficits    Strength  Strength Comments: grossly david ues 4/5 per OT; grossly david les 4/5    Perception  Inattention/Neglect: Appears intact    Coordination  Movements are Fluid and Coordinated: Yes    Postural Control  Posture Comment:  (forward head posturing with mild thoracic kyphosis)    Static Standing Balance  Static Standing-Balance Support: No upper extremity supported  Static Standing-Level of Assistance: Contact guard  Static Standing-Comment/Number of Minutes: 3  Dynamic Standing Balance  Dynamic Standing-Balance Support: No upper extremity supported  Dynamic Standing-Level of Assistance: Contact guard  Dynamic Standing-Comments: F+  Functional Assessments:  Bed Mobility  Bed Mobility: No    Transfer 1  Technique 1: Sit to stand, Stand to sit  Transfer Level of Assistance 1: Close supervision  Trials/Comments 1: SBA from std chair level; slowed to get trunk fully upright  Transfers 2  Transfer to 2: Chair with arms  Technique 2: Stand to sit  Transfer Device 2: Walker  Transfer Level of Assistance 2: Close supervision    Ambulation/Gait Training  Ambulation/Gait Training Performed:  (Pt. ambulated approx. 80ft without ad, req. cga for safety; trialed WW, ~60ft. req. sba for safety.  Exhibited short shuffling steps, scuffs his feet along floor, forward flexed posturing, decr.step/stride length.  Min fatigue overall.)  Extremity/Trunk Assessments:  RUE   RUE : Within Functional Limits  LUE   LUE: Within Functional Limits  RLE   RLE : Within Functional Limits  LLE   LLE : Within Functional Limits  Outcome Measures:  [facility] Basic Mobility  Turning from your back to your side while in a flat bed without " using bedrails: A little  Moving from lying on your back to sitting on the side of a flat bed without using bedrails: A little  Moving to and from bed to chair (including a wheelchair): A little  Standing up from a chair using your arms (e.g. wheelchair or bedside chair): A little  To walk in hospital room: A little  Climbing 3-5 steps with railing: A lot  Basic Mobility - Total Score: 17    Encounter Problems       Encounter Problems (Active)       PT Problem       STG - Pt will transition supine <> sitting indep (Progressing)       Start:  03/18/25    Expected End:  03/25/25            STG - Pt will transfer sit<>stand indep. (Progressing)       Start:  03/18/25    Expected End:  03/25/25            STG - Pt will amb 150' using lrd prn with distant supervision  (Progressing)       Start:  03/18/25    Expected End:  03/25/25            Pt. will demonstrate >=G dyn stand balance to decr. risk for falls (Progressing)       Start:  03/18/25    Expected End:  03/25/25                  Encounter Problems (Resolved)       Pain - Adult            Education Documentation  No documentation found.  Education Comments  No comments found.

## 2025-03-18 NOTE — CONSULTS
Inpatient consult to Neurology  Consult performed by: Claudette Crawford MD  Consult ordered by: Reji Jacobs MD      History Of Present Illness  Gilmer Quiñonez is a 89 y.o. male presents with his daughter at bedside to help with history.  She reports that he has some age-related forgetfulness at baseline, noting that he is 89 years old.  Over the past week he has been complaining his ears feeling clogged, is generally feeling dizzy, he has been confused about his business.  She adds that 1 week ago he was balancing his checkbook and walking with ease.  In the past week he has been walking with a walker.  The dizziness is not positional.  He then any recent last few days he has been complaining of double vision and dizziness with loss of consciousness.  He has had episodes of confusion in the past related to heart failure and acute renal failure.  The kidney fusion this time is somewhat similar but without evidence of heart failure.     Past Medical History  He has a past medical history of Acute right MCA stroke (Multi), Angiodysplasia of intestine, Atrial fibrillation and flutter, Chronic systolic heart failure, COPD (chronic obstructive pulmonary disease) (Multi), Coronary artery disease, Dementia, GI bleed, HLD (hyperlipidemia), and Hypertension.    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     Social History  He reports that he quit smoking about 27 years ago. His smoking use included cigarettes. He started smoking about 76 years ago. He has a 49 pack-year smoking history. He has never used smokeless tobacco. He reports current alcohol  "use of about 1.0 standard drink of alcohol per week. He reports that he does not use drugs.     Allergies  Penicillins, Rivaroxaban, and Morphine    Medications  Medications Prior to Admission   Medication Sig Dispense Refill Last Dose/Taking    apixaban (Eliquis) 2.5 mg tablet Take 1 tablet (2.5 mg) by mouth 2 times a day. 180 tablet 1     empagliflozin (Jardiance) 10 mg Take 1 tablet (10 mg) by mouth once daily. 90 tablet 1     ergocalciferol (Vitamin D-2) 1.25 MG (34024 UT) capsule Take 1 capsule (50,000 Units) by mouth 1 (one) time per week. 12 capsule 0     metoprolol succinate XL (Toprol-XL) 25 mg 24 hr tablet Take 1 tablet (25 mg) by mouth once daily. 90 tablet 1     rosuvastatin (Crestor) 40 mg tablet Take 1 tablet (40 mg) by mouth once daily. 90 tablet 1     tamsulosin (Flomax) 0.4 mg 24 hr capsule Take 1 capsule (0.4 mg) by mouth once daily. 30 capsule 11     torsemide (Demadex) 20 mg tablet Take 2 tablets (40 mg) by mouth once daily. 180 tablet 0      Review of Systems    Scheduled medications  apixaban, 2.5 mg, oral, BID  empagliflozin, 10 mg, oral, Daily  metoprolol succinate XL, 25 mg, oral, Daily  rosuvastatin, 40 mg, oral, Nightly  tamsulosin, 0.4 mg, oral, Daily  [Held by provider] torsemide, 40 mg, oral, Daily      Continuous medications     PRN medications  PRN medications: acetaminophen **OR** acetaminophen **OR** acetaminophen, calcium carbonate, melatonin, ondansetron **OR** ondansetron, sennosides-docusate sodium    Last Recorded Vitals   Blood pressure 131/56, pulse 58, temperature 36.5 °C (97.7 °F), temperature source Oral, resp. rate 17, height 1.702 m (5' 7.01\"), weight 72.9 kg (160 lb 11.5 oz), SpO2 98%.    Heart is RRR, Lungs CTAB  Neurologically, pt is awake and oriented x4. Attention, concentration, memory, cortical processing are intact. No aphasia  Cranial nerves: VFF, EOMI, No nystagmus, Face is symmetric to sensory and motor, no dysarthria, Tongue protrudes midline, Shrug " symmetric  Motor: 4/5 strength B throughout, no tremor or asterixis  Sensation is intact bilaterally throughout  Coordination: no ataxia, no dysmetria/dysdiadochokinesia  DTR symmetric  Gait unable to assess    Relevant Results    Results for orders placed or performed during the hospital encounter of 03/12/25 (from the past 96 hours)   Basic metabolic panel   Result Value Ref Range    Glucose 103 (H) 74 - 99 mg/dL    Sodium 140 136 - 145 mmol/L    Potassium 3.6 3.5 - 5.3 mmol/L    Chloride 101 98 - 107 mmol/L    Bicarbonate 28 21 - 32 mmol/L    Anion Gap 15 10 - 20 mmol/L    Urea Nitrogen 16 6 - 23 mg/dL    Creatinine 1.57 (H) 0.50 - 1.30 mg/dL    eGFR 42 (L) >60 mL/min/1.73m*2    Calcium 9.2 8.6 - 10.3 mg/dL   CBC   Result Value Ref Range    WBC 5.0 4.4 - 11.3 x10*3/uL    nRBC 0.0 0.0 - 0.0 /100 WBCs    RBC 4.90 4.50 - 5.90 x10*6/uL    Hemoglobin 13.0 (L) 13.5 - 17.5 g/dL    Hematocrit 41.9 41.0 - 52.0 %    MCV 86 80 - 100 fL    MCH 26.5 26.0 - 34.0 pg    MCHC 31.0 (L) 32.0 - 36.0 g/dL    RDW 17.2 (H) 11.5 - 14.5 %    Platelets 144 (L) 150 - 450 x10*3/uL   Basic metabolic panel   Result Value Ref Range    Glucose 95 74 - 99 mg/dL    Sodium 139 136 - 145 mmol/L    Potassium 3.3 (L) 3.5 - 5.3 mmol/L    Chloride 100 98 - 107 mmol/L    Bicarbonate 26 21 - 32 mmol/L    Anion Gap 16 10 - 20 mmol/L    Urea Nitrogen 22 6 - 23 mg/dL    Creatinine 1.70 (H) 0.50 - 1.30 mg/dL    eGFR 39 (L) >60 mL/min/1.73m*2    Calcium 8.9 8.6 - 10.3 mg/dL   CBC   Result Value Ref Range    WBC 5.3 4.4 - 11.3 x10*3/uL    nRBC      RBC 4.62 4.50 - 5.90 x10*6/uL    Hemoglobin 12.2 (L) 13.5 - 17.5 g/dL    Hematocrit 39.0 (L) 41.0 - 52.0 %    MCV 84 80 - 100 fL    MCH 26.4 26.0 - 34.0 pg    MCHC 31.3 (L) 32.0 - 36.0 g/dL    RDW 17.0 (H) 11.5 - 14.5 %    Platelets 133 (L) 150 - 450 x10*3/uL   Basic metabolic panel   Result Value Ref Range    Glucose 85 74 - 99 mg/dL    Sodium 137 136 - 145 mmol/L    Potassium 3.4 (L) 3.5 - 5.3 mmol/L    Chloride  103 98 - 107 mmol/L    Bicarbonate 23 21 - 32 mmol/L    Anion Gap 14 10 - 20 mmol/L    Urea Nitrogen 22 6 - 23 mg/dL    Creatinine 1.33 (H) 0.50 - 1.30 mg/dL    eGFR 52 (L) >60 mL/min/1.73m*2    Calcium 8.6 8.6 - 10.3 mg/dL   CBC   Result Value Ref Range    WBC 4.7 4.4 - 11.3 x10*3/uL    nRBC 0.0 0.0 - 0.0 /100 WBCs    RBC 4.38 (L) 4.50 - 5.90 x10*6/uL    Hemoglobin 11.5 (L) 13.5 - 17.5 g/dL    Hematocrit 40.7 (L) 41.0 - 52.0 %    MCV 93 80 - 100 fL    MCH 26.3 26.0 - 34.0 pg    MCHC 28.3 (L) 32.0 - 36.0 g/dL    RDW 16.9 (H) 11.5 - 14.5 %    Platelets 147 (L) 150 - 450 x10*3/uL   CBC   Result Value Ref Range    WBC 5.6 4.4 - 11.3 x10*3/uL    nRBC 0.0 0.0 - 0.0 /100 WBCs    RBC 4.74 4.50 - 5.90 x10*6/uL    Hemoglobin 12.3 (L) 13.5 - 17.5 g/dL    Hematocrit 40.8 (L) 41.0 - 52.0 %    MCV 86 80 - 100 fL    MCH 25.9 (L) 26.0 - 34.0 pg    MCHC 30.1 (L) 32.0 - 36.0 g/dL    RDW 16.7 (H) 11.5 - 14.5 %    Platelets 136 (L) 150 - 450 x10*3/uL   Basic metabolic panel   Result Value Ref Range    Glucose 100 (H) 74 - 99 mg/dL    Sodium 140 136 - 145 mmol/L    Potassium 3.7 3.5 - 5.3 mmol/L    Chloride 104 98 - 107 mmol/L    Bicarbonate 25 21 - 32 mmol/L    Anion Gap 15 10 - 20 mmol/L    Urea Nitrogen 21 6 - 23 mg/dL    Creatinine 1.25 0.50 - 1.30 mg/dL    eGFR 56 (L) >60 mL/min/1.73m*2    Calcium 9.1 8.6 - 10.3 mg/dL        ECG 12 lead    Result Date: 3/12/2025  Atrial fibrillation with premature ventricular or aberrantly conducted complexes Left axis deviation Nonspecific intraventricular block Minimal voltage criteria for LVH, may be normal variant ( [place] product ) Inferior infarct , age undetermined Abnormal ECG When compared with ECG of 11-MAR-2025 04:40, Atrial fibrillation has replaced Electronic ventricular pacemaker See ED provider note for full interpretation and clinical correlation Confirmed by Danette Elizondo ([id number]) on 3/12/2025 11:26:10 AM    CT cervical spine wo IV contrast    Result Date:  3/12/2025  Interpreted By:  José Luis Gary, STUDY: CT CERVICAL SPINE WO IV CONTRAST; ;  3/12/2025 7:15 am   INDICATION: Signs/Symptoms:trauma.   COMPARISON: None.   ACCESSION NUMBER(S): [id number]   ORDERING CLINICIAN: CYNTHIA DUBOIS   TECHNIQUE: Contiguous axial CT sections are performed from the skullbase to the upper thoracic spine and supplemented with coronal and sagittal reformatted images.   FINDINGS: There is some reversal of the cervical lordosis. The facet joints align normally. There is mild degenerative posterior subluxation of C5 relative to C4 and C6 measuring 2 mm.   There severe changes of multilevel cervical spondylosis with disc space narrowing greatest at C3-4 and C5-6.   The osseous structures are diffusely demineralized. The cervical vertebral body heights are maintained. There is no sign of cervical vertebral fracture. There is no bone destruction or aggressive periosteal reaction. No lytic or blastic lesion is detected. The visualized surrounding osseous structures are also intact.   The C1-2 relationship is within normal limits.   The C2-3 disc space level demonstrates moderate facet arthrosis on the left and mild facet arthrosis on the right. There is no significant central canal or neural foraminal stenosis.   The C3-4 disc space level demonstrates mild to moderate bilateral facet arthrosis greater on the right. There is bulging disc and marginal osteophyte with moderate central canal narrowing and some mass effect upon the ventral spinal cord. There is mild-to-moderate narrowing of the left neural foramen and moderate narrowing of the right neural foramen.   The C4-5 disc space level demonstrates moderate bilateral facet arthrosis. There is circumferential bulging disc and marginal osteophyte with moderate central canal narrowing and some effacement of the anterior surface of the cord. There is moderate bilateral neural foraminal narrowing, greater on the right.   The C5-6 disc  space level demonstrates mild to moderate bilateral facet arthrosis and posterior subluxation of C5 5. There is bulging disc and marginal osteophyte with at least moderate central canal narrowing and mass effect upon the anterior surface of the spinal cord. There is moderate to severe bilateral neural foraminal narrowing.   The C6-7 disc space level demonstrates mild bilateral facet arthrosis. There is bulging disc and marginal osteophyte with mild central canal narrowing. There is mild to moderate narrowing of the right neural foramen and mild narrowing left neural foramen.   The C7-T1 disc space level demonstrates mild bilateral facet arthrosis. There is no central canal or neural foraminal stenosis.   There is no prevertebral soft tissue swelling or retropharyngeal air. Incidentally noted is a fat density mass in the posterior subcutaneous fat to the right of midline measuring 4.2 cm in craniocaudal diameter and 5.0 x 3.2 cm in cross-section. There is deformity of the posterior skin surface. There is a well-defined margin though strandy and some amorphous density internally.       Severe multilevel cervical spondylosis. There is mild to moderate multilevel hypertrophic facet arthrosis with mild degenerative posterior subluxation of C5.   No acute fracture or traumatic subluxation.   Osteopenia.   Multilevel bulging disc and marginal osteophyte with moderate multilevel central canal narrowing and bilateral multilevel neural foraminal narrowing as detailed above.   Lipomatous mass within the posterior subcutaneous fat at the C7-T1 level to the right of midline measuring 4.2 x 5.0 x 3.2 cm. There is some strandy linear and amorphous internal density. Characteristics are similar to a previous CT examination of 03/31/2023 though the lesion is slightly increased in size in the interval. Need for further workup should be determined clinically. Correlate with pain at this site.     MACRO: None   Signed by: José Luis  Abdirahman 3/12/2025 7:38 AM Dictation workstation:   [id number]    CT head W O contrast trauma protocol    Result Date: 3/12/2025  Interpreted By:  José Luis Gary, STUDY: CT HEAD W/O CONTRAST TRAUMA PROTOCOL; ;  3/12/2025 7:15 am   INDICATION: Signs/Symptoms:trauma.   COMPARISON: 03/11/2025   ACCESSION NUMBER(S): [id number]   ORDERING CLINICIAN: CYNTHIA DUBOIS   TECHNIQUE: Contiguous unenhanced axial CT sections are performed from the skull base to the vertex.   FINDINGS: The osseous structures are intact. There is opacification of the right sphenoid compartment. There is lobular mucoperiosteal thickening and partial opacification of the ethmoid air cells. There is large lobular opacity in the right maxillary sinus inferiorly with some thinning and expansion of the medial wall. There is mild lobular mucoperiosteal thickening or polyposis in the left maxillary sinus as well. The frontal sinus in the left sphenoid compartment are clear. The mastoid air cells are clear.   There is severe generalized parenchymal volume loss with enlargement of the cortical sulci and CSF spaces. There is mild diffuse hypoattenuation in the cerebral white matter compatible with small-vessel ischemia.   There is no sign of parenchymal hematoma or dense extra-axial fluid collection. There is no mass effect or midline shift. The gray matter/white-matter differentiation is preserved.       Severe age-related atrophy and mild small-vessel ischemic changes of the cerebral white matter.   No CT evidence of acute intracranial hemorrhage or mass effect.   Maxillary and ethmoid sinusitis. There is some thinning and expansion of the medial wall the right maxillary sinus similar to previous studies.   No sign of acute fracture.     MACRO: None   Signed by: José Luis Gary 3/12/2025 7:28 AM Dictation workstation:   [id number]    ECG 12 lead    Result Date: 3/11/2025  Ventricular-paced rhythm with occasional AV dual-paced complexes  Abnormal ECG When compared with ECG of 29-AUG-2024 18:12, Electronic ventricular pacemaker has replaced Atrial flutter See ED provider note for full interpretation and clinical correlation Confirmed by Danette Elizondo ([id number]) on 3/11/2025 11:55:45 AM    CT chest wo IV contrast    Result Date: 3/11/2025  Interpreted By:  Finkelstein, Evan, STUDY: CT CHEST WO IV CONTRAST;  3/11/2025 5:38 am   INDICATION: Signs/Symptoms:weakness.     COMPARISON: CT chest 08/27/2024   ACCESSION NUMBER(S): [id number]   ORDERING CLINICIAN: SHELL TRINIDAD   TECHNIQUE: Axial CT images of the chest obtained  without IV contrast.  Sagittal and coronal reconstructions were created..   FINDINGS: CHEST WALL AND LOWER NECK: Left chest wall pacemaker. Gynecomastia bilaterally. UPPER ABDOMEN: No acute abnormality of the partially visualized abdomen.   VESSELS: Aorta and pulmonary artery are normal caliber. Atherosclerotic calcifications in the aorta and coronary arteries. HEART: Enlarged in size. No pericardial effusion. MEDIASTINUM AND GEO: No pathologically enlarged lymph nodes. Large hiatal hernia. LUNG, PLEURA, AND LARGE AIRWAYS: There are pleural calcifications. Redemonstrated opacities scattered throughout the lungs, predominantly curvilinear in orientation and similar compared to prior imaging.   BONES: No acute osseous abnormality. Median sternotomy wires are present.       Redemonstrated opacities scattered throughout the lungs, which are predominantly curvilinear in orientation and similar compared to prior imaging. Findings are favored to represent scarring versus atelectasis.   Large hiatal hernia.   MACRO: None.   Signed by: Evan Finkelstein 3/11/2025 6:57 AM Dictation workstation:   [id number]    CT head wo IV contrast    Result Date: 3/11/2025  Interpreted By:  Finkelstein, Evan, STUDY: CT HEAD WO IV CONTRAST; CT CERVICAL SPINE WO IV CONTRAST;  3/11/2025 5:38 am   INDICATION: Signs/Symptoms:fall.     COMPARISON: CT brain  08/29/2024   ACCESSION NUMBER(S): [id number]; [id number]   ORDERING CLINICIAN: SHELL TRINIDAD   TECHNIQUE: Noncontrast CT images of the head with coronal and sagittal reconstructions. Axial noncontrast CT images of the cervical spine with coronal and sagittal reconstructed images.   FINDINGS: EXTRACRANIAL SOFT TISSUES: Right frontal scalp and periorbital soft tissue laceration.   CALVARIUM: No depressed skull fracture. No destructive osseous lesion.   PARANASAL SINUSES/MASTOIDS: Partial opacification of the ethmoid sinuses and right maxillary sinus. Mastoid air cells are aerated.   HEMORRHAGE: No acute intracranial hemorrhage.   BRAIN PARENCHYMA: Gray-white matter interfaces are preserved. No mass effect or midline shift. There are nonspecific scattered white matter hypodensities.   VENTRICLES and EXTRA-AXIAL SPACES: Parenchymal atrophy with prominence of the ventricles and cortical sulci.   OTHER FINDINGS: There are calcifications within the cavernous carotids   CERVICAL SPINE:   ALIGNMENT: Straightening of the normal cervical lordosis, which may be positional or related to spasm. VERTEBRAE: No acute loss of vertebral body height. Bones are demineralized DISC SPACE: Disc space narrowing C3/C4, C4/C5 and C5/C6. SPINAL CANAL: Multilevel facet and uncovertebral arthropathy with varying degrees of neural foraminal stenosis. Prominent posterior disc osteophyte complexes from C3/C4 through C5/C6 contribute to moderate central narrowing. PREVERTEBRAL SOFT TISSUES: No prevertebral soft tissue swelling. LUNG APICES: Emphysematous changes in the visualized lung apices.   OTHER FINDINGS: 4.8 x 2.9 cm encapsulated fat attenuating lesion in the posterior right neck soft tissues       Right frontal scalp and periorbital soft tissue laceration. No underlying fracture.   No acute intracranial hemorrhage, mass effect or midline shift.   Nonspecific scattered white matter hypodensities favored to represent sequela of small vessel  ischemia. Cervical spondylosis without acute loss of vertebral body height or traumatic malalignment.   4.8 x 2.9 cm lipomatous lesion in the right lower neck/upper back soft tissues.   MACRO: None.   Signed by: Evan Finkelstein 3/11/2025 6:53 AM Dictation workstation:   [id number]    CT cervical spine wo IV contrast    Result Date: 3/11/2025  Interpreted By:  Finkelstein, Evan, STUDY: CT HEAD WO IV CONTRAST; CT CERVICAL SPINE WO IV CONTRAST;  3/11/2025 5:38 am   INDICATION: Signs/Symptoms:fall.     COMPARISON: CT brain 08/29/2024   ACCESSION NUMBER(S): [id number]; [id number]   ORDERING CLINICIAN: SHELL TRINIDAD   TECHNIQUE: Noncontrast CT images of the head with coronal and sagittal reconstructions. Axial noncontrast CT images of the cervical spine with coronal and sagittal reconstructed images.   FINDINGS: EXTRACRANIAL SOFT TISSUES: Right frontal scalp and periorbital soft tissue laceration.   CALVARIUM: No depressed skull fracture. No destructive osseous lesion.   PARANASAL SINUSES/MASTOIDS: Partial opacification of the ethmoid sinuses and right maxillary sinus. Mastoid air cells are aerated.   HEMORRHAGE: No acute intracranial hemorrhage.   BRAIN PARENCHYMA: Gray-white matter interfaces are preserved. No mass effect or midline shift. There are nonspecific scattered white matter hypodensities.   VENTRICLES and EXTRA-AXIAL SPACES: Parenchymal atrophy with prominence of the ventricles and cortical sulci.   OTHER FINDINGS: There are calcifications within the cavernous carotids   CERVICAL SPINE:   ALIGNMENT: Straightening of the normal cervical lordosis, which may be positional or related to spasm. VERTEBRAE: No acute loss of vertebral body height. Bones are demineralized DISC SPACE: Disc space narrowing C3/C4, C4/C5 and C5/C6. SPINAL CANAL: Multilevel facet and uncovertebral arthropathy with varying degrees of neural foraminal stenosis. Prominent posterior disc osteophyte complexes from C3/C4 through C5/C6  contribute to moderate central narrowing. PREVERTEBRAL SOFT TISSUES: No prevertebral soft tissue swelling. LUNG APICES: Emphysematous changes in the visualized lung apices.   OTHER FINDINGS: 4.8 x 2.9 cm encapsulated fat attenuating lesion in the posterior right neck soft tissues       Right frontal scalp and periorbital soft tissue laceration. No underlying fracture.   No acute intracranial hemorrhage, mass effect or midline shift.   Nonspecific scattered white matter hypodensities favored to represent sequela of small vessel ischemia. Cervical spondylosis without acute loss of vertebral body height or traumatic malalignment.   4.8 x 2.9 cm lipomatous lesion in the right lower neck/upper back soft tissues.   MACRO: None.   Signed by: Evan Finkelstein 3/11/2025 6:53 AM Dictation workstation:   [id number]        Assessment/Plan   Assessment & Plan  Recurrent falls    89-year-old man presents with dizziness and falls as well as altered mental status in the setting of acute renal failure.  His exam is not consistent with evidence of myelopathy despite the severity of spondylosis on CT cervical spine.  He does have severe atrophy on his CT head but he is functioning well at baseline when he is not sick with other things.  He is unable to get an MRI secondary to the type of pacemaker he has.  His leg strength is otherwise good.  On exam at this time he has noted to have a reasonably intact mental status both according to my assessment and that of his daughter.  I suspect this was a toxic metabolic encephalopathy in the setting of fluctuating kidney dysfunction and no further neurologic evaluation is recommended at this time.  I will sign off.  Please call with further questions or concerns.    I personally spent 53 minutes today, exclusive of procedures, providing care for this patient, including preparation, face to face time, documentation and other services such as review of medical records, diagnostic result,  patient education, counseling, coordination of care as specified in the encounter.

## 2025-03-18 NOTE — HH CARE COORDINATION
Home Care received a Referral for Physical Therapy and Occupational Therapy. We have processed the referral for a Start of Care on 3/19-3/20.     If you have any questions or concerns, please feel free to contact us at [phone]. Follow the prompts, enter your five digit zip code, and you will be directed to your care team on [facility].

## 2025-03-18 NOTE — PROGRESS NOTES
"Gilmer Quiñonez is a 89 y.o. male on day 0 of admission presenting with Recurrent falls.    Subjective   He was awake and alert,  He reported no dizziness when standing earlier today.       Objective     Physical Exam  General: awake, hard of hearing, alert, oriented, responsive  Cardiovascular: regular, normal S1 and S2  Lungs: good air entry bilaterally, clear to auscultation  Extremities: no peripheral cyanosis, no pedal edema  Neuro: alert, oriented x 3, no focal weakness      Last Recorded Vitals  Blood pressure 134/59, pulse 51, temperature 36.5 °C (97.7 °F), temperature source Oral, resp. rate 18, height 1.702 m (5' 7.01\"), weight 72.9 kg (160 lb 11.5 oz), SpO2 97%.  Intake/Output last 3 Shifts:  I/O last 3 completed shifts:  In: 240 (3.3 mL/kg) [P.O.:240]  Out: 750 (10.3 mL/kg) [Urine:750 (0.3 mL/kg/hr)]  Weight: 72.9 kg     Relevant Results  Lab Results   Component Value Date    WBC 5.6 03/18/2025    HGB 11.4 (L) 03/18/2025    HCT 36.9 (L) 03/18/2025    MCV 85 03/18/2025     (L) 03/18/2025     Lab Results   Component Value Date    GLUCOSE 90 03/18/2025    CALCIUM 8.6 03/18/2025     03/18/2025    K 3.6 03/18/2025    CO2 25 03/18/2025     03/18/2025    BUN 19 03/18/2025    CREATININE 1.39 (H) 03/18/2025     Scheduled medications  apixaban, 2.5 mg, oral, BID  empagliflozin, 10 mg, oral, Daily  metoprolol succinate XL, 25 mg, oral, Daily  rosuvastatin, 40 mg, oral, Nightly  tamsulosin, 0.4 mg, oral, Daily  [Held by provider] torsemide, 40 mg, oral, Daily      Continuous medications     PRN medications  PRN medications: acetaminophen **OR** acetaminophen **OR** acetaminophen, calcium carbonate, melatonin, ondansetron **OR** ondansetron, sennosides-docusate sodium    Assessment/Plan   Assessment & Plan    Dizziness and recurrent falls  [facility] 3/[facility] showed no acute changes  Seen by neurology, no further workup recommended     Paroxysmal atrial fibrillation  On " long-term anticoagulation with Eliquis.  Heart rate is controlled     Ischemic cardiomyopathy  Ejection fraction 25 to 30% in 8/2024, currently compensated     Presence of cardiac defibrillator  Status post ICD placement because of ischemic cardiomyopathy     Metabolic encephalopathy  Reported to be confused at [place], encephalopathy has resolved.      Chronic kidney disease stage III  Monitor creatinine level     Plan  Orthostatics checked: no orthostatic hypotension noted.   Holding torsemide because of elevated creatinine  Hold jardiance today  Discharge planning: His daughter declines SNF placement. Discharge home today with home health care      Reji Jacobs MD

## 2025-03-18 NOTE — DISCHARGE INSTRUCTIONS
Restart jardiance (empaglifloin) on 3/20/2025  Do not take torsemide until asked to by the primary physician.

## 2025-03-21 ENCOUNTER — HOME CARE VISIT (OUTPATIENT)
Dept: HOME HEALTH SERVICES | Facility: HOME HEALTH | Age: 89
End: 2025-03-21
Payer: MEDICARE

## 2025-03-21 VITALS
HEART RATE: 68 BPM | DIASTOLIC BLOOD PRESSURE: 55 MMHG | SYSTOLIC BLOOD PRESSURE: 151 MMHG | OXYGEN SATURATION: 99 % | RESPIRATION RATE: 18 BRPM

## 2025-03-21 PROCEDURE — G0151 HHCP-SERV OF PT,EA 15 MIN: HCPCS | Mod: HHH

## 2025-03-21 PROCEDURE — 169592 NO-PAY CLAIM PROCEDURE

## 2025-03-21 SDOH — ECONOMIC STABILITY: HOUSING INSECURITY: UNSAFE APPLIANCES: 1

## 2025-03-21 SDOH — HEALTH STABILITY: PHYSICAL HEALTH: EXERCISE COMMENTS: STANDING EXERCISE: X10  -HEEL/TOE RAISES  -MARCHES  -LATERAL TOE TAPS

## 2025-03-21 ASSESSMENT — GAIT ASSESSMENTS
STEP SYMMETRY: 1 - RIGHT AND LEFT STEP LENGTH APPEAR EQUAL
WALKING STANCE: 1 - HEELS ALMOST TOUCHING WHILE WALKING
INITIATION OF GAIT IMMEDIATELY AFTER GO: 1 - NO HESITANCY
STEP CONTINUITY: 1 - STEPS APPEAR CONTINUOUS
GAIT SCORE: 7
PATH: 1 - MILD/MODERATE DEVIATION OR USES WALKING AID
PATH SCORE: 1
TRUNK SCORE: 0
TRUNK: 0 - MARKED SWAY OR USES WALKING AID
BALANCE AND GAIT SCORE: 14

## 2025-03-21 ASSESSMENT — ENCOUNTER SYMPTOMS
PERSON REPORTING PAIN: PATIENT
MUSCLE WEAKNESS: 1
DENIES PAIN: 1

## 2025-03-21 ASSESSMENT — BALANCE ASSESSMENTS
SITTING DOWN: 1 - USES ARMS OR NOT SMOOTH MOTION
SITTING BALANCE: 1 - STEADY, SAFE
TURNING 360 DEGREES STEPS: 0 - DISCONTINUOUS STEPS
IMMEDIATE STANDING BALANCE FIRST 5 SECONDS: 1 - STEADY BUT USES WALKER OR OTHER SUPPORT
EYES CLOSED AT MAXIMUM POSITION NUDGED: 0 - UNSTEADY
ARISES: 1 - ABLE, USES ARMS TO HELP
ARISING SCORE: 1
NUDGED: 0 - BEGINS TO FALL
STANDING BALANCE: 1 - STEADY BUT WIDE STANCE AND USES CANE OR OTHER SUPPORT
BALANCE SCORE: 7
ATTEMPTS TO ARISE: 2 - ABLE TO RISE, ONE ATTEMPT
NUDGED SCORE: 0

## 2025-03-21 ASSESSMENT — ACTIVITIES OF DAILY LIVING (ADL)
AMBULATION_DISTANCE/DURATION_TOLERATED: 40 FEET
OASIS_M1830: 05
AMBULATION ASSISTANCE ON FLAT SURFACES: 1
ENTERING_EXITING_HOME: MINIMUM ASSIST

## 2025-03-26 ENCOUNTER — HOME CARE VISIT (OUTPATIENT)
Dept: HOME HEALTH SERVICES | Facility: HOME HEALTH | Age: 89
End: 2025-03-26
Payer: MEDICARE

## 2025-03-26 VITALS
OXYGEN SATURATION: 95 % | DIASTOLIC BLOOD PRESSURE: 78 MMHG | RESPIRATION RATE: 18 BRPM | HEART RATE: 60 BPM | SYSTOLIC BLOOD PRESSURE: 139 MMHG

## 2025-03-26 PROCEDURE — G0151 HHCP-SERV OF PT,EA 15 MIN: HCPCS | Mod: HHH

## 2025-03-26 SDOH — HEALTH STABILITY: PHYSICAL HEALTH
EXERCISE COMMENTS: SEATED THER EX: X 10 REPS  -ANKLE PUMPS  -LAQ  -MARCHES    MULTIPLE CUES FOR TECHNIQUE AND TO CONTINUE ACTIVITY

## 2025-03-26 ASSESSMENT — ENCOUNTER SYMPTOMS: DENIES PAIN: 1

## 2025-03-26 ASSESSMENT — ACTIVITIES OF DAILY LIVING (ADL)
AMBULATION ASSISTANCE ON FLAT SURFACES: 1
AMBULATION_DISTANCE/DURATION_TOLERATED: 80 FEET

## 2025-03-27 ENCOUNTER — OFFICE VISIT (OUTPATIENT)
Dept: PRIMARY CARE | Facility: CLINIC | Age: 89
End: 2025-03-27
Payer: MEDICARE

## 2025-03-27 ENCOUNTER — HOSPITAL ENCOUNTER (INPATIENT)
Facility: HOSPITAL | Age: 89
LOS: 2 days | Discharge: HOME | End: 2025-03-30
Attending: STUDENT IN AN ORGANIZED HEALTH CARE EDUCATION/TRAINING PROGRAM | Admitting: INTERNAL MEDICINE
Payer: MEDICARE

## 2025-03-27 ENCOUNTER — APPOINTMENT (OUTPATIENT)
Dept: CARDIOLOGY | Facility: HOSPITAL | Age: 89
End: 2025-03-27
Payer: MEDICARE

## 2025-03-27 ENCOUNTER — APPOINTMENT (OUTPATIENT)
Dept: RADIOLOGY | Facility: HOSPITAL | Age: 89
End: 2025-03-27
Payer: MEDICARE

## 2025-03-27 VITALS
WEIGHT: 156.2 LBS | HEART RATE: 79 BPM | SYSTOLIC BLOOD PRESSURE: 146 MMHG | TEMPERATURE: 97.6 F | OXYGEN SATURATION: 96 % | DIASTOLIC BLOOD PRESSURE: 66 MMHG | BODY MASS INDEX: 24.46 KG/M2

## 2025-03-27 DIAGNOSIS — N39.0 URINARY TRACT INFECTION WITHOUT HEMATURIA, SITE UNSPECIFIED: Primary | ICD-10-CM

## 2025-03-27 DIAGNOSIS — R41.0 CONFUSION WITH NON-FOCAL NEURO EXAM: Primary | ICD-10-CM

## 2025-03-27 DIAGNOSIS — R30.0 DYSURIA: ICD-10-CM

## 2025-03-27 DIAGNOSIS — E55.9 VITAMIN D DEFICIENCY: ICD-10-CM

## 2025-03-27 DIAGNOSIS — I50.9 CONGESTIVE HEART FAILURE, UNSPECIFIED HF CHRONICITY, UNSPECIFIED HEART FAILURE TYPE: ICD-10-CM

## 2025-03-27 DIAGNOSIS — I10 HYPERTENSION, UNSPECIFIED TYPE: ICD-10-CM

## 2025-03-27 DIAGNOSIS — J61 PNEUMOCONIOSIS DUE TO ASBESTOS AND OTHER MINERAL FIBERS (MULTI): ICD-10-CM

## 2025-03-27 DIAGNOSIS — N18.31 STAGE 3A CHRONIC KIDNEY DISEASE (MULTI): ICD-10-CM

## 2025-03-27 DIAGNOSIS — I50.43 CHF (CONGESTIVE HEART FAILURE), NYHA CLASS II, ACUTE ON CHRONIC, COMBINED: ICD-10-CM

## 2025-03-27 DIAGNOSIS — R41.82 ALTERED MENTAL STATUS, UNSPECIFIED ALTERED MENTAL STATUS TYPE: ICD-10-CM

## 2025-03-27 DIAGNOSIS — N39.0 URINARY TRACT INFECTION WITHOUT HEMATURIA, SITE UNSPECIFIED: ICD-10-CM

## 2025-03-27 DIAGNOSIS — D46.4 REFRACTORY ANEMIA, UNSPECIFIED: ICD-10-CM

## 2025-03-27 DIAGNOSIS — I50.9 OTHER CONGESTIVE HEART FAILURE: ICD-10-CM

## 2025-03-27 LAB
ALBUMIN SERPL BCP-MCNC: 4.4 G/DL (ref 3.4–5)
ALP SERPL-CCNC: 71 U/L (ref 33–136)
ALT SERPL W P-5'-P-CCNC: 7 U/L (ref 10–52)
ANION GAP SERPL CALC-SCNC: 14 MMOL/L (ref 10–20)
APPEARANCE UR: CLEAR
AST SERPL W P-5'-P-CCNC: 10 U/L (ref 9–39)
BASOPHILS # BLD AUTO: 0.02 X10*3/UL (ref 0–0.1)
BASOPHILS NFR BLD AUTO: 0.2 %
BILIRUB SERPL-MCNC: 1.1 MG/DL (ref 0–1.2)
BILIRUB UR STRIP.AUTO-MCNC: NEGATIVE MG/DL
BUN SERPL-MCNC: 11 MG/DL (ref 6–23)
CALCIUM SERPL-MCNC: 9.6 MG/DL (ref 8.6–10.3)
CARDIAC TROPONIN I PNL SERPL HS: 19 NG/L (ref 0–20)
CARDIAC TROPONIN I PNL SERPL HS: 19 NG/L (ref 0–20)
CHLORIDE SERPL-SCNC: 99 MMOL/L (ref 98–107)
CO2 SERPL-SCNC: 29 MMOL/L (ref 21–32)
COLOR UR: YELLOW
CREAT SERPL-MCNC: 1.33 MG/DL (ref 0.5–1.3)
DACRYOCYTES BLD QL SMEAR: NORMAL
EGFRCR SERPLBLD CKD-EPI 2021: 52 ML/MIN/1.73M*2
EOSINOPHIL # BLD AUTO: 0.19 X10*3/UL (ref 0–0.4)
EOSINOPHIL NFR BLD AUTO: 2.3 %
ERYTHROCYTE [DISTWIDTH] IN BLOOD BY AUTOMATED COUNT: 16.5 % (ref 11.5–14.5)
FLUAV RNA RESP QL NAA+PROBE: NOT DETECTED
FLUBV RNA RESP QL NAA+PROBE: NOT DETECTED
GLUCOSE SERPL-MCNC: 93 MG/DL (ref 74–99)
GLUCOSE UR STRIP.AUTO-MCNC: ABNORMAL MG/DL
HCT VFR BLD AUTO: 42.1 % (ref 41–52)
HGB BLD-MCNC: 13.1 G/DL (ref 13.5–17.5)
HYALINE CASTS #/AREA URNS AUTO: ABNORMAL /LPF
IMM GRANULOCYTES # BLD AUTO: 0.02 X10*3/UL (ref 0–0.5)
IMM GRANULOCYTES NFR BLD AUTO: 0.2 % (ref 0–0.9)
KETONES UR STRIP.AUTO-MCNC: NEGATIVE MG/DL
LEUKOCYTE ESTERASE UR QL STRIP.AUTO: ABNORMAL
LYMPHOCYTES # BLD AUTO: 0.8 X10*3/UL (ref 0.8–3)
LYMPHOCYTES NFR BLD AUTO: 9.6 %
MAGNESIUM SERPL-MCNC: 2 MG/DL (ref 1.6–2.4)
MCH RBC QN AUTO: 26.3 PG (ref 26–34)
MCHC RBC AUTO-ENTMCNC: 31.1 G/DL (ref 32–36)
MCV RBC AUTO: 85 FL (ref 80–100)
MONOCYTES # BLD AUTO: 0.7 X10*3/UL (ref 0.05–0.8)
MONOCYTES NFR BLD AUTO: 8.4 %
NEUTROPHILS # BLD AUTO: 6.58 X10*3/UL (ref 1.6–5.5)
NEUTROPHILS NFR BLD AUTO: 79.3 %
NITRITE UR QL STRIP.AUTO: NEGATIVE
NRBC BLD-RTO: 0 /100 WBCS (ref 0–0)
OVALOCYTES BLD QL SMEAR: NORMAL
PH UR STRIP.AUTO: 5.5 [PH]
PLATELET # BLD AUTO: 188 X10*3/UL (ref 150–450)
POTASSIUM SERPL-SCNC: 3.3 MMOL/L (ref 3.5–5.3)
PROT SERPL-MCNC: 7.9 G/DL (ref 6.4–8.2)
PROT UR STRIP.AUTO-MCNC: ABNORMAL MG/DL
RBC # BLD AUTO: 4.98 X10*6/UL (ref 4.5–5.9)
RBC # UR STRIP.AUTO: ABNORMAL MG/DL
RBC #/AREA URNS AUTO: ABNORMAL /HPF
RBC MORPH BLD: NORMAL
SARS-COV-2 RNA RESP QL NAA+PROBE: NOT DETECTED
SCHISTOCYTES BLD QL SMEAR: NORMAL
SODIUM SERPL-SCNC: 139 MMOL/L (ref 136–145)
SP GR UR STRIP.AUTO: 1.02
SQUAMOUS #/AREA URNS AUTO: ABNORMAL /HPF
UROBILINOGEN UR STRIP.AUTO-MCNC: NORMAL MG/DL
WBC # BLD AUTO: 8.3 X10*3/UL (ref 4.4–11.3)
WBC #/AREA URNS AUTO: ABNORMAL /HPF
WBC CLUMPS #/AREA URNS AUTO: ABNORMAL /HPF

## 2025-03-27 PROCEDURE — 84484 ASSAY OF TROPONIN QUANT: CPT | Performed by: STUDENT IN AN ORGANIZED HEALTH CARE EDUCATION/TRAINING PROGRAM

## 2025-03-27 PROCEDURE — 3077F SYST BP >= 140 MM HG: CPT | Performed by: INTERNAL MEDICINE

## 2025-03-27 PROCEDURE — 1160F RVW MEDS BY RX/DR IN RCRD: CPT | Performed by: INTERNAL MEDICINE

## 2025-03-27 PROCEDURE — 99215 OFFICE O/P EST HI 40 MIN: CPT | Performed by: INTERNAL MEDICINE

## 2025-03-27 PROCEDURE — 70450 CT HEAD/BRAIN W/O DYE: CPT | Performed by: RADIOLOGY

## 2025-03-27 PROCEDURE — 87636 SARSCOV2 & INF A&B AMP PRB: CPT | Performed by: STUDENT IN AN ORGANIZED HEALTH CARE EDUCATION/TRAINING PROGRAM

## 2025-03-27 PROCEDURE — 99285 EMERGENCY DEPT VISIT HI MDM: CPT | Mod: 25 | Performed by: STUDENT IN AN ORGANIZED HEALTH CARE EDUCATION/TRAINING PROGRAM

## 2025-03-27 PROCEDURE — 81001 URINALYSIS AUTO W/SCOPE: CPT | Performed by: STUDENT IN AN ORGANIZED HEALTH CARE EDUCATION/TRAINING PROGRAM

## 2025-03-27 PROCEDURE — 36415 COLL VENOUS BLD VENIPUNCTURE: CPT | Performed by: STUDENT IN AN ORGANIZED HEALTH CARE EDUCATION/TRAINING PROGRAM

## 2025-03-27 PROCEDURE — 2500000004 HC RX 250 GENERAL PHARMACY W/ HCPCS (ALT 636 FOR OP/ED): Performed by: STUDENT IN AN ORGANIZED HEALTH CARE EDUCATION/TRAINING PROGRAM

## 2025-03-27 PROCEDURE — 2500000001 HC RX 250 WO HCPCS SELF ADMINISTERED DRUGS (ALT 637 FOR MEDICARE OP): Performed by: NURSE PRACTITIONER

## 2025-03-27 PROCEDURE — 3078F DIAST BP <80 MM HG: CPT | Performed by: INTERNAL MEDICINE

## 2025-03-27 PROCEDURE — 2500000001 HC RX 250 WO HCPCS SELF ADMINISTERED DRUGS (ALT 637 FOR MEDICARE OP): Performed by: HOSPITALIST

## 2025-03-27 PROCEDURE — 85025 COMPLETE CBC W/AUTO DIFF WBC: CPT | Performed by: STUDENT IN AN ORGANIZED HEALTH CARE EDUCATION/TRAINING PROGRAM

## 2025-03-27 PROCEDURE — 1036F TOBACCO NON-USER: CPT | Performed by: INTERNAL MEDICINE

## 2025-03-27 PROCEDURE — G0378 HOSPITAL OBSERVATION PER HR: HCPCS

## 2025-03-27 PROCEDURE — 83735 ASSAY OF MAGNESIUM: CPT | Performed by: STUDENT IN AN ORGANIZED HEALTH CARE EDUCATION/TRAINING PROGRAM

## 2025-03-27 PROCEDURE — 71045 X-RAY EXAM CHEST 1 VIEW: CPT | Mod: FOREIGN READ | Performed by: RADIOLOGY

## 2025-03-27 PROCEDURE — 2500000002 HC RX 250 W HCPCS SELF ADMINISTERED DRUGS (ALT 637 FOR MEDICARE OP, ALT 636 FOR OP/ED): Performed by: HOSPITALIST

## 2025-03-27 PROCEDURE — 93005 ELECTROCARDIOGRAM TRACING: CPT

## 2025-03-27 PROCEDURE — 96365 THER/PROPH/DIAG IV INF INIT: CPT

## 2025-03-27 PROCEDURE — G2211 COMPLEX E/M VISIT ADD ON: HCPCS | Performed by: INTERNAL MEDICINE

## 2025-03-27 PROCEDURE — 1111F DSCHRG MED/CURRENT MED MERGE: CPT | Performed by: INTERNAL MEDICINE

## 2025-03-27 PROCEDURE — 87086 URINE CULTURE/COLONY COUNT: CPT | Mod: GENLAB | Performed by: STUDENT IN AN ORGANIZED HEALTH CARE EDUCATION/TRAINING PROGRAM

## 2025-03-27 PROCEDURE — 80053 COMPREHEN METABOLIC PANEL: CPT | Performed by: STUDENT IN AN ORGANIZED HEALTH CARE EDUCATION/TRAINING PROGRAM

## 2025-03-27 PROCEDURE — 1159F MED LIST DOCD IN RCRD: CPT | Performed by: INTERNAL MEDICINE

## 2025-03-27 PROCEDURE — 94760 N-INVAS EAR/PLS OXIMETRY 1: CPT

## 2025-03-27 PROCEDURE — 71045 X-RAY EXAM CHEST 1 VIEW: CPT

## 2025-03-27 PROCEDURE — 70450 CT HEAD/BRAIN W/O DYE: CPT

## 2025-03-27 RX ORDER — ERGOCALCIFEROL 1.25 MG/1
50000 CAPSULE ORAL WEEKLY
Status: DISCONTINUED | OUTPATIENT
Start: 2025-03-27 | End: 2025-03-30 | Stop reason: HOSPADM

## 2025-03-27 RX ORDER — ERGOCALCIFEROL 1.25 MG/1
50000 CAPSULE ORAL WEEKLY
Qty: 12 CAPSULE | Refills: 0 | Status: SHIPPED | OUTPATIENT
Start: 2025-03-27 | End: 2025-06-19

## 2025-03-27 RX ORDER — CALCIUM CARBONATE 200(500)MG
500 TABLET,CHEWABLE ORAL 4 TIMES DAILY PRN
Status: DISCONTINUED | OUTPATIENT
Start: 2025-03-27 | End: 2025-03-30 | Stop reason: HOSPADM

## 2025-03-27 RX ORDER — PANTOPRAZOLE SODIUM 40 MG/1
40 TABLET, DELAYED RELEASE ORAL
Status: DISCONTINUED | OUTPATIENT
Start: 2025-03-28 | End: 2025-03-30 | Stop reason: HOSPADM

## 2025-03-27 RX ORDER — ONDANSETRON HYDROCHLORIDE 2 MG/ML
4 INJECTION, SOLUTION INTRAVENOUS EVERY 8 HOURS PRN
Status: DISCONTINUED | OUTPATIENT
Start: 2025-03-27 | End: 2025-03-30 | Stop reason: HOSPADM

## 2025-03-27 RX ORDER — ACETAMINOPHEN 325 MG/1
650 TABLET ORAL EVERY 4 HOURS PRN
Status: DISCONTINUED | OUTPATIENT
Start: 2025-03-27 | End: 2025-03-30 | Stop reason: HOSPADM

## 2025-03-27 RX ORDER — TAMSULOSIN HYDROCHLORIDE 0.4 MG/1
0.4 CAPSULE ORAL DAILY
Status: DISCONTINUED | OUTPATIENT
Start: 2025-03-27 | End: 2025-03-30 | Stop reason: HOSPADM

## 2025-03-27 RX ORDER — METOPROLOL SUCCINATE 25 MG/1
25 TABLET, EXTENDED RELEASE ORAL DAILY
Status: DISCONTINUED | OUTPATIENT
Start: 2025-03-27 | End: 2025-03-30 | Stop reason: HOSPADM

## 2025-03-27 RX ORDER — PANTOPRAZOLE SODIUM 40 MG/10ML
40 INJECTION, POWDER, LYOPHILIZED, FOR SOLUTION INTRAVENOUS
Status: DISCONTINUED | OUTPATIENT
Start: 2025-03-28 | End: 2025-03-30 | Stop reason: HOSPADM

## 2025-03-27 RX ORDER — ACETAMINOPHEN 500 MG
5 TABLET ORAL NIGHTLY PRN
Status: DISCONTINUED | OUTPATIENT
Start: 2025-03-27 | End: 2025-03-30 | Stop reason: HOSPADM

## 2025-03-27 RX ORDER — AMOXICILLIN 250 MG
1 CAPSULE ORAL NIGHTLY PRN
Status: DISCONTINUED | OUTPATIENT
Start: 2025-03-27 | End: 2025-03-30 | Stop reason: HOSPADM

## 2025-03-27 RX ORDER — CEFTRIAXONE 2 G/50ML
2 INJECTION, SOLUTION INTRAVENOUS ONCE
Status: COMPLETED | OUTPATIENT
Start: 2025-03-27 | End: 2025-03-27

## 2025-03-27 RX ORDER — ONDANSETRON 4 MG/1
4 TABLET, FILM COATED ORAL EVERY 8 HOURS PRN
Status: DISCONTINUED | OUTPATIENT
Start: 2025-03-27 | End: 2025-03-30 | Stop reason: HOSPADM

## 2025-03-27 RX ORDER — ROSUVASTATIN CALCIUM 10 MG/1
40 TABLET, COATED ORAL DAILY
Status: DISCONTINUED | OUTPATIENT
Start: 2025-03-27 | End: 2025-03-30 | Stop reason: HOSPADM

## 2025-03-27 RX ORDER — CEFTRIAXONE 2 G/50ML
2 INJECTION, SOLUTION INTRAVENOUS ONCE
Status: COMPLETED | OUTPATIENT
Start: 2025-03-28 | End: 2025-03-28

## 2025-03-27 RX ADMIN — SENNOSIDES AND DOCUSATE SODIUM 1 TABLET: 50; 8.6 TABLET ORAL at 21:08

## 2025-03-27 RX ADMIN — Medication 5 MG: at 21:08

## 2025-03-27 RX ADMIN — METOPROLOL SUCCINATE 25 MG: 25 TABLET, EXTENDED RELEASE ORAL at 21:08

## 2025-03-27 RX ADMIN — ROSUVASTATIN CALCIUM 40 MG: 10 TABLET, FILM COATED ORAL at 21:08

## 2025-03-27 RX ADMIN — TAMSULOSIN HYDROCHLORIDE 0.4 MG: 0.4 CAPSULE ORAL at 21:08

## 2025-03-27 RX ADMIN — ERGOCALCIFEROL 50000 UNITS: 1.25 CAPSULE ORAL at 21:08

## 2025-03-27 RX ADMIN — APIXABAN 2.5 MG: 2.5 TABLET, FILM COATED ORAL at 21:08

## 2025-03-27 RX ADMIN — CEFTRIAXONE 2 G: 2 INJECTION, SOLUTION INTRAVENOUS at 16:29

## 2025-03-27 SDOH — SOCIAL STABILITY: SOCIAL INSECURITY: WITHIN THE LAST YEAR, HAVE YOU BEEN HUMILIATED OR EMOTIONALLY ABUSED IN OTHER WAYS BY YOUR PARTNER OR EX-PARTNER?: NO

## 2025-03-27 SDOH — ECONOMIC STABILITY: FOOD INSECURITY: WITHIN THE PAST 12 MONTHS, THE FOOD YOU BOUGHT JUST DIDN'T LAST AND YOU DIDN'T HAVE MONEY TO GET MORE.: NEVER TRUE

## 2025-03-27 SDOH — SOCIAL STABILITY: SOCIAL INSECURITY: ARE THERE ANY APPARENT SIGNS OF INJURIES/BEHAVIORS THAT COULD BE RELATED TO ABUSE/NEGLECT?: NO

## 2025-03-27 SDOH — ECONOMIC STABILITY: FOOD INSECURITY: WITHIN THE PAST 12 MONTHS, YOU WORRIED THAT YOUR FOOD WOULD RUN OUT BEFORE YOU GOT THE MONEY TO BUY MORE.: NEVER TRUE

## 2025-03-27 SDOH — ECONOMIC STABILITY: INCOME INSECURITY: IN THE PAST 12 MONTHS HAS THE ELECTRIC, GAS, OIL, OR WATER COMPANY THREATENED TO SHUT OFF SERVICES IN YOUR HOME?: NO

## 2025-03-27 SDOH — SOCIAL STABILITY: SOCIAL INSECURITY: ABUSE: ADULT

## 2025-03-27 SDOH — SOCIAL STABILITY: SOCIAL INSECURITY: ARE YOU OR HAVE YOU BEEN THREATENED OR ABUSED PHYSICALLY, EMOTIONALLY, OR SEXUALLY BY ANYONE?: NO

## 2025-03-27 SDOH — SOCIAL STABILITY: SOCIAL INSECURITY: DO YOU FEEL ANYONE HAS EXPLOITED OR TAKEN ADVANTAGE OF YOU FINANCIALLY OR OF YOUR PERSONAL PROPERTY?: NO

## 2025-03-27 SDOH — SOCIAL STABILITY: SOCIAL INSECURITY: WITHIN THE LAST YEAR, HAVE YOU BEEN AFRAID OF YOUR PARTNER OR EX-PARTNER?: NO

## 2025-03-27 SDOH — SOCIAL STABILITY: SOCIAL INSECURITY: HAVE YOU HAD ANY THOUGHTS OF HARMING ANYONE ELSE?: NO

## 2025-03-27 SDOH — SOCIAL STABILITY: SOCIAL INSECURITY: HAS ANYONE EVER THREATENED TO HURT YOUR FAMILY OR YOUR PETS?: NO

## 2025-03-27 SDOH — SOCIAL STABILITY: SOCIAL INSECURITY: DOES ANYONE TRY TO KEEP YOU FROM HAVING/CONTACTING OTHER FRIENDS OR DOING THINGS OUTSIDE YOUR HOME?: NO

## 2025-03-27 SDOH — SOCIAL STABILITY: SOCIAL INSECURITY: HAVE YOU HAD THOUGHTS OF HARMING ANYONE ELSE?: NO

## 2025-03-27 SDOH — SOCIAL STABILITY: SOCIAL INSECURITY: WERE YOU ABLE TO COMPLETE ALL THE BEHAVIORAL HEALTH SCREENINGS?: YES

## 2025-03-27 SDOH — SOCIAL STABILITY: SOCIAL INSECURITY: DO YOU FEEL UNSAFE GOING BACK TO THE PLACE WHERE YOU ARE LIVING?: NO

## 2025-03-27 ASSESSMENT — LIFESTYLE VARIABLES
AUDIT-C TOTAL SCORE: 0
PRESCIPTION_ABUSE_PAST_12_MONTHS: NO
SUBSTANCE_ABUSE_PAST_12_MONTHS: NO
HOW OFTEN DO YOU HAVE 6 OR MORE DRINKS ON ONE OCCASION: NEVER
SKIP TO QUESTIONS 9-10: 1
HOW OFTEN DO YOU HAVE A DRINK CONTAINING ALCOHOL: NEVER
HOW MANY STANDARD DRINKS CONTAINING ALCOHOL DO YOU HAVE ON A TYPICAL DAY: PATIENT DOES NOT DRINK
AUDIT-C TOTAL SCORE: 0

## 2025-03-27 ASSESSMENT — ENCOUNTER SYMPTOMS
DIAPHORESIS: 1
DYSURIA: 1
SORE THROAT: 0
CONFUSION: 1
BLOOD IN STOOL: 0
COUGH: 0
HEADACHES: 0
DIARRHEA: 0
FEVER: 0
FATIGUE: 1
SINUS PAIN: 0
DIFFICULTY URINATING: 0
ARTHRALGIAS: 0
BRUISES/BLEEDS EASILY: 0
WHEEZING: 0
DIZZINESS: 0
UNEXPECTED WEIGHT CHANGE: 0
APPETITE CHANGE: 1
PALPITATIONS: 0
ABDOMINAL PAIN: 0

## 2025-03-27 ASSESSMENT — COGNITIVE AND FUNCTIONAL STATUS - GENERAL
DRESSING REGULAR LOWER BODY CLOTHING: A LOT
MOVING FROM LYING ON BACK TO SITTING ON SIDE OF FLAT BED WITH BEDRAILS: A LOT
MOVING TO AND FROM BED TO CHAIR: A LOT
STANDING UP FROM CHAIR USING ARMS: A LOT
EATING MEALS: A LITTLE
TURNING FROM BACK TO SIDE WHILE IN FLAT BAD: A LOT
DAILY ACTIVITIY SCORE: 15
TOILETING: A LOT
PATIENT BASELINE BEDBOUND: NO
MOBILITY SCORE: 12
CLIMB 3 TO 5 STEPS WITH RAILING: A LOT
DRESSING REGULAR UPPER BODY CLOTHING: A LITTLE
HELP NEEDED FOR BATHING: A LOT
PERSONAL GROOMING: A LITTLE
WALKING IN HOSPITAL ROOM: A LOT

## 2025-03-27 ASSESSMENT — ACTIVITIES OF DAILY LIVING (ADL)
ADEQUATE_TO_COMPLETE_ADL: YES
DRESSING YOURSELF: NEEDS ASSISTANCE
TOILETING: NEEDS ASSISTANCE
JUDGMENT_ADEQUATE_SAFELY_COMPLETE_DAILY_ACTIVITIES: NO
LACK_OF_TRANSPORTATION: NO
GROOMING: NEEDS ASSISTANCE
PATIENT'S MEMORY ADEQUATE TO SAFELY COMPLETE DAILY ACTIVITIES?: NO
HEARING - RIGHT EAR: HEARING AID
BATHING: NEEDS ASSISTANCE
WALKS IN HOME: NEEDS ASSISTANCE
HEARING - LEFT EAR: HEARING AID
FEEDING YOURSELF: NEEDS ASSISTANCE

## 2025-03-27 ASSESSMENT — PATIENT HEALTH QUESTIONNAIRE - PHQ9
2. FEELING DOWN, DEPRESSED OR HOPELESS: NOT AT ALL
SUM OF ALL RESPONSES TO PHQ9 QUESTIONS 1 & 2: 0
1. LITTLE INTEREST OR PLEASURE IN DOING THINGS: NOT AT ALL

## 2025-03-27 ASSESSMENT — PAIN - FUNCTIONAL ASSESSMENT: PAIN_FUNCTIONAL_ASSESSMENT: 0-10

## 2025-03-27 ASSESSMENT — PAIN SCALES - GENERAL
PAINLEVEL_OUTOF10: 5 - MODERATE PAIN
PAINLEVEL_OUTOF10: 0 - NO PAIN

## 2025-03-27 NOTE — ED TRIAGE NOTES
Patient presents from Primary care office after a follow up appointment for ARF and uti patient c/o flank pain, dysuria, abdominal pain, headache, confusion and dizziness. Daughter at bedside   Detail Level: Zone

## 2025-03-28 ENCOUNTER — APPOINTMENT (OUTPATIENT)
Dept: RADIOLOGY | Facility: HOSPITAL | Age: 89
End: 2025-03-28
Payer: MEDICARE

## 2025-03-28 ENCOUNTER — HOME CARE VISIT (OUTPATIENT)
Dept: HOME HEALTH SERVICES | Facility: HOME HEALTH | Age: 89
End: 2025-03-28
Payer: MEDICARE

## 2025-03-28 PROBLEM — N39.0 URINARY TRACT INFECTION WITHOUT HEMATURIA, SITE UNSPECIFIED: Status: ACTIVE | Noted: 2025-03-28

## 2025-03-28 PROBLEM — E55.9 VITAMIN D DEFICIENCY: Status: ACTIVE | Noted: 2025-03-28

## 2025-03-28 PROBLEM — R93.89 ABNORMAL CXR: Status: ACTIVE | Noted: 2025-03-28

## 2025-03-28 LAB
ANION GAP SERPL CALC-SCNC: 15 MMOL/L (ref 10–20)
BNP SERPL-MCNC: 296 PG/ML (ref 0–99)
BUN SERPL-MCNC: 11 MG/DL (ref 6–23)
CALCIUM SERPL-MCNC: 9.1 MG/DL (ref 8.6–10.3)
CHLORIDE SERPL-SCNC: 102 MMOL/L (ref 98–107)
CO2 SERPL-SCNC: 23 MMOL/L (ref 21–32)
CREAT SERPL-MCNC: 1.16 MG/DL (ref 0.5–1.3)
EGFRCR SERPLBLD CKD-EPI 2021: 61 ML/MIN/1.73M*2
ERYTHROCYTE [DISTWIDTH] IN BLOOD BY AUTOMATED COUNT: 16.4 % (ref 11.5–14.5)
GLUCOSE SERPL-MCNC: 78 MG/DL (ref 74–99)
HCT VFR BLD AUTO: 44.7 % (ref 41–52)
HGB BLD-MCNC: 12.9 G/DL (ref 13.5–17.5)
MAGNESIUM SERPL-MCNC: 2.15 MG/DL (ref 1.6–2.4)
MCH RBC QN AUTO: 26.2 PG (ref 26–34)
MCHC RBC AUTO-ENTMCNC: 28.9 G/DL (ref 32–36)
MCV RBC AUTO: 91 FL (ref 80–100)
NRBC BLD-RTO: 0 /100 WBCS (ref 0–0)
PLATELET # BLD AUTO: 169 X10*3/UL (ref 150–450)
POTASSIUM SERPL-SCNC: 3.9 MMOL/L (ref 3.5–5.3)
Q ONSET: 211 MS
QRS COUNT: 12 BEATS
QRS DURATION: 168 MS
QT INTERVAL: 428 MS
QTC CALCULATION(BAZETT): 452 MS
QTC FREDERICIA: 444 MS
R AXIS: -59 DEGREES
RBC # BLD AUTO: 4.93 X10*6/UL (ref 4.5–5.9)
SODIUM SERPL-SCNC: 136 MMOL/L (ref 136–145)
T AXIS: 22 DEGREES
T OFFSET: 425 MS
VENTRICULAR RATE: 67 BPM
WBC # BLD AUTO: 6.4 X10*3/UL (ref 4.4–11.3)

## 2025-03-28 PROCEDURE — 2500000001 HC RX 250 WO HCPCS SELF ADMINISTERED DRUGS (ALT 637 FOR MEDICARE OP): Mod: IPSPLIT

## 2025-03-28 PROCEDURE — 97165 OT EVAL LOW COMPLEX 30 MIN: CPT | Mod: GO,IPSPLIT

## 2025-03-28 PROCEDURE — 96125 COGNITIVE TEST BY HC PRO: CPT | Mod: GO,IPSPLIT

## 2025-03-28 PROCEDURE — 83735 ASSAY OF MAGNESIUM: CPT

## 2025-03-28 PROCEDURE — 2500000004 HC RX 250 GENERAL PHARMACY W/ HCPCS (ALT 636 FOR OP/ED): Mod: IPSPLIT | Performed by: NURSE PRACTITIONER

## 2025-03-28 PROCEDURE — 94760 N-INVAS EAR/PLS OXIMETRY 1: CPT | Mod: IPSPLIT

## 2025-03-28 PROCEDURE — 97161 PT EVAL LOW COMPLEX 20 MIN: CPT | Mod: GP,IPSPLIT | Performed by: PHYSICAL THERAPIST

## 2025-03-28 PROCEDURE — 97535 SELF CARE MNGMENT TRAINING: CPT | Mod: GO,IPSPLIT

## 2025-03-28 PROCEDURE — 83880 ASSAY OF NATRIURETIC PEPTIDE: CPT

## 2025-03-28 PROCEDURE — 36415 COLL VENOUS BLD VENIPUNCTURE: CPT | Performed by: NURSE PRACTITIONER

## 2025-03-28 PROCEDURE — 99223 1ST HOSP IP/OBS HIGH 75: CPT

## 2025-03-28 PROCEDURE — 2500000001 HC RX 250 WO HCPCS SELF ADMINISTERED DRUGS (ALT 637 FOR MEDICARE OP): Performed by: HOSPITALIST

## 2025-03-28 PROCEDURE — 71250 CT THORAX DX C-: CPT

## 2025-03-28 PROCEDURE — 92610 EVALUATE SWALLOWING FUNCTION: CPT | Mod: GN,IPSPLIT | Performed by: SPEECH-LANGUAGE PATHOLOGIST

## 2025-03-28 PROCEDURE — 2500000002 HC RX 250 W HCPCS SELF ADMINISTERED DRUGS (ALT 637 FOR MEDICARE OP, ALT 636 FOR OP/ED): Performed by: HOSPITALIST

## 2025-03-28 PROCEDURE — 1100000001 HC PRIVATE ROOM DAILY: Mod: IPSPLIT

## 2025-03-28 PROCEDURE — 71250 CT THORAX DX C-: CPT | Mod: IPSPLIT

## 2025-03-28 PROCEDURE — 2500000004 HC RX 250 GENERAL PHARMACY W/ HCPCS (ALT 636 FOR OP/ED): Mod: IPSPLIT | Performed by: PSYCHIATRY & NEUROLOGY

## 2025-03-28 PROCEDURE — 85027 COMPLETE CBC AUTOMATED: CPT | Performed by: NURSE PRACTITIONER

## 2025-03-28 PROCEDURE — 80048 BASIC METABOLIC PNL TOTAL CA: CPT | Performed by: NURSE PRACTITIONER

## 2025-03-28 RX ORDER — TORSEMIDE 20 MG/1
40 TABLET ORAL DAILY
Status: DISCONTINUED | OUTPATIENT
Start: 2025-03-28 | End: 2025-03-30 | Stop reason: HOSPADM

## 2025-03-28 RX ORDER — LANOLIN ALCOHOL/MO/W.PET/CERES
100 CREAM (GRAM) TOPICAL DAILY
Status: DISCONTINUED | OUTPATIENT
Start: 2025-03-28 | End: 2025-03-30 | Stop reason: HOSPADM

## 2025-03-28 RX ORDER — THIAMINE HYDROCHLORIDE 100 MG/ML
500 INJECTION, SOLUTION INTRAMUSCULAR; INTRAVENOUS 3 TIMES DAILY
Status: DISCONTINUED | OUTPATIENT
Start: 2025-03-28 | End: 2025-03-30 | Stop reason: HOSPADM

## 2025-03-28 RX ADMIN — EMPAGLIFLOZIN 10 MG: 10 TABLET, FILM COATED ORAL at 11:34

## 2025-03-28 RX ADMIN — ROSUVASTATIN CALCIUM 40 MG: 10 TABLET, FILM COATED ORAL at 08:38

## 2025-03-28 RX ADMIN — APIXABAN 2.5 MG: 2.5 TABLET, FILM COATED ORAL at 08:38

## 2025-03-28 RX ADMIN — METOPROLOL SUCCINATE 25 MG: 25 TABLET, EXTENDED RELEASE ORAL at 08:38

## 2025-03-28 RX ADMIN — THIAMINE HYDROCHLORIDE 500 MG: 100 INJECTION, SOLUTION INTRAMUSCULAR; INTRAVENOUS at 15:37

## 2025-03-28 RX ADMIN — CEFTRIAXONE 2 G: 2 INJECTION, SOLUTION INTRAVENOUS at 15:37

## 2025-03-28 RX ADMIN — TAMSULOSIN HYDROCHLORIDE 0.4 MG: 0.4 CAPSULE ORAL at 08:38

## 2025-03-28 RX ADMIN — THIAMINE HYDROCHLORIDE 500 MG: 100 INJECTION, SOLUTION INTRAMUSCULAR; INTRAVENOUS at 21:08

## 2025-03-28 RX ADMIN — APIXABAN 2.5 MG: 2.5 TABLET, FILM COATED ORAL at 21:08

## 2025-03-28 RX ADMIN — TORSEMIDE 40 MG: 20 TABLET ORAL at 11:34

## 2025-03-28 ASSESSMENT — ENCOUNTER SYMPTOMS
CONFUSION: 1
TROUBLE SWALLOWING: 1
DIARRHEA: 0
CHILLS: 0
ABDOMINAL PAIN: 0
NAUSEA: 0
VOMITING: 0
FEVER: 0
DIFFICULTY URINATING: 0
COUGH: 0
SHORTNESS OF BREATH: 0
DYSURIA: 0

## 2025-03-28 ASSESSMENT — PAIN SCALES - PAIN ASSESSMENT IN ADVANCED DEMENTIA (PAINAD)
CONSOLABILITY: NO NEED TO CONSOLE
BREATHING: NORMAL
TOTALSCORE: 0
BODYLANGUAGE: RELAXED
FACIALEXPRESSION: SMILING OR INEXPRESSIVE

## 2025-03-28 ASSESSMENT — COGNITIVE AND FUNCTIONAL STATUS - GENERAL
DRESSING REGULAR UPPER BODY CLOTHING: A LITTLE
TOILETING: A LITTLE
CLIMB 3 TO 5 STEPS WITH RAILING: A LOT
DAILY ACTIVITIY SCORE: 18
STANDING UP FROM CHAIR USING ARMS: A LITTLE
MOVING TO AND FROM BED TO CHAIR: A LITTLE
MOVING FROM LYING ON BACK TO SITTING ON SIDE OF FLAT BED WITH BEDRAILS: A LITTLE
EATING MEALS: A LITTLE
TURNING FROM BACK TO SIDE WHILE IN FLAT BAD: A LITTLE
HELP NEEDED FOR BATHING: A LITTLE
WALKING IN HOSPITAL ROOM: A LITTLE
DRESSING REGULAR LOWER BODY CLOTHING: A LITTLE
PERSONAL GROOMING: A LITTLE
MOBILITY SCORE: 17

## 2025-03-28 ASSESSMENT — PAIN - FUNCTIONAL ASSESSMENT
PAIN_FUNCTIONAL_ASSESSMENT: 0-10
PAIN_FUNCTIONAL_ASSESSMENT: 0-10
PAIN_FUNCTIONAL_ASSESSMENT: PAINAD (PAIN ASSESSMENT IN ADVANCED DEMENTIA SCALE)
PAIN_FUNCTIONAL_ASSESSMENT: 0-10
PAIN_FUNCTIONAL_ASSESSMENT: 0-10

## 2025-03-28 ASSESSMENT — PAIN SCALES - GENERAL
PAINLEVEL_OUTOF10: 0 - NO PAIN

## 2025-03-28 ASSESSMENT — ACTIVITIES OF DAILY LIVING (ADL)
LACK_OF_TRANSPORTATION: NO
ADL_ASSISTANCE: INDEPENDENT
ADL_ASSISTANCE: INDEPENDENT
BATHING_ASSISTANCE: MINIMAL
HOME_MANAGEMENT_TIME_ENTRY: 12

## 2025-03-28 NOTE — NURSING NOTE
Sitting in chair, am care with therapy.  Call bell within reach.    1115 back in room after CT, sitting in chair.  Call bell within reach.  Report to Sarah FERREIRA.

## 2025-03-28 NOTE — PROGRESS NOTES
Occupational Therapy    Occupational Therapy MoCA Administration     Name: Gilmer Quiñonez  MRN: [id number]  Department: [facility]  Room: [address] [place], [place] [address]  Date: 25  Time Calculation  Start Time: 1350  Stop Time: 1428  Time Calculation (min): 38 min    Assessment:  OT Assessment: Pt is an 89 yo M referred to occupational therapy for impaired self-care and functional mobility 2/2 hospitalization for confusion. MoCA administration this date - results below. Pt would continue to benefit from OT services at the MOD intensity level to increase safety and independence.   Prognosis: Good  Barriers to Discharge Home: Caregiver assistance, Cognition needs, Physical needs  Caregiver Assistance: Caregiver assistance needed per identified barriers - however, level of patient's required assistance exceeds assistance available at home  Cognition Needs: 24hr supervision for safety awareness needed, Cognition-related high falls risk  Physical Needs: Intermittent mobility assistance needed, Intermittent ADL assistance needed, High falls risk due to function or environment, Ambulating household distances limited by function/safety  Evaluation/Treatment Tolerance: Patient tolerated treatment well  Medical Staff Made Aware: Yes  End of Session Communication: Bedside nurse  End of Session Patient Position: Up in chair, Alarm on  Plan:  Treatment Interventions: ADL retraining, Functional transfer training, UE strengthening/ROM, Endurance training, Patient/family training, Equipment evaluation/education, Compensatory technique education  OT Frequency: 3 times per week  OT Discharge Recommendations: Moderate intensity level of continued care  OT Recommended Transfer Status: Stand by assist, Assist of 1  OT - OK to Discharge: Yes    Outcome Measures:  MoCA  Visuospatial/Executive: 1  Namin  Memory (Score '0' as this is an Unscored Section): 0  Attention: Read List of Digits: 2  Attention: Read List of Letters: 0  Attention: Serial Sevens:  1  Language: Repeat: 0  Language: Fluency: 0  Abstraction: 1  Delayed Recall: 0  Orientation: 2  Add 1 Point if </=12 yr Education: 0  MOCA Total Score: 9    A MOCA score of 26-30 indicates normal cognition. A MOCA score of 18-25 indicates mild cognitive impairment. A MOCA score of 10-17 indicates a moderate cognitive impairment. A MOCA score <10 indicates a severe cognitive impairment. Completed scoring sheet kept in patient's chart for reference.    Goals:  Encounter Problems       Encounter Problems (Active)       ADLs       Patient will perform UB and LB bathing with set-up level of assistance and PRN bathroom equipment.       Start:  03/28/25    Expected End:  04/11/25            Patient with complete upper body dressing with set-up level of assistance donning and doffing all UE clothes with PRN adaptive equipment while edge of bed  and standing       Start:  03/28/25    Expected End:  04/11/25            Patient with complete lower body dressing with set-up level of assistance donning and doffing all LE clothes  with PRN adaptive equipment while edge of bed  and standing       Start:  03/28/25    Expected End:  04/11/25               BALANCE       Pt will maintain dynamic standing balance during ADL task with supervision level of assistance in order to demonstrate decreased risk of falling and improved postural control.       Start:  03/28/25    Expected End:  04/11/25               MOBILITY       Patient will perform Functional mobility mod Household distances/Community Distances with supervision level of assistance and least restrictive device in order to improve safety and functional mobility.       Start:  03/28/25    Expected End:  04/11/25

## 2025-03-28 NOTE — CONSULTS
"Nutrition Initial Assessment:   Nutrition Assessment    Reason for Assessment: Provider consult order (Nutrition assessment/recomendation)    Patient is a 89 y.o. male presenting with Urinary tract infection without hematuria, site unspecified.    PMH: Chronic systolic congestive heart failure (CHF) with EF 25-30%, acute kidney injury (JONAS), dementia, coronary artery disease (CAD), chronic obstructive pulmonary disease (COPD), atrial fibrillation with AICD, vitamin D deficiency, history of gastrointestinal (GI) bleed, angiodysplasia of the intestine, hypokalemia, hypertension (HTN), hyperlipidemia (HLD), acute right MCA stroke, benign prostatic hyperplasia (BPH), and abnormal chest X-ray (CXR).    Nutrition History:  Energy Intake: Good > 75 %  Food and Nutrient History: Visited with patient in the room. He expressed frustration with being asked repeated questions and stated, \"I just want to go home.\" Despite this, he shared that he is \"eating okay\" and indicated willingness to try an oral nutrition supplement (ONS). Patient was alert and oriented during the conversation and spent some time reminiscing about his younger years, but was not interested in discussing detailed nutrition history.  Per nursing flowsheets, oral intake has been approximately %. Speech therapy evaluated the patient following this visit and recommended a Regular (IDDSI Level 7) solid and Thin (IDDSI Level 0) liquid diet with compensatory swallowing strategies due to mild oral dysphagia. He wears upper and lower dentures and demonstrated independent self-feeding during SLP evaluation.  Patient has a history of alcohol use disorder, placing him at risk for micronutrient deficiencies, including thiamine, folate, and B12. Psychiatry has initiated thiamine 500 mg IV TID due to increased risk. Labs pending for other deficiency markers. Patient is currently on a 2g sodium diet due to chronic systolic CHF       Anthropometrics:  Height: 170.2 cm " "(5' 7\")   Weight: 70.8 kg (156 lb)   BMI (Calculated): 24.43  IBW/kg (Dietitian Calculated): 67 kg  Percent of IBW: 105 %       Weight History:   Wt Readings from Last 10 Encounters:   03/27/25 70.8 kg (156 lb)   03/27/25 70.9 kg (156 lb 3.2 oz)   03/17/25 72.9 kg (160 lb 11.5 oz)   03/12/25 72.9 kg (160 lb 11.5 oz)   03/11/25 79.1 kg (174 lb 6.1 oz)   01/06/25 71.9 kg (158 lb 9.6 oz)   09/16/24 72.8 kg (160 lb 9.6 oz)   08/29/24 73.8 kg (162 lb 11.2 oz)   08/27/24 71.8 kg (158 lb 4.6 oz)   04/01/24 78.4 kg (172 lb 12.8 oz)       Weight Change %:  Weight History / % Weight Change: 03/27/25 (70.8 kg) to 03/17/25 (72.9 kg), 2.9% loss in ~10 days. 03/27/25 (70.8 kg) to 09/16/24 (72.8 kg), 2.7% loss in ~6 months. 03/27/25 (70.8 kg) to 04/01/24 (78.4 kg), 9.7% loss in ~1 year.    Nutrition Focused Physical Exam Findings:    Subcutaneous Fat Loss:   Defer Subcutaneous Fat Loss Assessment: Defer all  Defer All Reason: Deferred due to patient fatigue and limited participation in the nutrition interview. Patient expressed frustration with questioning and declined further discussion. However, on observation during the encounter, patient appeared frail with visible depression in the bilateral temporalis region suggestive of possible muscle loss. Facial skin tone appeared jaundiced. Will reassess for full NFPE as appropriate.  Muscle Wasting:  Defer Muscle Wasting Assessment: Defer all  Edema:  Edema: none  Physical Findings:  Hair: Negative  Eyes: Negative  Teeth Findings:  (dentures)    Nutrition Significant Labs:  CBC Trend:   Results from last 7 days   Lab Units 03/28/25  0632 03/27/25  1518   WBC AUTO x10*3/uL 6.4 8.3   RBC AUTO x10*6/uL 4.93 4.98   HEMOGLOBIN g/dL 12.9* 13.1*   HEMATOCRIT % 44.7 42.1   MCV fL 91 85   PLATELETS AUTO x10*3/uL 169 188    , BMP Trend:   Results from last 7 days   Lab Units 03/28/25  0632 03/27/25  1518   GLUCOSE mg/dL 78 93   CALCIUM mg/dL 9.1 9.6   SODIUM mmol/L 136 139   POTASSIUM mmol/L " 3.9 3.3*   CO2 mmol/L 23 29   CHLORIDE mmol/L 102 99   BUN mg/dL 11 11   CREATININE mg/dL 1.16 1.33*    , A1C:  Lab Results   Component Value Date    HGBA1C 5.8 (H) 01/06/2025   , BG POCT trend:        Nutrition Specific Medications:  Scheduled medications  apixaban, 2.5 mg, oral, BID  empagliflozin, 10 mg, oral, Daily  [Held by provider] ergocalciferol, 50,000 Units, oral, Weekly  metoprolol succinate XL, 25 mg, oral, Daily  pantoprazole, 40 mg, oral, Daily before breakfast   Or  pantoprazole, 40 mg, intravenous, Daily before breakfast  rosuvastatin, 40 mg, oral, Daily  tamsulosin, 0.4 mg, oral, Daily  [Held by provider] thiamine, 100 mg, oral, Daily  thiamine, 500 mg, intravenous, TID  torsemide, 40 mg, oral, Daily      I/O:  ;      Dietary Orders (From admission, onward)       Start     Ordered    03/28/25 2027  Oral nutritional supplements  Until discontinued        Question Answer Comment   Deliver with Breakfast    Deliver with Dinner    Select supplement: Ensure Plus High Protein        03/28/25 2026 03/28/25 1138  Adult diet 2-3 grams sodium  Diet effective now        Question:  Diet type  Answer:  2-3 grams sodium    03/28/25 1137    03/27/25 1845  May Participate in Room Service With Assistance  ( ROOM SERVICE MAY PARTICIPATE WITH ASSISTANCE)  Once        Question:  .  Answer:  Yes    03/27/25 1844                Estimated Needs:   Total Energy Estimated Needs in 24 hours (kCal):  (1945-2538 kcal)  Method for Estimating Needs: 25-30 kcal/kg of actual BW  Total Protein Estimated Needs in 24 Hours (g):  (85-105g)  Method for Estimating 24 Hour Protein Needs: 1.2-1.5 g/kg of actual BW  Total Fluid Estimated Needs in 24 Hours (mL):  (6211-6888 mL)  Method for Estimating 24 Hour Fluid Needs: 1 mL/kcal or per medical team.  Patient on Order Fluid Restriction: No        Nutrition Diagnosis        Nutrition Diagnosis  Patient has Nutrition Diagnosis: Yes  Diagnosis Status (1): New  Nutrition Diagnosis 1:  Increased nutrient needs  Related to (1): acute illness (UTI) and history of chronic illness (CHF, COPD, CAD)  As Evidenced by (1): 2.9% loss in 10 days and 9.7% loss in the past year.       Nutrition Interventions/Recommendations   Nutrition prescription for oral nutrition, Nutrition prescription for parenteral nutrition    Nutrition Recommendations:  Individualized Nutrition Prescription Provided for : Reduced sodium (2-3g) and Ensure Plus High Protein BID    Nutrition Interventions/Goals:   Interventions: Medical food supplement, Meals and snacks  Meals and Snacks: Mineral-modified diet  Medical Food Supplement: Commercial beverage medical food supplement therapy  Goal: Ensure Plus High Protein BID (350kcal and 20g protein per 8 fluid oz)  Coordination of Care with Providers: Nursing (Sarah López RN; IDT rounds)      Education Documentation  Nutrition Related Education, taught by Zulay Torres RDN, LD at 3/28/2025  8:52 PM.  Learner: Patient  Readiness: Nonacceptance  Method: Explanation  Response: No Evidence of Learning          Nutrition Monitoring and Evaluation   Food/Nutrient Related History Monitoring  Monitoring and Evaluation Plan: Intake / amount of food  Intake / Amount of food: Consumes at least 75% or more of meals/snacks/supplements    Anthropometric Measurements  Monitoring and Evaluation Plan: Body weight  Body Weight: Body weight - Maintain stable weight       Physical Exam Findings  Other: Evaluate nutrition intervention as compared to nutrition goal(s) and estimated nutrient need criteria.    Goal Status: New goal(s) identified    Time Spent (min): 75 minutes

## 2025-03-28 NOTE — H&P
History Of Present Illness  Gilmer Quiñonez is a 89 y.o. male presenting with confusion. Patient was recently admitted to [facility] on 3/12 after he had multiple falls at home with worsening confusion. Patient was unable to have MRI completed at [facility] due to presence of AICD. He was transferred to [facility] on 3/17 to be seen by neurology per family request. Per neurology note, confusion was thought to be metabolic encephalopathy due to fluctuating renal function; no additional neurological workup was recommended at that time. Patient was discharged home with [facility] on 3/18. He was being seen by his PCP (Dr. Adames) in the office yesterday with reported dysuria and worsening confusion. He was sent to the ED for further evaluation. He remained afebrile/HDS on room air. ED labs were significant for K 3.3, Cr 1.33. UA indicative of infection with 75 leuk esterase, 21-50 WBCs, rare WBC clumps. CT head showed chronic changes; no acute infarct or hemorrhage. CXR showed cardiomegaly with diffuse interstitial markings, possibly due to pulmonary vascular congestion versus interstitial pneumonitis. Patient was started on ceftriaxone for UTI and admitted to med/surg.     On exam this morning, patient remains on room air. He was able to work with therapy today. He has had intermittent confusion since admission but is currently answering questions appropriately. He has no complaints at this time. Will continue ceftriaxone for UTI while culture is pending.      Past Medical History  Past Medical History:   Diagnosis Date    Acute right MCA stroke (Multi)     Angiodysplasia of intestine     Atrial fibrillation and flutter     Chronic systolic heart failure     COPD (chronic obstructive pulmonary disease) (Multi)     Coronary artery disease     Dementia     GI bleed     HLD (hyperlipidemia)     Hypertension        Surgical History  Past Surgical History:   Procedure Laterality Date    APPENDECTOMY  02/06/2015    Appendectomy    CARDIAC  DEFIBRILLATOR PLACEMENT      CORONARY ARTERY BYPASS GRAFT  07/17/2017    CABG    CT ABDOMEN PELVIS ANGIOGRAM W AND/OR WO IV CONTRAST  02/28/2022    CT ABDOMEN PELVIS ANGIOGRAM W AND/OR WO IV CONTRAST 2/28/2022 [facility]    CT ANGIO NECK  04/01/2023    CT NECK ANGIO W AND WO IV CONTRAST GEN CT    CT HEAD ANGIO W AND WO IV CONTRAST  04/01/2023    CT HEAD ANGIO W AND WO IV CONTRAST GEN CT    OTHER SURGICAL HISTORY  01/06/2020    Coronary artery stent placement    TOTAL KNEE ARTHROPLASTY Left     TOTAL KNEE ARTHROPLASTY Right         Social History  He reports that he quit smoking about 27 years ago. His smoking use included cigarettes. He started smoking about 76 years ago. He has a 49 pack-year smoking history. He has never used smokeless tobacco. He reports that he does not currently use alcohol. He reports that he does not use drugs.    Family History  Family History   Problem Relation Name Age of Onset    Coronary artery disease Father          Allergies  Penicillins, Rivaroxaban, and Morphine    Review of Systems   Constitutional:  Negative for chills and fever.   HENT:  Positive for trouble swallowing. Negative for congestion.    Respiratory:  Negative for cough and shortness of breath.    Cardiovascular:  Negative for chest pain.   Gastrointestinal:  Negative for abdominal pain, diarrhea, nausea and vomiting.   Genitourinary:  Negative for difficulty urinating and dysuria.   Psychiatric/Behavioral:  Positive for confusion.    All other systems reviewed and are negative.       Physical Exam  Constitutional:       General: He is not in acute distress.     Appearance: He is not toxic-appearing.   HENT:      Head: Normocephalic and atraumatic.      Mouth/Throat:      Mouth: Mucous membranes are moist.   Eyes:      Conjunctiva/sclera: Conjunctivae normal.   Cardiovascular:      Rate and Rhythm: Normal rate and regular rhythm.   Pulmonary:      Effort: No respiratory distress.      Breath sounds: Rales  "present. No wheezing.      Comments: On room air   Abdominal:      General: There is no distension.      Palpations: Abdomen is soft.      Tenderness: There is no abdominal tenderness.   Musculoskeletal:      Right lower leg: No edema.      Left lower leg: No edema.   Skin:     General: Skin is warm and dry.   Neurological:      Mental Status: He is alert and oriented to person, place, and time.   Psychiatric:         Mood and Affect: Mood normal.         Behavior: Behavior normal.          Last Recorded Vitals  Blood pressure 134/64, pulse 53, temperature 36.5 °C (97.7 °F), temperature source Temporal, resp. rate 16, height 1.702 m (5' 7\"), weight 70.8 kg (156 lb), SpO2 98%.    Relevant Results        Scheduled medications  apixaban, 2.5 mg, oral, BID  cefTRIAXone, 2 g, intravenous, Once  empagliflozin, 10 mg, oral, Daily  [Held by provider] ergocalciferol, 50,000 Units, oral, Weekly  metoprolol succinate XL, 25 mg, oral, Daily  pantoprazole, 40 mg, oral, Daily before breakfast   Or  pantoprazole, 40 mg, intravenous, Daily before breakfast  rosuvastatin, 40 mg, oral, Daily  tamsulosin, 0.4 mg, oral, Daily  torsemide, 40 mg, oral, Daily      Continuous medications     PRN medications  PRN medications: acetaminophen, acetaminophen, calcium carbonate, melatonin, ondansetron **OR** ondansetron, sennosides-docusate sodium    Results for orders placed or performed during the hospital encounter of 03/27/25 (from the past 24 hours)   CBC with Differential   Result Value Ref Range    WBC 8.3 4.4 - 11.3 x10*3/uL    nRBC 0.0 0.0 - 0.0 /100 WBCs    RBC 4.98 4.50 - 5.90 x10*6/uL    Hemoglobin 13.1 (L) 13.5 - 17.5 g/dL    Hematocrit 42.1 41.0 - 52.0 %    MCV 85 80 - 100 fL    MCH 26.3 26.0 - 34.0 pg    MCHC 31.1 (L) 32.0 - 36.0 g/dL    RDW 16.5 (H) 11.5 - 14.5 %    Platelets 188 150 - 450 x10*3/uL    Neutrophils % 79.3 40.0 - 80.0 %    Immature Granulocytes %, Automated 0.2 0.0 - 0.9 %    Lymphocytes % 9.6 13.0 - 44.0 %    " Monocytes % 8.4 2.0 - 10.0 %    Eosinophils % 2.3 0.0 - 6.0 %    Basophils % 0.2 0.0 - 2.0 %    Neutrophils Absolute 6.58 (H) 1.60 - 5.50 x10*3/uL    Immature Granulocytes Absolute, Automated 0.02 0.00 - 0.50 x10*3/uL    Lymphocytes Absolute 0.80 0.80 - 3.00 x10*3/uL    Monocytes Absolute 0.70 0.05 - 0.80 x10*3/uL    Eosinophils Absolute 0.19 0.00 - 0.40 x10*3/uL    Basophils Absolute 0.02 0.00 - 0.10 x10*3/uL   Comprehensive Metabolic Panel   Result Value Ref Range    Glucose 93 74 - 99 mg/dL    Sodium 139 136 - 145 mmol/L    Potassium 3.3 (L) 3.5 - 5.3 mmol/L    Chloride 99 98 - 107 mmol/L    Bicarbonate 29 21 - 32 mmol/L    Anion Gap 14 10 - 20 mmol/L    Urea Nitrogen 11 6 - 23 mg/dL    Creatinine 1.33 (H) 0.50 - 1.30 mg/dL    eGFR 52 (L) >60 mL/min/1.73m*2    Calcium 9.6 8.6 - 10.3 mg/dL    Albumin 4.4 3.4 - 5.0 g/dL    Alkaline Phosphatase 71 33 - 136 U/L    Total Protein 7.9 6.4 - 8.2 g/dL    AST 10 9 - 39 U/L    Bilirubin, Total 1.1 0.0 - 1.2 mg/dL    ALT 7 (L) 10 - 52 U/L   Urinalysis with Reflex Culture and Microscopic   Result Value Ref Range    Color, Urine Yellow Light-Yellow, Yellow, Dark-Yellow    Appearance, Urine Clear Clear    Specific Gravity, Urine 1.022 1.005 - 1.035    pH, Urine 5.5 5.0, 5.5, 6.0, 6.5, 7.0, 7.5, 8.0    Protein, Urine 70 (1+) (A) NEGATIVE, 10 (TRACE), 20 (TRACE) mg/dL    Glucose, Urine OVER (4+) (A) Normal mg/dL    Blood, Urine 0.06 (1+) (A) NEGATIVE mg/dL    Ketones, Urine NEGATIVE NEGATIVE mg/dL    Bilirubin, Urine NEGATIVE NEGATIVE mg/dL    Urobilinogen, Urine Normal Normal mg/dL    Nitrite, Urine NEGATIVE NEGATIVE    Leukocyte Esterase, Urine 75 [place]/uL (A) NEGATIVE   Microscopic Only, Urine   Result Value Ref Range    WBC, Urine 21-50 (A) 1-5, NONE /HPF    WBC Clumps, Urine RARE Reference range not established. /HPF    RBC, Urine 3-5 NONE, 1-2, 3-5 /HPF    Squamous Epithelial Cells, Urine 1-9 (SPARSE) Reference range not established. /HPF    Hyaline Casts, Urine 3+ (A) NONE  /LPF   Magnesium   Result Value Ref Range    Magnesium 2.00 1.60 - 2.40 mg/dL   Troponin I, High Sensitivity, Initial   Result Value Ref Range    Troponin I, High Sensitivity 19 0 - 20 ng/L   Morphology   Result Value Ref Range    RBC Morphology See Below     RBC Fragments Few     Ovalocytes Many     Teardrop Cells Few    Sars-CoV-2 and Influenza A/B PCR   Result Value Ref Range    Flu A Result Not Detected Not Detected    Flu B Result Not Detected Not Detected    Coronavirus 2019, PCR Not Detected Not Detected   Troponin, High Sensitivity, 1 Hour   Result Value Ref Range    Troponin I, High Sensitivity 19 0 - 20 ng/L   Basic metabolic panel   Result Value Ref Range    Glucose 78 74 - 99 mg/dL    Sodium 136 136 - 145 mmol/L    Potassium 3.9 3.5 - 5.3 mmol/L    Chloride 102 98 - 107 mmol/L    Bicarbonate 23 21 - 32 mmol/L    Anion Gap 15 10 - 20 mmol/L    Urea Nitrogen 11 6 - 23 mg/dL    Creatinine 1.16 0.50 - 1.30 mg/dL    eGFR 61 >60 mL/min/1.73m*2    Calcium 9.1 8.6 - 10.3 mg/dL   CBC   Result Value Ref Range    WBC 6.4 4.4 - 11.3 x10*3/uL    nRBC 0.0 0.0 - 0.0 /100 WBCs    RBC 4.93 4.50 - 5.90 x10*6/uL    Hemoglobin 12.9 (L) 13.5 - 17.5 g/dL    Hematocrit 44.7 41.0 - 52.0 %    MCV 91 80 - 100 fL    MCH 26.2 26.0 - 34.0 pg    MCHC 28.9 (L) 32.0 - 36.0 g/dL    RDW 16.4 (H) 11.5 - 14.5 %    Platelets 169 150 - 450 x10*3/uL   Magnesium   Result Value Ref Range    Magnesium 2.15 1.60 - 2.40 mg/dL   B-Type Natriuretic Peptide   Result Value Ref Range     (H) 0 - 99 pg/mL     *Note: Due to a large number of results and/or encounters for the requested time period, some results have not been displayed. A complete set of results can be found in Results Review.     Electrocardiogram, 12-lead PRN ACS symptoms    Result Date: 3/28/2025  Atrial fibrillation with frequent ventricular-paced complexes and with premature ventricular or aberrantly conducted complexes Left axis deviation Nonspecific intraventricular block  Minimal voltage criteria for LVH, may be normal variant ( [place] product ) Abnormal ECG When compared with ECG of 12-MAR-2025 07:25, Electronic ventricular pacemaker has replaced Atrial fibrillation Confirmed by Shakeel Neal ([id number]) on 3/28/2025 9:15:38 AM    XR chest 1 view    Result Date: 3/27/2025  STUDY: Chest Radiograph;  03/27/2025 at 15:46 hours. INDICATION: Altered mental status. COMPARISON: XR Chest 09/16/2024.  CT Chest 06/21/2020. ACCESSION NUMBER(S): [id number] ORDERING CLINICIAN: KIRK MARTINEZ TECHNIQUE:  Frontal chest was obtained at 15:46 hours. FINDINGS: CARDIOMEDIASTINAL SILHOUETTE: The cardiomediastinal silhouette is mildly enlarged slightly more prominent than on the prior with a similar configuration otherwise. There is a left subclavian approach dual-chamber AICD with leads in the projection of the right atrium and ventricle.  Sternotomy wires and mediastinal clips could also be seen previously.  There is a moderate hiatal hernia.  LUNGS: There is mild progression of interstitial markings in the lungs when compared with the prior.  Slight blunting of the right costophrenic angle suggesting a small pleural effusion was also present previously.  Bilateral calcified pleural plaque appears similar in configuration to the prior.  There is bibasilar subsegmental atelectasis versus scarring and hyperaeration also similar to the prior.  No new dense consolidation  ABDOMEN: No remarkable upper abdominal findings.  BONES: No acute osseous changes.    1.Mild enlargement of the cardiomediastinal silhouette with AICD in place. 2.Moderate hiatal hernia also present previously. 3.Mild progression of diffuse interstitial markings when compared with the prior. Findings could be on the basis of pulmonary vascular congestion or diffuse interstitial pneumonitis. 4.Chronic pleural and parenchymal changes in both lungs similar to the prior.  Bilateral calcified pleural plaque, which can be associated with  asbestos exposure, this could be correlated clinically. Signed by Josy Saldivar DO    CT head wo IV contrast    Result Date: 3/27/2025  Interpreted By:  Benito Gonzales, STUDY: CT HEAD WO IV CONTRAST;  3/27/2025 3:45 pm   INDICATION: Signs/Symptoms:AMS.     COMPARISON: 03/12/2025   ACCESSION NUMBER(S): [id number]   ORDERING CLINICIAN: KIRK MARTINEZ   TECHNIQUE: Noncontrast axial CT scan of head was performed. Angled reformats in brain and bone windows were generated. The images were reviewed in bone, brain, blood and soft tissue windows.   FINDINGS: The ventricles, cisterns and sulci are prominent, consistent with moderate to severe diffuse volume loss. There are areas of nonspecific white matter hypodensity, which are probably age-related or microvascular in nature.   Gray-white differentiation is intact and there is no evidence of acute cortical infarct. No mass, mass effect or midline shift is seen. There is no evidence of hemorrhage.   The visualized paranasal sinuses are remarkable for retention cyst or polyp in the right maxillary sinus..         No evidence of acute cortical infarct or intracranial hemorrhage.   Chronic changes as described.   MACRO: None   Signed by: Benito Gonzales 3/27/2025 3:54 PM Dictation workstation:   [id number]    ECG 12 lead    Result Date: 3/12/2025  Atrial fibrillation with premature ventricular or aberrantly conducted complexes Left axis deviation Nonspecific intraventricular block Minimal voltage criteria for LVH, may be normal variant ( [place] product ) Inferior infarct , age undetermined Abnormal ECG When compared with ECG of 11-MAR-2025 04:40, Atrial fibrillation has replaced Electronic ventricular pacemaker See ED provider note for full interpretation and clinical correlation Confirmed by Danette Elizondo ([id number]) on 3/12/2025 11:26:10 AM    CT cervical spine wo IV contrast    Result Date: 3/12/2025  Interpreted By:  José Luis Gary, STUDY: CT CERVICAL SPINE WO  IV CONTRAST; ;  3/12/2025 7:15 am   INDICATION: Signs/Symptoms:trauma.   COMPARISON: None.   ACCESSION NUMBER(S): [id number]   ORDERING CLINICIAN: CYNTHIA DUBOIS   TECHNIQUE: Contiguous axial CT sections are performed from the skullbase to the upper thoracic spine and supplemented with coronal and sagittal reformatted images.   FINDINGS: There is some reversal of the cervical lordosis. The facet joints align normally. There is mild degenerative posterior subluxation of C5 relative to C4 and C6 measuring 2 mm.   There severe changes of multilevel cervical spondylosis with disc space narrowing greatest at C3-4 and C5-6.   The osseous structures are diffusely demineralized. The cervical vertebral body heights are maintained. There is no sign of cervical vertebral fracture. There is no bone destruction or aggressive periosteal reaction. No lytic or blastic lesion is detected. The visualized surrounding osseous structures are also intact.   The C1-2 relationship is within normal limits.   The C2-3 disc space level demonstrates moderate facet arthrosis on the left and mild facet arthrosis on the right. There is no significant central canal or neural foraminal stenosis.   The C3-4 disc space level demonstrates mild to moderate bilateral facet arthrosis greater on the right. There is bulging disc and marginal osteophyte with moderate central canal narrowing and some mass effect upon the ventral spinal cord. There is mild-to-moderate narrowing of the left neural foramen and moderate narrowing of the right neural foramen.   The C4-5 disc space level demonstrates moderate bilateral facet arthrosis. There is circumferential bulging disc and marginal osteophyte with moderate central canal narrowing and some effacement of the anterior surface of the cord. There is moderate bilateral neural foraminal narrowing, greater on the right.   The C5-6 disc space level demonstrates mild to moderate bilateral facet arthrosis and posterior  subluxation of C5 5. There is bulging disc and marginal osteophyte with at least moderate central canal narrowing and mass effect upon the anterior surface of the spinal cord. There is moderate to severe bilateral neural foraminal narrowing.   The C6-7 disc space level demonstrates mild bilateral facet arthrosis. There is bulging disc and marginal osteophyte with mild central canal narrowing. There is mild to moderate narrowing of the right neural foramen and mild narrowing left neural foramen.   The C7-T1 disc space level demonstrates mild bilateral facet arthrosis. There is no central canal or neural foraminal stenosis.   There is no prevertebral soft tissue swelling or retropharyngeal air. Incidentally noted is a fat density mass in the posterior subcutaneous fat to the right of midline measuring 4.2 cm in craniocaudal diameter and 5.0 x 3.2 cm in cross-section. There is deformity of the posterior skin surface. There is a well-defined margin though strandy and some amorphous density internally.       Severe multilevel cervical spondylosis. There is mild to moderate multilevel hypertrophic facet arthrosis with mild degenerative posterior subluxation of C5.   No acute fracture or traumatic subluxation.   Osteopenia.   Multilevel bulging disc and marginal osteophyte with moderate multilevel central canal narrowing and bilateral multilevel neural foraminal narrowing as detailed above.   Lipomatous mass within the posterior subcutaneous fat at the C7-T1 level to the right of midline measuring 4.2 x 5.0 x 3.2 cm. There is some strandy linear and amorphous internal density. Characteristics are similar to a previous CT examination of 03/31/2023 though the lesion is slightly increased in size in the interval. Need for further workup should be determined clinically. Correlate with pain at this site.     MACRO: None   Signed by: José Luis Gary 3/12/2025 7:38 AM Dictation workstation:   [id number]    CT head W O  contrast trauma protocol    Result Date: 3/12/2025  Interpreted By:  José Luis Gary, STUDY: CT HEAD W/O CONTRAST TRAUMA PROTOCOL; ;  3/12/2025 7:15 am   INDICATION: Signs/Symptoms:trauma.   COMPARISON: 03/11/2025   ACCESSION NUMBER(S): [id number]   ORDERING CLINICIAN: CYNTHIA DUBOIS   TECHNIQUE: Contiguous unenhanced axial CT sections are performed from the skull base to the vertex.   FINDINGS: The osseous structures are intact. There is opacification of the right sphenoid compartment. There is lobular mucoperiosteal thickening and partial opacification of the ethmoid air cells. There is large lobular opacity in the right maxillary sinus inferiorly with some thinning and expansion of the medial wall. There is mild lobular mucoperiosteal thickening or polyposis in the left maxillary sinus as well. The frontal sinus in the left sphenoid compartment are clear. The mastoid air cells are clear.   There is severe generalized parenchymal volume loss with enlargement of the cortical sulci and CSF spaces. There is mild diffuse hypoattenuation in the cerebral white matter compatible with small-vessel ischemia.   There is no sign of parenchymal hematoma or dense extra-axial fluid collection. There is no mass effect or midline shift. The gray matter/white-matter differentiation is preserved.       Severe age-related atrophy and mild small-vessel ischemic changes of the cerebral white matter.   No CT evidence of acute intracranial hemorrhage or mass effect.   Maxillary and ethmoid sinusitis. There is some thinning and expansion of the medial wall the right maxillary sinus similar to previous studies.   No sign of acute fracture.     MACRO: None   Signed by: José Luis Gary 3/12/2025 7:28 AM Dictation workstation:   [id number]    ECG 12 lead    Result Date: 3/11/2025  Ventricular-paced rhythm with occasional AV dual-paced complexes Abnormal ECG When compared with ECG of 29-AUG-2024 18:12, Electronic ventricular  pacemaker has replaced Atrial flutter See ED provider note for full interpretation and clinical correlation Confirmed by Danette Elizondo ([id number]) on 3/11/2025 11:55:45 AM    CT chest wo IV contrast    Result Date: 3/11/2025  Interpreted By:  Finkelstein, Evan, STUDY: CT CHEST WO IV CONTRAST;  3/11/2025 5:38 am   INDICATION: Signs/Symptoms:weakness.     COMPARISON: CT chest 08/27/2024   ACCESSION NUMBER(S): [id number]   ORDERING CLINICIAN: SHELL TRINIDAD   TECHNIQUE: Axial CT images of the chest obtained  without IV contrast.  Sagittal and coronal reconstructions were created..   FINDINGS: CHEST WALL AND LOWER NECK: Left chest wall pacemaker. Gynecomastia bilaterally. UPPER ABDOMEN: No acute abnormality of the partially visualized abdomen.   VESSELS: Aorta and pulmonary artery are normal caliber. Atherosclerotic calcifications in the aorta and coronary arteries. HEART: Enlarged in size. No pericardial effusion. MEDIASTINUM AND GEO: No pathologically enlarged lymph nodes. Large hiatal hernia. LUNG, PLEURA, AND LARGE AIRWAYS: There are pleural calcifications. Redemonstrated opacities scattered throughout the lungs, predominantly curvilinear in orientation and similar compared to prior imaging.   BONES: No acute osseous abnormality. Median sternotomy wires are present.       Redemonstrated opacities scattered throughout the lungs, which are predominantly curvilinear in orientation and similar compared to prior imaging. Findings are favored to represent scarring versus atelectasis.   Large hiatal hernia.   MACRO: None.   Signed by: Evan Finkelstein 3/11/2025 6:57 AM Dictation workstation:   [id number]    CT head wo IV contrast    Result Date: 3/11/2025  Interpreted By:  Finkelstein, Evan, STUDY: CT HEAD WO IV CONTRAST; CT CERVICAL SPINE WO IV CONTRAST;  3/11/2025 5:38 am   INDICATION: Signs/Symptoms:fall.     COMPARISON: CT brain 08/29/2024   ACCESSION NUMBER(S): [id number]; [id number]   ORDERING CLINICIAN: SHELL  TRINIDAD   TECHNIQUE: Noncontrast CT images of the head with coronal and sagittal reconstructions. Axial noncontrast CT images of the cervical spine with coronal and sagittal reconstructed images.   FINDINGS: EXTRACRANIAL SOFT TISSUES: Right frontal scalp and periorbital soft tissue laceration.   CALVARIUM: No depressed skull fracture. No destructive osseous lesion.   PARANASAL SINUSES/MASTOIDS: Partial opacification of the ethmoid sinuses and right maxillary sinus. Mastoid air cells are aerated.   HEMORRHAGE: No acute intracranial hemorrhage.   BRAIN PARENCHYMA: Gray-white matter interfaces are preserved. No mass effect or midline shift. There are nonspecific scattered white matter hypodensities.   VENTRICLES and EXTRA-AXIAL SPACES: Parenchymal atrophy with prominence of the ventricles and cortical sulci.   OTHER FINDINGS: There are calcifications within the cavernous carotids   CERVICAL SPINE:   ALIGNMENT: Straightening of the normal cervical lordosis, which may be positional or related to spasm. VERTEBRAE: No acute loss of vertebral body height. Bones are demineralized DISC SPACE: Disc space narrowing C3/C4, C4/C5 and C5/C6. SPINAL CANAL: Multilevel facet and uncovertebral arthropathy with varying degrees of neural foraminal stenosis. Prominent posterior disc osteophyte complexes from C3/C4 through C5/C6 contribute to moderate central narrowing. PREVERTEBRAL SOFT TISSUES: No prevertebral soft tissue swelling. LUNG APICES: Emphysematous changes in the visualized lung apices.   OTHER FINDINGS: 4.8 x 2.9 cm encapsulated fat attenuating lesion in the posterior right neck soft tissues       Right frontal scalp and periorbital soft tissue laceration. No underlying fracture.   No acute intracranial hemorrhage, mass effect or midline shift.   Nonspecific scattered white matter hypodensities favored to represent sequela of small vessel ischemia. Cervical spondylosis without acute loss of vertebral body height or traumatic  malalignment.   4.8 x 2.9 cm lipomatous lesion in the right lower neck/upper back soft tissues.   MACRO: None.   Signed by: Evan Finkelstein 3/11/2025 6:53 AM Dictation workstation:   [id number]    CT cervical spine wo IV contrast    Result Date: 3/11/2025  Interpreted By:  Finkelstein, Evan, STUDY: CT HEAD WO IV CONTRAST; CT CERVICAL SPINE WO IV CONTRAST;  3/11/2025 5:38 am   INDICATION: Signs/Symptoms:fall.     COMPARISON: CT brain 08/29/2024   ACCESSION NUMBER(S): [id number]; [id number]   ORDERING CLINICIAN: SHELL TRINIDAD   TECHNIQUE: Noncontrast CT images of the head with coronal and sagittal reconstructions. Axial noncontrast CT images of the cervical spine with coronal and sagittal reconstructed images.   FINDINGS: EXTRACRANIAL SOFT TISSUES: Right frontal scalp and periorbital soft tissue laceration.   CALVARIUM: No depressed skull fracture. No destructive osseous lesion.   PARANASAL SINUSES/MASTOIDS: Partial opacification of the ethmoid sinuses and right maxillary sinus. Mastoid air cells are aerated.   HEMORRHAGE: No acute intracranial hemorrhage.   BRAIN PARENCHYMA: Gray-white matter interfaces are preserved. No mass effect or midline shift. There are nonspecific scattered white matter hypodensities.   VENTRICLES and EXTRA-AXIAL SPACES: Parenchymal atrophy with prominence of the ventricles and cortical sulci.   OTHER FINDINGS: There are calcifications within the cavernous carotids   CERVICAL SPINE:   ALIGNMENT: Straightening of the normal cervical lordosis, which may be positional or related to spasm. VERTEBRAE: No acute loss of vertebral body height. Bones are demineralized DISC SPACE: Disc space narrowing C3/C4, C4/C5 and C5/C6. SPINAL CANAL: Multilevel facet and uncovertebral arthropathy with varying degrees of neural foraminal stenosis. Prominent posterior disc osteophyte complexes from C3/C4 through C5/C6 contribute to moderate central narrowing. PREVERTEBRAL SOFT TISSUES: No prevertebral soft tissue  swelling. LUNG APICES: Emphysematous changes in the visualized lung apices.   OTHER FINDINGS: 4.8 x 2.9 cm encapsulated fat attenuating lesion in the posterior right neck soft tissues       Right frontal scalp and periorbital soft tissue laceration. No underlying fracture.   No acute intracranial hemorrhage, mass effect or midline shift.   Nonspecific scattered white matter hypodensities favored to represent sequela of small vessel ischemia. Cervical spondylosis without acute loss of vertebral body height or traumatic malalignment.   4.8 x 2.9 cm lipomatous lesion in the right lower neck/upper back soft tissues.   MACRO: None.   Signed by: Evan Finkelstein 3/11/2025 6:53 AM Dictation workstation:   [id number]        Assessment/Plan   Assessment & Plan  Confusion    Urinary tract infection without hematuria, site unspecified    AICD (automatic cardioverter/defibrillator) present    Atrial fibrillation (Multi)    BPH (benign prostatic hyperplasia)    Hypokalemia    HLD (hyperlipidemia)    JONAS (acute kidney injury)    Chronic systolic (congestive) heart failure    Abnormal CXR    Vitamin D deficiency      #Altered mental status, resolved  #UTI  #BPH  - Patient recently admitted with frequent falls and confusion; thought to be due to renal function per neurology note. Patient was discharged home with [facility] on 3/18, sent from PCP office yesterday for confusion and dysuria  - CT head: No evidence of acute cortical infarct or intracranial hemorrhage. Chronic changes as described.   - Patient unable to have MRI 2/2 AICD   - Trop 19 > 19  - Flu, COVID negative  - UA: 75 leuk esterase, 21-50 WBCs, 3+ hyaline casts  - UCx pending  - Continue ceftriaxone (day 2)  - Adjust ATBs pending final culture results  - Continue tamsulosin 0.4 mg every day for history of BPH   - Tylenol PRN for fever  - PT/OT evals  - SLP eval pending; patient reports occasional dysphagia   - Aspiration precautions   - Psych consulted per Dr. Thomas,  appreciate recs     #Chronic systolic congestive heart failure  #Abnormal CXR   #Atrial fibrillation   #HLD  #AICD in situ   - , patient remains on room air with no edema on exam    - Trop 19 > 19  - CXR: Mild enlargement of the cardiomediastinal silhouette with AICD   in place. Mild progression of diffuse interstitial markings when compared with the prior. Findings could be on the basis of pulmonary vascular congestion or diffuse interstitial pneumonitis. Chronic pleural and parenchymal changes in both lungs similar to the prior.  Bilateral calcified pleural plaque, which can be associated with asbestos exposure, this could be correlated clinically.   - Echo 8/2024: LVEF 25-30%, abnormal pattern of LVDF, moderate LVH, mildly reduced RV systolic function   - High resolution CT chest pending per Dr. Thomas   - Continue apixaban 2.5 mg BID, empagliflozin 10 mg every day, metoprolol succinate 25 mg every day, rosuvastatin 40 mg every day, torsemide 40 mg every day  - 2g Na diet  - Monitor volume status     #JONAS, resolved  #Hypokalemia, resolved  - Cr 1.33 > 1.16  - K 3.3 > 3.9  - Avoid nephrotoxins/renally dose meds  - Daily BMP    #Vitamin D deficiency   - Ergocalciferol 50,000 units not stocked by pharmacy    DVT PPx: Apixaban   GI PPx: Protonix   Diet: 2g Na   Code Status: Full     Disposition: Patient requires inpatient management at this time.          Patient seen/evaluated and plan of care discussed with attending Dr. Mirta Plaza.         Pushpa Aparicio PA-C  Attending Attestation:    Patient was seen and examined face to face, history and physical was taken personally at bedside the APRN-CNP, was present for the whole duration of the exam who participated in the documentation of this note. I performed the medical decision-making components (assessment and plan of care). I have reviewed the documentation and verified the findings in the note as written with additions or exceptions as stated in the body  of this note.  This is his second admission in the last few weeks, patient apparently was seen in his primary doctor office, was concerned about confusion was sent to emergency room, workup in the emergency room showed that patient has some significant pyuria, no leukocytosis there was slight increase in creatinine 1.33 and potassium was 3.3 of note patient has CHF severe systolic dysfunction with ejection fraction of 25% he is status post AICD, he is not a candidate for MRI of brain.  He was transferred from this facility to [facility], [place], operated by [facility] based on daughter request to be seen by neurology from last admission was practically the same confusion and change of mental status, neurology evaluation was concerned about patient's metabolic encephalopathy and abnormal kidney function test.  Patient has been drinking on and off for long time, I am not sure if abnormal kidney function test and that level can cause patient's change of mental status, metabolic encephalopathy is of consideration but I think given patient history of alcohol use, dementia and encephalopathy is also of consideration, which has been exacerbated by urinary tract infection and possible dehydration.  On exam patient does not have any significant focal neurological deficit, he is answering question appropriately the rest of physical exam within normal limits, patient with change of mental status and confusion negative CT scan for any acute finding, no significant focal neurological deficit.  Does have some urinary tract infection we will treat that, will consult psych for further evaluation and cognitive evaluation.    Dr. Mirta Plaza MD  Internal Medicine

## 2025-03-28 NOTE — PROGRESS NOTES
Occupational Therapy    Evaluation/Treatment    Patient Name: Gilmer Quiñonez  MRN: [id number]  Department: [facility]  Room: [address] [place], [place] [address]  Today's Date: 03/28/25  Time Calculation  Start Time: 0813  Stop Time: 0855  Time Calculation (min): 42 min     Assessment:  OT Assessment: Pt is an 88 yo M referred to occupational therapy for impaired self-care and functional mobility 2/2 hospitalization for confusion. Pt presents from primary care office. Pt demonstrates decreased ADL completion, endurnace, and functional mobility this date. Pt required CGA for functional mobility and supervision for STS. Pt required CGA for standing ADLs. Pt would benefit from continued OT services at the MOD intensity level to increase functional independence and help w/ return to PLOF.  Prognosis: Good  Barriers to Discharge Home: Caregiver assistance, Cognition needs, Physical needs  Caregiver Assistance: Caregiver assistance needed per identified barriers - however, level of patient's required assistance exceeds assistance available at home  Cognition Needs: 24hr supervision for safety awareness needed, Cognition-related high falls risk  Physical Needs: Intermittent mobility assistance needed, Intermittent ADL assistance needed, High falls risk due to function or environment, Ambulating household distances limited by function/safety  Evaluation/Treatment Tolerance: Patient tolerated treatment well  Medical Staff Made Aware: Yes  End of Session Communication: Bedside nurse  End of Session Patient Position: Up in chair, Alarm on  OT Assessment Results: Decreased ADL status, Decreased cognition, Decreased endurance, Decreased functional mobility, Decreased IADLs  Prognosis: Good  Barriers to Discharge: None  Evaluation/Treatment Tolerance: Patient tolerated treatment well  Medical Staff Made Aware: Yes  Strengths: Ability to acquire knowledge, Support of extended family/friends  Barriers to Participation: Comorbidities  Plan:  Treatment  Interventions: ADL retraining, Functional transfer training, UE strengthening/ROM, Endurance training, Patient/family training, Equipment evaluation/education, Compensatory technique education  OT Frequency: 3 times per week  OT Discharge Recommendations: Moderate intensity level of continued care  OT Recommended Transfer Status: Stand by assist, Assist of 1  OT - OK to Discharge: Yes (Based on completed evaluation and care plan recommendations, no barriers to discharge to next site of care)  Treatment Interventions: ADL retraining, Functional transfer training, UE strengthening/ROM, Endurance training, Patient/family training, Equipment evaluation/education, Compensatory technique education    Subjective   Current Problem:  1. Urinary tract infection without hematuria, site unspecified        2. Altered mental status, unspecified altered mental status type          General:   OT Received On: 03/28/25  General  Reason for Referral: Pt is an 88 yo M referred to occupational therapy for impaired self-care and functional mobility 2/2 hospitalization for confusion.  Referred By: HARSHIL Cortez-CNP  Past Medical History Relevant to Rehab: A-fib, BPH, CAD, CKD, HTN, syncope, HLD, dizziness, acute R MCA stroke (2023), recurrent falls, chronic congestive heart failure  Family/Caregiver Present: No  Prior to Session Communication: Bedside nurse  Patient Position Received: Bed, 3 rail up, Alarm on  Preferred Learning Style: verbal, visual  General Comment: Pt pleasant and agreeable to therapy session. Pt cleared by RN prior to session.   Precautions:  Medical Precautions: Fall precautions  Precautions Comment: rachael العلي     Date/Time Vitals Session Patient Position Pulse Resp SpO2 BP MAP (mmHg)    03/28/25 1019 --  --  --  --  98 %  --  --           Pain:  Pain Assessment  Pain Assessment: 0-10  0-10 (Numeric) Pain Score: 0 - No pain    Objective   Cognition:  Overall Cognitive Status: Within Functional  Limits  Arousal/Alertness: Appropriate responses to stimuli  Orientation Level: Disoriented to situation    Home Living:  Type of Home: House  Lives With: Adult children (Daughter)  Home Adaptive Equipment: Walker rolling or standard, Cane  Home Layout: Two level, Able to live on main level with bedroom/bathroom  Home Access: Stairs to enter without rails  Entrance Stairs-Rails: None  Entrance Stairs-Number of Steps: 1  Bathroom Shower/Tub: Walk-in shower  Bathroom Toilet: Standard  Bathroom Equipment: Grab bars in shower, Shower chair with back  Prior Function:  Level of [place]: Independent with ADLs and functional transfers, Needs assistance with homemaking  Receives Help From: Family  ADL Assistance: Independent  Homemaking Assistance: Independent (Pt cooks ind, daughter assists w/ other IADLs)  Ambulatory Assistance: Independent (no AD, pt reports using cane/walker as needed)    ADL:  Eating Assistance: Independent  Grooming Assistance: Stand by  Grooming Deficit: Wash/dry hands, Wash/dry face  Bathing Assistance: Minimal  UE Dressing Assistance: Minimal  LE Dressing Assistance: Minimal  LE Dressing Deficit: Don/doff R sock, Don/doff L sock, Thread RLE into pants, Thread LLE into pants, Pull up over hips  Toileting Assistance with Device: Stand by  Toileting Deficit: Clothing management up, Clothing management down, Perineal hygiene  Functional Assistance: Stand by  ADL Comments: Pt completed bed mobility w/ close supervision and STS w/ close supervision. Pt completed functional mobility w/ CGA and FWW. Pt completed toileting and grooming tasks standing at sink w/ CGA. Pt required min A for LE dressing. Other ADLs anticipated.  Activities of Daily Living:    Grooming  Grooming Level of Assistance: Contact guard  Grooming Where Assessed: Standing sinkside  Grooming Comments: Pt able to wash face and hands standing sinkside, no dizziness noted, CGA    UE Bathing  UE Bathing Level of Assistance: Setup  UE  Bathing Where Assessed:  (Chair)  UE Bathing Comments: Pt required setup assist to wash underarms seated in chair    LE Dressing  LE Dressing: Yes  Pants Level of Assistance: Minimum assistance  Sock Level of Assistance: Setup  LE Dressing Where Assessed: Chair, Edge of bed  LE Dressing Comments: Pt required min A to thread LLE into pants, pt able to pull up and over hips w/ CGA sitting EOB. Pt able to doff/don socks w/ setup assist seated i ncahir w/ extended time    Toileting  Toileting Level of Assistance: Contact guard  Where Assessed: Toilet  Toileting Comments: Pt required CGA for toileting including perineal hygiene standing at toilet  Activity Tolerance:  Endurance: Tolerates 10 - 20 min exercise with multiple rests  Functional Standing Tolerance:  Time: Approximately 3 minutes  Activity: Standing ADLs at sink  Functional Standing Tolerance Comments: Good balance, no reports of dizziness  Bed Mobility/Transfers:   Bed Mobility  Bed Mobility: Yes  Bed Mobility 1  Bed Mobility 1: Supine to sitting  Level of Assistance 1: Close supervision  Bed Mobility Comments 1: HOB elevated    Transfers  Transfer: Yes  Transfer 1  Transfer From 1: Bed to  Transfer to 1: Stand  Technique 1: Sit to stand, Stand to sit  Transfer Device 1: Walker  Transfer Level of Assistance 1: Close supervision  Transfers 2  Transfer From 2: Toilet to  Transfer to 2: Stand  Technique 2: Sit to stand, Stand to sit  Transfer Device 2: Walker  Transfer Level of Assistance 2: Close supervision    Functional Mobility:  Functional Mobility  Functional Mobility Performed: Yes  Functional Mobility 1  Surface 1: Level tile  Device 1: Rolling walker  Assistance 1: Contact guard  Comments 1: Pt w/ no reports of dizziness during mobility, functional mobility in room and hallway w/ CGA. No LOB noted  Sitting Balance:  Static Sitting Balance  Static Sitting-Balance Support: Feet supported  Static Sitting-Level of Assistance: Distant supervision  Dynamic  Sitting Balance  Dynamic Sitting-Balance Support: Feet supported  Dynamic Sitting-Level of Assistance: Distant supervision  Dynamic Sitting-Balance: Reaching for objects  Standing Balance:  Static Standing Balance  Static Standing-Balance Support: Bilateral upper extremity supported  Static Standing-Level of Assistance: Contact guard  Dynamic Standing Balance  Dynamic Standing-Balance Support: Bilateral upper extremity supported  Dynamic Standing-Level of Assistance: Contact guard  Dynamic Standing-Balance: Reaching for objects    Sensation:  Light Touch: No apparent deficits  Strength:  Strength Comments: BUE grossly 4/5  Coordination:  Movements are Fluid and Coordinated: Yes   Hand Function:  Hand Function  Gross Grasp: Functional  Coordination: Functional  Extremities: RUE   RUE : Within Functional Limits and LUE   LUE: Within Functional Limits    Outcome Measures:   [facility] Daily Activity  Putting on and taking off regular lower body clothing: A little  Bathing (including washing, rinsing, drying): A little  Putting on and taking off regular upper body clothing: A little  Toileting, which includes using toilet, bedpan or urinal: A little  Taking care of personal grooming such as brushing teeth: A little  Eating Meals: A little  Daily Activity - Total Score: 18    OT Adult Other Outcome Measures  4AT: 1/12    Patient's 4AT score:  Alertness: 0 - Normal (fully alert, but not agitated, throughout assessment)   AMT4: 1 - 1 mistake   Attention: 0 - Achieves 7 months or more correctly   Acute change or fluctuating course: 0 - No    4 AT Score: 1/12     4 AT is a standardized assessment completed with patient to assess delirium. The 4AT assesses 4 items including alertness, abbreviated mental test (AMT4), Attention, and acute change or fluctuating course.     The test is scored out of 12 points. Score indications are as follows:   0: delirium or severe cognitive impairment unlikely   (delirium is still possible if acute  change or fluctuating course information is incomplete and prior cognitive status is not obtained)  1-3: possible cognitive impairment  4 or above: possible delirium +/- cognitive impairment     The 4AT is scored as follows:   Alertness:   Normal (fully alert, but not agitated, throughout assessment) 0  Mild Sleepiness for <10 seconds after waking, then normal   0  Clearly Abnormal       4    AMT4: (age, date of birth, place (name of hospital or building), current year)  No mistakes     0  1 mistake    1  2 or more mistakes/untestable 2    Attention: (months of they year in backwards order starting with December)  Achieves 7 months or more correctly     0  Starts but scores <7 months/refuses to start    1  Untestable (cannot start because unwell, drowsy, inattentive) 2    Acute change or fluctuating Course: (change in cognition or mental function in last 2 wks and is still evident in last 24 hrs.   No  0  Yes  4     Education Documentation  Precautions, taught by Andreia Cohen OT at 3/28/2025 10:48 AM.  Learner: Patient  Readiness: Acceptance  Method: Explanation  Response: Verbalizes Understanding  Comment: Pt educated on POC, DC recs, safety and body mechanics when completing transfers and ADLs    ADL Training, taught by Andreia Cohen OT at 3/28/2025 10:48 AM.  Learner: Patient  Readiness: Acceptance  Method: Explanation  Response: Verbalizes Understanding  Comment: Pt educated on POC, DC recs, safety and body mechanics when completing transfers and ADLs    Goals:  Encounter Problems       Encounter Problems (Active)       ADLs       Patient will perform UB and LB bathing with set-up level of assistance and PRN bathroom equipment.       Start:  03/28/25    Expected End:  04/11/25            Patient with complete upper body dressing with set-up level of assistance donning and doffing all UE clothes with PRN adaptive equipment while edge of bed  and standing       Start:  03/28/25    Expected End:  04/11/25             Patient with complete lower body dressing with set-up level of assistance donning and doffing all LE clothes  with PRN adaptive equipment while edge of bed  and standing       Start:  03/28/25    Expected End:  04/11/25               BALANCE       Pt will maintain dynamic standing balance during ADL task with supervision level of assistance in order to demonstrate decreased risk of falling and improved postural control.       Start:  03/28/25    Expected End:  04/11/25               MOBILITY       Patient will perform Functional mobility mod Household distances/Community Distances with supervision level of assistance and least restrictive device in order to improve safety and functional mobility.       Start:  03/28/25    Expected End:  04/11/25

## 2025-03-28 NOTE — PROGRESS NOTES
03/28/25 1315   Discharge Planning   Living Arrangements Children   Support Systems Children;Family members   Assistance Needed AAOx3. Independent in ADL's and iADL's. His daughter does the shopping and some cooking. They live together. DME: 3 walkers, cane, grab bars. He does not use any home O2. He still drives if needed, but his daughter will provide transportation too. PT/OT recommend MOD level of therapy. Call to daughter, Catherine, to verify assessment and discuss MOD recommendation. She declines any HHC or SNF need. They are getting ready to get the hotel back up and running for the season and will be moving within the next couple weeks.   Type of Residence Private residence  (ranch)   Number of Stairs to Enter Residence 1   Number of Stairs Within Residence 13  (to loft. patient doesn't go upstairs.)   Do you have animals or pets at home? No   Who is requesting discharge planning? Provider   Home or Post Acute Services None   Expected Discharge Disposition Home  (declines services)   Does the patient need discharge transport arranged? No  (daughter will pick him up)   Financial Resource Strain   How hard is it for you to pay for the very basics like food, housing, medical care, and heating? Not hard   Housing Stability   In the last 12 months, was there a time when you were not able to pay the mortgage or rent on time? N   In the past 12 months, how many times have you moved where you were living? 0   At any time in the past 12 months, were you homeless or living in a shelter (including now)? N   Transportation Needs   In the past 12 months, has lack of transportation kept you from medical appointments or from getting medications? no   In the past 12 months, has lack of transportation kept you from meetings, work, or from getting things needed for daily living? No   Stroke Family Assessment   Stroke Family Assessment Needed No   Intensity of Service   Intensity of Service 0-30 min

## 2025-03-28 NOTE — PROGRESS NOTES
Physical Therapy    Physical Therapy Evaluation    Patient Name: Gilmer Quiñonez  MRN: [id number]  Department: [facility]  Room: [address] [place], [place] [address]  Today's Date: 3/28/2025   Time Calculation  Start Time: 1015  Stop Time: 1030  Time Calculation (min): 15 min    Assessment/Plan   PT Assessment  PT Assessment Results: Decreased strength, Impaired balance, Decreased mobility, Decreased endurance, Pain  Rehab Prognosis: Good  Barriers to Discharge Home: Caregiver assistance  Caregiver Assistance: Caregiver assistance needed per identified barriers - however, level of patient's required assistance exceeds assistance available at home (pt. states his daughter is not always home)  Evaluation/Treatment Tolerance: Patient limited by pain  Medical Staff Made Aware: Yes  Strengths: Premorbid level of function  Barriers to Participation: Comorbidities  End of Session Communication: Bedside nurse  Assessment Comment: Pleasant 89 y.o readmitted to hospital. Pt. demonstrates decreased endurance, weakness and impaired mobility. Pt. lives with daughter (not home all the time) and is normally FADY with WW. Pt. currently requires CGA and would benefit from additional PT to address above noted limitations and prevent further decline.  End of Session Patient Position: Alarm on, Up in chair  IP OR SWING BED PT PLAN  Inpatient or Swing Bed: Inpatient  PT Plan  Treatment/Interventions: Transfer training, Bed mobility, Gait training, Stair training, Balance training, Strengthening, Endurance training, Therapeutic exercise, Therapeutic activity, Home exercise program, Neuromuscular re-education  PT Plan: Ongoing PT  PT Frequency: 3 times per week  PT Discharge Recommendations: Moderate intensity level of continued care  PT Recommended Transfer Status: Assist x1  PT - OK to Discharge: Yes Based on completed evaluation and care plan recommendations, no barriers to discharge to next site of care      Subjective   General Visit Information:  General  Reason  for Referral: impaired mobility, confusion  Referred By: HARSHIL Cortez-CNP  Past Medical History Relevant to Rehab: A-fib, BPH, CAD, CKD, HTN, syncope, HLD, dizziness, acute R MCA stroke (2023), recurrent falls, chronic congestive heart failure  Family/Caregiver Present: No  Prior to Session Communication: Bedside nurse  Patient Position Received: Up in chair, Alarm on  General Comment: masimo  Home Living:  Home Living  Type of Home: House  Lives With: Adult children (Daughter)  Home Adaptive Equipment: Walker rolling or standard  Home Layout: Two level, Able to live on main level with bedroom/bathroom  Home Access: Stairs to enter without rails  Entrance Stairs-Rails: None  Entrance Stairs-Number of Steps: 1  Bathroom Shower/Tub: Walk-in shower  Bathroom Toilet: Standard  Bathroom Equipment: Grab bars in shower, Shower chair with back  Prior Level of Function:  Prior Function Per Pt/Caregiver Report  Level of [place]: Independent with ADLs and functional transfers, Needs assistance with homemaking  Receives Help From: Family  ADL Assistance: Independent  Homemaking Assistance: Independent (Pt cooks ind, daughter assists w/ other IADLs)  Ambulatory Assistance: Independent (with WW)  Precautions:  Precautions  Medical Precautions: Fall precautions      Date/Time Vitals Session Patient Position Pulse Resp SpO2 BP MAP (mmHg)    03/28/25 1015 --  --  63  --  87 %  115/63  --     03/28/25 1019 --  --  --  --  98 %  --  --         87% when amb. Pt. reported mild dizziness. Quick recovery once sitting >96%   BP taken after pt. Reported dizziness         Objective   Pain:  Pain Assessment  Pain Assessment: 0-10  0-10 (Numeric) Pain Score: 0 - No pain (no pain at rest but then c/o severe (8/10) headache when amb.)  Cognition:  Cognition  Overall Cognitive Status: Within Functional Limits (Ax0 x 3 but could not recall timeline of recent hospitalizations)  Arousal/Alertness: Appropriate responses to  stimuli  Orientation Level: Disoriented to situation    General Assessments:     Activity Tolerance  Endurance: Decreased tolerance for upright activites (limited 2/2 dizziness and headache)    Sensation  Sensation Comment: denies deficits    Strength  Strength Comments: BLE: grossly 4-/5  Coordination  Movements are Fluid and Coordinated: Yes       Static Standing Balance  Static Standing-Comment/Number of Minutes: F+; with BUE support  Dynamic Standing Balance  Dynamic Standing-Comments: F+; with BUE support  Functional Assessments:     Transfers  Transfer: Yes  Transfer 1  Technique 1: Sit to stand, Stand to sit  Transfer Device 1: Walker  Transfer Level of Assistance 1: Contact guard    Ambulation/Gait Training  Ambulation/Gait Training Performed: Yes  Ambulation/Gait Training 1  Surface 1: Level tile  Device 1: Rolling walker  Assistance 1: Contact guard  Quality of Gait 1: Decreased step length  Comments/Distance (ft) 1: 30 (pt. reported mild dizziness and severe headache)    Extremity/Trunk Assessments:  RLE   RLE : Within Functional Limits  LLE   LLE : Within Functional Limits  Outcome Measures:  [facility] Basic Mobility  Turning from your back to your side while in a flat bed without using bedrails: A little  Moving from lying on your back to sitting on the side of a flat bed without using bedrails: A little  Moving to and from bed to chair (including a wheelchair): A little  Standing up from a chair using your arms (e.g. wheelchair or bedside chair): A little  To walk in hospital room: A little  Climbing 3-5 steps with railing: A lot  Basic Mobility - Total Score: 17    Other Measures  5x Sit to Stand: unable to complete STS without UE support 2/2 weakness (multiple attempts)    Encounter Problems       Encounter Problems (Active)       Balance       STG - Maintains dynamic standing balance with 1 upper extremity support with >good balance        Start:  03/28/25    Expected End:  04/11/25                Mobility       LTG - Patient will ambulate community distance FADY with WW       Start:  03/28/25    Expected End:  04/11/25               Mobility       STG - Patient will ambulate up and down a curb/step IND       Start:  03/28/25    Expected End:  04/11/25               PT Transfers       STG - Patient will perform bed mobility IND       Start:  03/28/25    Expected End:  04/11/25            STG - Patient will transfer sit to and from stand AFDY with WW       Start:  03/28/25    Expected End:  04/11/25            Pt. will complete 5 STS with UE support in <15 seconds        Start:  03/28/25    Expected End:  04/11/25               Pain - Adult              Education Documentation  Mobility Training, taught by Indira Andres, PT at 3/28/2025 11:29 AM.  Learner: Patient  Readiness: Acceptance  Method: Explanation  Response: Verbalizes Understanding  Comment: Educated pt. on PT POC    Education Comments  No comments found.

## 2025-03-28 NOTE — PROGRESS NOTES
Pharmacy Medication History Review    Gilmer Quiñonez is a 89 y.o. male admitted for Urinary tract infection without hematuria, site unspecified. Pharmacy reviewed the patient's [id number] medications and allergies for accuracy.    The list below reflectives the updated PTA list. Please review each medication in order reconciliation for additional clarification and justification.  Medications Prior to Admission   Medication Sig Dispense Refill Last Dose/Taking    apixaban (Eliquis) 2.5 mg tablet Take 1 tablet (2.5 mg) by mouth 2 times a day. 180 tablet 1 3/27/2025 Morning    empagliflozin (Jardiance) 10 mg Take 1 tablet (10 mg) by mouth once daily. (Patient not taking: Reported on 3/27/2025) 90 tablet 1 Unknown    ergocalciferol (Vitamin D-2) 1250 mcg (50,000 units) capsule Take 1 capsule (50,000 Units) by mouth 1 (one) time per week. 12 capsule 0 Unknown    metoprolol succinate XL (Toprol-XL) 25 mg 24 hr tablet Take 1 tablet (25 mg) by mouth once daily. 90 tablet 1     rosuvastatin (Crestor) 40 mg tablet Take 1 tablet (40 mg) by mouth once daily. 90 tablet 1     tamsulosin (Flomax) 0.4 mg 24 hr capsule Take 1 capsule (0.4 mg) by mouth once daily. 30 capsule 11     torsemide (Demadex) 20 mg tablet Take 2 tablets (40 mg) by mouth once daily.           The list below reflectives the updated allergy list. Please review each documented allergy for additional clarification and justification.  Allergies  Reviewed by Joy Velasquez RN on 3/28/2025        Severity Reactions Comments    Penicillins Not Specified Swelling     Rivaroxaban Not Specified GI bleeding required 9 blood transfsions while on it, unable to find source of bleeding    Morphine Low Rash             Below are additional concerns with the patient's PTA list.  Pharmacy virtually reviewed medications using Medication Dispense History (MDH). Pharmacy virtual review is accurate with nursing medication history, therefore patient not interviewed. Unable  to verify OTCs.     Simran Gamez CPhT  Medication History Pharmacy Technician  BAYRON 8-4:30  Available via [email or web address] Secure Chat  OR  [phone]

## 2025-03-28 NOTE — PROGRESS NOTES
Speech-Language Pathology    SLP Adult Inpatient Speech-Language Pathology Clinical Swallow Evaluation    Patient Name: Gilmer Quiñonez  MRN: [id number]  Today's Date: 3/28/2025   Time Calculation  Start Time: 1437  Stop Time: 1458  Time Calculation (min): 21 min     Current Problem:   1. Urinary tract infection without hematuria, site unspecified        2. Altered mental status, unspecified altered mental status type        Assessed: dysphagia.     Recommendations:  Solid Diet Recommendations: Regular (IDDSI Level 7)    Liquid Diet Recommendations: Thin (IDDSI Level 0)       Compensatory Swallowing Strategies:   *Upright 90 degrees when eating/drinking  *Slow intake rate to allow for adequate mastication, swallow completely before taking the next bite/sip and before talking     General Visit Information:  Living Environment: Pt resides in private home with daughter.  Reason for Referral: There is a need to r/o aspiration/penetration and establish the safest, least restrictive diet level.    Past Medical History Relevant to Rehab: ARF, UTI, increased confusion and dizziness.    Baseline Diet: Regular (IDDSI Level 7), Thin (IDDSI Level 0)      Assessment:  Patient Positioning: HOB up to 90 degrees  Baseline Vocal Quality: WFL  Volitional Swallow: WFL  Consistencies Trialed: Thin (IDDSI Level 0) - Straw, Pureed (IDDSI Level 4), Soft & bite sized/chopped (IDDSI Level 6), Regular (IDDSI Level 7)      Oral trials included applesauce, diced peaches, a jensen cracker and thin liquids via straw. The pt demonstrated independent self feeding after set up. He tolerated all trials with no overt s/s of aspiration/penetration, mildly increased mastication time and effort needed with no overt oral residuals. It was noted that the pt tended to talk throughout oral phase/mastication of solids which can put the pt at increased risk for aspiration, recommend pt swallow completley before talking.      Based on today's assessment and  pt's baseline levels, Regular (IDDSI Level 7) consistency solids and Thin (IDDSI Level 0) liquids will be recommended with the plan to f/u x1 to ensure tolerance.     OUTCOME MEASURES:  Functional Oral Intake Scale  Functional Oral Intake Scale: Level 7 total oral diet with no restrictions     Oral/Motor Assessment:  Dentition: Upper and lower denture  Oral Motor: Mild delayed strength and ROM, evidenced during elevation tasks     Plan:  SLP Frequency: 1 x week x 1 week  SLP Discharge Recommendations: No further ST services are recommended after discharge.  Discussed POC: Patient, Nursing  Discussed Risks/Benefits: Patient, Nursing  Patient/Caregiver Agreeable: Yes    Goals:   Long term goal:  Pt will tolerate the safest, least restrictive diet level indicated by adequate intake and no overt s/s of aspiration/penetration.      Short term goals:  1. Patient will tolerate recommended diet indicated by adequate intake and no overt s/s of oral/pharyngeal dysphagia.  2. Patient will demonstrate independent utilization of safe swallowing compensatory strategies.       GOAL START:  1/24/2025          TIME FRAME: 1 weeks              GOAL END: 4/4/2025       Education:  Verbal Education : Results and recommendations.  Diagnosis: Mild Oral dysphagia  Patient reported understanding: yes  Plan of Care Discussed and Agreed Upon: yes

## 2025-03-28 NOTE — DISCHARGE INSTR - OTHER ORDERS
Thank you for choosing [facility] for your Health Care needs. As you transition from the hospital back to home, we hope we took your preferences into account on how you manage your health needs so you can manage your health at home.     You may receive a survey in the mail within the next couple weeks. Please take the time to complete it and return it. Your input is ALWAYS important to us. Thank you!  Your Care Transition Team - Marsha Olivares Angie  & Navi     For questions about your medications listed on your discharge instructions, please call the Nurses Station at [phone].

## 2025-03-28 NOTE — SIGNIFICANT EVENT
Consult for capacity noted  Chart reviewed  OT MOCA, dementia labs (folate, B12, syphilis screen) ordered.  Add thiamine 500 mg TID IV push x 9 doses (decreased in DM, ETOH, obesity)  Nutrition assessment please  Assessment in progress

## 2025-03-28 NOTE — PROGRESS NOTES
Speech-Language Pathology                 Therapy Communication Note    Patient Name: Gilmer Quiñonez  MRN: [id number]  Department: [facility]  Room: [address] [place] [address]A  Today's Date: 3/28/2025     Discipline: Speech Language Pathology    Missed Visit Reason: Attempted swallow eval, pt was too tired to eat. Will attempt when pt able.    Missed Time: Attempt

## 2025-03-29 ENCOUNTER — APPOINTMENT (OUTPATIENT)
Dept: RADIOLOGY | Facility: HOSPITAL | Age: 89
End: 2025-03-29
Payer: MEDICARE

## 2025-03-29 PROBLEM — E04.9 THYROID ENLARGEMENT: Status: ACTIVE | Noted: 2025-03-29

## 2025-03-29 LAB
ANION GAP SERPL CALC-SCNC: 14 MMOL/L (ref 10–20)
BUN SERPL-MCNC: 16 MG/DL (ref 6–23)
CALCIUM SERPL-MCNC: 9.2 MG/DL (ref 8.6–10.3)
CHLORIDE SERPL-SCNC: 98 MMOL/L (ref 98–107)
CO2 SERPL-SCNC: 31 MMOL/L (ref 21–32)
CREAT SERPL-MCNC: 1.5 MG/DL (ref 0.5–1.3)
EGFRCR SERPLBLD CKD-EPI 2021: 45 ML/MIN/1.73M*2
ERYTHROCYTE [DISTWIDTH] IN BLOOD BY AUTOMATED COUNT: 16.3 % (ref 11.5–14.5)
FOLATE SERPL-MCNC: 8 NG/ML
GLUCOSE SERPL-MCNC: 90 MG/DL (ref 74–99)
HCT VFR BLD AUTO: 38.3 % (ref 41–52)
HGB BLD-MCNC: 12 G/DL (ref 13.5–17.5)
MAGNESIUM SERPL-MCNC: 2.05 MG/DL (ref 1.6–2.4)
MCH RBC QN AUTO: 26.3 PG (ref 26–34)
MCHC RBC AUTO-ENTMCNC: 31.3 G/DL (ref 32–36)
MCV RBC AUTO: 84 FL (ref 80–100)
NRBC BLD-RTO: 0 /100 WBCS (ref 0–0)
PLATELET # BLD AUTO: 176 X10*3/UL (ref 150–450)
POTASSIUM SERPL-SCNC: 3.5 MMOL/L (ref 3.5–5.3)
RBC # BLD AUTO: 4.56 X10*6/UL (ref 4.5–5.9)
SODIUM SERPL-SCNC: 139 MMOL/L (ref 136–145)
VIT B12 SERPL-MCNC: 390 PG/ML (ref 211–911)
WBC # BLD AUTO: 5.9 X10*3/UL (ref 4.4–11.3)

## 2025-03-29 PROCEDURE — 76536 US EXAM OF HEAD AND NECK: CPT | Performed by: RADIOLOGY

## 2025-03-29 PROCEDURE — 86780 TREPONEMA PALLIDUM: CPT | Mod: GENLAB | Performed by: PSYCHIATRY & NEUROLOGY

## 2025-03-29 PROCEDURE — 94760 N-INVAS EAR/PLS OXIMETRY 1: CPT | Mod: IPSPLIT

## 2025-03-29 PROCEDURE — 2500000001 HC RX 250 WO HCPCS SELF ADMINISTERED DRUGS (ALT 637 FOR MEDICARE OP): Mod: IPSPLIT | Performed by: NURSE PRACTITIONER

## 2025-03-29 PROCEDURE — 1100000001 HC PRIVATE ROOM DAILY: Mod: IPSPLIT

## 2025-03-29 PROCEDURE — 85027 COMPLETE CBC AUTOMATED: CPT | Mod: IPSPLIT | Performed by: NURSE PRACTITIONER

## 2025-03-29 PROCEDURE — 82607 VITAMIN B-12: CPT | Mod: GENLAB | Performed by: PSYCHIATRY & NEUROLOGY

## 2025-03-29 PROCEDURE — 83735 ASSAY OF MAGNESIUM: CPT | Mod: IPSPLIT

## 2025-03-29 PROCEDURE — 82746 ASSAY OF FOLIC ACID SERUM: CPT | Mod: GENLAB | Performed by: PSYCHIATRY & NEUROLOGY

## 2025-03-29 PROCEDURE — 2500000004 HC RX 250 GENERAL PHARMACY W/ HCPCS (ALT 636 FOR OP/ED): Mod: IPSPLIT | Performed by: PSYCHIATRY & NEUROLOGY

## 2025-03-29 PROCEDURE — 36415 COLL VENOUS BLD VENIPUNCTURE: CPT | Mod: IPSPLIT | Performed by: NURSE PRACTITIONER

## 2025-03-29 PROCEDURE — 76536 US EXAM OF HEAD AND NECK: CPT | Mod: IPSPLIT

## 2025-03-29 PROCEDURE — 82565 ASSAY OF CREATININE: CPT | Mod: IPSPLIT | Performed by: NURSE PRACTITIONER

## 2025-03-29 PROCEDURE — 2500000004 HC RX 250 GENERAL PHARMACY W/ HCPCS (ALT 636 FOR OP/ED): Mod: IPSPLIT

## 2025-03-29 PROCEDURE — 2500000002 HC RX 250 W HCPCS SELF ADMINISTERED DRUGS (ALT 637 FOR MEDICARE OP, ALT 636 FOR OP/ED): Mod: IPSPLIT | Performed by: HOSPITALIST

## 2025-03-29 PROCEDURE — 2500000001 HC RX 250 WO HCPCS SELF ADMINISTERED DRUGS (ALT 637 FOR MEDICARE OP): Mod: IPSPLIT | Performed by: HOSPITALIST

## 2025-03-29 PROCEDURE — 99233 SBSQ HOSP IP/OBS HIGH 50: CPT

## 2025-03-29 PROCEDURE — 2500000001 HC RX 250 WO HCPCS SELF ADMINISTERED DRUGS (ALT 637 FOR MEDICARE OP): Mod: IPSPLIT

## 2025-03-29 RX ORDER — CEFTRIAXONE 2 G/50ML
2 INJECTION, SOLUTION INTRAVENOUS EVERY 24 HOURS
Status: DISCONTINUED | OUTPATIENT
Start: 2025-03-29 | End: 2025-03-29

## 2025-03-29 RX ORDER — LEVOFLOXACIN 500 MG/1
250 TABLET, FILM COATED ORAL EVERY 24 HOURS
Status: DISCONTINUED | OUTPATIENT
Start: 2025-03-30 | End: 2025-03-30 | Stop reason: HOSPADM

## 2025-03-29 RX ORDER — LEVOFLOXACIN 5 MG/ML
250 INJECTION, SOLUTION INTRAVENOUS ONCE
Status: COMPLETED | OUTPATIENT
Start: 2025-03-29 | End: 2025-03-29

## 2025-03-29 RX ADMIN — THIAMINE HYDROCHLORIDE 500 MG: 100 INJECTION, SOLUTION INTRAMUSCULAR; INTRAVENOUS at 12:11

## 2025-03-29 RX ADMIN — PANTOPRAZOLE SODIUM 40 MG: 40 TABLET, DELAYED RELEASE ORAL at 06:20

## 2025-03-29 RX ADMIN — LEVOFLOXACIN 250 MG: 5 INJECTION, SOLUTION INTRAVENOUS at 13:54

## 2025-03-29 RX ADMIN — EMPAGLIFLOZIN 10 MG: 10 TABLET, FILM COATED ORAL at 08:43

## 2025-03-29 RX ADMIN — APIXABAN 2.5 MG: 2.5 TABLET, FILM COATED ORAL at 08:43

## 2025-03-29 RX ADMIN — TAMSULOSIN HYDROCHLORIDE 0.4 MG: 0.4 CAPSULE ORAL at 08:43

## 2025-03-29 RX ADMIN — ROSUVASTATIN CALCIUM 40 MG: 10 TABLET, FILM COATED ORAL at 08:43

## 2025-03-29 RX ADMIN — LEVOFLOXACIN 250 MG: 5 INJECTION, SOLUTION INTRAVENOUS at 14:49

## 2025-03-29 RX ADMIN — METOPROLOL SUCCINATE 25 MG: 25 TABLET, EXTENDED RELEASE ORAL at 08:43

## 2025-03-29 RX ADMIN — TORSEMIDE 40 MG: 20 TABLET ORAL at 08:43

## 2025-03-29 RX ADMIN — THIAMINE HYDROCHLORIDE 500 MG: 100 INJECTION, SOLUTION INTRAMUSCULAR; INTRAVENOUS at 17:49

## 2025-03-29 ASSESSMENT — ACTIVITIES OF DAILY LIVING (ADL)
BATHING: NEEDS ASSISTANCE
GROOMING: NEEDS ASSISTANCE
WALKS IN HOME: NEEDS ASSISTANCE
JUDGMENT_ADEQUATE_SAFELY_COMPLETE_DAILY_ACTIVITIES: NO
DRESSING YOURSELF: NEEDS ASSISTANCE
TOILETING: NEEDS ASSISTANCE
HEARING - LEFT EAR: HEARING AID
FEEDING YOURSELF: INDEPENDENT
ADEQUATE_TO_COMPLETE_ADL: YES
HEARING - RIGHT EAR: HEARING AID
PATIENT'S MEMORY ADEQUATE TO SAFELY COMPLETE DAILY ACTIVITIES?: NO

## 2025-03-29 ASSESSMENT — COGNITIVE AND FUNCTIONAL STATUS - GENERAL
TURNING FROM BACK TO SIDE WHILE IN FLAT BAD: A LITTLE
MOBILITY SCORE: 14
HELP NEEDED FOR BATHING: A LITTLE
DAILY ACTIVITIY SCORE: 19
MOVING FROM LYING ON BACK TO SITTING ON SIDE OF FLAT BED WITH BEDRAILS: A LITTLE
MOVING TO AND FROM BED TO CHAIR: A LOT
DRESSING REGULAR LOWER BODY CLOTHING: A LITTLE
WALKING IN HOSPITAL ROOM: A LOT
CLIMB 3 TO 5 STEPS WITH RAILING: A LOT
STANDING UP FROM CHAIR USING ARMS: A LOT
DRESSING REGULAR UPPER BODY CLOTHING: A LITTLE
TOILETING: A LITTLE
PERSONAL GROOMING: A LITTLE

## 2025-03-29 ASSESSMENT — PAIN SCALES - PAIN ASSESSMENT IN ADVANCED DEMENTIA (PAINAD)
BODYLANGUAGE: RELAXED
FACIALEXPRESSION: SMILING OR INEXPRESSIVE
TOTALSCORE: 0
BREATHING: NORMAL
CONSOLABILITY: NO NEED TO CONSOLE

## 2025-03-29 ASSESSMENT — PAIN SCALES - WONG BAKER: WONGBAKER_NUMERICALRESPONSE: NO HURT

## 2025-03-29 ASSESSMENT — PAIN - FUNCTIONAL ASSESSMENT
PAIN_FUNCTIONAL_ASSESSMENT: WONG-BAKER FACES
PAIN_FUNCTIONAL_ASSESSMENT: 0-10
PAIN_FUNCTIONAL_ASSESSMENT: PAINAD (PAIN ASSESSMENT IN ADVANCED DEMENTIA SCALE)

## 2025-03-29 ASSESSMENT — PAIN DESCRIPTION - LOCATION: LOCATION: GENERALIZED

## 2025-03-29 ASSESSMENT — PAIN SCALES - GENERAL
PAINLEVEL_OUTOF10: 3
PAINLEVEL_OUTOF10: 0 - NO PAIN

## 2025-03-29 NOTE — CARE PLAN
The patient's goals for the shift include rest and stay warm    The clinical goals for the shift include free from falls/ injury this shift; maintain safety alarms    IVATB changed, tolerated well.  Up in recliner most of day.  Remains confused, impulsive.      Problem: Fall/Injury  Goal: Not fall by end of shift  Outcome: Met  Goal: Be free from injury by end of the shift  Outcome: Met  Goal: Verbalize understanding of personal risk factors for fall in the hospital  Outcome: Progressing  Goal: Verbalize understanding of risk factor reduction measures to prevent injury from fall in the home  Outcome: Progressing  Goal: Use assistive devices by end of the shift  Outcome: Progressing  Goal: Pace activities to prevent fatigue by end of the shift  Outcome: Progressing     Problem: Heart Failure  Goal: Improved gas exchange this shift  Outcome: Progressing  Goal: Improved urinary output this shift  Outcome: Progressing  Goal: Reduction in peripheral edema within 24 hours  Outcome: Progressing  Goal: Report improvement of dyspnea/breathlessness this shift  Outcome: Progressing  Goal: Weight from fluid excess reduced over 2-3 days, then stabilize  Outcome: Progressing  Goal: Increase self care and/or family involvement in 24 hours  Outcome: Progressing     Problem: Infection related to problem list condition  Goal: Infection will resolve through treatment  Outcome: Progressing

## 2025-03-29 NOTE — CARE PLAN
The patient's goals for the shift include rest and stay warm    The clinical goals for the shift include pt will be free of falls /injuries this shift    Pt awake most of night. Alert to name only and does not use call bell. Bed alarm on. No falls/ injuries overnight

## 2025-03-29 NOTE — PROGRESS NOTES
"Gilmer Quiñonez is a 89 y.o. male on day 1 of admission presenting with Urinary tract infection without hematuria, site unspecified.    Subjective     Patient was awake most of the night. Per RN, he was looking for his mother overnight and thought he was at home.     Patient is sitting up in bed feeding himself breakfast, daughter is at bedside this morning. Patient knows he is in the hospital. He states to the provider, \"I know I'm nuts!\" He denies any pain or shortness of breath. Daughter reports that patient appears much improved since admission. Discussed pending urine culture and continuing IV ATBs today, daughter is in agreement with plan of care. All questions answered by provider.          Objective     Physical Exam  Constitutional:       General: He is not in acute distress.     Appearance: He is not toxic-appearing.   HENT:      Head: Normocephalic and atraumatic.      Mouth/Throat:      Mouth: Mucous membranes are moist.   Eyes:      Conjunctiva/sclera: Conjunctivae normal.   Cardiovascular:      Rate and Rhythm: Normal rate and regular rhythm.   Pulmonary:      Effort: No respiratory distress.      Comments: Diminished breath sounds, on room air   Abdominal:      General: There is no distension.      Palpations: Abdomen is soft.      Tenderness: There is no abdominal tenderness.   Musculoskeletal:         General: No swelling.   Skin:     General: Skin is warm and dry.   Neurological:      Mental Status: He is alert. He is disoriented.   Psychiatric:         Mood and Affect: Mood normal.         Behavior: Behavior normal.         Last Recorded Vitals  Blood pressure 138/61, pulse 51, temperature 36.1 °C (97 °F), temperature source Temporal, resp. rate 20, height 1.702 m (5' 7\"), weight 70.8 kg (156 lb), SpO2 95%.  Intake/Output last 3 Shifts:  I/O last 3 completed shifts:  In: 620 (8.8 mL/kg) [P.O.:620]  Out: - (0 mL/kg)   Weight: 70.8 kg     Relevant Results          Scheduled medications  apixaban, " 2.5 mg, oral, BID  cefTRIAXone, 2 g, intravenous, q24h  empagliflozin, 10 mg, oral, Daily  [Held by provider] ergocalciferol, 50,000 Units, oral, Weekly  metoprolol succinate XL, 25 mg, oral, Daily  pantoprazole, 40 mg, oral, Daily before breakfast   Or  pantoprazole, 40 mg, intravenous, Daily before breakfast  rosuvastatin, 40 mg, oral, Daily  tamsulosin, 0.4 mg, oral, Daily  [Held by provider] thiamine, 100 mg, oral, Daily  thiamine, 500 mg, intravenous, TID  torsemide, 40 mg, oral, Daily      Continuous medications     PRN medications  PRN medications: acetaminophen, acetaminophen, calcium carbonate, melatonin, ondansetron **OR** ondansetron, sennosides-docusate sodium    Results for orders placed or performed during the hospital encounter of 03/27/25 (from the past 24 hours)   Basic metabolic panel   Result Value Ref Range    Glucose 90 74 - 99 mg/dL    Sodium 139 136 - 145 mmol/L    Potassium 3.5 3.5 - 5.3 mmol/L    Chloride 98 98 - 107 mmol/L    Bicarbonate 31 21 - 32 mmol/L    Anion Gap 14 10 - 20 mmol/L    Urea Nitrogen 16 6 - 23 mg/dL    Creatinine 1.50 (H) 0.50 - 1.30 mg/dL    eGFR 45 (L) >60 mL/min/1.73m*2    Calcium 9.2 8.6 - 10.3 mg/dL   CBC   Result Value Ref Range    WBC 5.9 4.4 - 11.3 x10*3/uL    nRBC 0.0 0.0 - 0.0 /100 WBCs    RBC 4.56 4.50 - 5.90 x10*6/uL    Hemoglobin 12.0 (L) 13.5 - 17.5 g/dL    Hematocrit 38.3 (L) 41.0 - 52.0 %    MCV 84 80 - 100 fL    MCH 26.3 26.0 - 34.0 pg    MCHC 31.3 (L) 32.0 - 36.0 g/dL    RDW 16.3 (H) 11.5 - 14.5 %    Platelets 176 150 - 450 x10*3/uL   Magnesium   Result Value Ref Range    Magnesium 2.05 1.60 - 2.40 mg/dL     *Note: Due to a large number of results and/or encounters for the requested time period, some results have not been displayed. A complete set of results can be found in Results Review.     CT chest high resolution    Result Date: 3/28/2025  Interpreted By:  oYnatan Ling,  and Rylie Boss STUDY: CT CHEST HIGH RESOLUTION;  3/28/2025  10:52 am   INDICATION: Signs/Symptoms:concern for pulmonary fibrosis.     COMPARISON: Chest CT dated 03/11/2025   ACCESSION NUMBER(S): [id number]   ORDERING CLINICIAN: RAE CADENA   TECHNIQUE: Using helical multidetector technique, volumetric data acquisition of the chest was obtained without intravenous administration of contrast material under the high resolution chest CT protocol. Examination includes contiguous slices through the chest in supine positioning during inspiration, noncontiguous axial slices in supine positioning during expiration and prone positioning during inspiration.   FINDINGS: LUNGS AND AIRWAYS: The trachea and central airways are patent. No endobronchial lesion is seen.   Pleural-based calcifications. Linear bandlike opacities are seen within the lung bases bilaterally which persist on prone imaging and suggest scarring. Compressive atelectatic changes are seen within the left lower lobe secondary to large hiatal hernia.   Expiratory imaging demonstrates no evidence of air-trapping. Prone imaging reveals no evidence of pulmonary fibrosis.   MEDIASTINUM AND GEO, LOWER NECK AND AXILLA: Heterogeneous appearance of the right lobe of the thyroid. The thyroid overall appears slightly enlarged. No evidence of thoracic lymphadenopathy by CT criteria. Few small subcentimeter sized mediastinal lymph nodes are felt to be reactive in nature. Mid to distal esophagus is patulous with mild long segment concentric wall thickening; correlate with concern for reflux esophagitis.   HEART AND VESSELS: The thoracic aorta normal in course and caliber.There is severe calcified atherosclerosis present. Main pulmonary artery and its branches are normal in caliber. Severe coronary artery calcifications are seen. Please note,the study is not optimized for evaluation of coronary arteries. Cardiomegaly.There is a left subclavian approach dual chamber cardiac pacemaker/ICD seen in placewith leads terminating within  right atrium and apical right ventricle. There is no pericardial effusion seen.   UPPER ABDOMEN: Large hiatal hernia. Exophytic lesion is seen within the inferior margins of the right kidney. Fatty atrophy of the pancreatic parenchyma.       CHEST WALL AND OSSEOUS STRUCTURES: Chest wall is within normal limits. Diffuse demineralization. Subchondral cystic changes within the left glenohumeral joint. Moderate multilevel discogenic degenerative changes are noted throughout the spine. Status post median sternotomy.There are no suspicious osseous lesions.       1. No evidence of pulmonary fibrosis or air trapping. 2. Bibasilar scarring and pleural-based calcifications, similar to prior. 3. Large hiatal hernia with atelectatic changes of the lung. 4. Concentric esophageal wall thickening. Correlate with clinical concern for reflux esophagitis. 5. Heterogeneous appearance of a mildly enlarged thyroid. Correlation with thyroid ultrasound is recommended if not previously obtained.   I personally reviewed the images/study and I agree with the findings as stated. This study was interpreted at [place], [place].   MACRO: None   Signed by: Yonatan Ling 3/28/2025 1:12 PM Dictation workstation:   [id number]    Electrocardiogram, 12-lead PRN ACS symptoms    Result Date: 3/28/2025  Atrial fibrillation with frequent ventricular-paced complexes and with premature ventricular or aberrantly conducted complexes Left axis deviation Nonspecific intraventricular block Minimal voltage criteria for LVH, may be normal variant ( Mars product ) Abnormal ECG When compared with ECG of 12-MAR-2025 07:25, Electronic ventricular pacemaker has replaced Atrial fibrillation Confirmed by Shakeel Neal ([id number]) on 3/28/2025 9:15:38 AM    XR chest 1 view    Result Date: 3/27/2025  STUDY: Chest Radiograph;  03/27/2025 at 15:46 hours. INDICATION: Altered mental status. COMPARISON: XR Chest 09/16/2024.  CT Chest  06/21/2020. ACCESSION NUMBER(S): [id number] ORDERING CLINICIAN: KIRK MARTINEZ TECHNIQUE:  Frontal chest was obtained at 15:46 hours. FINDINGS: CARDIOMEDIASTINAL SILHOUETTE: The cardiomediastinal silhouette is mildly enlarged slightly more prominent than on the prior with a similar configuration otherwise. There is a left subclavian approach dual-chamber AICD with leads in the projection of the right atrium and ventricle.  Sternotomy wires and mediastinal clips could also be seen previously.  There is a moderate hiatal hernia.  LUNGS: There is mild progression of interstitial markings in the lungs when compared with the prior.  Slight blunting of the right costophrenic angle suggesting a small pleural effusion was also present previously.  Bilateral calcified pleural plaque appears similar in configuration to the prior.  There is bibasilar subsegmental atelectasis versus scarring and hyperaeration also similar to the prior.  No new dense consolidation  ABDOMEN: No remarkable upper abdominal findings.  BONES: No acute osseous changes.    1.Mild enlargement of the cardiomediastinal silhouette with AICD in place. 2.Moderate hiatal hernia also present previously. 3.Mild progression of diffuse interstitial markings when compared with the prior. Findings could be on the basis of pulmonary vascular congestion or diffuse interstitial pneumonitis. 4.Chronic pleural and parenchymal changes in both lungs similar to the prior.  Bilateral calcified pleural plaque, which can be associated with asbestos exposure, this could be correlated clinically. Signed by Josy Saldivar DO    CT head wo IV contrast    Result Date: 3/27/2025  Interpreted By:  Benito Gonzales, STUDY: CT HEAD WO IV CONTRAST;  3/27/2025 3:45 pm   INDICATION: Signs/Symptoms:AMS.     COMPARISON: 03/12/2025   ACCESSION NUMBER(S): [id number]   ORDERING CLINICIAN: KIRK MARTINEZ   TECHNIQUE: Noncontrast axial CT scan of head was performed. Angled reformats in  brain and bone windows were generated. The images were reviewed in bone, brain, blood and soft tissue windows.   FINDINGS: The ventricles, cisterns and sulci are prominent, consistent with moderate to severe diffuse volume loss. There are areas of nonspecific white matter hypodensity, which are probably age-related or microvascular in nature.   Gray-white differentiation is intact and there is no evidence of acute cortical infarct. No mass, mass effect or midline shift is seen. There is no evidence of hemorrhage.   The visualized paranasal sinuses are remarkable for retention cyst or polyp in the right maxillary sinus..         No evidence of acute cortical infarct or intracranial hemorrhage.   Chronic changes as described.   MACRO: None   Signed by: Benito Gonzales 3/27/2025 3:54 PM Dictation workstation:   [id number]    ECG 12 lead    Result Date: 3/12/2025  Atrial fibrillation with premature ventricular or aberrantly conducted complexes Left axis deviation Nonspecific intraventricular block Minimal voltage criteria for LVH, may be normal variant ( Mars product ) Inferior infarct , age undetermined Abnormal ECG When compared with ECG of 11-MAR-2025 04:40, Atrial fibrillation has replaced Electronic ventricular pacemaker See ED provider note for full interpretation and clinical correlation Confirmed by Danette Elizondo ([id number]) on 3/12/2025 11:26:10 AM    CT cervical spine wo IV contrast    Result Date: 3/12/2025  Interpreted By:  José Luis Gary, STUDY: CT CERVICAL SPINE WO IV CONTRAST; ;  3/12/2025 7:15 am   INDICATION: Signs/Symptoms:trauma.   COMPARISON: None.   ACCESSION NUMBER(S): [id number]   ORDERING CLINICIAN: CYNTHIA DUBOIS   TECHNIQUE: Contiguous axial CT sections are performed from the skullbase to the upper thoracic spine and supplemented with coronal and sagittal reformatted images.   FINDINGS: There is some reversal of the cervical lordosis. The facet joints align normally. There is mild  degenerative posterior subluxation of C5 relative to C4 and C6 measuring 2 mm.   There severe changes of multilevel cervical spondylosis with disc space narrowing greatest at C3-4 and C5-6.   The osseous structures are diffusely demineralized. The cervical vertebral body heights are maintained. There is no sign of cervical vertebral fracture. There is no bone destruction or aggressive periosteal reaction. No lytic or blastic lesion is detected. The visualized surrounding osseous structures are also intact.   The C1-2 relationship is within normal limits.   The C2-3 disc space level demonstrates moderate facet arthrosis on the left and mild facet arthrosis on the right. There is no significant central canal or neural foraminal stenosis.   The C3-4 disc space level demonstrates mild to moderate bilateral facet arthrosis greater on the right. There is bulging disc and marginal osteophyte with moderate central canal narrowing and some mass effect upon the ventral spinal cord. There is mild-to-moderate narrowing of the left neural foramen and moderate narrowing of the right neural foramen.   The C4-5 disc space level demonstrates moderate bilateral facet arthrosis. There is circumferential bulging disc and marginal osteophyte with moderate central canal narrowing and some effacement of the anterior surface of the cord. There is moderate bilateral neural foraminal narrowing, greater on the right.   The C5-6 disc space level demonstrates mild to moderate bilateral facet arthrosis and posterior subluxation of C5 5. There is bulging disc and marginal osteophyte with at least moderate central canal narrowing and mass effect upon the anterior surface of the spinal cord. There is moderate to severe bilateral neural foraminal narrowing.   The C6-7 disc space level demonstrates mild bilateral facet arthrosis. There is bulging disc and marginal osteophyte with mild central canal narrowing. There is mild to moderate narrowing of  the right neural foramen and mild narrowing left neural foramen.   The C7-T1 disc space level demonstrates mild bilateral facet arthrosis. There is no central canal or neural foraminal stenosis.   There is no prevertebral soft tissue swelling or retropharyngeal air. Incidentally noted is a fat density mass in the posterior subcutaneous fat to the right of midline measuring 4.2 cm in craniocaudal diameter and 5.0 x 3.2 cm in cross-section. There is deformity of the posterior skin surface. There is a well-defined margin though strandy and some amorphous density internally.       Severe multilevel cervical spondylosis. There is mild to moderate multilevel hypertrophic facet arthrosis with mild degenerative posterior subluxation of C5.   No acute fracture or traumatic subluxation.   Osteopenia.   Multilevel bulging disc and marginal osteophyte with moderate multilevel central canal narrowing and bilateral multilevel neural foraminal narrowing as detailed above.   Lipomatous mass within the posterior subcutaneous fat at the C7-T1 level to the right of midline measuring 4.2 x 5.0 x 3.2 cm. There is some strandy linear and amorphous internal density. Characteristics are similar to a previous CT examination of 03/31/2023 though the lesion is slightly increased in size in the interval. Need for further workup should be determined clinically. Correlate with pain at this site.     MACRO: None   Signed by: José Luis Gary 3/12/2025 7:38 AM Dictation workstation:   [id number]    CT head W O contrast trauma protocol    Result Date: 3/12/2025  Interpreted By:  José Luis Gary, STUDY: CT HEAD W/O CONTRAST TRAUMA PROTOCOL; ;  3/12/2025 7:15 am   INDICATION: Signs/Symptoms:trauma.   COMPARISON: 03/11/2025   ACCESSION NUMBER(S): [id number]   ORDERING CLINICIAN: CYNTHIA DUBOIS   TECHNIQUE: Contiguous unenhanced axial CT sections are performed from the skull base to the vertex.   FINDINGS: The osseous structures are intact.  There is opacification of the right sphenoid compartment. There is lobular mucoperiosteal thickening and partial opacification of the ethmoid air cells. There is large lobular opacity in the right maxillary sinus inferiorly with some thinning and expansion of the medial wall. There is mild lobular mucoperiosteal thickening or polyposis in the left maxillary sinus as well. The frontal sinus in the left sphenoid compartment are clear. The mastoid air cells are clear.   There is severe generalized parenchymal volume loss with enlargement of the cortical sulci and CSF spaces. There is mild diffuse hypoattenuation in the cerebral white matter compatible with small-vessel ischemia.   There is no sign of parenchymal hematoma or dense extra-axial fluid collection. There is no mass effect or midline shift. The gray matter/white-matter differentiation is preserved.       Severe age-related atrophy and mild small-vessel ischemic changes of the cerebral white matter.   No CT evidence of acute intracranial hemorrhage or mass effect.   Maxillary and ethmoid sinusitis. There is some thinning and expansion of the medial wall the right maxillary sinus similar to previous studies.   No sign of acute fracture.     MACRO: None   Signed by: José Luis Gary 3/12/2025 7:28 AM Dictation workstation:   [id number]    ECG 12 lead    Result Date: 3/11/2025  Ventricular-paced rhythm with occasional AV dual-paced complexes Abnormal ECG When compared with ECG of 29-AUG-2024 18:12, Electronic ventricular pacemaker has replaced Atrial flutter See ED provider note for full interpretation and clinical correlation Confirmed by Danette Elizondo ([id number]) on 3/11/2025 11:55:45 AM    CT chest wo IV contrast    Result Date: 3/11/2025  Interpreted By:  Finkelstein, Evan, STUDY: CT CHEST WO IV CONTRAST;  3/11/2025 5:38 am   INDICATION: Signs/Symptoms:weakness.     COMPARISON: CT chest 08/27/2024   ACCESSION NUMBER(S): [id number]   ORDERING  CLINICIAN: SHELL TRINIDAD   TECHNIQUE: Axial CT images of the chest obtained  without IV contrast.  Sagittal and coronal reconstructions were created..   FINDINGS: CHEST WALL AND LOWER NECK: Left chest wall pacemaker. Gynecomastia bilaterally. UPPER ABDOMEN: No acute abnormality of the partially visualized abdomen.   VESSELS: Aorta and pulmonary artery are normal caliber. Atherosclerotic calcifications in the aorta and coronary arteries. HEART: Enlarged in size. No pericardial effusion. MEDIASTINUM AND GEO: No pathologically enlarged lymph nodes. Large hiatal hernia. LUNG, PLEURA, AND LARGE AIRWAYS: There are pleural calcifications. Redemonstrated opacities scattered throughout the lungs, predominantly curvilinear in orientation and similar compared to prior imaging.   BONES: No acute osseous abnormality. Median sternotomy wires are present.       Redemonstrated opacities scattered throughout the lungs, which are predominantly curvilinear in orientation and similar compared to prior imaging. Findings are favored to represent scarring versus atelectasis.   Large hiatal hernia.   MACRO: None.   Signed by: Evan Finkelstein 3/11/2025 6:57 AM Dictation workstation:   [id number]    CT head wo IV contrast    Result Date: 3/11/2025  Interpreted By:  Finkelstein, Evan, STUDY: CT HEAD WO IV CONTRAST; CT CERVICAL SPINE WO IV CONTRAST;  3/11/2025 5:38 am   INDICATION: Signs/Symptoms:fall.     COMPARISON: CT brain 08/29/2024   ACCESSION NUMBER(S): [id number]; [id number]   ORDERING CLINICIAN: SHELL TRINIDAD   TECHNIQUE: Noncontrast CT images of the head with coronal and sagittal reconstructions. Axial noncontrast CT images of the cervical spine with coronal and sagittal reconstructed images.   FINDINGS: EXTRACRANIAL SOFT TISSUES: Right frontal scalp and periorbital soft tissue laceration.   CALVARIUM: No depressed skull fracture. No destructive osseous lesion.   PARANASAL SINUSES/MASTOIDS: Partial opacification of the ethmoid  sinuses and right maxillary sinus. Mastoid air cells are aerated.   HEMORRHAGE: No acute intracranial hemorrhage.   BRAIN PARENCHYMA: Gray-white matter interfaces are preserved. No mass effect or midline shift. There are nonspecific scattered white matter hypodensities.   VENTRICLES and EXTRA-AXIAL SPACES: Parenchymal atrophy with prominence of the ventricles and cortical sulci.   OTHER FINDINGS: There are calcifications within the cavernous carotids   CERVICAL SPINE:   ALIGNMENT: Straightening of the normal cervical lordosis, which may be positional or related to spasm. VERTEBRAE: No acute loss of vertebral body height. Bones are demineralized DISC SPACE: Disc space narrowing C3/C4, C4/C5 and C5/C6. SPINAL CANAL: Multilevel facet and uncovertebral arthropathy with varying degrees of neural foraminal stenosis. Prominent posterior disc osteophyte complexes from C3/C4 through C5/C6 contribute to moderate central narrowing. PREVERTEBRAL SOFT TISSUES: No prevertebral soft tissue swelling. LUNG APICES: Emphysematous changes in the visualized lung apices.   OTHER FINDINGS: 4.8 x 2.9 cm encapsulated fat attenuating lesion in the posterior right neck soft tissues       Right frontal scalp and periorbital soft tissue laceration. No underlying fracture.   No acute intracranial hemorrhage, mass effect or midline shift.   Nonspecific scattered white matter hypodensities favored to represent sequela of small vessel ischemia. Cervical spondylosis without acute loss of vertebral body height or traumatic malalignment.   4.8 x 2.9 cm lipomatous lesion in the right lower neck/upper back soft tissues.   MACRO: None.   Signed by: Evan Finkelstein 3/11/2025 6:53 AM Dictation workstation:   [id number]    CT cervical spine wo IV contrast    Result Date: 3/11/2025  Interpreted By:  Finkelstein, Evan, STUDY: CT HEAD WO IV CONTRAST; CT CERVICAL SPINE WO IV CONTRAST;  3/11/2025 5:38 am   INDICATION: Signs/Symptoms:fall.     COMPARISON: CT  brain 08/29/2024   ACCESSION NUMBER(S): [id number]; [id number]   ORDERING CLINICIAN: SHELL TRINIDAD   TECHNIQUE: Noncontrast CT images of the head with coronal and sagittal reconstructions. Axial noncontrast CT images of the cervical spine with coronal and sagittal reconstructed images.   FINDINGS: EXTRACRANIAL SOFT TISSUES: Right frontal scalp and periorbital soft tissue laceration.   CALVARIUM: No depressed skull fracture. No destructive osseous lesion.   PARANASAL SINUSES/MASTOIDS: Partial opacification of the ethmoid sinuses and right maxillary sinus. Mastoid air cells are aerated.   HEMORRHAGE: No acute intracranial hemorrhage.   BRAIN PARENCHYMA: Gray-white matter interfaces are preserved. No mass effect or midline shift. There are nonspecific scattered white matter hypodensities.   VENTRICLES and EXTRA-AXIAL SPACES: Parenchymal atrophy with prominence of the ventricles and cortical sulci.   OTHER FINDINGS: There are calcifications within the cavernous carotids   CERVICAL SPINE:   ALIGNMENT: Straightening of the normal cervical lordosis, which may be positional or related to spasm. VERTEBRAE: No acute loss of vertebral body height. Bones are demineralized DISC SPACE: Disc space narrowing C3/C4, C4/C5 and C5/C6. SPINAL CANAL: Multilevel facet and uncovertebral arthropathy with varying degrees of neural foraminal stenosis. Prominent posterior disc osteophyte complexes from C3/C4 through C5/C6 contribute to moderate central narrowing. PREVERTEBRAL SOFT TISSUES: No prevertebral soft tissue swelling. LUNG APICES: Emphysematous changes in the visualized lung apices.   OTHER FINDINGS: 4.8 x 2.9 cm encapsulated fat attenuating lesion in the posterior right neck soft tissues       Right frontal scalp and periorbital soft tissue laceration. No underlying fracture.   No acute intracranial hemorrhage, mass effect or midline shift.   Nonspecific scattered white matter hypodensities favored to represent sequela of small  vessel ischemia. Cervical spondylosis without acute loss of vertebral body height or traumatic malalignment.   4.8 x 2.9 cm lipomatous lesion in the right lower neck/upper back soft tissues.   MACRO: None.   Signed by: Evan Finkelstein 3/11/2025 6:53 AM Dictation workstation:   [id number]                  Assessment/Plan   Assessment & Plan  Confusion    Urinary tract infection without hematuria, site unspecified    AICD (automatic cardioverter/defibrillator) present    Atrial fibrillation (Multi)    BPH (benign prostatic hyperplasia)    Hypokalemia    HLD (hyperlipidemia)    JONAS (acute kidney injury)    Chronic systolic (congestive) heart failure    Abnormal CXR    Vitamin D deficiency    Thyroid enlargement    #Altered mental status  #UTI  #BPH  - Patient recently admitted with frequent falls and confusion; thought to be due to renal function per neurology note. Patient was discharged home with [facility] on 3/18, sent from PCP office yesterday for confusion and dysuria. Patient does have remote history of EtOH use   - CT head: No evidence of acute cortical infarct or intracranial hemorrhage. Chronic changes as described.   - Patient unable to have MRI 2/2 AICD   - Trop 19 > 19  - Flu, COVID negative  - UA: 75 leuk esterase, 21-50 WBCs, 3+ hyaline casts  - UCx pending  - Continue ceftriaxone (day 3)  - Adjust ATBs pending final culture results  - Continue tamsulosin 0.4 mg every day for history of BPH   - Tylenol PRN for fever  - PT/OT evals- MoCA completed by OT, patient scored 9/30 (severe cognitive impairment)   - Folate, B12, syphilis screen pending per psych   - Thiamine 500 mg IV TID x 9 doses per psych recs   - SLP eval completed, recommending regular diet/thin liquids   - Aspiration precautions ordered   - Nutrition consulted, appreciate recs   - Psych consulted, assessment in progress     #Chronic systolic congestive heart failure  #Abnormal CXR   #Atrial fibrillation   #HLD  #AICD in situ   - , patient  remains on room air with no edema on exam    - Trop 19 > 19  - CXR: Mild enlargement of the cardiomediastinal silhouette with AICD   in place. Mild progression of diffuse interstitial markings when compared with the prior. Findings could be on the basis of pulmonary vascular congestion or diffuse interstitial pneumonitis. Chronic pleural and parenchymal changes in both lungs similar to the prior.  Bilateral calcified pleural plaque, which can be associated with asbestos exposure, this could be correlated clinically.   - Echo 8/2024: LVEF 25-30%, abnormal pattern of LVDF, moderate LVH, mildly reduced RV systolic function   - High resolution CT chest completed per Dr. Thomas; No evidence of pulmonary fibrosis or air trapping. Bibasilar scarring and pleural-based calcifications, similar to prior.   - Continue apixaban 2.5 mg BID, empagliflozin 10 mg every day, metoprolol succinate 25 mg every day, rosuvastatin 40 mg every day  - Torsemide 40 mg every day held due to JONAS   - 2g Na diet  - Monitor volume status      #Enlarged thyroid gland seen on CT   - High res CT chest: Heterogeneous appearance of a mildly enlarged thyroid. Correlation with thyroid ultrasound is recommended if not previously obtained.   - TSH was 0.90 in January 2025  - Thyroid US pending    #JONAS  #Hypokalemia, resolved  - Cr 1.33 > 1.16 > 1.50   - K 3.3 > 3.9 > 3.5   - Avoid nephrotoxins/renally dose meds- torsemide held   - Daily BMP    #Vitamin D deficiency   - Ergocalciferol 50,000 units not stocked by pharmacy     DVT PPx: Apixaban   GI PPx: Protonix   Diet: 2g Na   Code Status: Full      Disposition: Patient requires inpatient management at this time.          Pushpa Aparicio PA-C

## 2025-03-29 NOTE — NURSING NOTE
"Eating breakfast, family @ bedside.  PATTI in to discuss POC.    1219 up in recliner, eating lunch    1403 remains up in chair, confused / pleasant.  New IV access; IVATB infusing.  US thyroid done in room.    94332  up in chair eating dinner, dtr @ bedside.  Pt was convinced we were all @ the [facility]; this RN was finishing inventory.  Dtr whispers \"this is breaking my heart\"; offered support, reinforced this is difficult to see and we were taking good care of him.  PCNA will bring in shampoo cap and shave pt before bedtime.  "

## 2025-03-30 VITALS
DIASTOLIC BLOOD PRESSURE: 58 MMHG | SYSTOLIC BLOOD PRESSURE: 121 MMHG | WEIGHT: 156 LBS | RESPIRATION RATE: 18 BRPM | HEART RATE: 58 BPM | OXYGEN SATURATION: 96 % | BODY MASS INDEX: 24.48 KG/M2 | HEIGHT: 67 IN | TEMPERATURE: 98.2 F

## 2025-03-30 LAB
ANION GAP SERPL CALC-SCNC: 17 MMOL/L (ref 10–20)
BACTERIA UR CULT: ABNORMAL
BUN SERPL-MCNC: 23 MG/DL (ref 6–23)
CALCIUM SERPL-MCNC: 9.1 MG/DL (ref 8.6–10.3)
CHLORIDE SERPL-SCNC: 99 MMOL/L (ref 98–107)
CO2 SERPL-SCNC: 27 MMOL/L (ref 21–32)
CREAT SERPL-MCNC: 1.91 MG/DL (ref 0.5–1.3)
EGFRCR SERPLBLD CKD-EPI 2021: 34 ML/MIN/1.73M*2
ERYTHROCYTE [DISTWIDTH] IN BLOOD BY AUTOMATED COUNT: 16.2 % (ref 11.5–14.5)
GLUCOSE SERPL-MCNC: 90 MG/DL (ref 74–99)
HCT VFR BLD AUTO: 39.4 % (ref 41–52)
HGB BLD-MCNC: 12.3 G/DL (ref 13.5–17.5)
MAGNESIUM SERPL-MCNC: 2.04 MG/DL (ref 1.6–2.4)
MCH RBC QN AUTO: 26.2 PG (ref 26–34)
MCHC RBC AUTO-ENTMCNC: 31.2 G/DL (ref 32–36)
MCV RBC AUTO: 84 FL (ref 80–100)
NRBC BLD-RTO: 0 /100 WBCS (ref 0–0)
PLATELET # BLD AUTO: 178 X10*3/UL (ref 150–450)
POTASSIUM SERPL-SCNC: 3.5 MMOL/L (ref 3.5–5.3)
RBC # BLD AUTO: 4.69 X10*6/UL (ref 4.5–5.9)
SODIUM SERPL-SCNC: 139 MMOL/L (ref 136–145)
TREPONEMA PALLIDUM IGG+IGM AB [PRESENCE] IN SERUM OR PLASMA BY IMMUNOASSAY: NONREACTIVE
WBC # BLD AUTO: 5.7 X10*3/UL (ref 4.4–11.3)

## 2025-03-30 PROCEDURE — 99239 HOSP IP/OBS DSCHRG MGMT >30: CPT

## 2025-03-30 PROCEDURE — 80048 BASIC METABOLIC PNL TOTAL CA: CPT | Mod: IPSPLIT

## 2025-03-30 PROCEDURE — 2500000004 HC RX 250 GENERAL PHARMACY W/ HCPCS (ALT 636 FOR OP/ED): Mod: IPSPLIT | Performed by: PSYCHIATRY & NEUROLOGY

## 2025-03-30 PROCEDURE — 2500000001 HC RX 250 WO HCPCS SELF ADMINISTERED DRUGS (ALT 637 FOR MEDICARE OP): Mod: IPSPLIT | Performed by: HOSPITALIST

## 2025-03-30 PROCEDURE — 85027 COMPLETE CBC AUTOMATED: CPT | Mod: IPSPLIT

## 2025-03-30 PROCEDURE — 36415 COLL VENOUS BLD VENIPUNCTURE: CPT | Mod: IPSPLIT

## 2025-03-30 PROCEDURE — 2500000002 HC RX 250 W HCPCS SELF ADMINISTERED DRUGS (ALT 637 FOR MEDICARE OP, ALT 636 FOR OP/ED): Mod: IPSPLIT | Performed by: HOSPITALIST

## 2025-03-30 PROCEDURE — 83735 ASSAY OF MAGNESIUM: CPT | Mod: IPSPLIT

## 2025-03-30 PROCEDURE — 2500000004 HC RX 250 GENERAL PHARMACY W/ HCPCS (ALT 636 FOR OP/ED): Mod: IPSPLIT | Performed by: NURSE PRACTITIONER

## 2025-03-30 RX ORDER — TORSEMIDE 20 MG/1
40 TABLET ORAL DAILY
Start: 2025-03-30

## 2025-03-30 RX ORDER — HALOPERIDOL LACTATE 5 MG/ML
2 INJECTION, SOLUTION INTRAMUSCULAR EVERY 4 HOURS PRN
Status: DISCONTINUED | OUTPATIENT
Start: 2025-03-30 | End: 2025-03-30 | Stop reason: HOSPADM

## 2025-03-30 RX ORDER — LEVOFLOXACIN 250 MG/1
250 TABLET ORAL EVERY 24 HOURS
Qty: 6 TABLET | Refills: 0 | Status: SHIPPED | OUTPATIENT
Start: 2025-03-30 | End: 2025-04-05

## 2025-03-30 RX ORDER — LANOLIN ALCOHOL/MO/W.PET/CERES
100 CREAM (GRAM) TOPICAL DAILY
Qty: 30 TABLET | Refills: 0 | Status: SHIPPED | OUTPATIENT
Start: 2025-03-31

## 2025-03-30 RX ADMIN — THIAMINE HYDROCHLORIDE 500 MG: 100 INJECTION, SOLUTION INTRAMUSCULAR; INTRAVENOUS at 09:30

## 2025-03-30 RX ADMIN — APIXABAN 2.5 MG: 2.5 TABLET, FILM COATED ORAL at 09:29

## 2025-03-30 RX ADMIN — TAMSULOSIN HYDROCHLORIDE 0.4 MG: 0.4 CAPSULE ORAL at 09:29

## 2025-03-30 RX ADMIN — ROSUVASTATIN CALCIUM 40 MG: 10 TABLET, FILM COATED ORAL at 09:28

## 2025-03-30 RX ADMIN — METOPROLOL SUCCINATE 25 MG: 25 TABLET, EXTENDED RELEASE ORAL at 09:29

## 2025-03-30 RX ADMIN — PANTOPRAZOLE SODIUM 40 MG: 40 INJECTION, POWDER, LYOPHILIZED, FOR SOLUTION INTRAVENOUS at 06:32

## 2025-03-30 ASSESSMENT — COGNITIVE AND FUNCTIONAL STATUS - GENERAL
CLIMB 3 TO 5 STEPS WITH RAILING: A LITTLE
STANDING UP FROM CHAIR USING ARMS: A LITTLE
DRESSING REGULAR LOWER BODY CLOTHING: A LITTLE
TOILETING: A LITTLE
WALKING IN HOSPITAL ROOM: A LITTLE
EATING MEALS: A LITTLE
MOBILITY SCORE: 19
HELP NEEDED FOR BATHING: A LITTLE
PERSONAL GROOMING: A LITTLE
DAILY ACTIVITIY SCORE: 18
DRESSING REGULAR UPPER BODY CLOTHING: A LITTLE
TURNING FROM BACK TO SIDE WHILE IN FLAT BAD: A LITTLE
MOVING TO AND FROM BED TO CHAIR: A LITTLE

## 2025-03-30 ASSESSMENT — PAIN SCALES - GENERAL
PAINLEVEL_OUTOF10: 0 - NO PAIN
PAINLEVEL_OUTOF10: 0 - NO PAIN

## 2025-03-30 ASSESSMENT — PAIN SCALES - WONG BAKER: WONGBAKER_NUMERICALRESPONSE: NO HURT

## 2025-03-30 ASSESSMENT — PAIN - FUNCTIONAL ASSESSMENT
PAIN_FUNCTIONAL_ASSESSMENT: 0-10
PAIN_FUNCTIONAL_ASSESSMENT: 0-10

## 2025-03-30 NOTE — CARE PLAN
The patient's goals for the shift include unable to assess; patient sleeping.    The clinical goals for the shift include sitter at bedside, reorient patient with every intervention, assist with ADLs as needed, bed alarm, promote rest & comfort, aspiration precautions, bundle care,   promote hygiene, and provide minimal stimulation.

## 2025-03-30 NOTE — NURSING NOTE
Educated daughter on patients discharge and medications. Daughter stated she was not going to give him any medications except his antibiotics and eliquis.

## 2025-03-30 NOTE — CARE PLAN
Problem: Fall/Injury  Goal: Verbalize understanding of personal risk factors for fall in the hospital  Outcome: Not Progressing     Problem: Fall/Injury  Goal: Use assistive devices by end of the shift  Outcome: Progressing     Problem: Heart Failure  Goal: Improved urinary output this shift  Outcome: Progressing     Problem: Infection related to problem list condition  Goal: Infection will resolve through treatment  Outcome: Progressing     Problem: Pain - Adult  Goal: Verbalizes/displays adequate comfort level or baseline comfort level  Outcome: Met     Problem: Heart Failure  Goal: Improved gas exchange this shift  Outcome: Met  Goal: Reduction in peripheral edema within 24 hours  Outcome: Met   The patient's goals for the shift include rest and stay warm    The clinical goals for the shift include Pt will have no falls this shift.    Over the shift, the patient did make progress toward the following goals.     Problem: Fall/Injury  Goal: Verbalize understanding of personal risk factors for fall in the hospital  Outcome: Not Progressing

## 2025-03-30 NOTE — NURSING NOTE
1020 - Spoke to daughter on the phone, she wants patient to go home not to rehab. Notified provider.

## 2025-03-30 NOTE — CARE PLAN
The patient's goals for the shift include rest and stay warm    The clinical goals for the shift include sitter at bedside, reorient patient with every intervention, assist with ADLs as needed, bed alarm, promote rest & comfort, aspiration precautions, bundle care,   promote hygiene, and provide minimal stimulation.    Problem: Discharge Planning  Goal: Discharge to home or other facility with appropriate resources  Outcome: Progressing     Problem: Chronic Conditions and Co-morbidities  Goal: Patient's chronic conditions and co-morbidity symptoms are monitored and maintained or improved  Outcome: Progressing     Problem: Nutrition  Goal: Nutrient intake appropriate for maintaining nutritional needs  Outcome: Progressing     Problem: Fall/Injury  Goal: Verbalize understanding of personal risk factors for fall in the hospital  Outcome: Progressing  Goal: Verbalize understanding of risk factor reduction measures to prevent injury from fall in the home  Outcome: Progressing  Goal: Use assistive devices by end of the shift  Outcome: Progressing  Goal: Pace activities to prevent fatigue by end of the shift  Outcome: Progressing     Problem: Heart Failure  Goal: Improved urinary output this shift  Outcome: Progressing  Goal: Report improvement of dyspnea/breathlessness this shift  Outcome: Progressing  Goal: Weight from fluid excess reduced over 2-3 days, then stabilize  Outcome: Progressing  Goal: Increase self care and/or family involvement in 24 hours  Outcome: Progressing     Problem: Infection related to problem list condition  Goal: Infection will resolve through treatment  Outcome: Progressing     Problem: Mobility  Goal: Maintains or increases mobility and physical activity  Outcome: Progressing     Problem: Family/Caregiver Engagement  Goal: Family members and/or caregivers are engaged in care delivery process  Outcome: Progressing     Problem: Sleep  Goal: Patient obtains adequate amount of sleep  Outcome:  Progressing     Problem: Agitation  Goal: Patient experiences decreased agitation  Outcome: Progressing     Problem: Hydration  Goal: Patient maintains adequate hydration  Outcome: Progressing

## 2025-03-30 NOTE — DISCHARGE SUMMARY
Discharge Diagnosis  Urinary tract infection without hematuria, site unspecified    Issues Requiring Follow-Up    BMP to be drawn on 4/3 before PCP follow up appointment    Hold torsemide until you follow up with your PCP     Discharge Meds     Medication List      START taking these medications     levoFLOXacin 250 mg tablet; Commonly known as: Levaquin; Take 1 tablet   (250 mg) by mouth once every 24 hours for 6 days.   thiamine 100 mg tablet; Commonly known as: Vitamin B-1; Take 1 tablet   (100 mg) by mouth once daily. Do not fill before March 31, 2025.; Start   taking on: March 31, 2025     CHANGE how you take these medications     torsemide 20 mg tablet; Commonly known as: Demadex; Take 2 tablets (40   mg) by mouth once daily. Hold this medication on discharge until you have   repeat labs to monitor renal function; What changed: additional   instructions     CONTINUE taking these medications     apixaban 2.5 mg tablet; Commonly known as: Eliquis; Take 1 tablet (2.5   mg) by mouth 2 times a day.   ergocalciferol 1250 mcg (50,000 units) capsule; Commonly known as:   Vitamin D-2; Take 1 capsule (50,000 Units) by mouth 1 (one) time per week.   metoprolol succinate XL 25 mg 24 hr tablet; Commonly known as:   Toprol-XL; Take 1 tablet (25 mg) by mouth once daily.   rosuvastatin 40 mg tablet; Commonly known as: Crestor; Take 1 tablet (40   mg) by mouth once daily.   tamsulosin 0.4 mg 24 hr capsule; Commonly known as: Flomax; Take 1   capsule (0.4 mg) by mouth once daily.     STOP taking these medications     empagliflozin 10 mg tablet; Commonly known as: Jardiance       Test Results Pending At Discharge  Pending Labs       Order Current Status    Extra Urine Gray Tube Collected (03/27/25 9808)    Urinalysis with Reflex Culture and Microscopic In process          History Of Present Illness  Gilmer Quiñonez is a 89 y.o. male presenting with confusion. Patient was recently admitted to [facility] on 3/12 after he had  multiple falls at home with worsening confusion. Patient was unable to have MRI completed at [place] due to presence of AICD. He was transferred to [place] on 3/17 to be seen by neurology per family request. Per neurology note, confusion was thought to be metabolic encephalopathy due to fluctuating renal function; no additional neurological workup was recommended at that time. Patient was discharged home with [facility] on 3/18. He was being seen by his PCP (Dr. Adames) in the office yesterday with reported dysuria and worsening confusion. He was sent to the ED for further evaluation. He remained afebrile/HDS on room air. ED labs were significant for K 3.3, Cr 1.33. UA indicative of infection with 75 leuk esterase, 21-50 WBCs, rare WBC clumps. CT head showed chronic changes; no acute infarct or hemorrhage. CXR showed cardiomegaly with diffuse interstitial markings, possibly due to pulmonary vascular congestion versus interstitial pneumonitis. Patient was started on ceftriaxone for UTI and admitted to med/surg.     Hospital Course     Patient's urine culture grew enterococcus faecalis; antibiotics were changed to Levaquin on 3/29. Patient's mentation fluctuated throughout admission. He was evaluated by PT/OT. MoCa was completed and patient scored 9/30 (severe cognitive impairment). His daughter continues to decline any home care services or rehab placement and prefers to take the patient home. Patient's Cr increased up to 1.90, torsemide to be held on discharge. Patient is discharged on Levaquin to complete a total of 7 days of therapy for UTI. He will have labs drawn before his PCP follow up on 4/3 to monitor renal function. Daughter in agreement with discharge plan, all questions answered. Patient is alert and oriented on provider exam on the day of discharge.     Disposition: Patient stable for discharge with outpatient follow up     Pertinent Physical Exam At Time of Discharge  Physical Exam  Constitutional:       General:  He is not in acute distress.     Appearance: He is not toxic-appearing.   HENT:      Head: Normocephalic and atraumatic.      Mouth/Throat:      Mouth: Mucous membranes are moist.   Eyes:      Conjunctiva/sclera: Conjunctivae normal.   Cardiovascular:      Rate and Rhythm: Normal rate and regular rhythm.   Pulmonary:      Effort: No respiratory distress.      Breath sounds: Normal breath sounds.   Abdominal:      General: There is no distension.      Palpations: Abdomen is soft.      Tenderness: There is no abdominal tenderness.   Musculoskeletal:         General: No swelling.   Skin:     General: Skin is warm and dry.   Neurological:      Mental Status: He is alert and oriented to person, place, and time.   Psychiatric:         Mood and Affect: Mood normal.         Behavior: Behavior normal.         Outpatient Follow-Up  Future Appointments   Date Time Provider Department Center   4/2/2025 To Be Determined Jhoana Mojica PT [facility]   4/3/2025  2:15 PM Beronica Adames MD [id number]   4/4/2025 To Be Determined Jhoana Mojica PT [facility]   4/9/2025 To Be Determined Jhoana Mojica PT [facility]   4/11/2025 To Be Determined Jhoana Mojica PT [facility]         Pushpa Aparicio PA-C

## 2025-03-31 ENCOUNTER — HOME CARE VISIT (OUTPATIENT)
Dept: HOME HEALTH SERVICES | Facility: HOME HEALTH | Age: 89
End: 2025-03-31
Payer: MEDICARE

## 2025-03-31 ENCOUNTER — TELEPHONE (OUTPATIENT)
Dept: PRIMARY CARE | Facility: CLINIC | Age: 89
End: 2025-03-31
Payer: MEDICARE

## 2025-03-31 NOTE — TELEPHONE ENCOUNTER
Transition of Care    Inpatient facility: [facility]  Discharge diagnosis: UTI  Discharged to: home  Discharge date: 3/30/25  Initial Call date: 3/31/25  Spoke with patient/caregiver: Caregiver                                                                     Do you need assistance  visits prior to your PCP visit: No  Home health care ordered: No  Have you been contacted by home care and have a start of care date: No  Are you taking medications as prescribed at discharge: Yes    Referral to [facility] Pharmacist: No  Patient advised to bring all medications to PCP follow-up appointment.  Patient advised to follow discharge instructions until provider follow-up.  TCM visit date: 4/3/25  TCM provider visit with: BI Adames MD

## 2025-04-03 ENCOUNTER — APPOINTMENT (OUTPATIENT)
Dept: PRIMARY CARE | Facility: CLINIC | Age: 89
End: 2025-04-03
Payer: MEDICARE

## 2025-04-03 VITALS
HEART RATE: 70 BPM | TEMPERATURE: 96 F | OXYGEN SATURATION: 98 % | SYSTOLIC BLOOD PRESSURE: 116 MMHG | DIASTOLIC BLOOD PRESSURE: 62 MMHG | WEIGHT: 156.8 LBS | BODY MASS INDEX: 24.56 KG/M2

## 2025-04-03 DIAGNOSIS — E55.9 VITAMIN D DEFICIENCY: ICD-10-CM

## 2025-04-03 DIAGNOSIS — I50.43 CHF (CONGESTIVE HEART FAILURE), NYHA CLASS II, ACUTE ON CHRONIC, COMBINED: ICD-10-CM

## 2025-04-03 DIAGNOSIS — G62.9 NEUROPATHY: ICD-10-CM

## 2025-04-03 DIAGNOSIS — N39.0 URINARY TRACT INFECTION WITHOUT HEMATURIA, SITE UNSPECIFIED: ICD-10-CM

## 2025-04-03 DIAGNOSIS — I10 HYPERTENSION, UNSPECIFIED TYPE: Primary | ICD-10-CM

## 2025-04-03 DIAGNOSIS — N18.31 STAGE 3A CHRONIC KIDNEY DISEASE (MULTI): ICD-10-CM

## 2025-04-03 PROCEDURE — 1111F DSCHRG MED/CURRENT MED MERGE: CPT | Performed by: INTERNAL MEDICINE

## 2025-04-03 PROCEDURE — 3074F SYST BP LT 130 MM HG: CPT | Performed by: INTERNAL MEDICINE

## 2025-04-03 PROCEDURE — 1160F RVW MEDS BY RX/DR IN RCRD: CPT | Performed by: INTERNAL MEDICINE

## 2025-04-03 PROCEDURE — 99496 TRANSJ CARE MGMT HIGH F2F 7D: CPT | Performed by: INTERNAL MEDICINE

## 2025-04-03 PROCEDURE — 1159F MED LIST DOCD IN RCRD: CPT | Performed by: INTERNAL MEDICINE

## 2025-04-03 PROCEDURE — 3078F DIAST BP <80 MM HG: CPT | Performed by: INTERNAL MEDICINE

## 2025-04-03 NOTE — PROGRESS NOTES
Subjective   Patient ID: Gilmer Quiñonez is a 89 y.o. male who presents for Hospital Follow-up (TCM).    Transition of care  Patient admission history and physical, hospital course, medications, verified and reviewed  Patient contacted after discharge, medications verified comes today for follow-up    Inpatient facility: [facility]  Discharge diagnosis: UTI  Discharged to: home  Discharge date: 3/30/25  Initial Call date: 3/31/25  Spoke with patient/caregiver: Caregiver                                                                      Do you need assistance  visits prior to your PCP visit: No  Home health care ordered: No  Have you been contacted by home care and have a start of care date: No  Are you taking medications as prescribed at discharge: Yes     Referral to [facility] Pharmacist: No  Patient advised to bring all medications to PCP follow-up appointment.  Patient advised to follow discharge instructions until provider follow-up.  TCM visit date: 4/3/25  TCM provider visit with: BI Adames MD  -Patient admitted to the hospital for resistant E faecalis UTI treated with antibiotics discharged home on Levaquin patient confusion change in mental condition improved  Jardiance discontinued due to recurrent UTI      -Patient brought today by his daughter patient is stable and improving alert and oriented  -Sepsis due to urinary tract infection E faecalis continue Levaquin to 250 mg  - Worsening renal failure patient to follow-up BMP and CBC  - BPH uncontrolled patient now on Flomax  -Recent history of pneumonia and right pleural effusion need to follow-up x-ray  - Status post acute renal failure follow-up BMP  -Ischemic cardiomyopathy EF 25% patient has AICD  - Discontinue Jardiance 10 mg due to recurrent UTI  Continue on torsemide and torsemide 20 mg 2 tablets daily follow-up electrolyte as needed follow-up electrolytes  -Chronic atrial fibrillation continue with Eliquis 2.5 twice  a day metoprolol 25 mg daily.,  Follow-up CBC  --Hypercholesterolemia continue rosuvastatin continue low-fat diet  Continue monitoring blood work closely follow-up 4 weeks           Review of Systems   Constitutional:  Negative for fatigue, fever and unexpected weight change.   HENT:  Negative for congestion, ear discharge, ear pain, mouth sores, sinus pain and sore throat.    Eyes:  Negative for visual disturbance.   Respiratory:  Negative for cough and wheezing.    Cardiovascular:  Negative for chest pain, palpitations and leg swelling.   Gastrointestinal:  Negative for abdominal pain, blood in stool and diarrhea.   Genitourinary:  Negative for difficulty urinating.   Musculoskeletal:  Negative for arthralgias.   Skin:  Negative for rash.   Neurological:  Negative for dizziness and headaches.   Hematological:  Does not bruise/bleed easily.   Psychiatric/Behavioral:  Negative for behavioral problems.    All other systems reviewed and are negative.      Objective   Lab Results   Component Value Date    HGBA1C 5.8 (H) 01/06/2025      /62   Pulse 70   Temp 35.6 °C (96 °F)   Wt 71.1 kg (156 lb 12.8 oz)   SpO2 98%   BMI 24.56 kg/m²     Physical Exam  Vitals and nursing note reviewed.   Constitutional:       Appearance: Normal appearance.   HENT:      Head: Normocephalic.      Nose: Nose normal.   Eyes:      Conjunctiva/sclera: Conjunctivae normal.      Pupils: Pupils are equal, round, and reactive to light.   Cardiovascular:      Rate and Rhythm: Regular rhythm.   Pulmonary:      Effort: Pulmonary effort is normal.      Breath sounds: Normal breath sounds.   Abdominal:      General: Abdomen is flat.      Palpations: Abdomen is soft.   Musculoskeletal:      Cervical back: Neck supple.   Skin:     General: Skin is warm.   Neurological:      General: No focal deficit present.      Mental Status: He is oriented to person, place, and time.   Psychiatric:         Mood and Affect: Mood normal.          Assessment/Plan   Gilmer was seen today for hospital follow-up.  Diagnoses and all orders for this visit:  Hypertension, unspecified type (Primary)  Urinary tract infection without hematuria, site unspecified  -     Referral to Primary Care  Stage 3a chronic kidney disease (Multi)  CHF (congestive heart failure), NYHA class II, acute on chronic, combined  Vitamin D deficiency  Other orders  -     Follow Up In Primary Care - Established; Future   Transition of care  Patient admission history and physical, hospital course, medications, verified and reviewed  Patient contacted after discharge, medications verified comes today for follow-up    Inpatient facility: [facility]  Discharge diagnosis: UTI  Discharged to: home  Discharge date: 3/30/25  Initial Call date: 3/31/25  Spoke with patient/caregiver: Caregiver                                                                      Do you need assistance  visits prior to your PCP visit: No  Home health care ordered: No  Have you been contacted by home care and have a start of care date: No  Are you taking medications as prescribed at discharge: Yes     Referral to [facility] Pharmacist: No  Patient advised to bring all medications to PCP follow-up appointment.  Patient advised to follow discharge instructions until provider follow-up.  TCM visit date: 4/3/25  TCM provider visit with: BI Adames MD  -Patient admitted to the hospital for resistant E faecalis UTI treated with antibiotics discharged home on Levaquin patient confusion change in mental condition improved  Jardiance discontinued due to recurrent UTI      -Patient brought today by his daughter patient is stable and improving alert and oriented  -Sepsis due to urinary tract infection E faecalis continue Levaquin to 250 mg  - Worsening renal failure patient to follow-up BMP and CBC  - BPH uncontrolled patient now on Flomax  -Recent history of pneumonia and right pleural effusion  need to follow-up x-ray  - Status post acute renal failure follow-up BMP  -Ischemic cardiomyopathy EF 25% patient has AICD  - Discontinue Jardiance 10 mg due to recurrent UTI  Continue on torsemide and torsemide 20 mg 2 tablets daily follow-up electrolyte as needed follow-up electrolytes  -Chronic atrial fibrillation continue with Eliquis 2.5 twice a day metoprolol 25 mg daily.,  Follow-up CBC  --Hypercholesterolemia continue rosuvastatin continue low-fat diet  Continue monitoring blood work closely follow-up 4 weeks

## 2025-04-04 ENCOUNTER — PATIENT OUTREACH (OUTPATIENT)
Dept: CARE COORDINATION | Facility: CLINIC | Age: 89
End: 2025-04-04
Payer: MEDICARE

## 2025-04-04 DIAGNOSIS — N18.31 STAGE 3A CHRONIC KIDNEY DISEASE (MULTI): ICD-10-CM

## 2025-04-04 ASSESSMENT — ENCOUNTER SYMPTOMS
BLOOD IN STOOL: 0
SORE THROAT: 0
WHEEZING: 0
PALPITATIONS: 0
HEADACHES: 0
UNEXPECTED WEIGHT CHANGE: 0
ABDOMINAL PAIN: 0
SINUS PAIN: 0
DIFFICULTY URINATING: 0
DIARRHEA: 0
BRUISES/BLEEDS EASILY: 0
DIZZINESS: 0
COUGH: 0
ARTHRALGIAS: 0
FEVER: 0
FATIGUE: 0

## 2025-04-04 NOTE — PROGRESS NOTES
[facility]  Admitted 3/27/2025  Discharged 3/30/2025  Dx: UTI, Altered Mental Status    Outreach call to Catherine check in on Gilmer to support a smooth transition of care from recent admission and discuss primary care provider visit.  Left voicemail message with CM name and contact number. Will continue to monitor through transition period.    Minal Hdez RN/CM   [facility]  [phone]

## 2025-04-07 LAB
Q ONSET: 211 MS
QRS COUNT: 12 BEATS
QRS DURATION: 168 MS
QT INTERVAL: 428 MS
QTC CALCULATION(BAZETT): 452 MS
QTC FREDERICIA: 444 MS
R AXIS: -59 DEGREES
T AXIS: 22 DEGREES
T OFFSET: 425 MS
VENTRICULAR RATE: 67 BPM

## 2025-04-07 NOTE — ED PROVIDER NOTES
Chief Complaint: Sent in by primary care for confusion  HPI: This is an 89-year-old male, presenting to the emergency department from his primary care provider's office for evaluation of dizziness, confusion, and per primary care provider being different from his baseline.  Patient does have dementia at baseline, however the patient PCP did call this provider, and states that he is much more confused than his baseline.  Patient was recently admitted for JONAS as well as UTI, and was at his PCPs office for follow-up post admission.  Patient does not have any specific complaints at this time, however does seem slightly confused.  Further history is unable to be obtained secondary to patient mental status.    Past Medical History:   Diagnosis Date    Acute right MCA stroke (Multi)     Angiodysplasia of intestine     Atrial fibrillation and flutter     Chronic systolic heart failure     COPD (chronic obstructive pulmonary disease) (Multi)     Coronary artery disease     Dementia     GI bleed     HLD (hyperlipidemia)     Hypertension       Past Surgical History:   Procedure Laterality Date    APPENDECTOMY  02/06/2015    Appendectomy    CARDIAC DEFIBRILLATOR PLACEMENT      CORONARY ARTERY BYPASS GRAFT  07/17/2017    CABG    CT ABDOMEN PELVIS ANGIOGRAM W AND/OR WO IV CONTRAST  02/28/2022    CT ABDOMEN PELVIS ANGIOGRAM W AND/OR WO IV CONTRAST 2/28/2022 [facility]    CT ANGIO NECK  04/01/2023    CT NECK ANGIO W AND WO IV CONTRAST GEN CT    CT HEAD ANGIO W AND WO IV CONTRAST  04/01/2023    CT HEAD ANGIO W AND WO IV CONTRAST GEN CT    OTHER SURGICAL HISTORY  01/06/2020    Coronary artery stent placement    TOTAL KNEE ARTHROPLASTY Left     TOTAL KNEE ARTHROPLASTY Right        Physical Exam  Vitals reviewed.   Constitutional:       Appearance: Normal appearance.   HENT:      Head: Normocephalic and atraumatic.      Mouth/Throat:      Mouth: Mucous membranes are moist.   Eyes:      Extraocular Movements: Extraocular  movements intact.   Pulmonary:      Effort: Pulmonary effort is normal.   Abdominal:      General: Abdomen is flat.      Palpations: Abdomen is soft.   Skin:     General: Skin is warm.   Neurological:      General: No focal deficit present.      Mental Status: He is alert.   Psychiatric:         Mood and Affect: Mood normal.        ED Course/MDM  Diagnoses as of 04/07/25 0635   Urinary tract infection without hematuria, site unspecified   Altered mental status, unspecified altered mental status type     EKG interpreted by myself (ED attending physician): A-fib, ventricular paced, rate of 67, left axis deviation, wide QRS consistent with paced rhythm, no apparent ischemic changes    This is a 89 y.o. male presenting to the ED For evaluation of altered mental status in his primary care's office.  Patient does not have any specific complaints at this time, however PCP did call this provider, with concern of his mental status in their office after a inpatient admission for JONAS and UTI.  On physical exam, the patient was resting comfortably in the bed, no acute distress.  Heart is regular rate and rhythm, lungs are clear to auscultation bilaterally.  Urine did still show evidence of urinary tract infection, however remainder of the lab work is overall grossly unremarkable.  Patient was given IV Rocephin for the UTI, which is likely the cause of his altered mental status.  CT head and chest x-ray were also done, were grossly unremarkable.  I do feel the patient will require admission, with plan for likely placement.  Patient was admitted in stable condition.    Final Impression  1.  UTI  2.  Altered mental status  Disposition/Plan: Admit  Condition at disposition: Stable.     Muriel Oscar DO  Emergency Medicine Physician     Muriel Oscar DO  04/07/25 0651

## 2025-05-01 ENCOUNTER — PATIENT OUTREACH (OUTPATIENT)
Dept: CARE COORDINATION | Facility: CLINIC | Age: 89
End: 2025-05-01
Payer: MEDICARE

## 2025-05-01 NOTE — PROGRESS NOTES
Outreach call to patient to check in 30 days after hospital discharge to support smooth transition of care.  Spoke with Catherine (POBIN), Gilmer is doing ,much better. His mental status has not returned to baseline but it has improved. Karent with no additional needs noted. No additional outreach needed at this time.     CM will close case    Minal Hdez RN/ROSALVA  [facility]  [phone]

## 2025-05-02 ENCOUNTER — APPOINTMENT (OUTPATIENT)
Dept: CARDIOLOGY | Facility: HOSPITAL | Age: 89
End: 2025-05-02
Payer: MEDICARE

## 2025-05-02 ENCOUNTER — APPOINTMENT (OUTPATIENT)
Dept: RADIOLOGY | Facility: HOSPITAL | Age: 89
End: 2025-05-02
Payer: MEDICARE

## 2025-05-02 ENCOUNTER — HOSPITAL ENCOUNTER (EMERGENCY)
Facility: HOSPITAL | Age: 89
Discharge: HOME | End: 2025-05-02
Attending: STUDENT IN AN ORGANIZED HEALTH CARE EDUCATION/TRAINING PROGRAM
Payer: MEDICARE

## 2025-05-02 VITALS
SYSTOLIC BLOOD PRESSURE: 148 MMHG | OXYGEN SATURATION: 98 % | BODY MASS INDEX: 25.05 KG/M2 | HEART RATE: 71 BPM | DIASTOLIC BLOOD PRESSURE: 82 MMHG | TEMPERATURE: 97.9 F | HEIGHT: 67 IN | WEIGHT: 159.61 LBS | RESPIRATION RATE: 18 BRPM

## 2025-05-02 DIAGNOSIS — N48.1 BALANITIS: Primary | ICD-10-CM

## 2025-05-02 LAB
ALBUMIN SERPL BCP-MCNC: 3.5 G/DL (ref 3.4–5)
ALP SERPL-CCNC: 53 U/L (ref 33–136)
ALT SERPL W P-5'-P-CCNC: 11 U/L (ref 10–52)
ANION GAP SERPL CALC-SCNC: 11 MMOL/L (ref 10–20)
APPEARANCE UR: CLEAR
AST SERPL W P-5'-P-CCNC: 23 U/L (ref 9–39)
BASOPHILS # BLD AUTO: 0.02 X10*3/UL (ref 0–0.1)
BASOPHILS NFR BLD AUTO: 0.4 %
BILIRUB SERPL-MCNC: 0.5 MG/DL (ref 0–1.2)
BILIRUB UR STRIP.AUTO-MCNC: NEGATIVE MG/DL
BUN SERPL-MCNC: 9 MG/DL (ref 6–23)
CALCIUM SERPL-MCNC: 7.8 MG/DL (ref 8.6–10.3)
CARDIAC TROPONIN I PNL SERPL HS: 13 NG/L (ref 0–20)
CARDIAC TROPONIN I PNL SERPL HS: 15 NG/L (ref 0–20)
CHLORIDE SERPL-SCNC: 108 MMOL/L (ref 98–107)
CO2 SERPL-SCNC: 24 MMOL/L (ref 21–32)
COLOR UR: YELLOW
CREAT SERPL-MCNC: 0.91 MG/DL (ref 0.5–1.3)
EGFRCR SERPLBLD CKD-EPI 2021: 82 ML/MIN/1.73M*2
EOSINOPHIL # BLD AUTO: 0.29 X10*3/UL (ref 0–0.4)
EOSINOPHIL NFR BLD AUTO: 5.4 %
ERYTHROCYTE [DISTWIDTH] IN BLOOD BY AUTOMATED COUNT: 16.9 % (ref 11.5–14.5)
GLUCOSE SERPL-MCNC: 83 MG/DL (ref 74–99)
GLUCOSE UR STRIP.AUTO-MCNC: ABNORMAL MG/DL
HCT VFR BLD AUTO: 38.3 % (ref 41–52)
HGB BLD-MCNC: 11.7 G/DL (ref 13.5–17.5)
HYALINE CASTS #/AREA URNS AUTO: ABNORMAL /LPF
IMM GRANULOCYTES # BLD AUTO: 0.02 X10*3/UL (ref 0–0.5)
IMM GRANULOCYTES NFR BLD AUTO: 0.4 % (ref 0–0.9)
KETONES UR STRIP.AUTO-MCNC: NEGATIVE MG/DL
LEUKOCYTE ESTERASE UR QL STRIP.AUTO: NEGATIVE
LYMPHOCYTES # BLD AUTO: 1.07 X10*3/UL (ref 0.8–3)
LYMPHOCYTES NFR BLD AUTO: 19.9 %
MCH RBC QN AUTO: 26.5 PG (ref 26–34)
MCHC RBC AUTO-ENTMCNC: 30.5 G/DL (ref 32–36)
MCV RBC AUTO: 87 FL (ref 80–100)
MONOCYTES # BLD AUTO: 0.48 X10*3/UL (ref 0.05–0.8)
MONOCYTES NFR BLD AUTO: 8.9 %
MUCOUS THREADS #/AREA URNS AUTO: ABNORMAL /LPF
NEUTROPHILS # BLD AUTO: 3.49 X10*3/UL (ref 1.6–5.5)
NEUTROPHILS NFR BLD AUTO: 65 %
NITRITE UR QL STRIP.AUTO: NEGATIVE
NRBC BLD-RTO: 0 /100 WBCS (ref 0–0)
PH UR STRIP.AUTO: 6 [PH]
PLATELET # BLD AUTO: 139 X10*3/UL (ref 150–450)
POTASSIUM SERPL-SCNC: 4.3 MMOL/L (ref 3.5–5.3)
PROT SERPL-MCNC: 5.9 G/DL (ref 6.4–8.2)
PROT UR STRIP.AUTO-MCNC: ABNORMAL MG/DL
RBC # BLD AUTO: 4.42 X10*6/UL (ref 4.5–5.9)
RBC # UR STRIP.AUTO: ABNORMAL MG/DL
RBC #/AREA URNS AUTO: ABNORMAL /HPF
SODIUM SERPL-SCNC: 139 MMOL/L (ref 136–145)
SP GR UR STRIP.AUTO: 1.02
UROBILINOGEN UR STRIP.AUTO-MCNC: ABNORMAL MG/DL
WBC # BLD AUTO: 5.4 X10*3/UL (ref 4.4–11.3)
WBC #/AREA URNS AUTO: ABNORMAL /HPF

## 2025-05-02 PROCEDURE — 93005 ELECTROCARDIOGRAM TRACING: CPT

## 2025-05-02 PROCEDURE — 36415 COLL VENOUS BLD VENIPUNCTURE: CPT | Performed by: STUDENT IN AN ORGANIZED HEALTH CARE EDUCATION/TRAINING PROGRAM

## 2025-05-02 PROCEDURE — 2500000001 HC RX 250 WO HCPCS SELF ADMINISTERED DRUGS (ALT 637 FOR MEDICARE OP): Performed by: STUDENT IN AN ORGANIZED HEALTH CARE EDUCATION/TRAINING PROGRAM

## 2025-05-02 PROCEDURE — 99285 EMERGENCY DEPT VISIT HI MDM: CPT | Performed by: STUDENT IN AN ORGANIZED HEALTH CARE EDUCATION/TRAINING PROGRAM

## 2025-05-02 PROCEDURE — 80053 COMPREHEN METABOLIC PANEL: CPT | Performed by: STUDENT IN AN ORGANIZED HEALTH CARE EDUCATION/TRAINING PROGRAM

## 2025-05-02 PROCEDURE — 71045 X-RAY EXAM CHEST 1 VIEW: CPT | Performed by: STUDENT IN AN ORGANIZED HEALTH CARE EDUCATION/TRAINING PROGRAM

## 2025-05-02 PROCEDURE — 84484 ASSAY OF TROPONIN QUANT: CPT | Performed by: STUDENT IN AN ORGANIZED HEALTH CARE EDUCATION/TRAINING PROGRAM

## 2025-05-02 PROCEDURE — 71045 X-RAY EXAM CHEST 1 VIEW: CPT

## 2025-05-02 PROCEDURE — 81001 URINALYSIS AUTO W/SCOPE: CPT | Performed by: STUDENT IN AN ORGANIZED HEALTH CARE EDUCATION/TRAINING PROGRAM

## 2025-05-02 PROCEDURE — 85025 COMPLETE CBC W/AUTO DIFF WBC: CPT | Performed by: STUDENT IN AN ORGANIZED HEALTH CARE EDUCATION/TRAINING PROGRAM

## 2025-05-02 PROCEDURE — 2500000005 HC RX 250 GENERAL PHARMACY W/O HCPCS

## 2025-05-02 RX ORDER — CLOTRIMAZOLE 1 %
CREAM (GRAM) TOPICAL ONCE
Status: COMPLETED | OUTPATIENT
Start: 2025-05-02 | End: 2025-05-02

## 2025-05-02 RX ORDER — LIDOCAINE HYDROCHLORIDE 20 MG/ML
1 JELLY TOPICAL ONCE
Status: COMPLETED | OUTPATIENT
Start: 2025-05-02 | End: 2025-05-02

## 2025-05-02 RX ORDER — LIDOCAINE HYDROCHLORIDE 20 MG/ML
JELLY TOPICAL
Status: COMPLETED
Start: 2025-05-02 | End: 2025-05-02

## 2025-05-02 RX ADMIN — LIDOCAINE HYDROCHLORIDE 1 APPLICATION: 20 JELLY TOPICAL at 17:06

## 2025-05-02 RX ADMIN — CLOTRIMAZOLE: 1 CREAM TOPICAL at 18:31

## 2025-05-02 ASSESSMENT — PAIN SCALES - GENERAL: PAINLEVEL_OUTOF10: 0 - NO PAIN

## 2025-05-02 ASSESSMENT — PAIN - FUNCTIONAL ASSESSMENT: PAIN_FUNCTIONAL_ASSESSMENT: 0-10

## 2025-05-02 NOTE — ED TRIAGE NOTES
Pt to ED via EMS from hotel that he and his daughter own c/o worsening urinary symptoms, pt endorses some trouble urinating with burning

## 2025-05-03 LAB — HOLD SPECIMEN: NORMAL

## 2025-05-05 NOTE — ED PROVIDER NOTES
Chief Complaint: Burning with urination  HPI: This is an 89-year-old male, presenting to the emergency department for evaluation of burning with urination for the last several days.  Patient is worried that he has a urinary tract infection, as he does get these frequently.  He does not have any other specific complaints at this time.    Medical History[1]   Surgical History[2]    Physical Exam  Vitals and nursing note reviewed.   Constitutional:       Appearance: Normal appearance.   HENT:      Head: Normocephalic and atraumatic.      Mouth/Throat:      Mouth: Mucous membranes are moist.   Eyes:      Extraocular Movements: Extraocular movements intact.   Pulmonary:      Effort: Pulmonary effort is normal.   Abdominal:      General: Abdomen is flat.      Palpations: Abdomen is soft.   Genitourinary:     Comments: Whitish discharge at the penile head consistent with balanitis  Skin:     General: Skin is warm.   Neurological:      General: No focal deficit present.      Mental Status: He is alert.   Psychiatric:         Mood and Affect: Mood normal.            ED Course/[facility]  ED Course as of 05/05/25 1336   Fri May 02, 2025   1858 EKG interpreted by ED provider, atrial fibrillation with slow ventricular response, rate of 55, wide QRS consistent with left bundle branch block, left axis deviation, EKG without ischemic changes, however does appear to be consistent with previous EKGs, stable compared to 3/27/2025 []      ED Course User Index  [JH] Muriel Oscar DO         Diagnoses as of 05/05/25 1336   Balanitis     This is a 89 y.o. male presenting to the ED for evaluation of dysuria for the last several days.  On physical exam, the patient is resting comfortably in the bed, no acute distress.  There is a whitish discharge at the head of the penis, consistent with balanitis, which is likely the cause of the patient's symptoms.  Lab work was obtained and is overall grossly unremarkable.  Urinalysis is not  concerning for any evidence of UTI.  Patient was given Lotrimin cream, and advised to continue using this for the next several days.  He was advised to follow-up with his primary care provider and was discharged home in stable condition.    Final Impression  1.  Balanitis  Disposition/Plan: Discharge home.  Condition at disposition: Stable.     Muriel Oscar DO  Emergency Medicine Physician         [1]   Past Medical History:  Diagnosis Date    Acute right MCA stroke (Multi)     Angiodysplasia of intestine     Atrial fibrillation and flutter     Chronic systolic heart failure     COPD (chronic obstructive pulmonary disease) (Multi)     Coronary artery disease     Dementia     GI bleed     HLD (hyperlipidemia)     Hypertension    [2]   Past Surgical History:  Procedure Laterality Date    APPENDECTOMY  02/06/2015    Appendectomy    CARDIAC DEFIBRILLATOR PLACEMENT      CORONARY ARTERY BYPASS GRAFT  07/17/2017    CABG    CT ABDOMEN PELVIS ANGIOGRAM W AND/OR WO IV CONTRAST  02/28/2022    CT ABDOMEN PELVIS ANGIOGRAM W AND/OR WO IV CONTRAST 2/28/2022 [facility]    CT ANGIO NECK  04/01/2023    CT NECK ANGIO W AND WO IV CONTRAST GEN CT    CT HEAD ANGIO W AND WO IV CONTRAST  04/01/2023    CT HEAD ANGIO W AND WO IV CONTRAST GEN CT    OTHER SURGICAL HISTORY  01/06/2020    Coronary artery stent placement    TOTAL KNEE ARTHROPLASTY Left     TOTAL KNEE ARTHROPLASTY Right         Muriel Oscar DO  05/06/25 7200

## 2025-05-09 LAB
Q ONSET: 209 MS
QRS COUNT: 9 BEATS
QRS DURATION: 160 MS
QT INTERVAL: 468 MS
QTC CALCULATION(BAZETT): 447 MS
QTC FREDERICIA: 454 MS
R AXIS: -69 DEGREES
T AXIS: 32 DEGREES
T OFFSET: 443 MS
VENTRICULAR RATE: 55 BPM

## 2025-05-14 ENCOUNTER — APPOINTMENT (OUTPATIENT)
Dept: PRIMARY CARE | Facility: CLINIC | Age: 89
End: 2025-05-14
Payer: MEDICARE

## 2025-07-23 ENCOUNTER — APPOINTMENT (OUTPATIENT)
Dept: RADIOLOGY | Facility: HOSPITAL | Age: 89
End: 2025-07-23
Payer: MEDICARE

## 2025-07-23 ENCOUNTER — HOSPITAL ENCOUNTER (OUTPATIENT)
Facility: HOSPITAL | Age: 89
Setting detail: OBSERVATION
Discharge: HOME | End: 2025-07-24
Attending: EMERGENCY MEDICINE | Admitting: INTERNAL MEDICINE
Payer: MEDICARE

## 2025-07-23 ENCOUNTER — APPOINTMENT (OUTPATIENT)
Dept: CARDIOLOGY | Facility: HOSPITAL | Age: 89
End: 2025-07-23
Payer: MEDICARE

## 2025-07-23 DIAGNOSIS — R53.1 GENERALIZED WEAKNESS: Primary | ICD-10-CM

## 2025-07-23 DIAGNOSIS — I25.83 CORONARY ARTERY DISEASE DUE TO LIPID RICH PLAQUE: ICD-10-CM

## 2025-07-23 DIAGNOSIS — R41.82 ALTERED MENTAL STATUS, UNSPECIFIED ALTERED MENTAL STATUS TYPE: ICD-10-CM

## 2025-07-23 DIAGNOSIS — I25.10 CORONARY ARTERY DISEASE DUE TO LIPID RICH PLAQUE: ICD-10-CM

## 2025-07-23 DIAGNOSIS — G45.9 TIA (TRANSIENT ISCHEMIC ATTACK): ICD-10-CM

## 2025-07-23 LAB
ALBUMIN SERPL BCP-MCNC: 4.5 G/DL (ref 3.4–5)
ALP SERPL-CCNC: 66 U/L (ref 33–136)
ALT SERPL W P-5'-P-CCNC: 7 U/L (ref 10–52)
ANION GAP SERPL CALC-SCNC: 13 MMOL/L (ref 10–20)
APPEARANCE UR: CLEAR
AST SERPL W P-5'-P-CCNC: 13 U/L (ref 9–39)
BASOPHILS # BLD AUTO: 0.03 X10*3/UL (ref 0–0.1)
BASOPHILS NFR BLD AUTO: 0.6 %
BILIRUB SERPL-MCNC: 0.8 MG/DL (ref 0–1.2)
BILIRUB UR STRIP.AUTO-MCNC: NEGATIVE MG/DL
BNP SERPL-MCNC: 352 PG/ML (ref 0–99)
BUN SERPL-MCNC: 17 MG/DL (ref 6–23)
CALCIUM SERPL-MCNC: 9.4 MG/DL (ref 8.6–10.3)
CARDIAC TROPONIN I PNL SERPL HS: 17 NG/L (ref 0–20)
CARDIAC TROPONIN I PNL SERPL HS: 19 NG/L (ref 0–20)
CHLORIDE SERPL-SCNC: 101 MMOL/L (ref 98–107)
CO2 SERPL-SCNC: 30 MMOL/L (ref 21–32)
COLOR UR: NORMAL
CREAT SERPL-MCNC: 1.31 MG/DL (ref 0.5–1.3)
EGFRCR SERPLBLD CKD-EPI 2021: 53 ML/MIN/1.73M*2
EOSINOPHIL # BLD AUTO: 0.31 X10*3/UL (ref 0–0.4)
EOSINOPHIL NFR BLD AUTO: 5.8 %
ERYTHROCYTE [DISTWIDTH] IN BLOOD BY AUTOMATED COUNT: 15.9 % (ref 11.5–14.5)
GLUCOSE SERPL-MCNC: 130 MG/DL (ref 74–99)
GLUCOSE UR STRIP.AUTO-MCNC: NORMAL MG/DL
HCT VFR BLD AUTO: 38.9 % (ref 41–52)
HGB BLD-MCNC: 12.1 G/DL (ref 13.5–17.5)
IMM GRANULOCYTES # BLD AUTO: 0.01 X10*3/UL (ref 0–0.5)
IMM GRANULOCYTES NFR BLD AUTO: 0.2 % (ref 0–0.9)
KETONES UR STRIP.AUTO-MCNC: NEGATIVE MG/DL
LACTATE SERPL-SCNC: 1.5 MMOL/L (ref 0.4–2)
LEUKOCYTE ESTERASE UR QL STRIP.AUTO: NEGATIVE
LYMPHOCYTES # BLD AUTO: 0.98 X10*3/UL (ref 0.8–3)
LYMPHOCYTES NFR BLD AUTO: 18.2 %
MAGNESIUM SERPL-MCNC: 2.15 MG/DL (ref 1.6–2.4)
MCH RBC QN AUTO: 25.1 PG (ref 26–34)
MCHC RBC AUTO-ENTMCNC: 31.1 G/DL (ref 32–36)
MCV RBC AUTO: 81 FL (ref 80–100)
MONOCYTES # BLD AUTO: 0.53 X10*3/UL (ref 0.05–0.8)
MONOCYTES NFR BLD AUTO: 9.9 %
NEUTROPHILS # BLD AUTO: 3.51 X10*3/UL (ref 1.6–5.5)
NEUTROPHILS NFR BLD AUTO: 65.3 %
NITRITE UR QL STRIP.AUTO: NEGATIVE
NRBC BLD-RTO: 0 /100 WBCS (ref 0–0)
PH UR STRIP.AUTO: 5.5 [PH]
PLATELET # BLD AUTO: 159 X10*3/UL (ref 150–450)
POTASSIUM SERPL-SCNC: 3.8 MMOL/L (ref 3.5–5.3)
PROT SERPL-MCNC: 7.6 G/DL (ref 6.4–8.2)
PROT UR STRIP.AUTO-MCNC: NEGATIVE MG/DL
RBC # BLD AUTO: 4.83 X10*6/UL (ref 4.5–5.9)
RBC # UR STRIP.AUTO: NEGATIVE MG/DL
SODIUM SERPL-SCNC: 140 MMOL/L (ref 136–145)
SP GR UR STRIP.AUTO: 1.01
UROBILINOGEN UR STRIP.AUTO-MCNC: NORMAL MG/DL
WBC # BLD AUTO: 5.4 X10*3/UL (ref 4.4–11.3)

## 2025-07-23 PROCEDURE — 83880 ASSAY OF NATRIURETIC PEPTIDE: CPT | Performed by: NURSE PRACTITIONER

## 2025-07-23 PROCEDURE — 84075 ASSAY ALKALINE PHOSPHATASE: CPT | Performed by: PHYSICIAN ASSISTANT

## 2025-07-23 PROCEDURE — 80053 COMPREHEN METABOLIC PANEL: CPT | Performed by: PHYSICIAN ASSISTANT

## 2025-07-23 PROCEDURE — 85025 COMPLETE CBC W/AUTO DIFF WBC: CPT | Performed by: PHYSICIAN ASSISTANT

## 2025-07-23 PROCEDURE — 84484 ASSAY OF TROPONIN QUANT: CPT | Performed by: PHYSICIAN ASSISTANT

## 2025-07-23 PROCEDURE — 36415 COLL VENOUS BLD VENIPUNCTURE: CPT | Performed by: PHYSICIAN ASSISTANT

## 2025-07-23 PROCEDURE — 93005 ELECTROCARDIOGRAM TRACING: CPT

## 2025-07-23 PROCEDURE — 71045 X-RAY EXAM CHEST 1 VIEW: CPT

## 2025-07-23 PROCEDURE — 2500000004 HC RX 250 GENERAL PHARMACY W/ HCPCS (ALT 636 FOR OP/ED): Performed by: PHYSICIAN ASSISTANT

## 2025-07-23 PROCEDURE — 83036 HEMOGLOBIN GLYCOSYLATED A1C: CPT | Mod: GENLAB | Performed by: NURSE PRACTITIONER

## 2025-07-23 PROCEDURE — 70450 CT HEAD/BRAIN W/O DYE: CPT

## 2025-07-23 PROCEDURE — 70450 CT HEAD/BRAIN W/O DYE: CPT | Performed by: RADIOLOGY

## 2025-07-23 PROCEDURE — 2500000004 HC RX 250 GENERAL PHARMACY W/ HCPCS (ALT 636 FOR OP/ED): Performed by: NURSE PRACTITIONER

## 2025-07-23 PROCEDURE — 96361 HYDRATE IV INFUSION ADD-ON: CPT

## 2025-07-23 PROCEDURE — 71045 X-RAY EXAM CHEST 1 VIEW: CPT | Mod: FOREIGN READ | Performed by: RADIOLOGY

## 2025-07-23 PROCEDURE — 83605 ASSAY OF LACTIC ACID: CPT | Performed by: PHYSICIAN ASSISTANT

## 2025-07-23 PROCEDURE — 99285 EMERGENCY DEPT VISIT HI MDM: CPT | Mod: 25 | Performed by: EMERGENCY MEDICINE

## 2025-07-23 PROCEDURE — 96360 HYDRATION IV INFUSION INIT: CPT

## 2025-07-23 PROCEDURE — G0378 HOSPITAL OBSERVATION PER HR: HCPCS

## 2025-07-23 PROCEDURE — 2500000001 HC RX 250 WO HCPCS SELF ADMINISTERED DRUGS (ALT 637 FOR MEDICARE OP): Performed by: NURSE PRACTITIONER

## 2025-07-23 PROCEDURE — 2500000002 HC RX 250 W HCPCS SELF ADMINISTERED DRUGS (ALT 637 FOR MEDICARE OP, ALT 636 FOR OP/ED): Performed by: NURSE PRACTITIONER

## 2025-07-23 PROCEDURE — 81003 URINALYSIS AUTO W/O SCOPE: CPT | Performed by: PHYSICIAN ASSISTANT

## 2025-07-23 PROCEDURE — 94760 N-INVAS EAR/PLS OXIMETRY 1: CPT

## 2025-07-23 PROCEDURE — 83735 ASSAY OF MAGNESIUM: CPT | Performed by: PHYSICIAN ASSISTANT

## 2025-07-23 RX ORDER — ATORVASTATIN CALCIUM 40 MG/1
40 TABLET, FILM COATED ORAL NIGHTLY
Status: DISCONTINUED | OUTPATIENT
Start: 2025-07-23 | End: 2025-07-23 | Stop reason: ALTCHOICE

## 2025-07-23 RX ORDER — LABETALOL HYDROCHLORIDE 5 MG/ML
10 INJECTION, SOLUTION INTRAVENOUS EVERY 10 MIN PRN
Status: DISCONTINUED | OUTPATIENT
Start: 2025-07-23 | End: 2025-07-24 | Stop reason: HOSPADM

## 2025-07-23 RX ORDER — ROSUVASTATIN CALCIUM 10 MG/1
40 TABLET, COATED ORAL DAILY
Status: DISCONTINUED | OUTPATIENT
Start: 2025-07-23 | End: 2025-07-24 | Stop reason: HOSPADM

## 2025-07-23 RX ORDER — ASPIRIN 81 MG/1
81 TABLET ORAL DAILY
Status: DISCONTINUED | OUTPATIENT
Start: 2025-07-23 | End: 2025-07-24 | Stop reason: HOSPADM

## 2025-07-23 RX ORDER — TORSEMIDE 20 MG/1
20 TABLET ORAL DAILY
Status: DISCONTINUED | OUTPATIENT
Start: 2025-07-23 | End: 2025-07-24 | Stop reason: HOSPADM

## 2025-07-23 RX ORDER — HALOPERIDOL LACTATE 5 MG/ML
5 INJECTION, SOLUTION INTRAMUSCULAR ONCE
Status: DISCONTINUED | OUTPATIENT
Start: 2025-07-23 | End: 2025-07-24

## 2025-07-23 RX ORDER — ONDANSETRON HYDROCHLORIDE 2 MG/ML
4 INJECTION, SOLUTION INTRAVENOUS EVERY 8 HOURS PRN
Status: DISCONTINUED | OUTPATIENT
Start: 2025-07-23 | End: 2025-07-24 | Stop reason: HOSPADM

## 2025-07-23 RX ORDER — ACETAMINOPHEN 325 MG/1
650 TABLET ORAL EVERY 4 HOURS PRN
Status: DISCONTINUED | OUTPATIENT
Start: 2025-07-23 | End: 2025-07-24 | Stop reason: HOSPADM

## 2025-07-23 RX ORDER — POLYETHYLENE GLYCOL 3350 17 G/17G
17 POWDER, FOR SOLUTION ORAL DAILY PRN
Status: DISCONTINUED | OUTPATIENT
Start: 2025-07-23 | End: 2025-07-24 | Stop reason: HOSPADM

## 2025-07-23 RX ORDER — METOPROLOL SUCCINATE 25 MG/1
25 TABLET, EXTENDED RELEASE ORAL DAILY
Status: DISCONTINUED | OUTPATIENT
Start: 2025-07-24 | End: 2025-07-24 | Stop reason: HOSPADM

## 2025-07-23 RX ORDER — SODIUM CHLORIDE 9 MG/ML
100 INJECTION, SOLUTION INTRAVENOUS CONTINUOUS
Status: DISCONTINUED | OUTPATIENT
Start: 2025-07-23 | End: 2025-07-24 | Stop reason: HOSPADM

## 2025-07-23 RX ORDER — PANTOPRAZOLE SODIUM 40 MG/1
40 TABLET, DELAYED RELEASE ORAL
Status: DISCONTINUED | OUTPATIENT
Start: 2025-07-24 | End: 2025-07-24 | Stop reason: HOSPADM

## 2025-07-23 RX ADMIN — SODIUM CHLORIDE 100 ML/HR: 0.9 INJECTION, SOLUTION INTRAVENOUS at 17:44

## 2025-07-23 RX ADMIN — SODIUM CHLORIDE 100 ML/HR: 0.9 INJECTION, SOLUTION INTRAVENOUS at 20:27

## 2025-07-23 RX ADMIN — ASPIRIN 81 MG: 81 TABLET, DELAYED RELEASE ORAL at 17:44

## 2025-07-23 RX ADMIN — APIXABAN 2.5 MG: 2.5 TABLET, FILM COATED ORAL at 20:26

## 2025-07-23 RX ADMIN — SODIUM CHLORIDE 1000 ML: 0.9 INJECTION, SOLUTION INTRAVENOUS at 15:13

## 2025-07-23 RX ADMIN — ROSUVASTATIN CALCIUM 40 MG: 10 TABLET, FILM COATED ORAL at 20:26

## 2025-07-23 SDOH — SOCIAL STABILITY: SOCIAL INSECURITY: WITHIN THE LAST YEAR, HAVE YOU BEEN AFRAID OF YOUR PARTNER OR EX-PARTNER?: NO

## 2025-07-23 SDOH — ECONOMIC STABILITY: HOUSING INSECURITY: IN THE PAST 12 MONTHS, HOW MANY TIMES HAVE YOU MOVED WHERE YOU WERE LIVING?: 0

## 2025-07-23 SDOH — SOCIAL STABILITY: SOCIAL INSECURITY: HAS ANYONE EVER THREATENED TO HURT YOUR FAMILY OR YOUR PETS?: NO

## 2025-07-23 SDOH — SOCIAL STABILITY: SOCIAL INSECURITY: WITHIN THE LAST YEAR, HAVE YOU BEEN HUMILIATED OR EMOTIONALLY ABUSED IN OTHER WAYS BY YOUR PARTNER OR EX-PARTNER?: NO

## 2025-07-23 SDOH — SOCIAL STABILITY: SOCIAL INSECURITY: ARE YOU OR HAVE YOU BEEN THREATENED OR ABUSED PHYSICALLY, EMOTIONALLY, OR SEXUALLY BY ANYONE?: NO

## 2025-07-23 SDOH — ECONOMIC STABILITY: INCOME INSECURITY: IN THE PAST 12 MONTHS HAS THE ELECTRIC, GAS, OIL, OR WATER COMPANY THREATENED TO SHUT OFF SERVICES IN YOUR HOME?: NO

## 2025-07-23 SDOH — SOCIAL STABILITY: SOCIAL INSECURITY: HAVE YOU HAD ANY THOUGHTS OF HARMING ANYONE ELSE?: NO

## 2025-07-23 SDOH — ECONOMIC STABILITY: HOUSING INSECURITY: IN THE LAST 12 MONTHS, WAS THERE A TIME WHEN YOU WERE NOT ABLE TO PAY THE MORTGAGE OR RENT ON TIME?: NO

## 2025-07-23 SDOH — ECONOMIC STABILITY: FOOD INSECURITY: WITHIN THE PAST 12 MONTHS, THE FOOD YOU BOUGHT JUST DIDN'T LAST AND YOU DIDN'T HAVE MONEY TO GET MORE.: NEVER TRUE

## 2025-07-23 SDOH — SOCIAL STABILITY: SOCIAL INSECURITY: HAVE YOU HAD THOUGHTS OF HARMING ANYONE ELSE?: NO

## 2025-07-23 SDOH — SOCIAL STABILITY: SOCIAL INSECURITY: DO YOU FEEL ANYONE HAS EXPLOITED OR TAKEN ADVANTAGE OF YOU FINANCIALLY OR OF YOUR PERSONAL PROPERTY?: NO

## 2025-07-23 SDOH — ECONOMIC STABILITY: FOOD INSECURITY: WITHIN THE PAST 12 MONTHS, YOU WORRIED THAT YOUR FOOD WOULD RUN OUT BEFORE YOU GOT THE MONEY TO BUY MORE.: NEVER TRUE

## 2025-07-23 SDOH — ECONOMIC STABILITY: HOUSING INSECURITY: AT ANY TIME IN THE PAST 12 MONTHS, WERE YOU HOMELESS OR LIVING IN A SHELTER (INCLUDING NOW)?: NO

## 2025-07-23 SDOH — ECONOMIC STABILITY: FOOD INSECURITY: HOW HARD IS IT FOR YOU TO PAY FOR THE VERY BASICS LIKE FOOD, HOUSING, MEDICAL CARE, AND HEATING?: NOT HARD AT ALL

## 2025-07-23 SDOH — SOCIAL STABILITY: SOCIAL INSECURITY: WERE YOU ABLE TO COMPLETE ALL THE BEHAVIORAL HEALTH SCREENINGS?: YES

## 2025-07-23 SDOH — ECONOMIC STABILITY: TRANSPORTATION INSECURITY: IN THE PAST 12 MONTHS, HAS LACK OF TRANSPORTATION KEPT YOU FROM MEDICAL APPOINTMENTS OR FROM GETTING MEDICATIONS?: NO

## 2025-07-23 SDOH — SOCIAL STABILITY: SOCIAL INSECURITY: DO YOU FEEL UNSAFE GOING BACK TO THE PLACE WHERE YOU ARE LIVING?: NO

## 2025-07-23 SDOH — SOCIAL STABILITY: SOCIAL INSECURITY: ABUSE: ADULT

## 2025-07-23 SDOH — SOCIAL STABILITY: SOCIAL INSECURITY: ARE THERE ANY APPARENT SIGNS OF INJURIES/BEHAVIORS THAT COULD BE RELATED TO ABUSE/NEGLECT?: NO

## 2025-07-23 SDOH — SOCIAL STABILITY: SOCIAL INSECURITY: DOES ANYONE TRY TO KEEP YOU FROM HAVING/CONTACTING OTHER FRIENDS OR DOING THINGS OUTSIDE YOUR HOME?: NO

## 2025-07-23 ASSESSMENT — LIFESTYLE VARIABLES
HOW OFTEN DO YOU HAVE 6 OR MORE DRINKS ON ONE OCCASION: NEVER
HOW MANY STANDARD DRINKS CONTAINING ALCOHOL DO YOU HAVE ON A TYPICAL DAY: 1 OR 2
AUDIT-C TOTAL SCORE: 3
AUDIT-C TOTAL SCORE: 3
HOW OFTEN DO YOU HAVE A DRINK CONTAINING ALCOHOL: 2-3 TIMES A WEEK
SKIP TO QUESTIONS 9-10: 1

## 2025-07-23 ASSESSMENT — ACTIVITIES OF DAILY LIVING (ADL)
WALKS IN HOME: NEEDS ASSISTANCE
TOILETING: NEEDS ASSISTANCE
JUDGMENT_ADEQUATE_SAFELY_COMPLETE_DAILY_ACTIVITIES: NO
ADEQUATE_TO_COMPLETE_ADL: YES
HEARING - RIGHT EAR: HEARING AID
BATHING: NEEDS ASSISTANCE
GROOMING: NEEDS ASSISTANCE
HEARING - LEFT EAR: HEARING AID
LACK_OF_TRANSPORTATION: NO
DRESSING YOURSELF: NEEDS ASSISTANCE
ASSISTIVE_DEVICE: CANE;WALKER
PATIENT'S MEMORY ADEQUATE TO SAFELY COMPLETE DAILY ACTIVITIES?: NO
FEEDING YOURSELF: INDEPENDENT

## 2025-07-23 ASSESSMENT — COGNITIVE AND FUNCTIONAL STATUS - GENERAL
STANDING UP FROM CHAIR USING ARMS: A LITTLE
TOILETING: A LOT
DAILY ACTIVITIY SCORE: 17
STANDING UP FROM CHAIR USING ARMS: A LITTLE
DRESSING REGULAR UPPER BODY CLOTHING: A LITTLE
MOVING TO AND FROM BED TO CHAIR: A LITTLE
WALKING IN HOSPITAL ROOM: A LITTLE
PATIENT BASELINE BEDBOUND: NO
DRESSING REGULAR UPPER BODY CLOTHING: A LITTLE
DRESSING REGULAR LOWER BODY CLOTHING: A LOT
DRESSING REGULAR LOWER BODY CLOTHING: A LOT
DAILY ACTIVITIY SCORE: 17
HELP NEEDED FOR BATHING: A LOT
HELP NEEDED FOR BATHING: A LOT
WALKING IN HOSPITAL ROOM: A LITTLE
CLIMB 3 TO 5 STEPS WITH RAILING: A LOT
CLIMB 3 TO 5 STEPS WITH RAILING: A LOT
MOBILITY SCORE: 19
MOVING TO AND FROM BED TO CHAIR: A LITTLE
MOBILITY SCORE: 19
TOILETING: A LOT

## 2025-07-23 ASSESSMENT — PATIENT HEALTH QUESTIONNAIRE - PHQ9
SUM OF ALL RESPONSES TO PHQ9 QUESTIONS 1 & 2: 0
1. LITTLE INTEREST OR PLEASURE IN DOING THINGS: NOT AT ALL
2. FEELING DOWN, DEPRESSED OR HOPELESS: NOT AT ALL

## 2025-07-23 ASSESSMENT — PAIN SCALES - GENERAL
PAINLEVEL_OUTOF10: 0 - NO PAIN

## 2025-07-23 ASSESSMENT — PAIN - FUNCTIONAL ASSESSMENT
PAIN_FUNCTIONAL_ASSESSMENT: 0-10
PAIN_FUNCTIONAL_ASSESSMENT: 0-10

## 2025-07-23 NOTE — NURSING NOTE
1738: Admission completed, patient is pleasantly confused to place, time and situation. Patient oriented to room, call bell system and med surg routine. NIH completed. Bed side swallow completed. Med rec completed with patient's daughter Catherine who states patient no longer takes thiamine or tamulosin and now takes 20mg of torsemide instead of 40mg. Katia Childers CNP aware of above.

## 2025-07-23 NOTE — NURSING NOTE
Assumed care of patient. Call light is within reach. Bed alarm is on and active. Nurse will continue to medicate and monitor per MD order.

## 2025-07-23 NOTE — CARE PLAN
The patient's goals for the shift include      The clinical goals for the shift include Patient will return to baseline mental status by end of shift      Problem: Pain - Adult  Goal: Verbalizes/displays adequate comfort level or baseline comfort level  Outcome: Progressing     Problem: Safety - Adult  Goal: Free from fall injury  Outcome: Progressing     Problem: Discharge Planning  Goal: Discharge to home or other facility with appropriate resources  Outcome: Progressing     Problem: Chronic Conditions and Co-morbidities  Goal: Patient's chronic conditions and co-morbidity symptoms are monitored and maintained or improved  Outcome: Progressing     Problem: Nutrition  Goal: Nutrient intake appropriate for maintaining nutritional needs  Outcome: Progressing     Problem: General Stroke  Goal: Establish a mutual long term goal with patient by discharge  Outcome: Progressing  Goal: Demonstrate improvement in neurological exam throughout the shift  Outcome: Progressing  Goal: Maintain BP within ordered limits throughout shift  Outcome: Progressing  Goal: Participate in treatment (ie., meds, therapy) throughout shift  Outcome: Progressing  Goal: No symptoms of aspiration throughout shift  Outcome: Progressing  Goal: No symptoms of hemorrhage throughout shift  Outcome: Progressing  Goal: Tolerate enteral feeding throughout shift  Outcome: Progressing  Goal: Decreased nausea/vomiting throughout shift  Outcome: Progressing  Goal: Controlled blood glucose throughout shift  Outcome: Progressing  Goal: Out of bed three times today  Outcome: Progressing     Problem: ICU Stroke  Goal: Maintain ICP within ordered limits throughout shift  Outcome: Progressing  Goal: Tolerate EVD clamping trial throughout shift  Outcome: Progressing  Goal: Tolerate ventilator weaning trial during shift  Outcome: Progressing  Goal: Maintain patent airway throughout shift  Outcome: Progressing  Goal: Achieve/maintain targeted sodium level throughout  shift  Outcome: Progressing     Problem: Skin  Goal: Decreased wound size/increased tissue granulation at next dressing change  Outcome: Progressing  Goal: Participates in plan/prevention/treatment measures  Outcome: Progressing  Goal: Prevent/manage excess moisture  Outcome: Progressing  Goal: Prevent/minimize sheer/friction injuries  Outcome: Progressing  Goal: Promote/optimize nutrition  Outcome: Progressing  Goal: Promote skin healing  Outcome: Progressing

## 2025-07-24 ENCOUNTER — APPOINTMENT (OUTPATIENT)
Dept: CARDIOLOGY | Facility: HOSPITAL | Age: 89
End: 2025-07-24
Payer: MEDICARE

## 2025-07-24 VITALS
RESPIRATION RATE: 16 BRPM | BODY MASS INDEX: 23.89 KG/M2 | HEIGHT: 68 IN | WEIGHT: 157.6 LBS | HEART RATE: 60 BPM | DIASTOLIC BLOOD PRESSURE: 71 MMHG | TEMPERATURE: 97.9 F | OXYGEN SATURATION: 98 % | SYSTOLIC BLOOD PRESSURE: 162 MMHG

## 2025-07-24 PROBLEM — I25.10 CAD (CORONARY ARTERY DISEASE): Chronic | Status: ACTIVE | Noted: 2023-04-06

## 2025-07-24 LAB
ANION GAP SERPL CALC-SCNC: 11 MMOL/L (ref 10–20)
AORTIC VALVE PEAK VELOCITY: 1.57 M/S
ATRIAL RATE: 258 BPM
AV PEAK GRADIENT: 10 MMHG
AVA (PEAK VEL): 1.1 CM2
BUN SERPL-MCNC: 15 MG/DL (ref 6–23)
CALCIUM SERPL-MCNC: 8.6 MG/DL (ref 8.6–10.3)
CHLORIDE SERPL-SCNC: 106 MMOL/L (ref 98–107)
CHOLEST SERPL-MCNC: 101 MG/DL (ref 0–199)
CHOLESTEROL/HDL RATIO: 2.8
CO2 SERPL-SCNC: 27 MMOL/L (ref 21–32)
CREAT SERPL-MCNC: 1.17 MG/DL (ref 0.5–1.3)
EGFRCR SERPLBLD CKD-EPI 2021: 60 ML/MIN/1.73M*2
EJECTION FRACTION APICAL 4 CHAMBER: 41.6
EJECTION FRACTION: 38 %
ERYTHROCYTE [DISTWIDTH] IN BLOOD BY AUTOMATED COUNT: 15.9 % (ref 11.5–14.5)
EST. AVERAGE GLUCOSE BLD GHB EST-MCNC: 117 MG/DL
GLUCOSE SERPL-MCNC: 88 MG/DL (ref 74–99)
HBA1C MFR BLD: 5.7 % (ref ?–5.7)
HCT VFR BLD AUTO: 35.8 % (ref 41–52)
HDLC SERPL-MCNC: 36.5 MG/DL
HGB BLD-MCNC: 10.7 G/DL (ref 13.5–17.5)
HOLD SPECIMEN: NORMAL
LDLC SERPL CALC-MCNC: 52 MG/DL
LEFT ATRIUM VOLUME AREA LENGTH INDEX BSA: 72.4 ML/M2
LEFT VENTRICLE INTERNAL DIMENSION DIASTOLE: 5.43 CM (ref 3.5–6)
LEFT VENTRICULAR OUTFLOW TRACT DIAMETER: 1.96 CM
MCH RBC QN AUTO: 24.5 PG (ref 26–34)
MCHC RBC AUTO-ENTMCNC: 29.9 G/DL (ref 32–36)
MCV RBC AUTO: 82 FL (ref 80–100)
NON HDL CHOLESTEROL: 65 MG/DL (ref 0–149)
NRBC BLD-RTO: 0 /100 WBCS (ref 0–0)
PLATELET # BLD AUTO: 137 X10*3/UL (ref 150–450)
POTASSIUM SERPL-SCNC: 3.6 MMOL/L (ref 3.5–5.3)
Q ONSET: 195 MS
QRS COUNT: 9 BEATS
QRS DURATION: 210 MS
QT INTERVAL: 512 MS
QTC CALCULATION(BAZETT): 489 MS
QTC FREDERICIA: 497 MS
R AXIS: -80 DEGREES
RBC # BLD AUTO: 4.37 X10*6/UL (ref 4.5–5.9)
RIGHT VENTRICLE FREE WALL PEAK S': 4.96 CM/S
RIGHT VENTRICLE PEAK SYSTOLIC PRESSURE: 41 MMHG
SODIUM SERPL-SCNC: 140 MMOL/L (ref 136–145)
T AXIS: 55 DEGREES
T OFFSET: 451 MS
TRICUSPID ANNULAR PLANE SYSTOLIC EXCURSION: 1.3 CM
TRIGL SERPL-MCNC: 61 MG/DL (ref 0–149)
VENTRICULAR RATE: 55 BPM
VLDL: 12 MG/DL (ref 0–40)
WBC # BLD AUTO: 5.1 X10*3/UL (ref 4.4–11.3)

## 2025-07-24 PROCEDURE — 2500000001 HC RX 250 WO HCPCS SELF ADMINISTERED DRUGS (ALT 637 FOR MEDICARE OP): Performed by: NURSE PRACTITIONER

## 2025-07-24 PROCEDURE — 2500000004 HC RX 250 GENERAL PHARMACY W/ HCPCS (ALT 636 FOR OP/ED)

## 2025-07-24 PROCEDURE — 99239 HOSP IP/OBS DSCHRG MGMT >30: CPT | Performed by: NURSE PRACTITIONER

## 2025-07-24 PROCEDURE — 2500000004 HC RX 250 GENERAL PHARMACY W/ HCPCS (ALT 636 FOR OP/ED): Performed by: NURSE PRACTITIONER

## 2025-07-24 PROCEDURE — 36415 COLL VENOUS BLD VENIPUNCTURE: CPT | Performed by: NURSE PRACTITIONER

## 2025-07-24 PROCEDURE — 85027 COMPLETE CBC AUTOMATED: CPT | Performed by: NURSE PRACTITIONER

## 2025-07-24 PROCEDURE — 93306 TTE W/DOPPLER COMPLETE: CPT | Performed by: INTERNAL MEDICINE

## 2025-07-24 PROCEDURE — 94760 N-INVAS EAR/PLS OXIMETRY 1: CPT

## 2025-07-24 PROCEDURE — 97165 OT EVAL LOW COMPLEX 30 MIN: CPT | Mod: GO

## 2025-07-24 PROCEDURE — 93306 TTE W/DOPPLER COMPLETE: CPT

## 2025-07-24 PROCEDURE — G0378 HOSPITAL OBSERVATION PER HR: HCPCS

## 2025-07-24 PROCEDURE — 80048 BASIC METABOLIC PNL TOTAL CA: CPT | Performed by: NURSE PRACTITIONER

## 2025-07-24 PROCEDURE — 97161 PT EVAL LOW COMPLEX 20 MIN: CPT | Mod: GP | Performed by: PHYSICAL THERAPIST

## 2025-07-24 PROCEDURE — 80061 LIPID PANEL: CPT | Performed by: NURSE PRACTITIONER

## 2025-07-24 RX ORDER — SODIUM CHLORIDE 9 MG/ML
10 INJECTION, SOLUTION INTRAVENOUS CONTINUOUS PRN
Status: DISCONTINUED | OUTPATIENT
Start: 2025-07-24 | End: 2025-07-24 | Stop reason: HOSPADM

## 2025-07-24 RX ORDER — SODIUM CHLORIDE 9 MG/ML
INJECTION, SOLUTION INTRAVENOUS
Status: COMPLETED
Start: 2025-07-24 | End: 2025-07-24

## 2025-07-24 RX ORDER — ASPIRIN 81 MG/1
81 TABLET ORAL DAILY
Start: 2025-07-25 | End: 2026-07-25

## 2025-07-24 RX ADMIN — PANTOPRAZOLE SODIUM 40 MG: 40 TABLET, DELAYED RELEASE ORAL at 06:03

## 2025-07-24 RX ADMIN — APIXABAN 2.5 MG: 2.5 TABLET, FILM COATED ORAL at 09:23

## 2025-07-24 RX ADMIN — ASPIRIN 81 MG: 81 TABLET, DELAYED RELEASE ORAL at 09:23

## 2025-07-24 RX ADMIN — TORSEMIDE 20 MG: 20 TABLET ORAL at 09:23

## 2025-07-24 RX ADMIN — SODIUM CHLORIDE 100 ML/HR: 9 INJECTION, SOLUTION INTRAVENOUS at 06:12

## 2025-07-24 RX ADMIN — SODIUM CHLORIDE 100 ML/HR: 0.9 INJECTION, SOLUTION INTRAVENOUS at 01:21

## 2025-07-24 RX ADMIN — SODIUM CHLORIDE 100 ML/HR: 0.9 INJECTION, SOLUTION INTRAVENOUS at 06:12

## 2025-07-24 ASSESSMENT — ENCOUNTER SYMPTOMS
MYALGIAS: 1
WEAKNESS: 1
CARDIOVASCULAR NEGATIVE: 1
ENDOCRINE NEGATIVE: 1
RESPIRATORY NEGATIVE: 1
EYES NEGATIVE: 1
ALTERED MENTAL STATUS: 1
HEMATOLOGIC/LYMPHATIC NEGATIVE: 1
GASTROINTESTINAL NEGATIVE: 1

## 2025-07-24 ASSESSMENT — COGNITIVE AND FUNCTIONAL STATUS - GENERAL
TOILETING: A LITTLE
EATING MEALS: A LITTLE
STANDING UP FROM CHAIR USING ARMS: A LITTLE
MOVING TO AND FROM BED TO CHAIR: A LITTLE
DRESSING REGULAR LOWER BODY CLOTHING: A LITTLE
DAILY ACTIVITIY SCORE: 18
EATING MEALS: A LITTLE
MOVING TO AND FROM BED TO CHAIR: A LITTLE
WALKING IN HOSPITAL ROOM: A LITTLE
WALKING IN HOSPITAL ROOM: A LITTLE
TOILETING: A LITTLE
DRESSING REGULAR UPPER BODY CLOTHING: A LITTLE
CLIMB 3 TO 5 STEPS WITH RAILING: A LOT
CLIMB 3 TO 5 STEPS WITH RAILING: A LITTLE
PERSONAL GROOMING: A LITTLE
DRESSING REGULAR UPPER BODY CLOTHING: A LITTLE
TURNING FROM BACK TO SIDE WHILE IN FLAT BAD: A LITTLE
MOVING FROM LYING ON BACK TO SITTING ON SIDE OF FLAT BED WITH BEDRAILS: A LITTLE
HELP NEEDED FOR BATHING: A LITTLE
PERSONAL GROOMING: A LITTLE
STANDING UP FROM CHAIR USING ARMS: A LITTLE
HELP NEEDED FOR BATHING: A LITTLE
DAILY ACTIVITIY SCORE: 18
MOBILITY SCORE: 18
MOBILITY SCORE: 19
DRESSING REGULAR LOWER BODY CLOTHING: A LITTLE

## 2025-07-24 ASSESSMENT — PAIN SCALES - GENERAL
PAINLEVEL_OUTOF10: 0 - NO PAIN
PAINLEVEL_OUTOF10: 0 - NO PAIN

## 2025-07-24 ASSESSMENT — ACTIVITIES OF DAILY LIVING (ADL)
ADL_ASSISTANCE: INDEPENDENT
BATHING_ASSISTANCE: STAND BY
LACK_OF_TRANSPORTATION: NO

## 2025-07-24 ASSESSMENT — PAIN - FUNCTIONAL ASSESSMENT
PAIN_FUNCTIONAL_ASSESSMENT: 0-10
PAIN_FUNCTIONAL_ASSESSMENT: 0-10

## 2025-07-24 NOTE — CARE PLAN
The clinical goals for the shift include Patient will maintain a safe encviornment      Problem: Pain - Adult  Goal: Verbalizes/displays adequate comfort level or baseline comfort level  Outcome: Progressing     Problem: Safety - Adult  Goal: Free from fall injury  Outcome: Progressing     Problem: Discharge Planning  Goal: Discharge to home or other facility with appropriate resources  Outcome: Progressing     Problem: Chronic Conditions and Co-morbidities  Goal: Patient's chronic conditions and co-morbidity symptoms are monitored and maintained or improved  Outcome: Progressing     Problem: Nutrition  Goal: Nutrient intake appropriate for maintaining nutritional needs  Outcome: Progressing     Problem: General Stroke  Goal: Establish a mutual long term goal with patient by discharge  Outcome: Progressing  Goal: Demonstrate improvement in neurological exam throughout the shift  Outcome: Progressing  Goal: Maintain BP within ordered limits throughout shift  Outcome: Progressing  Goal: Participate in treatment (ie., meds, therapy) throughout shift  Outcome: Progressing  Goal: No symptoms of aspiration throughout shift  Outcome: Progressing  Goal: No symptoms of hemorrhage throughout shift  Outcome: Progressing  Goal: Tolerate enteral feeding throughout shift  Outcome: Progressing  Goal: Decreased nausea/vomiting throughout shift  Outcome: Progressing  Goal: Controlled blood glucose throughout shift  Outcome: Progressing  Goal: Out of bed three times today  Outcome: Progressing     Problem: ICU Stroke  Goal: Maintain ICP within ordered limits throughout shift  Outcome: Progressing  Goal: Tolerate EVD clamping trial throughout shift  Outcome: Progressing  Goal: Tolerate ventilator weaning trial during shift  Outcome: Progressing  Goal: Maintain patent airway throughout shift  Outcome: Progressing  Goal: Achieve/maintain targeted sodium level throughout shift  Outcome: Progressing     Problem: Skin  Goal: Decreased  wound size/increased tissue granulation at next dressing change  Outcome: Progressing  Goal: Participates in plan/prevention/treatment measures  Outcome: Progressing  Goal: Prevent/manage excess moisture  Outcome: Progressing  Goal: Prevent/minimize sheer/friction injuries  Outcome: Progressing  Goal: Promote/optimize nutrition  Outcome: Progressing  Goal: Promote skin healing  Outcome: Progressing

## 2025-07-24 NOTE — NURSING NOTE
0325: Assumed care of patient. Patient resting with rise and fall of chest observed. Safety measures in place at this time.

## 2025-07-24 NOTE — PROGRESS NOTES
Physical Therapy    Physical Therapy Evaluation    Patient Name: Gilmer Quiñonez  MRN: [id number]  Department: [facility]  Room: [address] [place] [address]  Today's Date: 7/24/2025   Time Calculation  Start Time: 1025  Stop Time: 1040  Time Calculation (min): 15 min    Assessment/Plan   PT Assessment  PT Assessment Results: Decreased strength, Impaired balance, Decreased mobility, Decreased cognition, Impaired judgement, Decreased safety awareness  Rehab Prognosis: Good  Barriers to Discharge Home: No anticipated barriers  Evaluation/Treatment Tolerance: Patient tolerated treatment well  Strengths: Support of Caregivers, Rehab experience  Barriers to Participation:  (n/a)  Assessment Comment: Pleasant  89 y.o presents with weakness and impaired mobility. Pt. lives with daughter and is normally IND. Pt. currently requires CGA and would benefit from additional PT to address above noted limitations and prevent further decline.  End of Session Patient Position: Alarm on, Up in chair  IP OR SWING BED PT PLAN  Inpatient or Swing Bed: Inpatient  PT Plan  Treatment/Interventions: Transfer training, Bed mobility, Gait training, Stair training, Balance training, Strengthening, Endurance training, Therapeutic exercise, Therapeutic activity, Home exercise program, Neuromuscular re-education  PT Plan: Ongoing PT  PT Frequency: 3 times per week  PT Discharge Recommendations: Low intensity level of continued care, 24 hr supervision due to cognition (recommend CGA with WW at this time for fall prevention)  PT Recommended Transfer Status: Assist x1  PT - OK to Discharge: Yes Based on completed evaluation and care plan recommendations, no barriers to discharge to next site of care       Subjective     PT Visit Info:  PT Received On: 07/24/25  General Visit Information:  General  Reason for Referral: impaired mobility, altered mental state  Referred By: Sherine Grewal, HARSHIL-CNP  Past Medical History Relevant to Rehab: f HLD, CAD s/p CABG, 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 complexity  Prior to Session Communication: Bedside nurse  Patient Position Received: Up in chair, Alarm on  General Comment: ELROY cano  Home Living:  Home Living  Type of Home: House  Lives With:  (daughter)  Home Adaptive Equipment: Walker rolling or standard, Cane  Home Layout: Two level, Able to live on main level with bedroom/bathroom  Home Access: Stairs to enter without rails  Entrance Stairs-Rails: None  Entrance Stairs-Number of Steps: 1  Bathroom Shower/Tub: Walk-in shower  Bathroom Equipment:  (shower bench)  Prior Level of Function:  Prior Function Per Pt/Caregiver Report  Level of [place]: Independent with ADLs and functional transfers, Needs assistance with homemaking  Receives Help From: Family  Ambulatory Assistance: Independent (no AD (has WW or SC if needed))  Precautions:  Precautions  Medical Precautions: Fall precautions      Date/Time Vitals Session Patient Position Pulse Resp SpO2 BP MAP (mmHg)    07/24/25 0923 --  --  57  --  --  --  --     07/24/25 1025 --  --  51  --  97 %  --  --         Objective   Pain:  Pain Assessment  Pain Assessment: 0-10  0-10 (Numeric) Pain Score: 0 - No pain  Cognition:  Cognition  Overall Cognitive Status: Impaired  Arousal/Alertness: Appropriate responses to stimuli  Orientation Level: Disoriented to time, Disoriented to situation  Following Commands: Follows all commands and directions without difficulty    General Assessments:     Activity Tolerance  Endurance: Endurance does not limit participation in activity    Strength  Strength Comments: BLE: grossly 4-/5 (able to complete sit to stand without UE support but did have LOB)       Coordination  Movements are Fluid and Coordinated: Yes    Static Standing Balance  Static Standing-Comment/Number of Minutes: good  Dynamic  Standing Balance  Dynamic Standing-Comments: good-; recommend AD at this time for fall prevention. Pt. able to amb. with no AD at CGA but did have minor LOB  Functional Assessments:     Transfers  Transfer: Yes  Transfer 1  Transfer From 1:  (chair and toilet)  Technique 1: Sit to stand, Stand to sit  Transfer Device 1: Walker  Transfer Level of Assistance 1: Close supervision  Trials/Comments 1: 50'    Ambulation/Gait Training  Ambulation/Gait Training Performed: Yes  Ambulation/Gait Training 1  Surface 1: Level tile  Device 1: Rolling walker  Assistance 1: Close supervision  Quality of Gait 1: Inconsistent stride length, Decreased step length, Narrow base of support  Comments/Distance (ft) 1: 50'  Ambulation/Gait Training 2  Assistance 2: Contact guard  Quality of Gait 2: Inconsistent stride length, Decreased step length, Narrow base of support  Comments/Distance (ft) 2: 50' (pt. had 1 minor LOB. Recommend AD for safety at this time with CGA.)  Extremity/Trunk Assessments:  Defer to OT for UE detail   RLE   RLE : Within Functional Limits  LLE   LLE : Within Functional Limits  Outcome Measures:  [facility] Basic Mobility  Turning from your back to your side while in a flat bed without using bedrails: A little  Moving from lying on your back to sitting on the side of a flat bed without using bedrails: A little  Moving to and from bed to chair (including a wheelchair): A little  Standing up from a chair using your arms (e.g. wheelchair or bedside chair): A little  To walk in hospital room: A little  Climbing 3-5 steps with railing: A little  Basic Mobility - Total Score: 18    Encounter Problems       Encounter Problems (Active)       Balance       STG - Maintains dynamic standing balance with upper extremity support with >=good- balance        Start:  07/24/25    Expected End:  08/07/25               Balance       Pt. will complete 5 STS without UE support <15 seconds without LOB        Start:  07/24/25    Expected End:   08/07/25               Mobility       LTG - Patient will ambulate community distance SUP with WW        Start:  07/24/25    Expected End:  08/07/25            STG - Patient will ambulate up and down a curb/step with SBA        Start:  07/24/25    Expected End:  08/07/25               PT Transfers       STG - Patient will transfer sit to and from stand with SUP and WW        Start:  07/24/25    Expected End:  08/07/25                   Education Documentation  Mobility Training, taught by Indira Andres, PT at 7/24/2025 10:59 AM.  Learner: Patient  Readiness: Acceptance  Method: Explanation  Response: Verbalizes Understanding  Comment: Educated pt. on PT POC    Education Comments  No comments found.

## 2025-07-24 NOTE — PROGRESS NOTES
07/24/25 1006   Discharge Planning   Living Arrangements Children   Support Systems Children;Friends/neighbors   Assistance Needed Lives with his daughter. Independent in ADL's and iADL's. DME: 3 walkers, cane, grab bars. He does not use any home O2. His daughter provides transportation.   Type of Residence Private residence   Number of Stairs to Enter Residence 1   Number of Stairs Within Residence 13  (to loft. patient doesn't go upstairs.)   Do you have animals or pets at home? No   Expected Discharge Disposition Home  (daughter plans to take him home)   Does the patient need discharge transport arranged? No  (his daughter will pick him up.)   Financial Resource Strain   How hard is it for you to pay for the very basics like food, housing, medical care, and heating? Not hard   Housing Stability   In the last 12 months, was there a time when you were not able to pay the mortgage or rent on time? N   In the past 12 months, how many times have you moved where you were living? 0   At any time in the past 12 months, were you homeless or living in a shelter (including now)? N   Transportation Needs   In the past 12 months, has lack of transportation kept you from medical appointments or from getting medications? no   In the past 12 months, has lack of transportation kept you from meetings, work, or from getting things needed for daily living? No   Stroke Family Assessment   Stroke Family Assessment Needed No   Intensity of Service   Intensity of Service 0-30 min

## 2025-07-24 NOTE — ED PROVIDER NOTES
HPI   Chief Complaint   Patient presents with    Weakness, Gen    Urinary Frequency       History of present illness:  89-year-old male presents to the emergency room for complaints of generalized weakness urinary urgency and frequency.  The patient has a past medical history of a right MCA stroke A-fib, on Eliquis long-term heart failure COPD coronary disease dementia GI bleeds hyperlipidemia and hypertension.  The patient was brought by EMS at this time he states that the family called as they were concerned but the patient states that he has not been himself for the past few days and they believe that he might be having a urinary tract infection.  Upon presentation I asked the patient about what was going on and what brought him here at this time and the patient states that he is not really sure and states that he just feels tired.  The patient states he has no acute symptoms denies any recent falls or trauma denies any pain anywhere.  I spoke with the patient's daughter at length over the phone and she explained to me that over the past week the patient has become increasingly weak especially with the past 4 days he just seems to not be acting himself and seems to be having a hard time caring for himself.  She states that she does does live with them to help care for him but he usually manages his own medications and also usually is able to feed himself and dress himself but she is concerned he may have a UTI stating that he has had increased urinary urgency and sometimes is wetting himself and appears to have some foul-smelling urine.  She states he just seems very groggy at times as well.  She states that he takes his Eliquis only once a day because he does believe that he needs to take it twice a day.  She states that he has been doing this for at least the past 6 months.    Social history: Negative for alcohol and drug use.    Review of systems:   Gen.: No weight loss  Eyes: No vision loss, double vision,  drainage, eye pain.   ENT: No pharyngitis, dry mouth.   Cardiac: No chest pain, palpitations, syncope, near syncope.   Pulmonary: No shortness of breath, cough, hemoptysis.   Heme/lymph: No swollen glands, fever, bleeding.   GI: No abdominal pain, change in bowel habits, melena, hematemesis, hematochezia, nausea, vomiting, diarrhea.   : No discharge, dysuria, frequency, urgency, hematuria.   Musculoskeletal: No limb pain, joint pain, joint swelling.   Skin: No rashes.   Review of systems is otherwise negative unless stated above or in history of present illness.        Physical exam:  General: Vitals noted, no distress. Afebrile.   EENT: No lymphadenopathy appreciated slight nasolabial fold flattening on the left side compared to the right  Cardiac: Regular, rate, rhythm, no murmur.   Pulmonary: Lungs clear bilaterally with good aeration. No adventitious breath sounds.   Abdomen: Soft, nonsurgical. Nontender. No peritoneal signs. Normoactive bowel sounds.   Extremities: No peripheral edema.   Skin: No rash.   Neuro: No focal neurologic deficits, NIH score of 3.  Slight nasolabial flattening present and there is also some difficulty remembering what year it was but the patient is able to answer other questions appropriately.  The patient also received +1 as he was having some extinction on the left arm compared to the right.  Otherwise unremarkable exam.      Medical decision making:   Testing: Urinalysis negative troponin x 2 is negative magnesium 2.15 CBC showed creatinine 1.31 lactate was normal and remaining testing was also normal chest x-ray did not show acute findings interpreted by myself and radiology noted stable cardiomegaly and bilateral calcified pleural plaques seen on previous process.  CT scan of the head is unremarkable as interpreted by radiology  Plan: Home-going.  Discussed differential. Will follow-up with the primary physician in the next 2-3 days. Return if worse. They understand return  precautions and discharge instructions. Patient and family/friend/caregiver are in agreement with this plan. 89-year-old male presents to the emergency room for complaints of generalized weakness urinary urgency and frequency.  The patient has a past medical history of a right MCA stroke A-fib, on Eliquis long-term heart failure COPD coronary disease dementia GI bleeds hyperlipidemia and hypertension.  The patient was brought by EMS at this time he states that the family called as they were concerned but the patient states that he has not been himself for the past few days and they believe that he might be having a urinary tract infection.  Upon presentation I asked the patient about what was going on and what brought him here at this time and the patient states that he is not really sure and states that he just feels tired.  The patient states he has no acute symptoms denies any recent falls or trauma denies any pain anywhere.  I spoke with the patient's daughter at length over the phone and she explained to me that over the past week the patient has become increasingly weak especially with the past 4 days he just seems to not be acting himself and seems to be having a hard time caring for himself.  She states that she does does live with them to help care for him but he usually manages his own medications and also usually is able to feed himself and dress himself but she is concerned he may have a UTI stating that he has had increased urinary urgency and sometimes is wetting himself and appears to have some foul-smelling urine.  She states he just seems very groggy at times as well.  She states that he takes his Eliquis only once a day because he does believe that he needs to take it twice a day.  She states that he has been doing this for at least the past 6 months. Neuro: No focal neurologic deficits, NIH score of 3.  Slight nasolabial flattening present and there is also some difficulty remembering what year it  was but the patient is able to answer other questions appropriately.  The patient also received +1 as he was having some extinction on the left arm compared to the right.  Otherwise unremarkable exam.  I explained to the patient the test results at this time felt that we should admit him.  The patient was in agreement this plan and was agreeable to this plan.  I spoke with the family as well who is also in agreement of this plan.  The care of the patient was transitioned to the hospitalist team at this time for further management workup of the generalized weakness and altered mental status.  Impression:   1.  AMS  2.  Generalized weakness        History provided by:  Patient   used: No            Patient History   Medical History[1]  Surgical History[2]  Family History[3]  Social History[4]    Physical Exam   ED Triage Vitals   Temp Heart Rate Resp BP   07/23/25 1301 07/23/25 1301 07/23/25 1301 07/23/25 1301   36.7 °C (98.1 °F) 67 18 139/82      SpO2 Temp Source Heart Rate Source Patient Position   07/23/25 1301 07/23/25 1301 07/23/25 1650 07/23/25 1430   100 % Temporal Monitor Sitting      BP Location FiO2 (%)     07/23/25 1430 --     Right arm        Physical Exam      ED Course & MDM   Diagnoses as of 07/23/25 2329   Generalized weakness   Altered mental status, unspecified altered mental status type                 No data recorded     [place] Coma Scale Score: 14 (07/23/25 2100 : Jhoana Christina, RN)       NIH Stroke Scale: 3 (07/23/25 1700 : Maite Gallardo RN)                   Medical Decision Making      Procedure  Procedures       [1]   Past Medical History:  Diagnosis Date    Acute right MCA stroke (Multi)     Angiodysplasia of intestine     Atrial fibrillation and flutter     Chronic systolic heart failure     COPD (chronic obstructive pulmonary disease) (Multi)     Coronary artery disease     Dementia     GI bleed     HLD (hyperlipidemia)     Hypertension    [2]   Past Surgical  History:  Procedure Laterality Date    APPENDECTOMY  2015    Appendectomy    CARDIAC DEFIBRILLATOR PLACEMENT      CORONARY ARTERY BYPASS GRAFT  2017    CABG    CT ABDOMEN PELVIS ANGIOGRAM W AND/OR WO IV CONTRAST  2022    CT ABDOMEN PELVIS ANGIOGRAM W AND/OR WO IV CONTRAST 2022 [facility]    CT ANGIO NECK  2023    CT NECK ANGIO W AND WO IV CONTRAST GEN CT    CT HEAD ANGIO W AND WO IV CONTRAST  2023    CT HEAD ANGIO W AND WO IV CONTRAST GEN CT    OTHER SURGICAL HISTORY  2020    Coronary artery stent placement    TOTAL KNEE ARTHROPLASTY Left     TOTAL KNEE ARTHROPLASTY Right    [3]   Family History  Problem Relation Name Age of Onset    Coronary artery disease Father     [4]   Social History  Tobacco Use    Smoking status: Former     Current packs/day: 0.00     Average packs/day: 1 pack/day for 49.0 years (49.0 ttl pk-yrs)     Types: Cigarettes     Start date:      Quit date:      Years since quittin.5    Smokeless tobacco: Never   Vaping Use    Vaping status: Never Used   Substance Use Topics    Alcohol use: Not Currently     Comment: occasional    Drug use: Never        Frank Chi PA-C  25 [id number]

## 2025-07-24 NOTE — PROGRESS NOTES
Occupational Therapy    Evaluation    Patient Name: Gilmer Quiñonez  MRN: [id number]  Department:   Room: [address] [place] [address]  Today's Date: 7/24/2025  Time Calculation  Start Time: 1024  Stop Time: 1040  Time Calculation (min): 16 min    Assessment  IP OT Assessment  OT Assessment: RN cleared. PT/OT co-eval to maximize participation/safety. OT orders received and occupational profile established via interview method, data gathering, and review of medical records to determine low complexity and establish OT POC. At this time, pt displays minimal decline from PLOF including diminished- ADL independence and functional mobility 2/2 to acute inpatient hospitalization due to  generalized weakness and increased urinary urgency. Pt would benefit from OT services to address deficit areas to restore QOL/safety. RN notified.  Prognosis: Good  Evaluation/Treatment Tolerance: Patient tolerated treatment well  Medical Staff Made Aware: Yes  End of Session Communication: Bedside nurse  End of Session Patient Position: Up in chair, Alarm on    Plan:  Treatment Interventions: ADL retraining, Functional transfer training, UE strengthening/ROM, Endurance training, Patient/family training, Neuromuscular reeducation  OT Frequency: 2 times per week  OT Discharge Recommendations: Low intensity level of continued care  OT Recommended Transfer Status: Stand by assist, Assist of 1  OT - OK to Discharge: Yes    Subjective   Current Problem:  1. Generalized weakness        2. Altered mental status, unspecified altered mental status type        3. TIA (transient ischemic attack)  Transthoracic Echo Complete    Transthoracic Echo Complete      4. 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  Co-Treatment: PT  Co-Treatment Reason: pt complexity  Prior to Session Communication: Bedside nurse  Patient Position Received: Up in chair, Alarm on  Preferred Learning Style: auditory, verbal  General Comment: Pt seated in chair, family/friend with him. Agreeable to work with therapy to complete evaluation.    Precautions:  Medical Precautions: Fall precautions    Pain:  Pain Assessment  Pain Assessment: 0-10  0-10 (Numeric) Pain Score: 0 - No pain    Objective   Cognition:  Overall Cognitive Status: Impaired at baseline  Orientation Level: Disoriented to place, Disoriented to time, Disoriented to situation  Following Commands: Follows one step commands with repetition  Attention: Exceptions to WFL  Sustained Attention: Impaired  Memory: Exceptions to WFL  Safety/Judgement: Exceptions to WFL  Impulsive: Mildly    Home Living:  Type of Home: House  Lives With:  (Dtr)  Home Adaptive Equipment: Walker rolling or standard, Cane  Home Layout: Multi-level, Able to live on main level with bedroom/bathroom  Home Access: Stairs to enter without rails  Entrance Stairs-Rails: None  Entrance Stairs-Number of Steps: 1  Bathroom Shower/Tub: Walk-in shower  Bathroom Toilet: Standard  Bathroom Equipment: Shower chair with back  Bathroom Accessibility: No concerns.  Home Living Comments: Sleeps in reg bed.     Prior Function:  Level of [place]: Independent with ADLs and functional transfers, Needs assistance with homemaking  Receives Help From: Family  ADL Assistance: Independent  Homemaking Assistance: Needs assistance  Meal Prep: Independent (Simple meal prep/snacks)  Laundry: Total  Vacuuming: Total  Cleaning: Total  Gardening: Total  Yard Work: Total  Driving/Transportation: Total  Shopping: Total  Homemaking Assistance Comments: Dtr completes all  IADLs  Ambulatory Assistance: Independent (WW)  Hand Dominance: Right  Prior Function Comments: Per dtr- pt able to dress and feed himself, ambulates with WW. She assists with all IADLS.No reported recent falls.    IADL History:  Homemaking Responsibilities: No  Current License: No  Mode of Transportation: Family    ADL:  Eating Assistance: Stand by (Obs)  Eating Deficit: Setup  Grooming Assistance: Stand by (anticipated)  Grooming Deficit: Setup, Verbal cueing, Supervision/safety  Bathing Assistance: Stand by (anticipated)  Bathing Deficit: Setup, Increased time to complete , Supervision/safety, Verbal cueing  UE Dressing Assistance: Minimal  UE Dressing Deficit: Fasteners  LE Dressing Assistance: Stand by  LE Dressing Deficit: Setup, Supervision/safety  Toileting Assistance with Device: Stand by (anticipated)  Toileting Deficit: Setup, Verbal cueing, Supervison/safety, Steadying  Functional Assistance: Stand by  Functional Deficit: Steadying, Supervision/safety, Verbal cueing    Activity Tolerance:  Endurance: Endurance does not limit participation in activity    Bed Mobility/Transfers:   Transfers  Transfer: Yes  Transfer 1  Transfer From 1: Sit to  Transfer to 1: Stand  Technique 1: Sit to stand, Stand to sit  Transfer Device 1: Walker  Transfer Level of Assistance 1: Close supervision  Trials/Comments 1: mult trials from varied positions with consistent RUSH      Functional Mobility:  Functional Mobility  Functional Mobility Performed: Yes  Functional Mobility 1  Surface 1: Level tile  Device 1: Rolling walker  Functional Mobility Support Devices: Gait belt  Assistance 1: Contact guard  Comments 1: Initial use of FWW out of room into hallway-- trial AD without as secondly. LOB occurring without AD when re-entering room although pt observed to be 'pretending to box'.    Sitting Balance:  Static Sitting Balance  Static Sitting-Balance Support: Feet supported  Static Sitting-Level of Assistance: Distant  supervision  Dynamic Sitting Balance  Dynamic Sitting-Balance Support: Feet supported  Dynamic Sitting-Level of Assistance: Close supervision    Standing Balance:  Static Standing Balance  Static Standing-Balance Support: Bilateral upper extremity supported  Static Standing-Level of Assistance: Close supervision  Dynamic Standing Balance  Dynamic Standing-Balance Support: Bilateral upper extremity supported, No upper extremity supported  Dynamic Standing-Level of Assistance: Contact guard    IADL's:   Homemaking Responsibilities: No  Current License: No  Mode of Transportation: Family    Vision:   Vision - Basic Assessment  Current Vision: No visual deficits    Sensation:  Light Touch: No apparent deficits    Strength:  Strength Comments: BUE grossly 4/5    Coordination:  Movements are Fluid and Coordinated: Yes     Hand Function:  Hand Function  Gross Grasp: Functional  Coordination: Functional    Extremities:   RUE   RUE : Within Functional Limits and LUE   LUE: Within Functional Limits    Outcome Measures:   [facility] Daily Activity  Putting on and taking off regular lower body clothing: A little  Bathing (including washing, rinsing, drying): A little  Putting on and taking off regular upper body clothing: A little  Toileting, which includes using toilet, bedpan or urinal: A little  Taking care of personal grooming such as brushing teeth: A little  Eating Meals: A little  Daily Activity - Total Score: 18    Education Documentation  No documentation found.  Education Comments  No comments found.      Goals:   Encounter Problems       Encounter Problems (Active)       ADLs       Patient with complete upper body dressing with modified independent level of assistance donning and doffing all UE clothes with PRN adaptive equipment while supported sitting       Start:  07/24/25    Expected End:  08/02/25            Patient with complete lower body dressing with modified independent level of assistance donning and doffing  all LE clothes  with PRN adaptive equipment while supported sitting       Start:  07/24/25    Expected End:  08/02/25            Patient will complete daily grooming tasks seated at sink with modified independent level of assistance and PRN adaptive equipment while supported sitting.       Start:  07/24/25    Expected End:  08/02/25            Patient will complete toileting including hygiene clothing management/hygiene with modified independent level of assistance and raised toilet seat and grab bars.       Start:  07/24/25    Expected End:  08/02/25               BALANCE       Pt will maintain dynamic standing balance during ADL task with modified independent level of assistance in order to demonstrate decreased risk of falling and improved postural control.       Start:  07/24/25    Expected End:  08/02/25               MOBILITY       Patient will perform Functional mobility min Household distances/Community Distances with modified independent level of assistance and least restrictive device in order to improve safety and functional mobility.       Start:  07/24/25    Expected End:  08/02/25               TRANSFERS       Patient will perform bed mobility modified independent level of assistance and bed rails in order to improve safety and independence with mobility       Start:  07/24/25    Expected End:  08/02/25            Patient will complete sit to stand transfer with modified independent level of assistance and least restrictive device in order to improve safety and prepare for out of bed mobility.       Start:  07/24/25    Expected End:  08/02/25

## 2025-07-24 NOTE — CARE PLAN
The patient's goals for the shift include      The clinical goals for the shift include Patient to be free from injury throughout this shift      Problem: Pain - Adult  Goal: Verbalizes/displays adequate comfort level or baseline comfort level  Outcome: Progressing     Problem: Safety - Adult  Goal: Free from fall injury  Outcome: Progressing     Problem: Discharge Planning  Goal: Discharge to home or other facility with appropriate resources  Outcome: Progressing     Problem: Chronic Conditions and Co-morbidities  Goal: Patient's chronic conditions and co-morbidity symptoms are monitored and maintained or improved  Outcome: Progressing     Problem: Nutrition  Goal: Nutrient intake appropriate for maintaining nutritional needs  Outcome: Progressing     Problem: General Stroke  Goal: Establish a mutual long term goal with patient by discharge  Outcome: Progressing  Goal: Demonstrate improvement in neurological exam throughout the shift  Outcome: Progressing  Goal: Maintain BP within ordered limits throughout shift  Outcome: Progressing  Goal: Participate in treatment (ie., meds, therapy) throughout shift  Outcome: Progressing  Goal: No symptoms of aspiration throughout shift  Outcome: Progressing  Goal: No symptoms of hemorrhage throughout shift  Outcome: Progressing  Goal: Tolerate enteral feeding throughout shift  Outcome: Progressing  Goal: Decreased nausea/vomiting throughout shift  Outcome: Progressing  Goal: Controlled blood glucose throughout shift  Outcome: Progressing  Goal: Out of bed three times today  Outcome: Progressing     Problem: ICU Stroke  Goal: Maintain ICP within ordered limits throughout shift  Outcome: Progressing  Goal: Tolerate EVD clamping trial throughout shift  Outcome: Progressing  Goal: Tolerate ventilator weaning trial during shift  Outcome: Progressing  Goal: Maintain patent airway throughout shift  Outcome: Progressing  Goal: Achieve/maintain targeted sodium level throughout  shift  Outcome: Progressing     Problem: Skin  Goal: Decreased wound size/increased tissue granulation at next dressing change  Outcome: Progressing  Goal: Participates in plan/prevention/treatment measures  Outcome: Progressing  Goal: Prevent/manage excess moisture  Outcome: Progressing  Goal: Prevent/minimize sheer/friction injuries  Outcome: Progressing  Goal: Promote/optimize nutrition  Outcome: Progressing  Goal: Promote skin healing  Outcome: Progressing

## 2025-07-24 NOTE — DISCHARGE SUMMARY
Discharge Diagnosis  Altered mental state      Issues Requiring Follow-Up      Discharge Meds     Medication List      ASK your doctor about these medications     apixaban 2.5 mg tablet; Commonly known as: Eliquis; Take 1 tablet (2.5   mg) by mouth 2 times a day.   metoprolol succinate XL 25 mg 24 hr tablet; Commonly known as:   Toprol-XL; Take 1 tablet (25 mg) by mouth once daily.   rosuvastatin 40 mg tablet; Commonly known as: Crestor; Take 1 tablet (40   mg) by mouth once daily.   tamsulosin 0.4 mg 24 hr capsule; Commonly known as: Flomax; Take 1   capsule (0.4 mg) by mouth once daily.   thiamine 100 mg tablet; Commonly known as: Vitamin B-1; Take 1 tablet   (100 mg) by mouth once daily. Do not fill before March 31, 2025.   torsemide 20 mg tablet; Commonly known as: Demadex; Take 2 tablets (40   mg) by mouth once daily. Hold this medication on discharge until you have   repeat labs to monitor renal function       Test Results Pending At Discharge  Pending Labs       Order Current Status    Extra Urine Gray Tube In process    Urinalysis with Reflex Culture and Microscopic In process            Hospital Course   Gilmer Quiñonez is a very pleasant 89 year old gentleman with a history of HLD, CAD s/p CABG, ischemic cardiomyopathy s/p ICD (LVEF 25-30%), CKD, CVA, Atrial fibrillation (Eliquis), dementia, GI bleed and COPD, presented to the ER with general weakness and urinary frequency.  Family called EMS due to change in mental status and general fatigue, felt he was not acting like himself. This morning he is pleasant A&Ox1. Denies chest pain, shortness of breath or heart palpitations. Denies any general pain. Daughter would like him to come home today, he is negative for a UTI. He states he does not drink much water at home prefers to drink soda.      Altered mental status   General Weakness  Chronic Atrial fibrillation   HTN   HLD   CVA   Ischemic cardiomyopathy s/p ICD (LVEF 25-30%)  CAD   Dementia      Altered  mental status   General Weakness  -Urine negative for a UTI   -Normal saline 100 ml/hr  -physical therapy   -altered mental status secondary to dementia      Chronic Atrial fibrillation   HTN   HLD   CVA   Ischemic cardiomyopathy s/p ICD (LVEF 25-30%)  CAD   -continue Eliquis 2.5 mg twice a day and Aspirin 81 mg daily   -continue Metoprolol ER 25 mg daily and Crestor 40 mg daily   -continue Demadex 20 mg daily      Dementia   -currently lives at home with his daughter  -baseline cognition today      GI ppx: PPI-protonix  DVT ppx: Eliquis   Fluids: As ordered  Electrolytes: replace as needed  Nutrition: Regular diet   Adjuncts: PIV  Code Status: Full code  Pertinent Physical Exam At Time of Discharge  Physical Exam  Vitals reviewed.   HENT:      Head: Normocephalic.      Nose: Nose normal.   Eyes:      Pupils: Pupils are equal, round, and reactive to light.   Cardiovascular:      Rate and Rhythm: Normal rate Irregularly Irregular   Pulmonary:      Effort: Pulmonary effort is normal.      Breath sounds: Normal breath sounds.   Abdominal:      General: Abdomen is flat.      Palpations: Abdomen is soft.   Musculoskeletal:         General: Normal range of motion.      Cervical back: Normal range of motion.   Skin:     General: Skin is warm and dry.   Neurological:      General: No focal deficit present.      Mental Status: He is alert and oriented to person, place, and time.   Psychiatric:         Mood and Affect: Mood normal.   Alert and oriented x1  Extremities no pedal edema bilaterally          Outpatient Follow-Up  No future appointments.      Sherine Marie, APRN-CNP

## 2025-07-24 NOTE — H&P
History Of Present Illness:    Gilmer Quiñonez is a very pleasant 89 year old gentleman with a history of HLD, CAD s/p CABG, ischemic cardiomyopathy s/p ICD (LVEF 25-30%), CKD, CVA, Atrial fibrillation (Eliquis), dementia, GI bleed and COPD, presented to the ER with general weakness and urinary frequency.  Family called EMS due to change in mental status and general fatigue, felt he was not acting like himself. This morning he is pleasant A&Ox1. Denies chest pain, shortness of breath or heart palpitations. Denies any general pain. Daughter would like him to come home today, he is negative for a UTI. He states he does not drink much water at home prefers to drink soda.     Review of Systems   Constitutional: Positive for malaise/fatigue.   HENT: Negative.     Eyes: Negative.    Cardiovascular: Negative.    Respiratory: Negative.     Endocrine: Negative.    Hematologic/Lymphatic: Negative.    Skin: Negative.    Musculoskeletal:  Positive for muscle weakness and myalgias.   Gastrointestinal: Negative.    Neurological:  Positive for weakness.   Psychiatric/Behavioral:  Positive for altered mental status.          Last Recorded Vitals:  Vitals:    07/24/25 0000 07/24/25 0400 07/24/25 0714 07/24/25 0800   BP:   138/71    BP Location:   Right arm    Patient Position:   Lying    Pulse: 66 (!) 46 53 50   Resp:   16    Temp:   36.6 °C (97.9 °F)    TempSrc:   Temporal    SpO2: 97% 97% 94% 96%   Weight:       Height:           Last Labs:  CBC - 7/24/2025:  7:15 AM  5.1 10.7 137    35.8      CMP - 7/24/2025:  7:15 AM  8.6 7.6 13 --- 0.8   _ 4.5 7 66      PTT - No results in last year.  1.2   12.8 _     Troponin I, High Sensitivity   Date/Time Value Ref Range Status   07/23/2025 02:21 PM 17 0 - 20 ng/L Final   07/23/2025 01:17 PM 19 0 - 20 ng/L Final   05/02/2025 06:19 PM 15 0 - 20 ng/L Final     BNP   Date/Time Value Ref Range Status   07/23/2025 01:17  (H) 0 - 99 pg/mL Final   03/28/2025 06:32  (H) 0 - 99 pg/mL  Final     Hemoglobin A1C   Date/Time Value Ref Range Status   07/23/2025 01:17 PM 5.7 (H) See comment % Final   01/06/2025 09:47 AM 5.8 (H) See comment % Final     LDL Calculated   Date/Time Value Ref Range Status   07/24/2025 07:15 AM 52 <=99 mg/dL Final     Comment:                                 Near   Borderline      AGE      Desirable  Optimal    High     High     Very High     0-19 Y     0 - 109     ---    110-129   >/= 130     ----    20-24 Y     0 - 119     ---    120-159   >/= 160     ----      >24 Y     0 -  99   100-129  130-159   160-189     >/=190    LDL Cholesterol is calculated using the Friedewald equation.   01/06/2025 09:47 AM 77 <=99 mg/dL Final     Comment:                                 Near   Borderline      AGE      Desirable  Optimal    High     High     Very High     0-19 Y     0 - 109     ---    110-129   >/= 130     ----    20-24 Y     0 - 119     ---    120-159   >/= 160     ----      >24 Y     0 -  99   100-129  130-159   160-189     >/=190     08/30/2024 06:55 AM 71 <=99 mg/dL Final     Comment:                                 Near   Borderline      AGE      Desirable  Optimal    High     High     Very High     0-19 Y     0 - 109     ---    110-129   >/= 130     ----    20-24 Y     0 - 119     ---    120-159   >/= 160     ----      >24 Y     0 -  99   100-129  130-159   160-189     >/=190       VLDL   Date/Time Value Ref Range Status   07/24/2025 07:15 AM 12 0 - 40 mg/dL Final   01/06/2025 09:47 AM 13 0 - 40 mg/dL Final   08/30/2024 06:55 AM 18 0 - 40 mg/dL Final      Last I/O:  I/O last 3 completed shifts:  In: 1120 (15.7 mL/kg) [I.V.:120 (1.7 mL/kg); IV Piggyback:1000]  Out: - (0 mL/kg)   Weight: 71.5 kg     Past Cardiology Tests (Last 3 Years):  EKG:  ECG 12 lead 05/02/2025 atrial fibrillation     Echo:  Transthoracic Echo (TTE) Complete 08/26/2024  1. Left ventricular ejection fraction is severely decreased, by visual estimate at 25-30%.   2. There is global hypokinesis of the  left ventricle with minor regional variations.   3. Spectral Doppler shows an abnormal pattern of left ventricular diastolic filling.   4. Left ventricular cavity size is mildly dilated.   5. There is moderate concentric left ventricular hypertrophy.   6. There is mildly reduced right ventricular systolic function.   7. Mildly enlarged right ventricle.   8. The left atrium is severely dilated.   9. The right atrium is moderately dilated.  10. The patient is in atrial fibrillation which may influence the estimate of left ventricular function and transvalvular flows.    Ejection Fractions:  EF   Date/Time Value Ref Range Status   08/26/2024 04:45 PM 28 %      Cath:  Heart cath 10/31/2016   1. Left main: patent stent into left Cx with mild ISR.   2. LAD: 100% ostial occlusion.   3. LCx: patient LM-proximal LCx stent with mild ISR, mild diffuse disease in vessel proper.   4. RCA: 95% ostial disease, 100% prox-mid  with right to right collaterals.   5. Grafts: (1) WENDY to LAD (and jump to diagonal) patent (2) LIMA to ramus patent (3) SVG to RPDA with 99% ISR at distal anastomosis.   6. LVEDP 6mmHg, no significant aortic stenosis on LV-AO gradient.    Stress Test:  No results found for this or any previous visit from the past 1095 days.     Imaging:  Chest x-ray   1.  No acute cardiopulmonary process.  2.  Stable cardiomegaly.  3.  Bilateral calcified pleural plaques.    Head CT 7/23/2025  NO ACUTE INTRACRANIAL PROCESS     Past Medical History:  He has a past medical history of Acute right MCA stroke (Multi), Angiodysplasia of intestine, Atrial fibrillation and flutter, Chronic systolic heart failure, COPD (chronic obstructive pulmonary disease) (Multi), Coronary artery disease, Dementia, GI bleed, HLD (hyperlipidemia), and Hypertension.    Past Surgical History:  He has a past surgical history that includes Appendectomy (02/06/2015); Other surgical history (01/06/2020); Coronary artery bypass graft (07/17/2017); CT  angio abdomen pelvis w and or wo IV IV contrast (02/28/2022); CT angio head w and wo IV contrast (04/01/2023); CT angio neck (04/01/2023); Cardiac defibrillator placement; Total knee arthroplasty (Left); and Total knee arthroplasty (Right).      Social History:  He reports that he quit smoking about 27 years ago. His smoking use included cigarettes. He started smoking about 76 years ago. He has a 49 pack-year smoking history. He has never used smokeless tobacco. He reports that he does not currently use alcohol. He reports that he does not use drugs.    Family History:  Family History[1]     Allergies:  Penicillins, Rivaroxaban, and Morphine    Inpatient Medications:  Scheduled Medications[2]  PRN Medications[3]  Continuous Medications[4]  Outpatient Medications:  Current Outpatient Medications   Medication Instructions    apixaban (ELIQUIS) 2.5 mg, oral, 2 times daily    metoprolol succinate XL (TOPROL-XL) 25 mg, oral, Daily    rosuvastatin (CRESTOR) 40 mg, oral, Daily    tamsulosin (FLOMAX) 0.4 mg, oral, Daily    thiamine (VITAMIN B-1) 100 mg, oral, Daily    torsemide (DEMADEX) 40 mg, oral, Daily, Hold this medication on discharge until you have repeat labs to monitor renal function       Physical Exam:  Physical Exam  Vitals reviewed.   HENT:      Head: Normocephalic.      Nose: Nose normal.   Eyes:      Pupils: Pupils are equal, round, and reactive to light.   Cardiovascular:      Rate and Rhythm: Normal rate Irregularly Irregular   Pulmonary:      Effort: Pulmonary effort is normal.      Breath sounds: Normal breath sounds.   Abdominal:      General: Abdomen is flat.      Palpations: Abdomen is soft.   Musculoskeletal:         General: Normal range of motion.      Cervical back: Normal range of motion.   Skin:     General: Skin is warm and dry.   Neurological:      General: No focal deficit present.      Mental Status: He is alert and oriented to person, place, and time.   Psychiatric:         Mood and Affect:  Mood normal.   Alert and oriented x1  Extremities no pedal edema bilaterally          Assessment/Plan   Gilmer Quiñonez is a very pleasant 89 year old gentleman with a history of HLD, CAD s/p CABG, ischemic cardiomyopathy s/p ICD (LVEF 25-30%), CKD, CVA, Atrial fibrillation (Eliquis), dementia, GI bleed and COPD, presented to the ER with general weakness and urinary frequency.  Family called EMS due to change in mental status and general fatigue, felt he was not acting like himself. This morning he is pleasant A&Ox1. Denies chest pain, shortness of breath or heart palpitations. Denies any general pain. Daughter would like him to come home today, he is negative for a UTI. He states he does not drink much water at home prefers to drink soda.     Altered mental status   General Weakness  Chronic Atrial fibrillation   HTN   HLD   CVA   Ischemic cardiomyopathy s/p ICD (LVEF 25-30%)  CAD   Dementia     Altered mental status   General Weakness  -Urine negative for a UTI   -Normal saline 100 ml/hr  -physical therapy     Chronic Atrial fibrillation   HTN   HLD   CVA   Ischemic cardiomyopathy s/p ICD (LVEF 25-30%)  CAD   -continue Eliquis 2.5 mg twice a day and Aspirin 81 mg daily   -continue Metoprolol ER 25 mg daily and Crestor 40 mg daily   -continue Demadex 20 mg daily     Dementia   -currently lives at home with his daughter  -baseline cognition today     GI ppx: PPI-protonix  DVT ppx: Eliquis   Fluids: As ordered  Electrolytes: replace as needed  Nutrition: Regular diet   Adjuncts: PIV  Code Status: Full code     Pt requires inpatient stay at this time.  Peripheral IV 07/23/25 20 G Right Antecubital (Active)   Site Assessment Clean;Dry;Intact 07/23/25 2254   Dressing Status Clean;Dry 07/23/25 2254   Number of days: 1       Code Status:  Full Code    I spent  minutes in the professional and overall care of this patient.        Sherine Marie, APRN-CNP  Attending Attestation:    Patient was seen and examined face to  face, history and physical was taken personally at bedside the APRN-CNP, was present for the whole duration of the exam who participated in the documentation of this note. I performed the medical decision-making components (assessment and plan of care). I have reviewed the documentation and verified the findings in the note as written with additions or exceptions as stated in the body of this note.  Laying in bed in no distress seems to be confused, denies having any chest pain, cough, shortness of breath.  On exam could not appreciate crackles in the lungs, heart is regular, abdomen soft bowel sounds are present and extremity no edema.  Patient does not look like fluid overloaded however he did receive IV fluid in the emergency room, he has and severe ischemic cardiomyopathy with ejection fraction of 25%, his mental status is back to his baseline, his daughter wants to to take patient's home.  He does have follow-up with cardiology, consult PT OT for evaluation but patient medically stable to be discharged home    Dr. Mirta Plaza MD  Internal Medicine        [1]   Family History  Problem Relation Name Age of Onset    Coronary artery disease Father     [2]   Scheduled medications   Medication Dose Route Frequency    apixaban  2.5 mg oral BID    aspirin  81 mg oral Daily    metoprolol succinate XL  25 mg oral Daily    pantoprazole  40 mg oral Daily before breakfast    perflutren lipid microspheres  0.5-10 mL of dilution intravenous Once in imaging    pneumococcal polysaccharide  0.5 mL intramuscular During hospitalization    rosuvastatin  40 mg oral Daily    torsemide  20 mg oral Daily   [3]   PRN medications   Medication    acetaminophen    labetaloL    ondansetron    oxygen    polyethylene glycol    sodium chloride 0.9%   [4]   Continuous Medications   Medication Dose Last Rate    sodium chloride 0.9%  100 mL/hr 100 mL/hr (07/24/25 0612)    sodium chloride 0.9%  10 mL/hr

## 2025-07-31 ENCOUNTER — PATIENT OUTREACH (OUTPATIENT)
Dept: CARE COORDINATION | Facility: CLINIC | Age: 89
End: 2025-07-31
Payer: MEDICARE

## 2025-07-31 NOTE — PROGRESS NOTES
Transitions of care outreach call  and no contact made with patient. Left voicemail message with my name and contact information. Will enroll patient in transtions of care and outreach again to follow up on PCP appointment.     GEORGI Mccauley, RN   [phone]   [facility] Department

## 2025-08-09 LAB
ATRIAL RATE: 258 BPM
Q ONSET: 195 MS
QRS COUNT: 9 BEATS
QRS DURATION: 210 MS
QT INTERVAL: 512 MS
QTC CALCULATION(BAZETT): 489 MS
QTC FREDERICIA: 497 MS
R AXIS: -80 DEGREES
T AXIS: 55 DEGREES
T OFFSET: 451 MS
VENTRICULAR RATE: 55 BPM

## 2025-08-13 ENCOUNTER — APPOINTMENT (OUTPATIENT)
Dept: RADIOLOGY | Facility: HOSPITAL | Age: 89
End: 2025-08-13
Payer: MEDICARE

## 2025-08-13 ENCOUNTER — APPOINTMENT (OUTPATIENT)
Dept: CARDIOLOGY | Facility: HOSPITAL | Age: 89
End: 2025-08-13
Payer: MEDICARE

## 2025-08-13 ENCOUNTER — HOSPITAL ENCOUNTER (EMERGENCY)
Facility: HOSPITAL | Age: 89
Discharge: LEFT BEFORE DISCHARGE | End: 2025-08-14
Attending: EMERGENCY MEDICINE
Payer: MEDICARE

## 2025-08-13 ENCOUNTER — HOSPITAL ENCOUNTER (EMERGENCY)
Facility: HOSPITAL | Age: 89
Discharge: STILL ADMITTED OR ADMINISTRATIVE | End: 2025-08-14
Payer: MEDICARE

## 2025-08-13 DIAGNOSIS — R10.33 PERIUMBILICAL PAIN: ICD-10-CM

## 2025-08-13 DIAGNOSIS — I25.10 CORONARY ARTERY DISEASE DUE TO LIPID RICH PLAQUE: ICD-10-CM

## 2025-08-13 DIAGNOSIS — I25.83 CORONARY ARTERY DISEASE DUE TO LIPID RICH PLAQUE: ICD-10-CM

## 2025-08-13 DIAGNOSIS — R07.9 NONSPECIFIC CHEST PAIN: ICD-10-CM

## 2025-08-13 DIAGNOSIS — Z95.810 AICD (AUTOMATIC CARDIOVERTER/DEFIBRILLATOR) PRESENT: ICD-10-CM

## 2025-08-13 DIAGNOSIS — K31.89 GASTRIC VOLVULUS: Primary | ICD-10-CM

## 2025-08-13 LAB
ALBUMIN SERPL BCP-MCNC: 4.4 G/DL (ref 3.4–5)
ALP SERPL-CCNC: 65 U/L (ref 33–136)
ALT SERPL W P-5'-P-CCNC: 7 U/L (ref 10–52)
ANION GAP SERPL CALC-SCNC: 13 MMOL/L (ref 10–20)
APPEARANCE UR: CLEAR
AST SERPL W P-5'-P-CCNC: 13 U/L (ref 9–39)
BASOPHILS # BLD AUTO: 0.03 X10*3/UL (ref 0–0.1)
BASOPHILS NFR BLD AUTO: 0.7 %
BILIRUB SERPL-MCNC: 1 MG/DL (ref 0–1.2)
BILIRUB UR STRIP.AUTO-MCNC: NEGATIVE MG/DL
BNP SERPL-MCNC: 520 PG/ML (ref 0–99)
BUN SERPL-MCNC: 14 MG/DL (ref 6–23)
CALCIUM SERPL-MCNC: 9.6 MG/DL (ref 8.6–10.3)
CARDIAC TROPONIN I PNL SERPL HS: 15 NG/L (ref 0–20)
CARDIAC TROPONIN I PNL SERPL HS: 16 NG/L (ref 0–20)
CHLORIDE SERPL-SCNC: 100 MMOL/L (ref 98–107)
CO2 SERPL-SCNC: 30 MMOL/L (ref 21–32)
COLOR UR: COLORLESS
CREAT SERPL-MCNC: 1.25 MG/DL (ref 0.5–1.3)
EGFRCR SERPLBLD CKD-EPI 2021: 56 ML/MIN/1.73M*2
EOSINOPHIL # BLD AUTO: 0.23 X10*3/UL (ref 0–0.4)
EOSINOPHIL NFR BLD AUTO: 5.2 %
ERYTHROCYTE [DISTWIDTH] IN BLOOD BY AUTOMATED COUNT: 17.3 % (ref 11.5–14.5)
GLUCOSE SERPL-MCNC: 104 MG/DL (ref 74–99)
GLUCOSE UR STRIP.AUTO-MCNC: NORMAL MG/DL
HCT VFR BLD AUTO: 36.7 % (ref 41–52)
HGB BLD-MCNC: 11.1 G/DL (ref 13.5–17.5)
IMM GRANULOCYTES # BLD AUTO: 0.02 X10*3/UL (ref 0–0.5)
IMM GRANULOCYTES NFR BLD AUTO: 0.5 % (ref 0–0.9)
KETONES UR STRIP.AUTO-MCNC: NEGATIVE MG/DL
LEUKOCYTE ESTERASE UR QL STRIP.AUTO: NEGATIVE
LIPASE SERPL-CCNC: 17 U/L (ref 9–82)
LYMPHOCYTES # BLD AUTO: 0.84 X10*3/UL (ref 0.8–3)
LYMPHOCYTES NFR BLD AUTO: 18.9 %
MAGNESIUM SERPL-MCNC: 2.14 MG/DL (ref 1.6–2.4)
MCH RBC QN AUTO: 24.7 PG (ref 26–34)
MCHC RBC AUTO-ENTMCNC: 30.2 G/DL (ref 32–36)
MCV RBC AUTO: 82 FL (ref 80–100)
MONOCYTES # BLD AUTO: 0.56 X10*3/UL (ref 0.05–0.8)
MONOCYTES NFR BLD AUTO: 12.6 %
NEUTROPHILS # BLD AUTO: 2.76 X10*3/UL (ref 1.6–5.5)
NEUTROPHILS NFR BLD AUTO: 62.1 %
NITRITE UR QL STRIP.AUTO: NEGATIVE
NRBC BLD-RTO: 0 /100 WBCS (ref 0–0)
PH UR STRIP.AUTO: 5 [PH]
PLATELET # BLD AUTO: 156 X10*3/UL (ref 150–450)
POTASSIUM SERPL-SCNC: 4 MMOL/L (ref 3.5–5.3)
PROT SERPL-MCNC: 7.6 G/DL (ref 6.4–8.2)
PROT UR STRIP.AUTO-MCNC: NEGATIVE MG/DL
RBC # BLD AUTO: 4.5 X10*6/UL (ref 4.5–5.9)
RBC # UR STRIP.AUTO: NEGATIVE MG/DL
SODIUM SERPL-SCNC: 139 MMOL/L (ref 136–145)
SP GR UR STRIP.AUTO: 1.01
UROBILINOGEN UR STRIP.AUTO-MCNC: NORMAL MG/DL
WBC # BLD AUTO: 4.4 X10*3/UL (ref 4.4–11.3)

## 2025-08-13 PROCEDURE — 80053 COMPREHEN METABOLIC PANEL: CPT | Performed by: EMERGENCY MEDICINE

## 2025-08-13 PROCEDURE — 93005 ELECTROCARDIOGRAM TRACING: CPT

## 2025-08-13 PROCEDURE — 83690 ASSAY OF LIPASE: CPT | Performed by: EMERGENCY MEDICINE

## 2025-08-13 PROCEDURE — 4500999001 HC ED NO CHARGE

## 2025-08-13 PROCEDURE — 81003 URINALYSIS AUTO W/O SCOPE: CPT | Performed by: EMERGENCY MEDICINE

## 2025-08-13 PROCEDURE — 2500000001 HC RX 250 WO HCPCS SELF ADMINISTERED DRUGS (ALT 637 FOR MEDICARE OP)

## 2025-08-13 PROCEDURE — 84484 ASSAY OF TROPONIN QUANT: CPT | Performed by: EMERGENCY MEDICINE

## 2025-08-13 PROCEDURE — 71250 CT THORAX DX C-: CPT

## 2025-08-13 PROCEDURE — 83735 ASSAY OF MAGNESIUM: CPT | Performed by: EMERGENCY MEDICINE

## 2025-08-13 PROCEDURE — 83880 ASSAY OF NATRIURETIC PEPTIDE: CPT | Performed by: EMERGENCY MEDICINE

## 2025-08-13 PROCEDURE — 85025 COMPLETE CBC W/AUTO DIFF WBC: CPT | Performed by: EMERGENCY MEDICINE

## 2025-08-13 PROCEDURE — 74176 CT ABD & PELVIS W/O CONTRAST: CPT | Mod: FOREIGN READ | Performed by: RADIOLOGY

## 2025-08-13 PROCEDURE — 99285 EMERGENCY DEPT VISIT HI MDM: CPT | Mod: 25

## 2025-08-13 PROCEDURE — 36415 COLL VENOUS BLD VENIPUNCTURE: CPT | Performed by: EMERGENCY MEDICINE

## 2025-08-13 PROCEDURE — 71250 CT THORAX DX C-: CPT | Mod: FOREIGN READ | Performed by: RADIOLOGY

## 2025-08-13 RX ORDER — HYDROCODONE BITARTRATE AND ACETAMINOPHEN 5; 325 MG/1; MG/1
TABLET ORAL
Status: COMPLETED
Start: 2025-08-13 | End: 2025-08-13

## 2025-08-13 RX ORDER — HYDROCODONE BITARTRATE AND ACETAMINOPHEN 5; 325 MG/1; MG/1
1 TABLET ORAL ONCE
Refills: 0 | Status: COMPLETED | OUTPATIENT
Start: 2025-08-13 | End: 2025-08-13

## 2025-08-13 RX ADMIN — HYDROCODONE BITARTRATE AND ACETAMINOPHEN 1 TABLET: 5; 325 TABLET ORAL at 18:45

## 2025-08-13 ASSESSMENT — PAIN DESCRIPTION - PAIN TYPE: TYPE: ACUTE PAIN

## 2025-08-13 ASSESSMENT — HEART SCORE
HISTORY: SLIGHTLY SUSPICIOUS
RISK FACTORS: >2 RISK FACTORS OR HX OF ATHEROSCLEROTIC DISEASE
ECG: NON-SPECIFIC REPOLARIZATION DISTURBANCE
AGE: 65+

## 2025-08-13 ASSESSMENT — PAIN DESCRIPTION - DESCRIPTORS
DESCRIPTORS: ACHING
DESCRIPTORS: ACHING

## 2025-08-13 ASSESSMENT — PAIN DESCRIPTION - FREQUENCY: FREQUENCY: CONSTANT/CONTINUOUS

## 2025-08-13 ASSESSMENT — PAIN DESCRIPTION - ORIENTATION: ORIENTATION: MID

## 2025-08-13 ASSESSMENT — PAIN SCALES - GENERAL
PAINLEVEL_OUTOF10: 8
PAINLEVEL_OUTOF10: 4

## 2025-08-13 ASSESSMENT — PAIN DESCRIPTION - LOCATION: LOCATION: ABDOMEN

## 2025-08-13 ASSESSMENT — PAIN - FUNCTIONAL ASSESSMENT: PAIN_FUNCTIONAL_ASSESSMENT: 0-10

## 2025-08-14 VITALS
SYSTOLIC BLOOD PRESSURE: 137 MMHG | DIASTOLIC BLOOD PRESSURE: 84 MMHG | HEART RATE: 55 BPM | BODY MASS INDEX: 24.33 KG/M2 | WEIGHT: 155 LBS | OXYGEN SATURATION: 98 % | RESPIRATION RATE: 16 BRPM | HEIGHT: 67 IN | TEMPERATURE: 96.7 F

## 2025-08-14 LAB — HOLD SPECIMEN: NORMAL

## 2025-08-17 LAB
ATRIAL RATE: 74 BPM
Q ONSET: 159 MS
QRS COUNT: 13 BEATS
QRS DURATION: 180 MS
QT INTERVAL: 450 MS
QTC CALCULATION(BAZETT): 495 MS
QTC FREDERICIA: 480 MS
R AXIS: -78 DEGREES
T AXIS: 47 DEGREES
T OFFSET: 384 MS
VENTRICULAR RATE: 73 BPM